# Patient Record
Sex: MALE | Race: WHITE | NOT HISPANIC OR LATINO | URBAN - METROPOLITAN AREA
[De-identification: names, ages, dates, MRNs, and addresses within clinical notes are randomized per-mention and may not be internally consistent; named-entity substitution may affect disease eponyms.]

---

## 2021-03-11 ENCOUNTER — IMMUNIZATION (OUTPATIENT)
Dept: VACCINE CLINIC | Facility: HOSPITAL | Age: 61
End: 2021-03-11

## 2021-03-11 PROCEDURE — 0001A: CPT | Performed by: INTERNAL MEDICINE

## 2021-03-11 PROCEDURE — 91300 HC SARSCOV02 VAC 30MCG/0.3ML IM: CPT | Performed by: INTERNAL MEDICINE

## 2021-04-01 ENCOUNTER — IMMUNIZATION (OUTPATIENT)
Dept: VACCINE CLINIC | Facility: HOSPITAL | Age: 61
End: 2021-04-01

## 2021-04-01 PROCEDURE — 0002A: CPT | Performed by: INTERNAL MEDICINE

## 2021-04-01 PROCEDURE — 91300 HC SARSCOV02 VAC 30MCG/0.3ML IM: CPT | Performed by: INTERNAL MEDICINE

## 2023-05-28 ENCOUNTER — APPOINTMENT (OUTPATIENT)
Dept: CT IMAGING | Facility: HOSPITAL | Age: 63
End: 2023-05-28
Payer: COMMERCIAL

## 2023-05-28 ENCOUNTER — HOSPITAL ENCOUNTER (INPATIENT)
Facility: HOSPITAL | Age: 63
LOS: 7 days | Discharge: HOME | End: 2023-06-04
Attending: EMERGENCY MEDICINE | Admitting: INTERNAL MEDICINE
Payer: COMMERCIAL

## 2023-05-28 ENCOUNTER — APPOINTMENT (OUTPATIENT)
Dept: GENERAL RADIOLOGY | Facility: HOSPITAL | Age: 63
End: 2023-05-28
Payer: COMMERCIAL

## 2023-05-28 DIAGNOSIS — I26.99 ACUTE PULMONARY EMBOLISM, UNSPECIFIED PULMONARY EMBOLISM TYPE, UNSPECIFIED WHETHER ACUTE COR PULMONALE PRESENT: Primary | ICD-10-CM

## 2023-05-28 DIAGNOSIS — R19.04 LEFT LOWER QUADRANT ABDOMINAL MASS: ICD-10-CM

## 2023-05-28 DIAGNOSIS — R93.5 ABNORMAL CT OF THE ABDOMEN: ICD-10-CM

## 2023-05-28 DIAGNOSIS — C43.9 METASTATIC MELANOMA: ICD-10-CM

## 2023-05-28 LAB
ALBUMIN SERPL-MCNC: 3.9 G/DL (ref 3.5–5.2)
ALBUMIN/GLOB SERPL: 0.9 G/DL
ALP SERPL-CCNC: 86 U/L (ref 39–117)
ALT SERPL W P-5'-P-CCNC: 19 U/L (ref 1–41)
ANION GAP SERPL CALCULATED.3IONS-SCNC: 12.9 MMOL/L (ref 5–15)
APTT PPP: 28.3 SECONDS (ref 22.7–35.4)
AST SERPL-CCNC: 11 U/L (ref 1–40)
BASOPHILS # BLD AUTO: 0.03 10*3/MM3 (ref 0–0.2)
BASOPHILS NFR BLD AUTO: 0.3 % (ref 0–1.5)
BILIRUB SERPL-MCNC: 0.8 MG/DL (ref 0–1.2)
BUN SERPL-MCNC: 14 MG/DL (ref 8–23)
BUN/CREAT SERPL: 9.9 (ref 7–25)
CALCIUM SPEC-SCNC: 9.4 MG/DL (ref 8.6–10.5)
CHLORIDE SERPL-SCNC: 99 MMOL/L (ref 98–107)
CO2 SERPL-SCNC: 23.1 MMOL/L (ref 22–29)
CREAT SERPL-MCNC: 1.41 MG/DL (ref 0.76–1.27)
D DIMER PPP FEU-MCNC: >20 MCGFEU/ML (ref 0–0.6)
DEPRECATED RDW RBC AUTO: 41 FL (ref 37–54)
EGFRCR SERPLBLD CKD-EPI 2021: 57.1 ML/MIN/1.73
EOSINOPHIL # BLD AUTO: 0.49 10*3/MM3 (ref 0–0.4)
EOSINOPHIL NFR BLD AUTO: 4.1 % (ref 0.3–6.2)
ERYTHROCYTE [DISTWIDTH] IN BLOOD BY AUTOMATED COUNT: 13.4 % (ref 12.3–15.4)
GLOBULIN UR ELPH-MCNC: 4.5 GM/DL
GLUCOSE SERPL-MCNC: 116 MG/DL (ref 65–99)
HCT VFR BLD AUTO: 39.4 % (ref 37.5–51)
HGB BLD-MCNC: 13.3 G/DL (ref 13–17.7)
HOLD SPECIMEN: NORMAL
HOLD SPECIMEN: NORMAL
IMM GRANULOCYTES # BLD AUTO: 0.04 10*3/MM3 (ref 0–0.05)
IMM GRANULOCYTES NFR BLD AUTO: 0.3 % (ref 0–0.5)
INR PPP: 1.21 (ref 0.9–1.1)
LIPASE SERPL-CCNC: 17 U/L (ref 13–60)
LYMPHOCYTES # BLD AUTO: 1.12 10*3/MM3 (ref 0.7–3.1)
LYMPHOCYTES NFR BLD AUTO: 9.3 % (ref 19.6–45.3)
MAGNESIUM SERPL-MCNC: 2.1 MG/DL (ref 1.6–2.4)
MCH RBC QN AUTO: 28.1 PG (ref 26.6–33)
MCHC RBC AUTO-ENTMCNC: 33.8 G/DL (ref 31.5–35.7)
MCV RBC AUTO: 83.3 FL (ref 79–97)
MONOCYTES # BLD AUTO: 0.93 10*3/MM3 (ref 0.1–0.9)
MONOCYTES NFR BLD AUTO: 7.8 % (ref 5–12)
NEUTROPHILS NFR BLD AUTO: 78.2 % (ref 42.7–76)
NEUTROPHILS NFR BLD AUTO: 9.39 10*3/MM3 (ref 1.7–7)
NRBC BLD AUTO-RTO: 0 /100 WBC (ref 0–0.2)
NT-PROBNP SERPL-MCNC: 460 PG/ML (ref 0–900)
PLATELET # BLD AUTO: 230 10*3/MM3 (ref 140–450)
PMV BLD AUTO: 8.9 FL (ref 6–12)
POTASSIUM SERPL-SCNC: 4.6 MMOL/L (ref 3.5–5.2)
PROT SERPL-MCNC: 8.4 G/DL (ref 6–8.5)
PROTHROMBIN TIME: 15.5 SECONDS (ref 11.7–14.2)
PSA SERPL-MCNC: 1.49 NG/ML (ref 0–4)
RBC # BLD AUTO: 4.73 10*6/MM3 (ref 4.14–5.8)
SODIUM SERPL-SCNC: 135 MMOL/L (ref 136–145)
TROPONIN T SERPL HS-MCNC: 26 NG/L
WBC NRBC COR # BLD: 12 10*3/MM3 (ref 3.4–10.8)
WHOLE BLOOD HOLD COAG: NORMAL
WHOLE BLOOD HOLD SPECIMEN: NORMAL

## 2023-05-28 PROCEDURE — 84484 ASSAY OF TROPONIN QUANT: CPT

## 2023-05-28 PROCEDURE — 93005 ELECTROCARDIOGRAM TRACING: CPT

## 2023-05-28 PROCEDURE — 83735 ASSAY OF MAGNESIUM: CPT

## 2023-05-28 PROCEDURE — 71275 CT ANGIOGRAPHY CHEST: CPT

## 2023-05-28 PROCEDURE — 25010000002 HEPARIN (PORCINE) 25000-0.45 UT/250ML-% SOLUTION: Performed by: EMERGENCY MEDICINE

## 2023-05-28 PROCEDURE — 99284 EMERGENCY DEPT VISIT MOD MDM: CPT

## 2023-05-28 PROCEDURE — 85610 PROTHROMBIN TIME: CPT | Performed by: EMERGENCY MEDICINE

## 2023-05-28 PROCEDURE — 25510000001 IOPAMIDOL PER 1 ML: Performed by: EMERGENCY MEDICINE

## 2023-05-28 PROCEDURE — 71045 X-RAY EXAM CHEST 1 VIEW: CPT

## 2023-05-28 PROCEDURE — 84153 ASSAY OF PSA TOTAL: CPT | Performed by: INTERNAL MEDICINE

## 2023-05-28 PROCEDURE — 93010 ELECTROCARDIOGRAM REPORT: CPT | Performed by: STUDENT IN AN ORGANIZED HEALTH CARE EDUCATION/TRAINING PROGRAM

## 2023-05-28 PROCEDURE — 85025 COMPLETE CBC W/AUTO DIFF WBC: CPT

## 2023-05-28 PROCEDURE — 83880 ASSAY OF NATRIURETIC PEPTIDE: CPT

## 2023-05-28 PROCEDURE — 83690 ASSAY OF LIPASE: CPT | Performed by: EMERGENCY MEDICINE

## 2023-05-28 PROCEDURE — 25010000002 HEPARIN (PORCINE) PER 1000 UNITS: Performed by: EMERGENCY MEDICINE

## 2023-05-28 PROCEDURE — 85730 THROMBOPLASTIN TIME PARTIAL: CPT | Performed by: EMERGENCY MEDICINE

## 2023-05-28 PROCEDURE — 85379 FIBRIN DEGRADATION QUANT: CPT | Performed by: EMERGENCY MEDICINE

## 2023-05-28 PROCEDURE — 80053 COMPREHEN METABOLIC PANEL: CPT

## 2023-05-28 PROCEDURE — 74177 CT ABD & PELVIS W/CONTRAST: CPT

## 2023-05-28 RX ORDER — BISACODYL 10 MG
10 SUPPOSITORY, RECTAL RECTAL DAILY PRN
Status: DISCONTINUED | OUTPATIENT
Start: 2023-05-28 | End: 2023-06-04 | Stop reason: HOSPADM

## 2023-05-28 RX ORDER — ACETAMINOPHEN 325 MG/1
650 TABLET ORAL EVERY 4 HOURS PRN
Status: DISCONTINUED | OUTPATIENT
Start: 2023-05-28 | End: 2023-06-04 | Stop reason: HOSPADM

## 2023-05-28 RX ORDER — HEPARIN SODIUM 10000 [USP'U]/100ML
18 INJECTION, SOLUTION INTRAVENOUS
Status: DISCONTINUED | OUTPATIENT
Start: 2023-05-28 | End: 2023-06-03

## 2023-05-28 RX ORDER — SODIUM CHLORIDE 0.9 % (FLUSH) 0.9 %
10 SYRINGE (ML) INJECTION AS NEEDED
Status: DISCONTINUED | OUTPATIENT
Start: 2023-05-28 | End: 2023-06-04 | Stop reason: HOSPADM

## 2023-05-28 RX ORDER — AMOXICILLIN 250 MG
2 CAPSULE ORAL 2 TIMES DAILY
Status: DISCONTINUED | OUTPATIENT
Start: 2023-05-28 | End: 2023-06-04 | Stop reason: HOSPADM

## 2023-05-28 RX ORDER — UREA 10 %
3 LOTION (ML) TOPICAL NIGHTLY PRN
Status: DISCONTINUED | OUTPATIENT
Start: 2023-05-28 | End: 2023-06-04 | Stop reason: HOSPADM

## 2023-05-28 RX ORDER — ONDANSETRON 2 MG/ML
4 INJECTION INTRAMUSCULAR; INTRAVENOUS EVERY 6 HOURS PRN
Status: DISCONTINUED | OUTPATIENT
Start: 2023-05-28 | End: 2023-06-04 | Stop reason: HOSPADM

## 2023-05-28 RX ORDER — ONDANSETRON 4 MG/1
4 TABLET, FILM COATED ORAL EVERY 6 HOURS PRN
Status: DISCONTINUED | OUTPATIENT
Start: 2023-05-28 | End: 2023-06-04 | Stop reason: HOSPADM

## 2023-05-28 RX ORDER — SODIUM CHLORIDE 9 MG/ML
75 INJECTION, SOLUTION INTRAVENOUS CONTINUOUS
Status: DISCONTINUED | OUTPATIENT
Start: 2023-05-28 | End: 2023-05-31

## 2023-05-28 RX ORDER — HEPARIN SODIUM 5000 [USP'U]/ML
40-80 INJECTION, SOLUTION INTRAVENOUS; SUBCUTANEOUS EVERY 6 HOURS PRN
Status: DISCONTINUED | OUTPATIENT
Start: 2023-05-28 | End: 2023-06-03

## 2023-05-28 RX ORDER — POLYETHYLENE GLYCOL 3350 17 G/17G
17 POWDER, FOR SOLUTION ORAL DAILY PRN
Status: DISCONTINUED | OUTPATIENT
Start: 2023-05-28 | End: 2023-06-04 | Stop reason: HOSPADM

## 2023-05-28 RX ORDER — HEPARIN SODIUM 5000 [USP'U]/ML
80 INJECTION, SOLUTION INTRAVENOUS; SUBCUTANEOUS ONCE
Status: COMPLETED | OUTPATIENT
Start: 2023-05-28 | End: 2023-05-28

## 2023-05-28 RX ORDER — BISACODYL 5 MG/1
5 TABLET, DELAYED RELEASE ORAL DAILY PRN
Status: DISCONTINUED | OUTPATIENT
Start: 2023-05-28 | End: 2023-06-04 | Stop reason: HOSPADM

## 2023-05-28 RX ADMIN — HEPARIN SODIUM 8300 UNITS: 5000 INJECTION INTRAVENOUS; SUBCUTANEOUS at 19:03

## 2023-05-28 RX ADMIN — SODIUM CHLORIDE 75 ML/HR: 9 INJECTION, SOLUTION INTRAVENOUS at 21:42

## 2023-05-28 RX ADMIN — IOPAMIDOL 85 ML: 755 INJECTION, SOLUTION INTRAVENOUS at 18:12

## 2023-05-28 RX ADMIN — HEPARIN SODIUM 18 UNITS/KG/HR: 10000 INJECTION, SOLUTION INTRAVENOUS at 19:05

## 2023-05-28 NOTE — ED PROVIDER NOTES
EMERGENCY DEPARTMENT ENCOUNTER    Room Number:  24/24  Date seen:  5/28/2023  PCP: Mohini Cortes MD  Historian: Patient and friends of the patient  Relevant information provided by sources other than the patient, review of external records, and social determinants of health may be included in the HPI section.      HPI:  Chief Complaint: Multiple issues of concern  Additional historical features obtained directly from: Dr. Cynthia Bond who is a personal friend of the patient is at the bedside to give additional information, and said that they were unaware that he had been feeling poorly but the other day after the patient had a near syncopal episode they went to check on him and she noted that he had a very swollen left leg and he showed her a mass in his left inguinal region that raise concerns and encouraged him to come to the hospital to be seen.  Context: King George is a 62 y.o. male who presents to the ED c/o multiple complaints over the last several weeks.  The patient has been otherwise healthy and is the  of the women's softball team about high school for years.  He admittedly has neglected preventative care and has never had a PCP that he sees often and does not take any medications.  Sounds like over the course the last several months has had some rectal bleeding off and on which he was not seen for but it would just stop.  He for started noticing this growth in his left inguinal region a few weeks ago and it is continually gotten larger and is nontender.  He then started noticing some swelling in his left leg that was worsening, and it would initially get better with elevation but now is not.  Finally, when he was at practice the other day he had a near syncopal episode where he felt very short of breath and had to go sit down.        Initial impression including social determinants which will impact the assessment social determinants limiting care in this case is that the patient has not had any  preventative care or checkups at all recently.  We have no old medical records to go by, and he has no past medical history to go from.  However, he is an extremely nice gentleman who is also good historian and is admittedly upset that he did not seek care sooner.          PAST MEDICAL HISTORY  Active Ambulatory Problems     Diagnosis Date Noted   • No Active Ambulatory Problems     Resolved Ambulatory Problems     Diagnosis Date Noted   • No Resolved Ambulatory Problems     No Additional Past Medical History         PAST SURGICAL HISTORY  History reviewed. No pertinent surgical history.      FAMILY HISTORY  History reviewed. No pertinent family history.      SOCIAL HISTORY  Social History     Socioeconomic History   • Marital status: Unknown   Tobacco Use   • Smoking status: Never   Substance and Sexual Activity   • Alcohol use: Never   • Drug use: Never         ALLERGIES  Patient has no known allergies.          PHYSICAL EXAM  ED Triage Vitals   Temp Heart Rate Resp BP SpO2   05/28/23 1646 05/28/23 1646 05/28/23 1646 05/28/23 1655 05/28/23 1646   99.4 °F (37.4 °C) 114 16 130/77 99 %      Temp src Heart Rate Source Patient Position BP Location FiO2 (%)   05/28/23 1646 05/28/23 1646 -- -- --   Tympanic Monitor            Physical Exam      GENERAL: Awake and alert, not acutely toxic  HENT: nares patent  EYES: no scleral icterus, PERRL, EOMI  CV: Sinus tachycardia, distal pulses symmetric and palpable  RESPIRATORY: normal effort, no respiratory distress and breath sounds are clear  ABDOMEN: soft, nondistended, nontender except for a large firm and nonmobile mass in the left inguinal region consistent with lymphadenopathy.  MUSCULOSKELETAL: no deformity, he has asymmetric swelling of his left lower extremity all the way up to the hip.  There is 1+ pitting edema, and the DP and PT pulses are palpable but somewhat diminished in strength compared to the opposite side.  NEURO: alert, moves all extremities, follows  commands  PSYCH:  calm, cooperative  SKIN: warm, dry with no rash        LAB RESULTS  Recent Results (from the past 24 hour(s))   Comprehensive Metabolic Panel    Collection Time: 05/28/23  5:10 PM    Specimen: Blood   Result Value Ref Range    Glucose 116 (H) 65 - 99 mg/dL    BUN 14 8 - 23 mg/dL    Creatinine 1.41 (H) 0.76 - 1.27 mg/dL    Sodium 135 (L) 136 - 145 mmol/L    Potassium 4.6 3.5 - 5.2 mmol/L    Chloride 99 98 - 107 mmol/L    CO2 23.1 22.0 - 29.0 mmol/L    Calcium 9.4 8.6 - 10.5 mg/dL    Total Protein 8.4 6.0 - 8.5 g/dL    Albumin 3.9 3.5 - 5.2 g/dL    ALT (SGPT) 19 1 - 41 U/L    AST (SGOT) 11 1 - 40 U/L    Alkaline Phosphatase 86 39 - 117 U/L    Total Bilirubin 0.8 0.0 - 1.2 mg/dL    Globulin 4.5 gm/dL    A/G Ratio 0.9 g/dL    BUN/Creatinine Ratio 9.9 7.0 - 25.0    Anion Gap 12.9 5.0 - 15.0 mmol/L    eGFR 57.1 (L) >60.0 mL/min/1.73   Magnesium    Collection Time: 05/28/23  5:10 PM    Specimen: Blood   Result Value Ref Range    Magnesium 2.1 1.6 - 2.4 mg/dL   Single High Sensitivity Troponin T    Collection Time: 05/28/23  5:10 PM    Specimen: Blood   Result Value Ref Range    HS Troponin T 26 (H) <15 ng/L   BNP    Collection Time: 05/28/23  5:10 PM    Specimen: Blood   Result Value Ref Range    proBNP 460.0 0.0 - 900.0 pg/mL   Green Top (Gel)    Collection Time: 05/28/23  5:10 PM   Result Value Ref Range    Extra Tube Hold for add-ons.    Lavender Top    Collection Time: 05/28/23  5:10 PM   Result Value Ref Range    Extra Tube hold for add-on    Gold Top - SST    Collection Time: 05/28/23  5:10 PM   Result Value Ref Range    Extra Tube Hold for add-ons.    Light Blue Top    Collection Time: 05/28/23  5:10 PM   Result Value Ref Range    Extra Tube Hold for add-ons.    CBC Auto Differential    Collection Time: 05/28/23  5:10 PM    Specimen: Blood   Result Value Ref Range    WBC 12.00 (H) 3.40 - 10.80 10*3/mm3    RBC 4.73 4.14 - 5.80 10*6/mm3    Hemoglobin 13.3 13.0 - 17.7 g/dL    Hematocrit 39.4 37.5 -  "51.0 %    MCV 83.3 79.0 - 97.0 fL    MCH 28.1 26.6 - 33.0 pg    MCHC 33.8 31.5 - 35.7 g/dL    RDW 13.4 12.3 - 15.4 %    RDW-SD 41.0 37.0 - 54.0 fl    MPV 8.9 6.0 - 12.0 fL    Platelets 230 140 - 450 10*3/mm3    Neutrophil % 78.2 (H) 42.7 - 76.0 %    Lymphocyte % 9.3 (L) 19.6 - 45.3 %    Monocyte % 7.8 5.0 - 12.0 %    Eosinophil % 4.1 0.3 - 6.2 %    Basophil % 0.3 0.0 - 1.5 %    Immature Grans % 0.3 0.0 - 0.5 %    Neutrophils, Absolute 9.39 (H) 1.70 - 7.00 10*3/mm3    Lymphocytes, Absolute 1.12 0.70 - 3.10 10*3/mm3    Monocytes, Absolute 0.93 (H) 0.10 - 0.90 10*3/mm3    Eosinophils, Absolute 0.49 (H) 0.00 - 0.40 10*3/mm3    Basophils, Absolute 0.03 0.00 - 0.20 10*3/mm3    Immature Grans, Absolute 0.04 0.00 - 0.05 10*3/mm3    nRBC 0.0 0.0 - 0.2 /100 WBC   D-dimer, Quantitative    Collection Time: 05/28/23  5:10 PM    Specimen: Blood   Result Value Ref Range    D-Dimer, Quantitative >20.00 (H) 0.00 - 0.60 MCGFEU/mL   Protime-INR    Collection Time: 05/28/23  5:10 PM    Specimen: Blood   Result Value Ref Range    Protime 15.5 (H) 11.7 - 14.2 Seconds    INR 1.21 (H) 0.90 - 1.10   aPTT    Collection Time: 05/28/23  5:10 PM    Specimen: Blood   Result Value Ref Range    PTT 28.3 22.7 - 35.4 seconds   Lipase    Collection Time: 05/28/23  5:10 PM    Specimen: Blood   Result Value Ref Range    Lipase 17 13 - 60 U/L   ECG 12 Lead ED Triage Standing Order; Syncope    Collection Time: 05/28/23  5:15 PM   Result Value Ref Range    QT Interval 361 ms       Ordered the above labs and my independent interpretation of these results are discussed in the section labeled \"ED course\" below        RADIOLOGY  XR Chest 1 View    Result Date: 5/28/2023  XR CHEST 1 VW-  HISTORY: 62-year-old male with shortness of breath.  FINDINGS: There is no evidence for pneumonia, effusions, or CHF.      CT Angiogram Chest Pulmonary Embolism, CT Abdomen Pelvis With Contrast    Result Date: 5/28/2023   CT ANGIOGRAM OF THE CHEST. MULTIPLE CORONAL, SAGITTAL, " AND 3-D RECONSTRUCTIONS. CT ABDOMEN AND PELVIS WITH IV CONTRAST  HISTORY: 62-year-old male with palpable abnormality at the left abdomen. Inguinal mass. Shortness of breath.  TECHNIQUE: Radiation dose reduction techniques were utilized, including automated exposure control and exposure modulation based on body size. CT angiogram of the chest was performed. Multiple coronal, sagittal, and 3-D reconstruction images were obtained. Subsequently, 3 mm images were obtained through the abdomen and pelvis after the administration of IV contrast. No priors for comparison.  FINDINGS: CHEST CTA: 1. There are multiple bilateral pulmonary thromboemboli. There are no pulmonary thromboemboli within the main pulmonary arteries, but there is nonocclusive thrombus within the proximal right interlobar pulmonary artery extending into segmental and subsegmental pulmonary arteries and there are pulmonary thromboemboli within the right upper and right middle lobe pulmonary arteries. There is a smaller volume of pulmonary thromboemboli within segmental and subsegmental pulmonary arteries on the left. The RV to LV ratio is 1.1. 2. There are several pulmonary sub-6 mm pulmonary nodules in both lungs as seen at the left lower lobe series 2 image 108. The nodules are suspicious for metastases. There are no pleural or pericardial effusions. There is no lymphadenopathy within the chest.  ABDOMEN/PELVIS: 1. The urinary bladder is markedly thickened and irregular and the abnormal soft tissue is confluent with the prostate. There is bulky lymphadenopathy within the pelvis and left groin. One of the left pelvic nodes measures 8.6 x 7.4 cm and a slightly inferior [place] mass measures 10.7 x 6.7 cm. One of the large left groin nodes measures 6.5 x 4.8 cm. There are multiple similar size and smaller size nodes adjacently. There is thrombus within the left common femoral vein. The left external iliac vein is compressed by the bulky intrapelvic lesions.  "There is lymphadenopathy throughout the pelvis and perirectal regions. There is a soft tissue mass within the posterior aspect of the distal rectum which appears similar to the lymphadenopathy but may possibly represent stool. There is significantly less prominent right groin lymphadenopathy than on the left. In addition, there is a 3.0 x 2.4 cm lateral hepatic segment metastasis. Primary malignancy may be the urinary bladder, colon and rectum, lymphoma, sarcoma. Tissue diagnosis is recommended. Left groin lymphadenopathy is easily accessible for CT-guided biopsy. The lateral hepatic segment metastasis is accessible as well. 2. There are lytic bone lesions as seen at the right iliac crest and left posterior elements of L3. Staging with PET/CT is recommended. 3. The spleen measures 14 cm. There is no lymphadenopathy within the abdomen. The gallbladder, pancreas, adrenals, and kidneys appear unremarkable. There is no bowel ileus or obstruction. Appendix appears within normal limits. There is a moderate size right inguinal hernia containing a segment of small bowel without evidence for incarceration.        Ordered the above noted radiological studies.  Independently interpreted by me in PACS.  My independent interpretation of these results are discussed in the section below labeled \"ED course\"        PROCEDURES  Critical Care  Performed by: Venkat Garcia MD  Authorized by: Venkat Garcia MD     Critical care provider statement:     Critical care time (minutes):  35    Critical care time was exclusive of:  Separately billable procedures and treating other patients    Critical care was necessary to treat or prevent imminent or life-threatening deterioration of the following conditions: PE with cor pulmonale.    Critical care was time spent personally by me on the following activities:  Discussions with consultants, discussions with primary provider, evaluation of patient's response to treatment, examination of " "patient, ordering and performing treatments and interventions, ordering and review of laboratory studies, ordering and review of radiographic studies, re-evaluation of patient's condition and review of old charts    I assumed direction of critical care for this patient from another provider in my specialty: no      Care discussed with: admitting provider                MEDICATIONS GIVEN IN ER  Medications   sodium chloride 0.9 % flush 10 mL (has no administration in time range)   heparin 33271 units/250 mL (100 units/mL) in 0.45 % NaCl infusion (has no administration in time range)   heparin (porcine) 5000 UNIT/ML injection 4,200-8,300 Units (has no administration in time range)   iopamidol (ISOVUE-370) 76 % injection 100 mL (85 mL Intravenous Given by Other 5/28/23 1812)   heparin (porcine) 5000 UNIT/ML injection 8,300 Units (8,300 Units Intravenous Given 5/28/23 1903)                   MEDICAL DECISION MAKING and INDEPENDENT INTERPRETATIONS      Everything documented below this statement is the \"ED course\" section which I have referred to above.  Everything discussed in the following section constitutes MY INDEPENDENT INTERPRETATIONS of the lab work, EKGs, and radiology studies, and all of my personal conversations with other providers, and all of my independent decision making regarding this patient are discussed below.      ED Course as of 05/28/23 1907   Sun May 28, 2023   1903 Patient has a mild leukocytosis and normal hemoglobin [DP]   1903 Chemistry shows slightly elevated creatinine with no previous baseline and electrolytes are normal [DP]   1903 High-sensitivity troponin is 26 and D-dimer is greater than 20 [DP]   1903 proBNP is negative [DP]   1904 EKG at 1715  Sinus rhythm at 90  Normal HI, QRS and QT  No acute ST segment changes are seen to suggest ischemia and there is no previous available for comparison at this time. [DP]   1904 Chest x-ray shows nothing acute [DP]   1904 CT angiogram shows multiple " "subsegmental PEs, there may be slight enlargement of the right ventricle. [DP]   1904 CT the abdomen pelvis shows a large heterogeneous mass in the left lower quadrant with adjacent lymphadenopathy in the inguinal region and probably clot in the left iliac region [DP]   1905 Went back in the room to have a long talk with the patient along with Dr. Bond and her  at the bedside.  I have advised him that he needs to be on heparin for the DVT, general surgery and oncology will need to be consulted.    He is hemodynamically stable at this time and not in any respiratory distress so I do not think any surgical intervention or thrombolysis is necessary at this time, but cardiology probably would be consulted [DP]   1905 Patient is aware that this is likely a metastatic colon cancer although we will need to get specialty involvement to confirm [DP]   1905 There is no ultrasound tech here at this time to do an ultrasound of the left lower extremity, but almost certain that there is significant amount of clot burden in that leg [DP]   1906 The distal pulses are slightly diminished when compared to the right, but there is brisk cap refill in the toes and no significant discoloration of the leg to merit emergent thrombectomy. [DP]   1906 I spoke with Dr. Weinstein who agrees to admit the patient to a telemetry bed for further. [DP]      ED Course User Index  [DP] Venkat Garcia MD         Everything documented above with this statement, in the ED course section constitutes my independent interpretation of lab work EKG and radiology studies along with my personal conversations with other providers and my independent medical decision making.  This statement will not be repeated before each data point, but they are all my independent interpretation needs contained within the \"ED course\" section.             All appropriate hygiene and PPE requirements were satisfied with this patient encounter      FINAL DIAGNOSES  Final " diagnoses:   Acute pulmonary embolism, unspecified pulmonary embolism type, unspecified whether acute cor pulmonale present   Left lower quadrant abdominal mass           DISPOSITION  Admit to telemetry          Latest Documented Vital Signs:  As of 19:07 EDT  BP- 130/77 HR- 114 Temp- 99.4 °F (37.4 °C) (Tympanic) O2 sat- 99%        --    Please note that portions of this were completed with a voice recognition program.       Note Disclaimer: At [facility], we believe that sharing information builds trust and better relationships. You are receiving this note because you are receiving care at [facility] or recently visited. It is possible you will see health information before a provider has talked with you about it. This kind of information can be easy to misunderstand. To help you fully understand what it      Venkat Garcia MD  05/28/23 5687

## 2023-05-28 NOTE — ED TRIAGE NOTES
Pt is soa.  He has an abd mass that is palpable.  He had a near syncopal episode today.  He has left lowe calf pain

## 2023-05-28 NOTE — ED NOTES
"Nursing report ED to floor  [place] George  62 y.o.  male    HPI :   Chief Complaint   Patient presents with    Shortness of Breath    Syncope    Leg Pain    abdominal mass       Admitting doctor:   Maria Guadalupe Kam MD    Admitting diagnosis:   The primary encounter diagnosis was Acute pulmonary embolism, unspecified pulmonary embolism type, unspecified whether acute cor pulmonale present. A diagnosis of Left lower quadrant abdominal mass was also pertinent to this visit.    Code status:   Current Code Status       Date Active Code Status Order ID Comments User Context       Not on file            Allergies:   Patient has no known allergies.    Isolation:   No active isolations    Intake and Output  No intake or output data in the 24 hours ending 05/28/23 1908    Weight:       05/28/23 1655   Weight: 104 kg (230 lb)       Most recent vitals:   Vitals:    05/28/23 1646 05/28/23 1655   BP:  130/77   Pulse: 114    Resp: 16    Temp: 99.4 °F (37.4 °C)    TempSrc: Tympanic    SpO2: 99%    Weight:  104 kg (230 lb)   Height:  182.9 cm (72\")       Active LDAs/IV Access:   Lines, Drains & Airways       Active LDAs       Name Placement date Placement time Site Days    Peripheral IV 05/28/23 1708 Anterior;Right Forearm 05/28/23 1708  Forearm  less than 1    Peripheral IV 05/28/23 1902 Anterior;Distal;Right;Upper Arm 05/28/23 1902  Arm  less than 1                    Labs (abnormal labs have a star):   Labs Reviewed   COMPREHENSIVE METABOLIC PANEL - Abnormal; Notable for the following components:       Result Value    Glucose 116 (*)     Creatinine 1.41 (*)     Sodium 135 (*)     eGFR 57.1 (*)     All other components within normal limits    Narrative:     GFR Normal >60  Chronic Kidney Disease <60  Kidney Failure <15     SINGLE HSTROPONIN T - Abnormal; Notable for the following components:    HS Troponin T 26 (*)     All other components within normal limits    Narrative:     High Sensitive Troponin T Reference " "Range:  <10.0 ng/L- Negative Female for AMI  <15.0 ng/L- Negative Male for AMI  >=10 - Abnormal Female indicating possible myocardial injury.  >=15 - Abnormal Male indicating possible myocardial injury.   Clinicians would have to utilize clinical acumen, EKG, Troponin, and serial changes to determine if it is an Acute Myocardial Infarction or myocardial injury due to an underlying chronic condition.        CBC WITH AUTO DIFFERENTIAL - Abnormal; Notable for the following components:    WBC 12.00 (*)     Neutrophil % 78.2 (*)     Lymphocyte % 9.3 (*)     Neutrophils, Absolute 9.39 (*)     Monocytes, Absolute 0.93 (*)     Eosinophils, Absolute 0.49 (*)     All other components within normal limits   D-DIMER, QUANTITATIVE - Abnormal; Notable for the following components:    D-Dimer, Quantitative >20.00 (*)     All other components within normal limits    Narrative:     According to the assay 's published package insert, a normal (<0.50 MCGFEU/mL) D-dimer result in conjunction with a non-high clinical probability assessment, excludes deep vein thrombosis (DVT) and pulmonary embolism (PE) with high sensitivity.    D-dimer values increase with age and this can make VTE exclusion of an older population difficult. To address this, the American College [employer], based on best available evidence and recent guidelines, recommends that clinicians use age-adjusted D-dimer thresholds in patients greater than 50 years of age with: a) a low probability of PE who do not meet all Pulmonary Embolism Rule Out Criteria, or b) in those with intermediate probability of PE.   The formula for an age-adjusted D-dimer cut-off is \"age/100\".  For example, a 60 year old patient would have an age-adjusted cut-off of 0.60 MCGFEU/mL and an 80 year old 0.80 MCGFEU/mL.   PROTIME-INR - Abnormal; Notable for the following components:    Protime 15.5 (*)     INR 1.21 (*)     All other components within normal limits   MAGNESIUM - Normal "   BNP (IN-HOUSE) - Normal    Narrative:     Among patients with dyspnea, NT-proBNP is highly sensitive for the detection of acute congestive heart failure. In addition NT-proBNP of <300 pg/ml effectively rules out acute congestive heart failure with 99% negative predictive value.    Results may be falsely decreased if patient taking Biotin.     APTT - Normal   LIPASE - Normal   RAINBOW DRAW    Narrative:     The following orders were created for panel order [place] Draw.  Procedure                               Abnormality         Status                     ---------                               -----------         ------                     Green Top (Gel)[[id number]]                                  Final result               Lavender Top[[id number]]                                     Final result               Gold Top - SST[[id number]]                                   Final result               Light Blue Top[[id number]]                                   Final result                 Please view results for these tests on the individual orders.   CBC AND DIFFERENTIAL    Narrative:     The following orders were created for panel order CBC & Differential.  Procedure                               Abnormality         Status                     ---------                               -----------         ------                     CBC Auto Differential[[id number]]        Abnormal            Final result                 Please view results for these tests on the individual orders.   GREEN TOP   LAVENDER TOP   GOLD TOP - SST   LIGHT BLUE TOP       EKG:   ECG 12 Lead ED Triage Standing Order; Syncope   Preliminary Result   HEART RATE= 90  bpm   RR Interval= 667  ms   VA Interval= 163  ms   P Horizontal Axis= 19  deg   P Front Axis= 49  deg   QRSD Interval= 77  ms   QT Interval= 361  ms   QRS Axis= 16  deg   T Wave Axis= 15  deg   - NORMAL ECG -   Sinus rhythm   Baseline wander in lead(s) V2   Electronically Signed By:     Date and Time of Study: 2023-05-28 17:15:59          Meds given in ED:   Medications   sodium chloride 0.9 % flush 10 mL (has no administration in time range)   heparin 31861 units/250 mL (100 units/mL) in 0.45 % NaCl infusion (18 Units/kg/hr × 104 kg Intravenous New Bag 5/28/23 1905)   heparin (porcine) 5000 UNIT/ML injection 4,200-8,300 Units (has no administration in time range)   iopamidol (ISOVUE-370) 76 % injection 100 mL (85 mL Intravenous Given by Other 5/28/23 1812)   heparin (porcine) 5000 UNIT/ML injection 8,300 Units (8,300 Units Intravenous Given 5/28/23 1903)       Imaging results:  No radiology results for the last day    Ambulatory status:   - up ad antonio    Social issues:   Social History     Socioeconomic History    Marital status: Unknown   Tobacco Use    Smoking status: Never   Substance and Sexual Activity    Alcohol use: Never    Drug use: Never       NIH Stroke Scale:         Carla Solano RN  05/28/23 19:08 EDT

## 2023-05-29 PROBLEM — R59.0 ABDOMINAL LYMPHADENOPATHY: Status: ACTIVE | Noted: 2023-05-29

## 2023-05-29 PROBLEM — D64.9 ANEMIA: Status: ACTIVE | Noted: 2023-05-29

## 2023-05-29 PROBLEM — R19.09 LEFT GROIN MASS: Status: ACTIVE | Noted: 2023-05-29

## 2023-05-29 PROBLEM — K76.9 HEPATIC LESION: Status: ACTIVE | Noted: 2023-05-29

## 2023-05-29 LAB
ANION GAP SERPL CALCULATED.3IONS-SCNC: 14.5 MMOL/L (ref 5–15)
APTT PPP: 51.8 SECONDS (ref 22.7–35.4)
APTT PPP: 52.9 SECONDS (ref 22.7–35.4)
APTT PPP: 90.8 SECONDS (ref 22.7–35.4)
BASOPHILS # BLD AUTO: 0.04 10*3/MM3 (ref 0–0.2)
BASOPHILS NFR BLD AUTO: 0.4 % (ref 0–1.5)
BUN SERPL-MCNC: 16 MG/DL (ref 8–23)
BUN/CREAT SERPL: 13.6 (ref 7–25)
CALCIUM SPEC-SCNC: 8.9 MG/DL (ref 8.6–10.5)
CEA SERPL-MCNC: 0.88 NG/ML
CHLORIDE SERPL-SCNC: 100 MMOL/L (ref 98–107)
CO2 SERPL-SCNC: 20.5 MMOL/L (ref 22–29)
CREAT SERPL-MCNC: 1.18 MG/DL (ref 0.76–1.27)
DEPRECATED RDW RBC AUTO: 42.7 FL (ref 37–54)
EGFRCR SERPLBLD CKD-EPI 2021: 70.6 ML/MIN/1.73
EOSINOPHIL # BLD AUTO: 0.46 10*3/MM3 (ref 0–0.4)
EOSINOPHIL NFR BLD AUTO: 4.1 % (ref 0.3–6.2)
ERYTHROCYTE [DISTWIDTH] IN BLOOD BY AUTOMATED COUNT: 13.9 % (ref 12.3–15.4)
FERRITIN SERPL-MCNC: 378 NG/ML (ref 30–400)
GLUCOSE SERPL-MCNC: 96 MG/DL (ref 65–99)
HBA1C MFR BLD: 5.7 % (ref 4.8–5.6)
HCT VFR BLD AUTO: 34.4 % (ref 37.5–51)
HGB BLD-MCNC: 11.4 G/DL (ref 13–17.7)
IMM GRANULOCYTES # BLD AUTO: 0.04 10*3/MM3 (ref 0–0.05)
IMM GRANULOCYTES NFR BLD AUTO: 0.4 % (ref 0–0.5)
LDH SERPL-CCNC: 542 U/L (ref 135–225)
LYMPHOCYTES # BLD AUTO: 1.35 10*3/MM3 (ref 0.7–3.1)
LYMPHOCYTES NFR BLD AUTO: 12 % (ref 19.6–45.3)
MCH RBC QN AUTO: 27.8 PG (ref 26.6–33)
MCHC RBC AUTO-ENTMCNC: 33.1 G/DL (ref 31.5–35.7)
MCV RBC AUTO: 83.9 FL (ref 79–97)
MONOCYTES # BLD AUTO: 0.98 10*3/MM3 (ref 0.1–0.9)
MONOCYTES NFR BLD AUTO: 8.7 % (ref 5–12)
NEUTROPHILS NFR BLD AUTO: 74.4 % (ref 42.7–76)
NEUTROPHILS NFR BLD AUTO: 8.41 10*3/MM3 (ref 1.7–7)
NRBC BLD AUTO-RTO: 0 /100 WBC (ref 0–0.2)
PLATELET # BLD AUTO: 206 10*3/MM3 (ref 140–450)
PMV BLD AUTO: 9.2 FL (ref 6–12)
POTASSIUM SERPL-SCNC: 4.1 MMOL/L (ref 3.5–5.2)
QT INTERVAL: 361 MS
RBC # BLD AUTO: 4.1 10*6/MM3 (ref 4.14–5.8)
SODIUM SERPL-SCNC: 135 MMOL/L (ref 136–145)
WBC NRBC COR # BLD: 11.28 10*3/MM3 (ref 3.4–10.8)

## 2023-05-29 PROCEDURE — 85730 THROMBOPLASTIN TIME PARTIAL: CPT | Performed by: HOSPITALIST

## 2023-05-29 PROCEDURE — 85025 COMPLETE CBC W/AUTO DIFF WBC: CPT | Performed by: EMERGENCY MEDICINE

## 2023-05-29 PROCEDURE — 25010000002 HEPARIN (PORCINE) 25000-0.45 UT/250ML-% SOLUTION: Performed by: EMERGENCY MEDICINE

## 2023-05-29 PROCEDURE — 36415 COLL VENOUS BLD VENIPUNCTURE: CPT | Performed by: INTERNAL MEDICINE

## 2023-05-29 PROCEDURE — 85730 THROMBOPLASTIN TIME PARTIAL: CPT | Performed by: NURSE PRACTITIONER

## 2023-05-29 PROCEDURE — 83036 HEMOGLOBIN GLYCOSYLATED A1C: CPT | Performed by: INTERNAL MEDICINE

## 2023-05-29 PROCEDURE — 99223 1ST HOSP IP/OBS HIGH 75: CPT | Performed by: PHYSICIAN ASSISTANT

## 2023-05-29 PROCEDURE — 85730 THROMBOPLASTIN TIME PARTIAL: CPT | Performed by: INTERNAL MEDICINE

## 2023-05-29 PROCEDURE — 0TJB8ZZ INSPECTION OF BLADDER, VIA NATURAL OR ARTIFICIAL OPENING ENDOSCOPIC: ICD-10-PCS | Performed by: SURGERY

## 2023-05-29 PROCEDURE — 25010000002 HEPARIN (PORCINE) PER 1000 UNITS: Performed by: EMERGENCY MEDICINE

## 2023-05-29 PROCEDURE — 80048 BASIC METABOLIC PNL TOTAL CA: CPT | Performed by: INTERNAL MEDICINE

## 2023-05-29 PROCEDURE — 83615 LACTATE (LD) (LDH) ENZYME: CPT | Performed by: INTERNAL MEDICINE

## 2023-05-29 PROCEDURE — 82728 ASSAY OF FERRITIN: CPT | Performed by: INTERNAL MEDICINE

## 2023-05-29 PROCEDURE — 82378 CARCINOEMBRYONIC ANTIGEN: CPT | Performed by: INTERNAL MEDICINE

## 2023-05-29 PROCEDURE — 07DJ3ZX EXTRACTION OF LEFT INGUINAL LYMPHATIC, PERCUTANEOUS APPROACH, DIAGNOSTIC: ICD-10-PCS | Performed by: RADIOLOGY

## 2023-05-29 RX ADMIN — HEPARIN SODIUM 20.99 UNITS/KG/HR: 10000 INJECTION, SOLUTION INTRAVENOUS at 07:21

## 2023-05-29 RX ADMIN — HEPARIN SODIUM 8100 UNITS: 5000 INJECTION INTRAVENOUS; SUBCUTANEOUS at 14:31

## 2023-05-29 RX ADMIN — DOCUSATE SODIUM 50MG AND SENNOSIDES 8.6MG 2 TABLET: 8.6; 5 TABLET, FILM COATED ORAL at 09:13

## 2023-05-29 RX ADMIN — HEPARIN SODIUM 25.83 UNITS/KG/HR: 10000 INJECTION, SOLUTION INTRAVENOUS at 19:17

## 2023-05-29 RX ADMIN — SODIUM CHLORIDE 75 ML/HR: 9 INJECTION, SOLUTION INTRAVENOUS at 22:51

## 2023-05-29 RX ADMIN — HEPARIN SODIUM 25.83 UNITS/KG/HR: 10000 INJECTION, SOLUTION INTRAVENOUS at 14:31

## 2023-05-29 RX ADMIN — DOCUSATE SODIUM 50MG AND SENNOSIDES 8.6MG 2 TABLET: 8.6; 5 TABLET, FILM COATED ORAL at 21:55

## 2023-05-29 NOTE — CONSULTS
FIRST UROLOGY CONSULT      Patient Identification:  NAME:  [place]  Age:  62 y.o.   Sex:  male   :  1960   MRN:  [id number]       Chief complaint: Syncope, mass inguinal region    History of present illness:  Pt presented to the ER on 23 with recent history of near syncopal episode, left inguinal mass.  Pt reports he has not been feeling well for about a month. 20# wt loss due to lack of appetite and ''gagging'' in the morning when he tries to eat.  He reports he noticed swelling in left groin area and extending toward his leg.  He reports it is firm to the touch.  He voids without straining or pushing.  He does report increased urgency to void.  He denies blood in urine or burning with urination.  He does not have any past medical history as he has not seen a medical provider for many years.      CT scan indicates the bladder is thickened and irregular.  Abnormal soft tissue confluent with the prostate.  Bulky lymphadenopathy within the pelvis and left groin.  Enlarged pelvic lymph nodes.  Willian mass.  Thrombus in L common femoral vein and left external iliac vein compression with bulky intrapelvic lesions.  Lymphadenopathy throughout pelvis and cody-rectal area.  Soft issue mass in distal rectum.      Pt's PSA is 1.49.  Pt will have a bedside cystoscopy today to evaluate bladder.  Discussed the procedure with the pt today.        Past medical history:  History reviewed. No pertinent past medical history.    Past surgical history:  History reviewed. No pertinent surgical history.    Allergies:  Patient has no known allergies.    Home medications:  No medications prior to admission.        Hospital medications:  senna-docusate sodium, 2 tablet, Oral, BID      heparin, 18 Units/kg/hr, Last Rate: 20.99 Units/kg/hr (23 0721)  sodium chloride, 75 mL/hr, Last Rate: 75 mL/hr (23 0678)      •  acetaminophen  •  senna-docusate sodium **AND** polyethylene glycol **AND** bisacodyl  **AND** bisacodyl  •  heparin (porcine)  •  melatonin  •  ondansetron **OR** ondansetron  •  sodium chloride    Family history:  History reviewed. No pertinent family history.    Social history:  Social History     Tobacco Use   • Smoking status: Never   Substance Use Topics   • Alcohol use: Never   • Drug use: Never       Review of systems:      Positive for:  Left groin/left leg palpable mass  Negative for:  Denies HA.  Denies CP or SOA.  Denies abd pain    Objective:  TMax 24 hours:   Temp (24hrs), Av.7 °F (37.1 °C), Min:98.2 °F (36.8 °C), Max:99.4 °F (37.4 °C)      Vitals Ranges:   Temp:  [98.2 °F (36.8 °C)-99.4 °F (37.4 °C)] 98.2 °F (36.8 °C)  Heart Rate:  [] 72  Resp:  [16] 16  BP: (122-139)/(74-81) 139/74    Intake/Output Last 3 shifts:  I/O last 3 completed shifts:  In: 600 [P.O.:600]  Out: 200 [Urine:200]     Physical Exam:    General Appearance:    Alert, cooperative, NAD, sitting up in bed    HEENT:    No trauma, pupils reactive, hearing intact   Back:     No CVA tenderness   Lungs:     Respirations unlabored, no wheezing    Heart:    RRR, intact peripheral pulses   Abdomen:     Soft, NDNT, no masses, no guarding.  Large firm and non mobile mass in left inguinal region   :    Penis normal   Extremities:   1+ pitting edema, no deformity   Lymphatic:   No neck or groin LAD   Skin:   No bleeding, bruising or rashes   Neuro/Psych:   Orientation intact, mood/affect pleasant, no focal findings       Results review:   I reviewed the patient's new clinical results.    Data review:  Lab Results (last 24 hours)     Procedure Component Value Units Date/Time    aPTT [[id number]]  (Abnormal) Collected: 23    Specimen: Blood Updated: 23     PTT 52.9 seconds     Basic Metabolic Panel [[id number]]  (Abnormal) Collected: 23    Specimen: Blood Updated: 23     Glucose 96 mg/dL      BUN 16 mg/dL      Creatinine 1.18 mg/dL      Sodium 135 mmol/L      Potassium 4.1 mmol/L       Chloride 100 mmol/L      CO2 20.5 mmol/L      Calcium 8.9 mg/dL      BUN/Creatinine Ratio 13.6     Anion Gap 14.5 mmol/L      eGFR 70.6 mL/min/1.73     Narrative:      GFR Normal >60  Chronic Kidney Disease <60  Kidney Failure <15      Hemoglobin A1c [[id number]]  (Abnormal) Collected: 05/29/23 0326    Specimen: Blood Updated: 05/29/23 0426     Hemoglobin A1C 5.70 %     Narrative:      Hemoglobin A1C Ranges:    Increased Risk for Diabetes  5.7% to 6.4%  Diabetes                     >= 6.5%  Diabetic Goal                < 7.0%    CBC & Differential [[id number]]  (Abnormal) Collected: 05/29/23 0326    Specimen: Blood Updated: 05/29/23 0415    Narrative:      The following orders were created for panel order CBC & Differential.  Procedure                               Abnormality         Status                     ---------                               -----------         ------                     CBC Auto Differential[[id number]]        Abnormal            Final result                 Please view results for these tests on the individual orders.    CBC Auto Differential [[id number]]  (Abnormal) Collected: 05/29/23 0326    Specimen: Blood Updated: 05/29/23 0415     WBC 11.28 10*3/mm3      RBC 4.10 10*6/mm3      Hemoglobin 11.4 g/dL      Hematocrit 34.4 %      MCV 83.9 fL      MCH 27.8 pg      MCHC 33.1 g/dL      RDW 13.9 %      RDW-SD 42.7 fl      MPV 9.2 fL      Platelets 206 10*3/mm3      Neutrophil % 74.4 %      Lymphocyte % 12.0 %      Monocyte % 8.7 %      Eosinophil % 4.1 %      Basophil % 0.4 %      Immature Grans % 0.4 %      Neutrophils, Absolute 8.41 10*3/mm3      Lymphocytes, Absolute 1.35 10*3/mm3      Monocytes, Absolute 0.98 10*3/mm3      Eosinophils, Absolute 0.46 10*3/mm3      Basophils, Absolute 0.04 10*3/mm3      Immature Grans, Absolute 0.04 10*3/mm3      nRBC 0.0 /100 WBC     PSA DIAGNOSTIC [[id number]]  (Normal) Collected: 05/28/23 2236    Specimen: Blood Updated: 05/28/23 2315     PSA 1.490 ng/mL  "    Narrative:      Results may be falsely decreased if patient taking Biotin.    Testing Method: [employer] Electrochemiluminescence Immunoassay(ECLIA)  Values obtained with different assay methods or kits cannot be used interchangeably.    D-dimer, Quantitative [[id number]]  (Abnormal) Collected: 05/28/23 1710    Specimen: Blood Updated: 05/28/23 1830     D-Dimer, Quantitative >20.00 MCGFEU/mL     Narrative:      According to the assay 's published package insert, a normal (<0.50 MCGFEU/mL) D-dimer result in conjunction with a non-high clinical probability assessment, excludes deep vein thrombosis (DVT) and pulmonary embolism (PE) with high sensitivity.    D-dimer values increase with age and this can make VTE exclusion of an older population difficult. To address this, the [employer], based on best available evidence and recent guidelines, recommends that clinicians use age-adjusted D-dimer thresholds in patients greater than 50 years of age with: a) a low probability of PE who do not meet all Pulmonary Embolism Rule Out Criteria, or b) in those with intermediate probability of PE.   The formula for an age-adjusted D-dimer cut-off is \"age/100\".  For example, a 60 year old patient would have an age-adjusted cut-off of 0.60 MCGFEU/mL and an 80 year old 0.80 MCGFEU/mL.    aPTT [[id number]]  (Normal) Collected: 05/28/23 1710    Specimen: Blood Updated: 05/28/23 1825     PTT 28.3 seconds     Protime-INR [[id number]]  (Abnormal) Collected: 05/28/23 1710    Specimen: Blood Updated: 05/28/23 1825     Protime 15.5 Seconds      INR 1.21    [place] Draw [[id number]] Collected: 05/28/23 1710    Specimen: Blood Updated: 05/28/23 1815    Narrative:      The following orders were created for panel order [place] Draw.  Procedure                               Abnormality         Status                     ---------                               -----------         ------                   "   Green Top (Gel)[[id number]]                                  Final result               Lavender Top[[id number]]                                     Final result               Gold Top - SST[[id number]]                                   Final result               Light Blue Top[[id number]]                                   Final result                 Please view results for these tests on the individual orders.    Green Top (Gel) [[id number]] Collected: 05/28/23 1710    Specimen: Blood Updated: 05/28/23 1815     Extra Tube Hold for add-ons.     Comment: Auto resulted.       Lavender Top [[id number]] Collected: 05/28/23 1710    Specimen: Blood Updated: 05/28/23 1815     Extra Tube hold for add-on     Comment: Auto resulted       Gold Top - SST [[id number]] Collected: 05/28/23 1710    Specimen: Blood Updated: 05/28/23 1815     Extra Tube Hold for add-ons.     Comment: Auto resulted.       Light Blue Top [[id number]] Collected: 05/28/23 1710    Specimen: Blood Updated: 05/28/23 1815     Extra Tube Hold for add-ons.     Comment: Auto resulted       Lipase [[id number]]  (Normal) Collected: 05/28/23 1710    Specimen: Blood Updated: 05/28/23 1757     Lipase 17 U/L     Magnesium [[id number]]  (Normal) Collected: 05/28/23 1710    Specimen: Blood Updated: 05/28/23 1743     Magnesium 2.1 mg/dL     Comprehensive Metabolic Panel [[id number]]  (Abnormal) Collected: 05/28/23 1710    Specimen: Blood Updated: 05/28/23 1743     Glucose 116 mg/dL      BUN 14 mg/dL      Creatinine 1.41 mg/dL      Sodium 135 mmol/L      Potassium 4.6 mmol/L      Chloride 99 mmol/L      CO2 23.1 mmol/L      Calcium 9.4 mg/dL      Total Protein 8.4 g/dL      Albumin 3.9 g/dL      ALT (SGPT) 19 U/L      AST (SGOT) 11 U/L      Alkaline Phosphatase 86 U/L      Total Bilirubin 0.8 mg/dL      Globulin 4.5 gm/dL      A/G Ratio 0.9 g/dL      BUN/Creatinine Ratio 9.9     Anion Gap 12.9 mmol/L      eGFR 57.1 mL/min/1.73     Narrative:      GFR Normal >60  Chronic Kidney  Disease <60  Kidney Failure <15      Single High Sensitivity Troponin T [[id number]]  (Abnormal) Collected: 05/28/23 1710    Specimen: Blood Updated: 05/28/23 1743     HS Troponin T 26 ng/L     Narrative:      High Sensitive Troponin T Reference Range:  <10.0 ng/L- Negative Female for AMI  <15.0 ng/L- Negative Male for AMI  >=10 - Abnormal Female indicating possible myocardial injury.  >=15 - Abnormal Male indicating possible myocardial injury.   Clinicians would have to utilize clinical acumen, EKG, Troponin, and serial changes to determine if it is an Acute Myocardial Infarction or myocardial injury due to an underlying chronic condition.         BNP [[id number]]  (Normal) Collected: 05/28/23 1710    Specimen: Blood Updated: 05/28/23 1741     proBNP 460.0 pg/mL     Narrative:      Among patients with dyspnea, NT-proBNP is highly sensitive for the detection of acute congestive heart failure. In addition NT-proBNP of <300 pg/ml effectively rules out acute congestive heart failure with 99% negative predictive value.    Results may be falsely decreased if patient taking Biotin.      CBC & Differential [[id number]]  (Abnormal) Collected: 05/28/23 1710    Specimen: Blood Updated: 05/28/23 1722    Narrative:      The following orders were created for panel order CBC & Differential.  Procedure                               Abnormality         Status                     ---------                               -----------         ------                     CBC Auto Differential[[id number]]        Abnormal            Final result                 Please view results for these tests on the individual orders.    CBC Auto Differential [[id number]]  (Abnormal) Collected: 05/28/23 1710    Specimen: Blood Updated: 05/28/23 1722     WBC 12.00 10*3/mm3      RBC 4.73 10*6/mm3      Hemoglobin 13.3 g/dL      Hematocrit 39.4 %      MCV 83.3 fL      MCH 28.1 pg      MCHC 33.8 g/dL      RDW 13.4 %      RDW-SD 41.0 fl      MPV 8.9 fL       Platelets 230 10*3/mm3      Neutrophil % 78.2 %      Lymphocyte % 9.3 %      Monocyte % 7.8 %      Eosinophil % 4.1 %      Basophil % 0.3 %      Immature Grans % 0.3 %      Neutrophils, Absolute 9.39 10*3/mm3      Lymphocytes, Absolute 1.12 10*3/mm3      Monocytes, Absolute 0.93 10*3/mm3      Eosinophils, Absolute 0.49 10*3/mm3      Basophils, Absolute 0.03 10*3/mm3      Immature Grans, Absolute 0.04 10*3/mm3      nRBC 0.0 /100 WBC            Imaging:  Imaging Results (Last 24 Hours)     Procedure Component Value Units Date/Time    CT Angiogram Chest Pulmonary Embolism [[id number]] Collected: 05/28/23 1906     Updated: 05/28/23 1906    Narrative:         CT ANGIOGRAM OF THE CHEST. MULTIPLE CORONAL, SAGITTAL, AND 3-D  RECONSTRUCTIONS. CT ABDOMEN AND PELVIS WITH IV CONTRAST     HISTORY: 62-year-old male with palpable abnormality at the left abdomen.  Inguinal mass. Shortness of breath.     TECHNIQUE: Radiation dose reduction techniques were utilized, including  automated exposure control and exposure modulation based on body size.  CT angiogram of the chest was performed. Multiple coronal, sagittal, and  3-D reconstruction images were obtained. Subsequently, 3 mm images were  obtained through the abdomen and pelvis after the administration of IV  contrast. No priors for comparison.     FINDINGS:  CHEST CTA:  1. There are multiple bilateral pulmonary thromboemboli. There are no  pulmonary thromboemboli within the main pulmonary arteries, but there is  nonocclusive thrombus within the proximal right interlobar pulmonary  artery extending into segmental and subsegmental pulmonary arteries and  there are pulmonary thromboemboli within the right upper and right  middle lobe pulmonary arteries. There is a smaller volume of pulmonary  thromboemboli within segmental and subsegmental pulmonary arteries on  the left. The RV to LV ratio is 1.1.  2. There are several pulmonary sub-6 mm pulmonary nodules in both lungs  as seen at  the left lower lobe series 2 image 108. The nodules are  suspicious for metastases. There are no pleural or pericardial  effusions. There is no lymphadenopathy within the chest.     ABDOMEN/PELVIS:  1. The urinary bladder is markedly thickened and irregular and the  abnormal soft tissue is confluent with the prostate. There is bulky  lymphadenopathy within the pelvis and left groin. One of the left pelvic  nodes measures 8.6 x 7.4 cm and a slightly inferior [place] mass measures  10.7 x 6.7 cm. One of the large left groin nodes measures 6.5 x 4.8 cm.  There are multiple similar size and smaller size nodes adjacently. There  is thrombus within the left common femoral vein. The left external iliac  vein is compressed by the bulky intrapelvic lesions. There is  lymphadenopathy throughout the pelvis and perirectal regions. There is a  soft tissue mass within the posterior aspect of the distal rectum which  appears similar to the lymphadenopathy but may possibly represent stool.  There is significantly less prominent right groin lymphadenopathy than  on the left. In addition, there is a 3.0 x 2.4 cm lateral hepatic  segment metastasis. Primary malignancy may be the urinary bladder, colon  and rectum, lymphoma, sarcoma. Tissue diagnosis is recommended. Left  groin lymphadenopathy is easily accessible for CT-guided biopsy. The  lateral hepatic segment metastasis is accessible as well.  2. There are lytic bone lesions as seen at the right iliac crest and  left posterior elements of L3. Staging with PET/CT is recommended.  3. The spleen measures 14 cm. There is no lymphadenopathy within the  abdomen. The gallbladder, pancreas, adrenals, and kidneys appear  unremarkable. There is no bowel ileus or obstruction. Appendix appears  within normal limits. There is a moderate size right inguinal hernia  containing a segment of small bowel without evidence for incarceration.       CT Abdomen Pelvis With Contrast [[id number]] Collected:  05/28/23 1906     Updated: 05/28/23 1906    Narrative:         CT ANGIOGRAM OF THE CHEST. MULTIPLE CORONAL, SAGITTAL, AND 3-D  RECONSTRUCTIONS. CT ABDOMEN AND PELVIS WITH IV CONTRAST     HISTORY: 62-year-old male with palpable abnormality at the left abdomen.  Inguinal mass. Shortness of breath.     TECHNIQUE: Radiation dose reduction techniques were utilized, including  automated exposure control and exposure modulation based on body size.  CT angiogram of the chest was performed. Multiple coronal, sagittal, and  3-D reconstruction images were obtained. Subsequently, 3 mm images were  obtained through the abdomen and pelvis after the administration of IV  contrast. No priors for comparison.     FINDINGS:  CHEST CTA:  1. There are multiple bilateral pulmonary thromboemboli. There are no  pulmonary thromboemboli within the main pulmonary arteries, but there is  nonocclusive thrombus within the proximal right interlobar pulmonary  artery extending into segmental and subsegmental pulmonary arteries and  there are pulmonary thromboemboli within the right upper and right  middle lobe pulmonary arteries. There is a smaller volume of pulmonary  thromboemboli within segmental and subsegmental pulmonary arteries on  the left. The RV to LV ratio is 1.1.  2. There are several pulmonary sub-6 mm pulmonary nodules in both lungs  as seen at the left lower lobe series 2 image 108. The nodules are  suspicious for metastases. There are no pleural or pericardial  effusions. There is no lymphadenopathy within the chest.     ABDOMEN/PELVIS:  1. The urinary bladder is markedly thickened and irregular and the  abnormal soft tissue is confluent with the prostate. There is bulky  lymphadenopathy within the pelvis and left groin. One of the left pelvic  nodes measures 8.6 x 7.4 cm and a slightly inferior [place] mass measures  10.7 x 6.7 cm. One of the large left groin nodes measures 6.5 x 4.8 cm.  There are multiple similar size and smaller  size nodes adjacently. There  is thrombus within the left common femoral vein. The left external iliac  vein is compressed by the bulky intrapelvic lesions. There is  lymphadenopathy throughout the pelvis and perirectal regions. There is a  soft tissue mass within the posterior aspect of the distal rectum which  appears similar to the lymphadenopathy but may possibly represent stool.  There is significantly less prominent right groin lymphadenopathy than  on the left. In addition, there is a 3.0 x 2.4 cm lateral hepatic  segment metastasis. Primary malignancy may be the urinary bladder, colon  and rectum, lymphoma, sarcoma. Tissue diagnosis is recommended. Left  groin lymphadenopathy is easily accessible for CT-guided biopsy. The  lateral hepatic segment metastasis is accessible as well.  2. There are lytic bone lesions as seen at the right iliac crest and  left posterior elements of L3. Staging with PET/CT is recommended.  3. The spleen measures 14 cm. There is no lymphadenopathy within the  abdomen. The gallbladder, pancreas, adrenals, and kidneys appear  unremarkable. There is no bowel ileus or obstruction. Appendix appears  within normal limits. There is a moderate size right inguinal hernia  containing a segment of small bowel without evidence for incarceration.       XR Chest 1 View [[id number]] Collected: 05/28/23 1823     Updated: 05/28/23 1823    Narrative:      XR CHEST 1 VW-     HISTORY: 62-year-old male with shortness of breath.     FINDINGS: There is no evidence for pneumonia, effusions, or CHF.                Assessment:       Acute pulmonary embolism, unspecified pulmonary embolism type, unspecified whether acute cor pulmonale present    Weight loss     Plan:   Bladder wall thickening and irregular findings on CT   PSA 1.49   Cr. 1.18  Will plan for bedside cystoscopy   Plan reviewed and discussed with pt today   Spoke with RNKylie, and requested cart for bedside cystoscopy     Shira Angela,  APRN  05/29/23  07:43 EDT

## 2023-05-29 NOTE — CONSULTS
Subjective     REASON FOR CONSULTATION: Widely metastatic malignancy presumably of rectal origin with associated DVT and pulmonary embolism  Provide an opinion on any further workup or treatment                             REQUESTING PHYSICIAN: [facility]    RECORDS OBTAINED:  Records of the patients history including those obtained from the referring provider were reviewed and summarized in detail.    HISTORY OF PRESENT ILLNESS:  The patient is a 62 y.o. year old male who is here for an opinion about the above issue.    History of Present Illness patient is a 62-year-old white male with no chronic medical illnesses has not seen a doctor for many years never had a screening colonoscopy or PSA.  He is had a 20 pound weight loss in the last 2 to 3 months associated with low appetite and rectal bleeding.  Became progressively more weak and short of breath and came to the ER at the insistence of one of his friends who is a physician    He has noted a fullness in the left groin as well as swelling of his entire left leg for the last week    CAT scans in the ER showed pulmonary embolism and CT of the abdomen showed extensive adenopathy in the left side of the abdomen with a possible rectal mass liver lesions bone lesions and pulmonary nodules and a clot in the external iliac vein due to compression by adenopathy    Biopsy of these nodes is planned for tomorrow    He is not a smoker or drinker  Is never had a DVT MI or stroke  He has a family history of prostate cancer in his father and his 60s and mother with breast cancer at age 86        History reviewed. No pertinent past medical history.     History reviewed. No pertinent surgical history.     No current facility-administered medications on file prior to encounter.     No current outpatient medications on file prior to encounter.        ALLERGIES:  No Known Allergies     Social History     Socioeconomic History   • Marital status: Unknown   Tobacco Use   • Smoking status:  Never   Substance and Sexual Activity   • Alcohol use: Never   • Drug use: Never        History reviewed. No pertinent family history.     Review of Systems   Constitutional: Positive for activity change, appetite change, fatigue and unexpected weight change.   Respiratory: Positive for shortness of breath.    Cardiovascular: Positive for leg swelling (Left side).   Gastrointestinal: Positive for anal bleeding.   Hematological: Positive for adenopathy (Left groin).        Objective     Vitals:    05/28/23 2355 05/29/23 0400 05/29/23 0443 05/29/23 0810   BP: 126/77 139/74  137/78   BP Location: Left arm Left arm  Right arm   Patient Position: Lying Lying  Lying   Pulse: 79 72  74   Resp: 16 16  16   Temp: 98.9 °F (37.2 °C) 98.2 °F (36.8 °C)  98.2 °F (36.8 °C)   TempSrc: Oral Oral  Oral   SpO2:    98%   Weight:   102 kg (224 lb 1.6 oz)    Height:              View : No data to display.                Physical Exam   CONSTITUTIONAL:  Vital signs reviewed.  No distress, looks comfortable.  EYES:  Conjunctivae and lids unremarkable.  PERRLA  EARS,NOSE,MOUTH,THROAT:  Ears and nose appear unremarkable.  Lips, teeth, gums appear unremarkable.  RESPIRATORY:  Normal respiratory effort.  Lungs clear to auscultation bilaterally.  CARDIOVASCULAR:  Normal S1, S2.  No murmurs rubs or gallops.  3+ edema in his entire left leg to the thigh GASTROINTESTINAL: Abdomen appears unremarkable.  Nontender.  No hepatomegaly.  No splenomegaly.  LYMPHATIC:  No cervical, supraclavicular, axillary lymphadenopathy.  SKIN:  Warm.  No rashes.  PSYCHIATRIC:  Normal judgment and insight.  Normal mood and affect.      RECENT LABS:  Hematology WBC   Date Value Ref Range Status   05/29/2023 11.28 (H) 3.40 - 10.80 10*3/mm3 Final     RBC   Date Value Ref Range Status   05/29/2023 4.10 (L) 4.14 - 5.80 10*6/mm3 Final     Hemoglobin   Date Value Ref Range Status   05/29/2023 11.4 (L) 13.0 - 17.7 g/dL Final     Hematocrit   Date Value Ref Range Status    05/29/2023 34.4 (L) 37.5 - 51.0 % Final     Platelets   Date Value Ref Range Status   05/29/2023 206 140 - 450 10*3/mm3 Final        FINDINGS:  CHEST CTA:  1. There are multiple bilateral pulmonary thromboemboli. There are no  pulmonary thromboemboli within the main pulmonary arteries, but there is  nonocclusive thrombus within the proximal right interlobar pulmonary  artery extending into segmental and subsegmental pulmonary arteries and  there are pulmonary thromboemboli within the right upper and right  middle lobe pulmonary arteries. There is a smaller volume of pulmonary  thromboemboli within segmental and subsegmental pulmonary arteries on  the left. The RV to LV ratio is 1.1.  2. There are several pulmonary sub-6 mm pulmonary nodules in both lungs  as seen at the left lower lobe series 2 image 108. The nodules are  suspicious for metastases. There are no pleural or pericardial  effusions. There is no lymphadenopathy within the chest.     ABDOMEN/PELVIS:  1. The urinary bladder is markedly thickened and irregular and the  abnormal soft tissue is confluent with the prostate. There is bulky  lymphadenopathy within the pelvis and left groin. One of the left pelvic  nodes measures 8.6 x 7.4 cm and a slightly inferior [place] mass measures  10.7 x 6.7 cm. One of the large left groin nodes measures 6.5 x 4.8 cm.  There are multiple similar size and smaller size nodes adjacently. There  is thrombus within the left common femoral vein. The left external iliac  vein is compressed by the bulky intrapelvic lesions. There is  lymphadenopathy throughout the pelvis and perirectal regions. There is a  soft tissue mass within the posterior aspect of the distal rectum which  appears similar to the lymphadenopathy but may possibly represent stool.  There is significantly less prominent right groin lymphadenopathy than  on the left. In addition, there is a 3.0 x 2.4 cm lateral hepatic  segment metastasis. Primary malignancy may  be the urinary bladder, colon  and rectum, lymphoma, sarcoma. Tissue diagnosis is recommended. Left  groin lymphadenopathy is easily accessible for CT-guided biopsy. The  lateral hepatic segment metastasis is accessible as well.  2. There are lytic bone lesions as seen at the right iliac crest and  left posterior elements of L3. Staging with PET/CT is recommended.  3. The spleen measures 14 cm. There is no lymphadenopathy within the  abdomen. The gallbladder, pancreas, adrenals, and kidneys appear  unremarkable. There is no bowel ileus or obstruction. Appendix appears  within normal limits. There is a moderate size right inguinal hernia  containing a segment of small bowel without evidence for incarceration.      This result has not been signed. Information might be incomplete.       Assessment & Plan   1.  Generalized abdominal lymphadenopathy with a rectal mass liver mets and pulmonary nodules and bone mets suspicious for rectal carcinoma with metastasis-cannot exclude bladder cancer or lymphoma although lymphoma less likely to have lytic bone lesions and liver involvement    2.  Incidental bilateral PEs and thrombus in the left external iliac due to compression by bulky nodes on IV heparin    3.  Mild anemia suspect iron deficiency    4.  Family history of breast cancer in his mother at age 86 and prostate cancer his father in his 60s    Plan    1.Check tumor markers such as CEA/CA 19-9 and coag work-up although the clotting is very obviously due to the underlying malignancy and some of it due to compressive obstruction of the veins in his left leg    2.  Agree with anticoagulation with heparin until biopsy is over    3.  We will need to watch for rectal bleeding with anticoagulation and see how we will do before discharge    4.  We will need an [id number] once the path report is back for treatment and he realizes the options for treatment may be limited depending on the diagnosis in terms of  curability.    Thank you for this consult we will follow with you

## 2023-05-29 NOTE — PROGRESS NOTES
Discharge Planning Assessment  [facility]     Patient Name: King George  MRN: [id number]  Today's Date: 5/29/2023    Admit Date: 5/28/2023    Plan: Home with no needs   Discharge Needs Assessment     Row Name 05/29/23 1115       Living Environment    People in Home alone    Current Living Arrangements home    Potentially Unsafe Housing Conditions none    Primary Care Provided by self    Provides Primary Care For no one    Family Caregiver if Needed friend(s)    Quality of Family Relationships supportive    Able to Return to Prior Arrangements yes       Resource/Environmental Concerns    Resource/Environmental Concerns none    Transportation Concerns none       Food Insecurity    Within the past 12 months, you worried that your food would run out before you got the money to buy more. Never true    Within the past 12 months, the food you bought just didn't last and you didn't have money to get more. Never true       Transition Planning    Patient/Family Anticipates Transition to home    Patient/Family Anticipated Services at Transition none    Transportation Anticipated family or friend will provide       Discharge Needs Assessment    Equipment Currently Used at Home none    Concerns to be Addressed denies needs/concerns at this time;discharge planning    Anticipated Changes Related to Illness none               Discharge Plan     Row Name 05/29/23 1117       Plan    Plan Home with no needs    Plan Comments CCP spoke to patient at bedside to discuss discharge plans.  CCP role explained  Face sheet verified.  Pt lives in a one story house alone.  He does not use any assistive devices.  He is independent with ADL's. He has no history of HH  He has been to rehab many years ago.  Plan is home with no needs  CCP following              Continued Care and Services - Admitted Since 5/28/2023    Coordination has not been started for this encounter.          Demographic Summary    No documentation.                 Functional Status    No documentation.                Psychosocial    No documentation.                Abuse/Neglect    No documentation.                Legal    No documentation.                Substance Abuse    No documentation.                Patient Forms    No documentation.                   Lucy Sue RN

## 2023-05-29 NOTE — CONSULTS
Nutrition Services    Patient Name:  King George  YOB: 1960  MRN: [id number]  Admit Date:  5/28/2023      Assessment Date:  05/29/23    Comment: Nutrition consult per nurse admission screen    61 y/o male admitted after near syncopal episode and L groin mass with LLE swelling x 1 month. CTA performed in the ED revealed multiple bilateral PEs and several <6 mm pulmonary nodules. CT of the abdomen/pelvis with noted thickening of the urinary bladder, pelvic lymphadenopathy, questionable soft tissue mass near the distal rectum, lytic bone lesions, and a 3 x 2.4 cm hepatic lesion concerning for metastasis.     Pt reports decreased appetite and PO intake for the past month. States he was also experiencing gagging with PO intake, which he reports is resolving, but still experienced this morning. Pt also endorses 20# weight loss in the past month. He reports his PO intake was low but was able to tolerate fruit and protein shakes. He was able to tolerate eggs and oatmeal this morning. Agreed to RD adding Boost Plus with breakfast. Encouraged PO intake as tolerated. RD will continue to follow.     Patient meets criteria for : Moderate (non-severe) Malnutrition (Pt meets ASPEN/AND criteria for moderate malnutrition of acute illness based on decreased PO intake and unintentional weight loss.)    Recommendations:  1. Add Boost Plus TID  2. Encourage PO intake as tolerated      CLINICAL NUTRITION ASSESSMENT      Reason for Assessment Nurse Admission Screen   Diagnosis/Problem Acute pulmonary embolism, abdominal mass   Medical & Surgical Hx History reviewed. No pertinent past medical history.    History reviewed. No pertinent surgical history.     Current Problems      Encounter Information        Nutrition History    Food Preferences    Supplements    Factors Affecting Intake decreased appetite, nausea, Other: gagging w/ PO intake   Tests/Procedures CT scan     Anthropometrics        Current Height   Current  "Weight  BMI kg/m2 Height: 182.9 cm (72\")  Weight: 102 kg (224 lb 1.6 oz) (05/29/23 0443)  Body mass index is 30.39 kg/m².     Adj BMI (if applicable)    BMI Category Obese, Class I (30 - 34.9)       Admission Weight 224#   Ideal Body Weight (IBW) 178#     Adj IBW (if applicable)    Usual Body Weight (UBW)    Weight Change/Trend Loss, Amount/Timeframe:  pt reports 20# wt loss x 1 month        Weight history: Wt Readings from Last 30 Encounters:   05/29/23 0443 102 kg (224 lb 1.6 oz)   05/28/23 2014 101 kg (222 lb 14.4 oz)   05/28/23 1655 104 kg (230 lb)        Estimated/Assessed Needs        Energy Requirements    Weight for Calculation 101.6kg   Method for Estimation  20-22 kcal/kg   EST Needs (kcal/day) 5753-4327       Protein Requirements    Weight for Calculation 80.9kg   EST Protein Needs (g/kg) 1.5 - 2.0 gm/kg   EST Daily Needs (g/day) 122-162       Fluid Requirements     Method for Estimation 1 mL/kcal    Estimated Needs (mL/day)        Fluid Deficit    Current Na Level (mEq/L)    Desired Na Level (mEq/L)    Estimated Fluid Deficit (L)       Labs        Pertinent Labs    Results from last 7 days   Lab Units 05/29/23  0326 05/28/23  1710   SODIUM mmol/L 135* 135*   POTASSIUM mmol/L 4.1 4.6   CHLORIDE mmol/L 100 99   CO2 mmol/L 20.5* 23.1   BUN mg/dL 16 14   CREATININE mg/dL 1.18 1.41*   CALCIUM mg/dL 8.9 9.4   BILIRUBIN mg/dL  --  0.8   ALK PHOS U/L  --  86   ALT (SGPT) U/L  --  19   AST (SGOT) U/L  --  11   GLUCOSE mg/dL 96 116*     Results from last 7 days   Lab Units 05/29/23  0326 05/28/23  1710   MAGNESIUM mg/dL  --  2.1   HEMOGLOBIN g/dL 11.4* 13.3   HEMATOCRIT % 34.4* 39.4   WBC 10*3/mm3 11.28* 12.00*   ALBUMIN g/dL  --  3.9     Results from last 7 days   Lab Units 05/29/23  0326 05/28/23  1710   INR   --  1.21*   APTT seconds 52.9* 28.3   PLATELETS 10*3/mm3 206 230     No results found for: COVID19  Lab Results   Component Value Date    HGBA1C 5.70 (H) 05/29/2023          Medications            " Scheduled Medications senna-docusate sodium, 2 tablet, Oral, BID        Infusions heparin, 18 Units/kg/hr, Last Rate: 20.99 Units/kg/hr (05/29/23 0719)  sodium chloride, 75 mL/hr, Last Rate: 75 mL/hr (05/28/23 2142)        PRN Medications •  acetaminophen  •  senna-docusate sodium **AND** polyethylene glycol **AND** bisacodyl **AND** bisacodyl  •  heparin (porcine)  •  melatonin  •  ondansetron **OR** ondansetron  •  sodium chloride     Physical Findings          Physical Appearance alert, oriented, room air   Oral/Mouth Cavity WNL   Edema  3+ (moderate)   Gastrointestinal last bowel movement:5/27   Skin  skin intact   Tubes/Drains/Lines none   NFPE See Malnutrition Severity Assessment     Malnutrition Severity Assessment      Patient meets criteria for : Moderate (non-severe) Malnutrition (Pt meets ASPEN/AND criteria for moderate malnutrition of acute illness based on decreased PO intake and unintentional weight loss.)  Malnutrition Type (last 8 hours)     Malnutrition Severity Assessment     Row Name 05/29/23 1020       Malnutrition Severity Assessment    Malnutrition Type Acute Disease or Injury - Related Malnutrition    Row Name 05/29/23 1020       Insufficient Energy Intake     Insufficient Energy Intake Findings Moderate    Insufficient Energy Intake  <75% of est. energy requirement for > or equal to 1 month    Row Name 05/29/23 1020       Unintentional Weight Loss     Unintentional Weight Loss Findings Severe    Unintentional Weight Loss  Weight loss greater than 5% in one month  20# (8.1%) x 1 month    Row Name 05/29/23 1020       Muscle Loss    Loss of Muscle Mass Findings Mild    Row Name 05/29/23 1020       Fat Loss    Subcutaneous Fat Loss Findings Mild    Row Name 05/29/23 1020       Criteria Met (Must meet criteria for severity in at least 2 of these categories: M Wasting, Fat Loss, Fluid, Secondary Signs, Wt. Status, Intake)    Patient meets criteria for  Moderate (non-severe) Malnutrition  Pt meets  ASPEN/AND criteria for moderate malnutrition of acute illness based on decreased PO intake and unintentional weight loss.                   Current Nutrition Orders & Evaluation of Intake       Oral Nutrition     Food Allergies NKFA   Current PO Diet Diet: Cardiac Diets; Healthy Heart (2-3 Na+); Texture: Regular Texture (IDDSI 7); Fluid Consistency: Thin (IDDSI 0)   Supplement    PO Evaluation     Trending % PO Intake 50% breakfast     Nutrition Diagnosis        Nutrition Dx Problem 1 Problem: Malnutrition  Etiology: Factors Affecting Nutrition  Signs/Symptoms: Report of Minimal PO Intake and Unintended Weight Change    Comment:      INTERVENTION / PLAN OF CARE  Intervention Goal        Intervention Goal(s) Reduce/improve symptoms, Increase intake and No significant weight loss     Nutrition Intervention        RD Action Supplement provided, Encourage intake and Follow Tx Progress     Prescription         Diet     Supplement/Snack Boost Plus with breakfast   EN/PN     Prescription Ordered Yes     Monitor/Evaluation        Monitor Per protocol   Discharge Plan Pending clinical course   Education Will instruct as appropriate     RD to follow per protocol.       Electronically signed by:  Brigette Marina RD  05/29/23 10:14 EDT

## 2023-05-29 NOTE — NURSING NOTE
Confirmed with Madeline Schmidt Heparin gtt @ 18u/kg/hr which Dr Brooks ordered is to be continued.    GEORGES Brown

## 2023-05-29 NOTE — PLAN OF CARE
Problem: Adult Inpatient Plan of Care  Goal: Plan of Care Review  Outcome: Ongoing, Progressing     Problem: Adult Inpatient Plan of Care  Goal: Absence of Hospital-Acquired Illness or Injury  Intervention: Prevent and Manage VTE (Venous Thromboembolism) Risk  Recent Flowsheet Documentation  Taken 5/29/2023 0931 by Kylie Bustos, RN  Range of Motion: active ROM (range of motion) encouraged     Problem: Adult Inpatient Plan of Care  Goal: Absence of Hospital-Acquired Illness or Injury  Intervention: Identify and Manage Fall Risk  Recent Flowsheet Documentation  Taken 5/29/2023 0931 by Kylie Bustos RN  Safety Promotion/Fall Prevention:   activity supervised   clutter free environment maintained   assistive device/personal items within reach   nonskid shoes/slippers when out of bed   room organization consistent   safety round/check completed     Problem: Adult Inpatient Plan of Care  Goal: Optimal Comfort and Wellbeing  Outcome: Ongoing, Progressing  Intervention: Provide Person-Centered Care  Recent Flowsheet Documentation  Taken 5/29/2023 0931 by Kylie Bustos RN  Trust Relationship/Rapport:   care explained   questions answered     Problem: Adult Inpatient Plan of Care  Goal: Readiness for Transition of Care  Outcome: Ongoing, Progressing   Goal Outcome Evaluation:

## 2023-05-29 NOTE — H&P
HISTORY AND PHYSICAL   [facility]        Date of Admission: 2023  Patient Identification:  Name: King George  Age: 62 y.o.  Sex: male  :  1960  MRN: [id number]                     Primary Care Physician: Mohini Cortes MD    Chief Complaint:  62 year old gentleman who presented to the emergency room with near syncope, weakness and malaise; his friend who is an md checked on him and noted that he appeared weak and in poor condition so she urged him to come to the ed; he has noted black and bloody stool for some time but did not seek medical attention; he has had weight loss; he has not seen any medical providers for many years    History of Present Illness:   As above    Past Medical History:  History reviewed. No pertinent past medical history.  Past Surgical History:  History reviewed. No pertinent surgical history.   Home Meds:  No medications prior to admission.       Allergies:  No Known Allergies  Immunizations:  Immunization History   Administered Date(s) Administered   • COVID-19 (PFIZER) Purple Cap Monovalent 2021, 2021     Social History:   Social History     Social History Narrative   • Not on file     Social History     Socioeconomic History   • Marital status: Unknown   Tobacco Use   • Smoking status: Never   Substance and Sexual Activity   • Alcohol use: Never   • Drug use: Never       Family History:  History reviewed. No pertinent family history.     Review of Systems  See history of present illness and past medical history.  Patient denies headache, dizziness, syncope, falls, trauma, change in vision, change in hearing, change in taste, changes in weight, changes in appetite, focal weakness, numbness, or paresthesia.  Patient denies chest pain, palpitations, dyspnea, orthopnea, PND, cough, sinus pressure, rhinorrhea, epistaxis, hemoptysis, nausea, vomiting,hematemesis, diarrhea, constipation or hematochezia.  Denies cold or heat intolerance, polydipsia,  "polyuria, polyphagia. Denies hematuria, pyuria, dysuria, hesitancy, frequency or urgency. Denies consumption of raw and under cooked meats foods or change in water source.  Denies fever, chills, sweats, night sweats.  Denies missing any routine medications. Remainder of ROS is negative.    Objective:  T Max 24 hrs: Temp (24hrs), Av.4 °F (37.4 °C), Min:99.4 °F (37.4 °C), Max:99.4 °F (37.4 °C)    Vitals Ranges:   Temp:  [99.4 °F (37.4 °C)] 99.4 °F (37.4 °C)  Heart Rate:  [] 90  Resp:  [16] 16  BP: (122-130)/(77-81) 122/81      Exam:  /81   Pulse 90   Temp 99.4 °F (37.4 °C) (Tympanic)   Resp 16   Ht 182.9 cm (72\")   Wt 101 kg (222 lb 14.4 oz)   SpO2 100%   BMI 30.23 kg/m²     General Appearance:    Alert, cooperative, no distress, appears stated age   Head:    Normocephalic, without obvious abnormality, atraumatic   Eyes:    PERRL, conjunctivae/corneas clear, EOM's intact, both eyes   Ears:    Normal external ear canals, both ears   Nose:   Nares normal, septum midline, mucosa normal, no drainage    or sinus tenderness   Throat:   Lips, mucosa, and tongue normal   Neck:   Supple, symmetrical, trachea midline, no adenopathy;     thyroid:  no enlargement/tenderness/nodules; no carotid    bruit or JVD   Back:     Symmetric, no curvature, ROM normal, no CVA tenderness   Lungs:     Decreased breath sounds bilaterally, respirations unlabored   Chest Wall:    No tenderness or deformity    Heart:    Regular rate and rhythm, S1 and S2 normal, no murmur, rub   or gallop   Abdomen:     Soft, nontender, bowel sounds active all four quadrants,     no masses, no hepatomegaly, no splenomegaly   Extremities:   Extremities normal, atraumatic, no cyanosis or edema   Pulses:   2+ and symmetric all extremities   Skin:   Skin color, texture, turgor normal, no rashes or lesions               .    Data Review:  Labs in chart were reviewed.  WBC   Date Value Ref Range Status   2023 12.00 (H) 3.40 - 10.80 10*3/mm3 " Final     Hemoglobin   Date Value Ref Range Status   05/28/2023 13.3 13.0 - 17.7 g/dL Final     Hematocrit   Date Value Ref Range Status   05/28/2023 39.4 37.5 - 51.0 % Final     Platelets   Date Value Ref Range Status   05/28/2023 230 140 - 450 10*3/mm3 Final     Sodium   Date Value Ref Range Status   05/28/2023 135 (L) 136 - 145 mmol/L Final     Potassium   Date Value Ref Range Status   05/28/2023 4.6 3.5 - 5.2 mmol/L Final     Chloride   Date Value Ref Range Status   05/28/2023 99 98 - 107 mmol/L Final     CO2   Date Value Ref Range Status   05/28/2023 23.1 22.0 - 29.0 mmol/L Final     BUN   Date Value Ref Range Status   05/28/2023 14 8 - 23 mg/dL Final     Creatinine   Date Value Ref Range Status   05/28/2023 1.41 (H) 0.76 - 1.27 mg/dL Final     Glucose   Date Value Ref Range Status   05/28/2023 116 (H) 65 - 99 mg/dL Final     Calcium   Date Value Ref Range Status   05/28/2023 9.4 8.6 - 10.5 mg/dL Final     Magnesium   Date Value Ref Range Status   05/28/2023 2.1 1.6 - 2.4 mg/dL Final                Imaging Results (All)     Procedure Component Value Units Date/Time    CT Angiogram Chest Pulmonary Embolism [[id number]] Collected: 05/28/23 1906     Updated: 05/28/23 1906    Narrative:         CT ANGIOGRAM OF THE CHEST. MULTIPLE CORONAL, SAGITTAL, AND 3-D  RECONSTRUCTIONS. CT ABDOMEN AND PELVIS WITH IV CONTRAST     HISTORY: 62-year-old male with palpable abnormality at the left abdomen.  Inguinal mass. Shortness of breath.     TECHNIQUE: Radiation dose reduction techniques were utilized, including  automated exposure control and exposure modulation based on body size.  CT angiogram of the chest was performed. Multiple coronal, sagittal, and  3-D reconstruction images were obtained. Subsequently, 3 mm images were  obtained through the abdomen and pelvis after the administration of IV  contrast. No priors for comparison.     FINDINGS:  CHEST CTA:  1. There are multiple bilateral pulmonary thromboemboli. There are  no  pulmonary thromboemboli within the main pulmonary arteries, but there is  nonocclusive thrombus within the proximal right interlobar pulmonary  artery extending into segmental and subsegmental pulmonary arteries and  there are pulmonary thromboemboli within the right upper and right  middle lobe pulmonary arteries. There is a smaller volume of pulmonary  thromboemboli within segmental and subsegmental pulmonary arteries on  the left. The RV to LV ratio is 1.1.  2. There are several pulmonary sub-6 mm pulmonary nodules in both lungs  as seen at the left lower lobe series 2 image 108. The nodules are  suspicious for metastases. There are no pleural or pericardial  effusions. There is no lymphadenopathy within the chest.     ABDOMEN/PELVIS:  1. The urinary bladder is markedly thickened and irregular and the  abnormal soft tissue is confluent with the prostate. There is bulky  lymphadenopathy within the pelvis and left groin. One of the left pelvic  nodes measures 8.6 x 7.4 cm and a slightly inferior [place] mass measures  10.7 x 6.7 cm. One of the large left groin nodes measures 6.5 x 4.8 cm.  There are multiple similar size and smaller size nodes adjacently. There  is thrombus within the left common femoral vein. The left external iliac  vein is compressed by the bulky intrapelvic lesions. There is  lymphadenopathy throughout the pelvis and perirectal regions. There is a  soft tissue mass within the posterior aspect of the distal rectum which  appears similar to the lymphadenopathy but may possibly represent stool.  There is significantly less prominent right groin lymphadenopathy than  on the left. In addition, there is a 3.0 x 2.4 cm lateral hepatic  segment metastasis. Primary malignancy may be the urinary bladder, colon  and rectum, lymphoma, sarcoma. Tissue diagnosis is recommended. Left  groin lymphadenopathy is easily accessible for CT-guided biopsy. The  lateral hepatic segment metastasis is accessible as  well.  2. There are lytic bone lesions as seen at the right iliac crest and  left posterior elements of L3. Staging with PET/CT is recommended.  3. The spleen measures 14 cm. There is no lymphadenopathy within the  abdomen. The gallbladder, pancreas, adrenals, and kidneys appear  unremarkable. There is no bowel ileus or obstruction. Appendix appears  within normal limits. There is a moderate size right inguinal hernia  containing a segment of small bowel without evidence for incarceration.       CT Abdomen Pelvis With Contrast [[id number]] Collected: 05/28/23 1906     Updated: 05/28/23 1906    Narrative:         CT ANGIOGRAM OF THE CHEST. MULTIPLE CORONAL, SAGITTAL, AND 3-D  RECONSTRUCTIONS. CT ABDOMEN AND PELVIS WITH IV CONTRAST     HISTORY: 62-year-old male with palpable abnormality at the left abdomen.  Inguinal mass. Shortness of breath.     TECHNIQUE: Radiation dose reduction techniques were utilized, including  automated exposure control and exposure modulation based on body size.  CT angiogram of the chest was performed. Multiple coronal, sagittal, and  3-D reconstruction images were obtained. Subsequently, 3 mm images were  obtained through the abdomen and pelvis after the administration of IV  contrast. No priors for comparison.     FINDINGS:  CHEST CTA:  1. There are multiple bilateral pulmonary thromboemboli. There are no  pulmonary thromboemboli within the main pulmonary arteries, but there is  nonocclusive thrombus within the proximal right interlobar pulmonary  artery extending into segmental and subsegmental pulmonary arteries and  there are pulmonary thromboemboli within the right upper and right  middle lobe pulmonary arteries. There is a smaller volume of pulmonary  thromboemboli within segmental and subsegmental pulmonary arteries on  the left. The RV to LV ratio is 1.1.  2. There are several pulmonary sub-6 mm pulmonary nodules in both lungs  as seen at the left lower lobe series 2 image 108. The  nodules are  suspicious for metastases. There are no pleural or pericardial  effusions. There is no lymphadenopathy within the chest.     ABDOMEN/PELVIS:  1. The urinary bladder is markedly thickened and irregular and the  abnormal soft tissue is confluent with the prostate. There is bulky  lymphadenopathy within the pelvis and left groin. One of the left pelvic  nodes measures 8.6 x 7.4 cm and a slightly inferior [place] mass measures  10.7 x 6.7 cm. One of the large left groin nodes measures 6.5 x 4.8 cm.  There are multiple similar size and smaller size nodes adjacently. There  is thrombus within the left common femoral vein. The left external iliac  vein is compressed by the bulky intrapelvic lesions. There is  lymphadenopathy throughout the pelvis and perirectal regions. There is a  soft tissue mass within the posterior aspect of the distal rectum which  appears similar to the lymphadenopathy but may possibly represent stool.  There is significantly less prominent right groin lymphadenopathy than  on the left. In addition, there is a 3.0 x 2.4 cm lateral hepatic  segment metastasis. Primary malignancy may be the urinary bladder, colon  and rectum, lymphoma, sarcoma. Tissue diagnosis is recommended. Left  groin lymphadenopathy is easily accessible for CT-guided biopsy. The  lateral hepatic segment metastasis is accessible as well.  2. There are lytic bone lesions as seen at the right iliac crest and  left posterior elements of L3. Staging with PET/CT is recommended.  3. The spleen measures 14 cm. There is no lymphadenopathy within the  abdomen. The gallbladder, pancreas, adrenals, and kidneys appear  unremarkable. There is no bowel ileus or obstruction. Appendix appears  within normal limits. There is a moderate size right inguinal hernia  containing a segment of small bowel without evidence for incarceration.       XR Chest 1 View [[id number]] Collected: 05/28/23 1823     Updated: 05/28/23 1823    Narrative:       XR CHEST 1 VW-     HISTORY: 62-year-old male with shortness of breath.     FINDINGS: There is no evidence for pneumonia, effusions, or CHF.               Assessment:  Active Hospital Problems    Diagnosis  POA   • **Acute pulmonary embolism, unspecified pulmonary embolism type, unspecified whether acute cor pulmonale present [I26.99]  Yes      Resolved Hospital Problems   No resolved problems to display.   weight loss  ckd3  Hyperglycemia      Plan:  Will ask urology and colorectal surgery to see  Fluids  Monitor blood sugar  Trend creatinine  Heparin for pe since he will need procedures/biopsy  Check psa  D.w patient and friends as well as ed provider    Maria Guadalupe Kam MD  5/28/2023  20:40 EDT

## 2023-05-29 NOTE — PROGRESS NOTES
Name: King George ADMIT: 2023   : 1960  PCP: Mohini Cortes MD    MRN: [id number] LOS: 1 days   AGE/SEX: 62 y.o. male  ROOM: [facility]     Subjective   Subjective   Patient evaluated at 1350--appears comfortable and in no apparent distress.  Denies chest pain or shortness of breath at this time.  Also denies pain.  Reports left lower extremity swelling and left groin 'mass' present for quite some time with associated intermittent rectal bleeding without prior work-up or history of colonoscopy.       Objective   Objective   Vital Signs  Temp:  [98.2 °F (36.8 °C)-99.4 °F (37.4 °C)] 98.6 °F (37 °C)  Heart Rate:  [] 85  Resp:  [16] 16  BP: (122-139)/(74-81) 129/78  SpO2:  [97 %-100 %] 97 %  on   ;   Device (Oxygen Therapy): room air  Body mass index is 30.39 kg/m².     Physical Exam  Constitutional:       General: He is not in acute distress.     Appearance: He is obese. He is not toxic-appearing.   Cardiovascular:      Rate and Rhythm: Normal rate.      Heart sounds: Normal heart sounds.   Pulmonary:      Effort: Pulmonary effort is normal.      Breath sounds: Normal breath sounds.   Abdominal:      General: Bowel sounds are normal.   Musculoskeletal:      Left lower leg: Edema present.   Skin:     General: Skin is warm and dry.   Neurological:      Mental Status: He is alert and oriented to person, place, and time.      Cranial Nerves: No cranial nerve deficit.       Results Review     I reviewed the patient's new clinical results.  Results from last 7 days   Lab Units 23  0326 23  1710   WBC 10*3/mm3 11.28* 12.00*   HEMOGLOBIN g/dL 11.4* 13.3   PLATELETS 10*3/mm3 206 230     Results from last 7 days   Lab Units 23  0326 23  1710   SODIUM mmol/L 135* 135*   POTASSIUM mmol/L 4.1 4.6   CHLORIDE mmol/L 100 99   CO2 mmol/L 20.5* 23.1   BUN mg/dL 16 14   CREATININE mg/dL 1.18 1.41*   GLUCOSE mg/dL 96 116*   EGFR mL/min/1.73 70.6 57.1*     Results from last 7 days   Lab Units  05/28/23  1710   ALBUMIN g/dL 3.9   BILIRUBIN mg/dL 0.8   ALK PHOS U/L 86   AST (SGOT) U/L 11   ALT (SGPT) U/L 19     Results from last 7 days   Lab Units 05/29/23  0326 05/28/23  1710   CALCIUM mg/dL 8.9 9.4   ALBUMIN g/dL  --  3.9   MAGNESIUM mg/dL  --  2.1       Hemoglobin A1C   Date/Time Value Ref Range Status   05/29/2023 0326 5.70 (H) 4.80 - 5.60 % Final       No radiology results for the last day  I have personally reviewed all medications:  Scheduled Medications  senna-docusate sodium, 2 tablet, Oral, BID    Infusions  heparin, 18 Units/kg/hr, Last Rate: 25.83 Units/kg/hr (05/29/23 1431)  sodium chloride, 75 mL/hr, Last Rate: 75 mL/hr (05/28/23 2142)    Diet  Diet: Cardiac Diets; Healthy Heart (2-3 Na+); Texture: Regular Texture (IDDSI 7); Fluid Consistency: Thin (IDDSI 0)    I have personally reviewed:  [x]  Laboratory   []  Microbiology   [x]  Radiology   [x]  EKG/Telemetry  []  Cardiology/Vascular    []  Pathology    []  Records       Assessment/Plan     Active Hospital Problems    Diagnosis  POA   • **Acute pulmonary embolism, unspecified pulmonary embolism type, unspecified whether acute cor pulmonale present [I26.99]  Yes   • Anemia [D64.9]  Yes   • Left groin mass [R19.09]  Yes   • Hepatic lesion [K76.9]  Yes   • Abdominal lymphadenopathy [R59.0]  Yes      Resolved Hospital Problems   No resolved problems to display.       62 y.o. male admitted with Acute pulmonary embolism, unspecified pulmonary embolism type, unspecified whether acute cor pulmonale present.      Acute pulmonary embolism, unspecified pulmonary embolism type, unspecified whether acute cor pulmonale present: Confirmed on CTA chest.      Abdominal lymphadenopathy: Associated rectal mass, liver mets, pulmonary nodule, and bone mets suspicious for rectal carcinoma with metastasis on CT.  Primary malignancy may be the urinary bladder, colon, rectum, lymphoma, sarcoma.  Oncology following.  [id number] once path report available.  Tumor  markers pending results.  Colorectal surgery following ultrasound-guided biopsy (noted heparin gtt does not need to be held for biopsy).  No plans for colonoscopy.      Anemia: Slight downward trend serum hemoglobin.  Suspect part hemodilution; however, heparin drip infusing as well.  No overt signs of active bleeding.  Transfuse for serum hemoglobin less than 7.  Repeat labs in AM.      Hepatic lesion: See plan above.      Bladder wall thickening and irregularity findings on CT: Urology consulted plan for bedside cystoscopy.     · heparin gtt adequate for DVT prophylaxis.  · Full code.  · Discussed with patient & RN.  · Anticipate discharge pending clinical course.      TANYA Bray  [facility]  05/29/23  14:54 EDT

## 2023-05-29 NOTE — CONSULTS
King George is a 62 y.o. male who is seen as a consult at the request of Maria Guadalupe Kam MD for abdominal mass.      HPI:  Pt presented to the [facility] ED yesterday after a near syncopal episode. 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 He contributed his symptoms to exertion so he drank a Gatorade and sat down inside. He then experienced tunnel vision and felt like he was going to pass out. Pt later went home to rest. States his best friend's wife is a retired ER physician. She came over after hearing that he was not feeling well. At that time, the Pt told her about a mass in the left groin that he noticed a couple months prior and LLE swelling x1 month. After evaluating him and taking his vitals, she ultimately convinced him to present to the ER for further evaluation.     CTA performed in the ED revealed multiple bilateral PEs and several <6 mm pulmonary nodules. CT of the abdomen/pelvis with noted thickening of the urinary bladder, pelvic lymphadenopathy, questionable soft tissue mass near the distal rectum, lytic bone lesions, and a 3 x 2.4 cm hepatic lesion concerning for metastasis. Primary malignancy favored to be urinary bladder, colon, lymphoma, or sarcoma. WBC 12. Hg 13.3. Pt was admitted for further management.     Pt reports experiencing intermittent BRBPR x1 year that has been progressively worsening. He reports a distrust in the healthcare system as his father was a  of a hospital and ultimately passed away due to prostate cancer. His mother also had a Hx of breast cancer to which he reports disagreeing with her treatment recommendations. Due to this, he has not been seen by a physician, take any medications, or undergone any preventative measures, like a colonoscopy. Pt states that he has 1-2 BM daily, with bleeding becoming gradually more frequent. He also notes a 20 lb weight loss within the past month, which he contributed to decreased appetite  "and increased physical activity. Even though his PO intake decreased, he ate fruits/vegatibles and foods high in protein. Therefore, he states he was not concerned about his weight loss. He denies N/V prior to being dmitted, but notes a \"gagging\" sensation now when he tries to eat. Denies any abdominal pain or melena. No known FHx of colon polyps or colon cancer.     He first noticed the mass in the left groin approximately 2 months ago. Had what he believed to be a right inguinal hernia, but notes the mass on the left felt different as it was more firm and less mobile. It was non-tender, but interfered with his mobility and ability to bend down to put his socks on. Tried treating it with warm compresses. He also notes an overlying rash which he tried creams on in the past, without improvement.       History reviewed. No pertinent past medical history.    History reviewed. No pertinent surgical history.    Social History:   reports that he has never smoked. He does not have any smokeless tobacco history on file. He reports that he does not drink alcohol and does not use drugs.      Marriage status: Unknown    History reviewed. No pertinent family history.      Current Facility-Administered Medications:   •  acetaminophen (TYLENOL) tablet 650 mg, 650 mg, Oral, Q4H PRN, Maria Guadalupe Kam MD  •  sennosides-docusate (PERICOLACE) 8.6-50 MG per tablet 2 tablet, 2 tablet, Oral, BID, 2 tablet at 05/29/23 0913 **AND** polyethylene glycol (MIRALAX) packet 17 g, 17 g, Oral, Daily PRN **AND** bisacodyl (DULCOLAX) EC tablet 5 mg, 5 mg, Oral, Daily PRN **AND** bisacodyl (DULCOLAX) suppository 10 mg, 10 mg, Rectal, Daily PRN, Maria Guadalupe Kam MD  •  heparin (porcine) 5000 UNIT/ML injection 4,200-8,300 Units, 40-80 Units/kg, Intravenous, Q6H PRN, Venkat Garcia MD  •  heparin 38790 units/250 mL (100 units/mL) in 0.45 % NaCl infusion, 18 Units/kg/hr, Intravenous, Titrated, Venkat Garcia MD, Last Rate: 21.83 mL/hr at " 05/29/23 0721, 20.99 Units/kg/hr at 05/29/23 0721  •  melatonin tablet 3 mg, 3 mg, Oral, Nightly PRN, Maria Guadalupe Kam MD  •  ondansetron (ZOFRAN) tablet 4 mg, 4 mg, Oral, Q6H PRN **OR** ondansetron (ZOFRAN) injection 4 mg, 4 mg, Intravenous, Q6H PRN, Maria Guadalupe Kam MD  •  sodium chloride 0.9 % flush 10 mL, 10 mL, Intravenous, PRN, Emergency, Triage Protocol, MD  •  sodium chloride 0.9 % infusion, 75 mL/hr, Intravenous, Continuous, Maria Guadalupe Kam MD, Last Rate: 75 mL/hr at 05/28/23 2142, 75 mL/hr at 05/28/23 2142    Allergy  Patient has no known allergies.    Review of Systems   Constitutional: Positive for decreased appetite and weight loss. Negative for weight gain.   HENT: Negative for congestion, hearing loss and hoarse voice.    Eyes: Positive for visual disturbance. Negative for discharge.   Cardiovascular: Positive for leg swelling and near-syncope. Negative for chest pain and cyanosis.   Respiratory: Positive for shortness of breath. Negative for cough, sleep disturbances due to breathing and snoring.    Endocrine: Negative for cold intolerance and heat intolerance.   Hematologic/Lymphatic: Does not bruise/bleed easily.   Skin: Negative for itching, poor wound healing and skin cancer.   Musculoskeletal: Negative for arthritis, back pain, joint pain and joint swelling.   Gastrointestinal: Positive for hematochezia. Negative for abdominal pain, change in bowel habit, bowel incontinence, constipation, diarrhea, hematemesis, melena, nausea and vomiting.   Genitourinary: Negative for bladder incontinence, dysuria and hematuria.   Neurological: Negative for brief paralysis, excessive daytime sleepiness, dizziness, focal weakness, headaches, light-headedness and weakness.   Psychiatric/Behavioral: Negative for altered mental status and hallucinations. The patient does not have insomnia.    Allergic/Immunologic: Negative for HIV exposure and persistent infections.       Vitals:    05/29/23 0810    BP: 137/78   Pulse: 74   Resp: 16   Temp: 98.2 °F (36.8 °C)   SpO2: 98%     Body mass index is 30.39 kg/m².    Physical Exam  Exam conducted with a chaperone present.   Constitutional:       General: He is not in acute distress.     Appearance: He is well-developed.      Comments: LLE swelling   HENT:      Head: Normocephalic and atraumatic.      Nose: Nose normal.   Eyes:      Conjunctiva/sclera: Conjunctivae normal.      Pupils: Pupils are equal, round, and reactive to light.   Neck:      Trachea: No tracheal deviation.   Pulmonary:      Effort: Pulmonary effort is normal. No respiratory distress.      Breath sounds: Normal breath sounds.   Abdominal:      General: Bowel sounds are normal. There is no distension.      Palpations: Abdomen is soft.      Comments: Abdomen soft, non-distended. No tenderness to palpation. Large, firm, and non-mobile mass noted to left groin.    Musculoskeletal:         General: No deformity. Normal range of motion.      Cervical back: Normal range of motion.   Skin:     General: Skin is warm and dry.   Neurological:      Mental Status: He is alert and oriented to person, place, and time.      Cranial Nerves: No cranial nerve deficit.      Coordination: Coordination normal.      Gait: Gait normal.   Psychiatric:         Behavior: Behavior normal.         Judgment: Judgment normal.         Review of Medical Record:  I reviewed medical records as detailed in HPI.     CT Abdomen/Pelvis W/ Contrast 05/28/2023:  - Urinary bladder with irregular thickening   - Bulky lymphadenopathy within the pelvis and left groin. One of the left pelvic nodes measures 8.6 x 7.4 cm and a slightly inferior [place] mass measures 10.7 x 6.7 cm. One of the large left groin nodes measures 6.5 x 4.8 cm.  - Thrombus present within the left common femoral vein.   - Soft tissue mass within the posterior aspect of the distal rectum   - Tthere is a 3.0 x 2.4 cm lateral hepatic segment metastasis.   - Lytic bone lesions  as seen at the right iliac crest and left posterior elements of L3.     CTA 05/28/2023:  - Multiple bilateral PEs  - Several <6 mm pulmonary nodules bilateral concerning for metastases     CBC 05/28/2023:  - WBC 12  - Hg 13.3      Assessment:  - LLQ mass with unknown etiology     Plan:  - Pt evaluated by urology today with plans to undergo cystoscopy   - Will ask oncology to evaluate Pt today and will ask IR to perform biopsy of the lymph nodes within the left groin.     I have reviewed the history and plan as obtained by More Bailey PA-C:. I have independently examined the patient and I personally performed <50% of the management in this split shared patient. My exam confirms her physical findings and I agree with the plan as listed, with the addition of the following      Physical Exam  Constitutional:       General: Patient is not in acute distress.     Appearance: Normal appearance. Not toxic-appearing.   HENT:      Head: Normocephalic and atraumatic.   Cardiovascular:      Rate and Rhythm: Normal rate and regular rhythm.   Pulmonary:      Effort: Pulmonary effort is normal. No respiratory distress.      Breath sounds: Normal breath sounds. No wheezing or rhonchi.   Abdominal:      General: Bowel sounds are normal. There is no distension.      Palpations: Abdomen is soft.      Tenderness: There is no abdominal tenderness. There is no guarding or rebound.      Left groin 8 x 6 cm firm mass  Musculoskeletal:        Right lower leg: No edema.      Left lower leg: + edema.   Skin:     General: Skin is warm and dry.   Neurological:      General: No focal deficit present.      Mental Status: Alert and oriented to person, place, and time.   Psychiatric:         Mood and Affect: Mood normal.         Behavior: Behavior normal.       I have personally reviewed:  [x]  Laboratory   []  Microbiology   [x]  Radiology I reviewed CT images and report  []  EKG/Telemetry   []  Cardiology/Vascular   []  Pathology   []  Some  old records       Assessment: Metastatic cancer of unknown origin.  It would be unusual for rectal cancer to have such large groin nodes.      Plan I discussed with patient that he has tumor in his liver and bones.  He was unaware of this.    • I have written for interventional radiology to do a ultrasound-guided biopsy of the left groin.  I discussed this with Dr. George.  He states the heparin does not need to be held.  • Urology to do bedside cystoscope  • At this point I do not think that there is high yield to do a colonoscopy and we will wait for pathology from groin biopsy.  • I have written for oncology to be consulted  • Discussed with patient and consulting provider and More Bailey PA-C:

## 2023-05-30 ENCOUNTER — APPOINTMENT (OUTPATIENT)
Dept: ULTRASOUND IMAGING | Facility: HOSPITAL | Age: 63
End: 2023-05-30
Payer: COMMERCIAL

## 2023-05-30 PROBLEM — E44.0 MODERATE MALNUTRITION: Status: ACTIVE | Noted: 2023-05-30

## 2023-05-30 LAB
ANION GAP SERPL CALCULATED.3IONS-SCNC: 9 MMOL/L (ref 5–15)
APTT PPP: 88.4 SECONDS (ref 22.7–35.4)
BASOPHILS # BLD AUTO: 0.04 10*3/MM3 (ref 0–0.2)
BASOPHILS NFR BLD AUTO: 0.5 % (ref 0–1.5)
BUN SERPL-MCNC: 13 MG/DL (ref 8–23)
BUN/CREAT SERPL: 14.9 (ref 7–25)
CALCIUM SPEC-SCNC: 8.1 MG/DL (ref 8.6–10.5)
CHLORIDE SERPL-SCNC: 104 MMOL/L (ref 98–107)
CO2 SERPL-SCNC: 21 MMOL/L (ref 22–29)
CREAT SERPL-MCNC: 0.87 MG/DL (ref 0.76–1.27)
DEPRECATED RDW RBC AUTO: 41.8 FL (ref 37–54)
EGFRCR SERPLBLD CKD-EPI 2021: 98.8 ML/MIN/1.73
EOSINOPHIL # BLD AUTO: 0.48 10*3/MM3 (ref 0–0.4)
EOSINOPHIL NFR BLD AUTO: 5.6 % (ref 0.3–6.2)
ERYTHROCYTE [DISTWIDTH] IN BLOOD BY AUTOMATED COUNT: 13.7 % (ref 12.3–15.4)
GLUCOSE SERPL-MCNC: 92 MG/DL (ref 65–99)
HCT VFR BLD AUTO: 34.7 % (ref 37.5–51)
HGB BLD-MCNC: 11.4 G/DL (ref 13–17.7)
IMM GRANULOCYTES # BLD AUTO: 0.03 10*3/MM3 (ref 0–0.05)
IMM GRANULOCYTES NFR BLD AUTO: 0.4 % (ref 0–0.5)
LYMPHOCYTES # BLD AUTO: 0.94 10*3/MM3 (ref 0.7–3.1)
LYMPHOCYTES NFR BLD AUTO: 11 % (ref 19.6–45.3)
MCH RBC QN AUTO: 27.7 PG (ref 26.6–33)
MCHC RBC AUTO-ENTMCNC: 32.9 G/DL (ref 31.5–35.7)
MCV RBC AUTO: 84.4 FL (ref 79–97)
MONOCYTES # BLD AUTO: 0.83 10*3/MM3 (ref 0.1–0.9)
MONOCYTES NFR BLD AUTO: 9.7 % (ref 5–12)
NEUTROPHILS NFR BLD AUTO: 6.23 10*3/MM3 (ref 1.7–7)
NEUTROPHILS NFR BLD AUTO: 72.8 % (ref 42.7–76)
NRBC BLD AUTO-RTO: 0 /100 WBC (ref 0–0.2)
PLATELET # BLD AUTO: 238 10*3/MM3 (ref 140–450)
PMV BLD AUTO: 9.4 FL (ref 6–12)
POTASSIUM SERPL-SCNC: 4.2 MMOL/L (ref 3.5–5.2)
RBC # BLD AUTO: 4.11 10*6/MM3 (ref 4.14–5.8)
SODIUM SERPL-SCNC: 134 MMOL/L (ref 136–145)
WBC NRBC COR # BLD: 8.55 10*3/MM3 (ref 3.4–10.8)

## 2023-05-30 PROCEDURE — 88341 IMHCHEM/IMCYTCHM EA ADD ANTB: CPT | Performed by: COLON & RECTAL SURGERY

## 2023-05-30 PROCEDURE — 36415 COLL VENOUS BLD VENIPUNCTURE: CPT | Performed by: EMERGENCY MEDICINE

## 2023-05-30 PROCEDURE — 0 LIDOCAINE 1 % SOLUTION: Performed by: RADIOLOGY

## 2023-05-30 PROCEDURE — 97162 PT EVAL MOD COMPLEX 30 MIN: CPT

## 2023-05-30 PROCEDURE — 88342 IMHCHEM/IMCYTCHM 1ST ANTB: CPT | Performed by: COLON & RECTAL SURGERY

## 2023-05-30 PROCEDURE — 85025 COMPLETE CBC W/AUTO DIFF WBC: CPT | Performed by: EMERGENCY MEDICINE

## 2023-05-30 PROCEDURE — 80048 BASIC METABOLIC PNL TOTAL CA: CPT | Performed by: NURSE PRACTITIONER

## 2023-05-30 PROCEDURE — 99232 SBSQ HOSP IP/OBS MODERATE 35: CPT | Performed by: PHYSICIAN ASSISTANT

## 2023-05-30 PROCEDURE — 25010000002 HEPARIN (PORCINE) 25000-0.45 UT/250ML-% SOLUTION: Performed by: EMERGENCY MEDICINE

## 2023-05-30 PROCEDURE — 85730 THROMBOPLASTIN TIME PARTIAL: CPT | Performed by: EMERGENCY MEDICINE

## 2023-05-30 PROCEDURE — 88305 TISSUE EXAM BY PATHOLOGIST: CPT | Performed by: COLON & RECTAL SURGERY

## 2023-05-30 PROCEDURE — 97530 THERAPEUTIC ACTIVITIES: CPT

## 2023-05-30 PROCEDURE — 76942 ECHO GUIDE FOR BIOPSY: CPT

## 2023-05-30 RX ORDER — LIDOCAINE HYDROCHLORIDE 10 MG/ML
10 INJECTION, SOLUTION INFILTRATION; PERINEURAL ONCE
Status: COMPLETED | OUTPATIENT
Start: 2023-05-30 | End: 2023-05-30

## 2023-05-30 RX ADMIN — SODIUM CHLORIDE 75 ML/HR: 9 INJECTION, SOLUTION INTRAVENOUS at 12:10

## 2023-05-30 RX ADMIN — LIDOCAINE HYDROCHLORIDE 2 ML: 10 INJECTION, SOLUTION INFILTRATION; PERINEURAL at 09:48

## 2023-05-30 RX ADMIN — SODIUM CHLORIDE 75 ML/HR: 9 INJECTION, SOLUTION INTRAVENOUS at 21:24

## 2023-05-30 RX ADMIN — DOCUSATE SODIUM 50MG AND SENNOSIDES 8.6MG 2 TABLET: 8.6; 5 TABLET, FILM COATED ORAL at 10:41

## 2023-05-30 RX ADMIN — HEPARIN SODIUM 25.8 UNITS/KG/HR: 10000 INJECTION, SOLUTION INTRAVENOUS at 15:01

## 2023-05-30 RX ADMIN — HEPARIN SODIUM 25.83 UNITS/KG/HR: 10000 INJECTION, SOLUTION INTRAVENOUS at 05:41

## 2023-05-30 NOTE — THERAPY EVALUATION
Patient Name: King George  : 1960    MRN: [id number]                              Today's Date: 2023       Admit Date: 2023    Visit Dx:     ICD-10-CM ICD-9-CM   1. Acute pulmonary embolism, unspecified pulmonary embolism type, unspecified whether acute cor pulmonale present  I26.99 415.19   2. Left lower quadrant abdominal mass  R19.04 789.34     Patient Active Problem List   Diagnosis   • Acute pulmonary embolism, unspecified pulmonary embolism type, unspecified whether acute cor pulmonale present   • Anemia   • Left groin mass   • Hepatic lesion   • Abdominal lymphadenopathy   • Moderate Malnutrition (HCC)     History reviewed. No pertinent past medical history.  Past Surgical History:   Procedure Laterality Date   • US GUIDED LYMPH NODE BIOPSY  2023      General Information     Row Name 23 1054          Physical Therapy Time and Intention    Document Type evaluation  -DB     Mode of Treatment individual therapy;physical therapy  -DB     Row Name 23 1054          General Information    Patient Profile Reviewed yes  -DB     Prior Level of Function independent:  -DB     Existing Precautions/Restrictions no known precautions/restrictions  -DB     Barriers to Rehab none identified  -DB     Row Name 23 1054          Living Environment    People in Home alone  -DB     Row Name 23 1054          Home Main Entrance    Number of Stairs, Main Entrance none  -DB     Row Name 23 1054          Stairs Within Home, Primary    Number of Stairs, Within Home, Primary none  -DB     Row Name 23 1054          Cognition    Orientation Status (Cognition) oriented x 4  -DB     Row Name 23 1054          Safety Issues, Functional Mobility    Impairments Affecting Function (Mobility) pain;endurance/activity tolerance  -DB           User Key  (r) = Recorded By, (t) = Taken By, (c) = Cosigned By    Initials Name Provider Type    DB Le Myers, PT Physical Therapist                Mobility     Row Name 05/30/23 1055          Bed Mobility    Bed Mobility sit-supine  -DB     Sit-Supine Larue (Bed Mobility) supervision  -DB     Assistive Device (Bed Mobility) head of bed elevated  -DB     Row Name 05/30/23 1055          Sit-Stand Transfer    Sit-Stand Larue (Transfers) standby assist  -DB     Row Name 05/30/23 1055          Gait/Stairs (Locomotion)    Larue Level (Gait) contact guard  -DB     Assistive Device (Gait) other (see comments)  pushing IV pole  -DB     Distance in Feet (Gait) 100'  -DB     Deviations/Abnormal Patterns (Gait) ijan decreased;gait speed decreased  -DB           User Key  (r) = Recorded By, (t) = Taken By, (c) = Cosigned By    Initials Name Provider Type    Le Noriega PT Physical Therapist               Obj/Interventions     Row Name 05/30/23 1056          Range of Motion Comprehensive    General Range of Motion no range of motion deficits identified  -DB     Row Name 05/30/23 1056          Strength Comprehensive (MMT)    General Manual Muscle Testing (MMT) Assessment no strength deficits identified  -DB     Row Name 05/30/23 1056          Balance    Balance Assessment sitting static balance;sitting dynamic balance;standing static balance;standing dynamic balance  -DB     Static Sitting Balance modified independence  -DB     Dynamic Sitting Balance modified independence  -DB     Position, Sitting Balance unsupported;sitting edge of bed  -DB     Static Standing Balance standby assist  -DB     Dynamic Standing Balance contact guard;standby assist  -DB     Position/Device Used, Standing Balance unsupported  -DB     Balance Interventions sitting;standing;sit to stand  -DB           User Key  (r) = Recorded By, (t) = Taken By, (c) = Cosigned By    Initials Name Provider Type    Le Noriega PT Physical Therapist               Goals/Plan    No documentation.                Clinical Impression     Row Name 05/30/23 1056           Pain    Pain Intervention(s) Ambulation/increased activity;Repositioned  -DB     Row Name 05/30/23 1056          Plan of Care Review    Plan of Care Reviewed With patient  -DB     Outcome Evaluation Patient is a 62 y.o. male admitted to [facility] for SOA and near syncopal episode on 5/28/2023. Work up reveals acute PE. Patient is (I) at baseline and lives alone in  home with 0 MONTSE. Today, patient performed bed mobility with SV, required SBA for transfers, and ambulated 100' with CGA and pushing IV pole. Pt denies any SOA during ambulation and states he feels like he is at his baseline for strength and balance. No acute PT needs at this time. Anticipate home upon DC.  -DB     Row Name 05/30/23 1056          Therapy Assessment/Plan (PT)    Criteria for Skilled Interventions Met (PT) does not meet criteria for skilled intervention  -DB     Therapy Frequency (PT) evaluation only  -DB     Row Name 05/30/23 1056          Vital Signs    O2 Delivery Pre Treatment room air  -DB     O2 Delivery Intra Treatment room air  -DB     O2 Delivery Post Treatment room air  -DB     Pre Patient Position Supine  -DB     Intra Patient Position Standing  -DB     Post Patient Position Supine  -DB     Row Name 05/30/23 1056          Positioning and Restraints    Pre-Treatment Position other (comment)  on stretcher  -DB     Post Treatment Position bed  -DB     In Bed supine;call light within reach;encouraged to call for assist  -DB           User Key  (r) = Recorded By, (t) = Taken By, (c) = Cosigned By    Initials Name Provider Type    DB Le Myers, PT Physical Therapist               Outcome Measures     Row Name 05/30/23 1100          How much help from another person do you currently need...    Turning from your back to your side while in flat bed without using bedrails? 4  -DB     Moving from lying on back to sitting on the side of a flat bed without bedrails? 4  -DB     Moving to and from a bed to a chair (including a wheelchair)?  4  -DB     Standing up from a chair using your arms (e.g., wheelchair, bedside chair)? 4  -DB     Climbing 3-5 steps with a railing? 3  -DB     To walk in hospital room? 3  -DB     AM-PAC 6 Clicks Score (PT) 22  -DB     Highest level of mobility 7 --> Walked 25 feet or more  -DB     Row Name 05/30/23 1100          Functional Assessment    Outcome Measure Options AM-PAC 6 Clicks Basic Mobility (PT)  -DB           User Key  (r) = Recorded By, (t) = Taken By, (c) = Cosigned By    Initials Name Provider Type    DB Le Myers, MARTÍNEZ Physical Therapist                             Physical Therapy Education     Title: PT OT SLP Therapies (Done)     Topic: Physical Therapy (Done)     Point: Mobility training (Done)     Learning Progress Summary           Patient Acceptance, E, VU by DB at 5/30/2023 1101                   Point: Home exercise program (Done)     Learning Progress Summary           Patient Acceptance, E, VU by DB at 5/30/2023 1101                   Point: Body mechanics (Done)     Learning Progress Summary           Patient Acceptance, E, VU by DB at 5/30/2023 1101                   Point: Precautions (Done)     Learning Progress Summary           Patient Acceptance, E, VU by DB at 5/30/2023 1101                               User Key     Initials Effective Dates Name Provider Type Discipline    DB 06/16/21 -  Le Myers PT Physical Therapist PT              PT Recommendation and Plan     Plan of Care Reviewed With: patient  Outcome Evaluation: Patient is a 62 y.o. male admitted to [facility] for SOA and near syncopal episode on 5/28/2023. Work up reveals acute PE. Patient is (I) at baseline and lives alone in  home with 0 MONTSE. Today, patient performed bed mobility with SV, required SBA for transfers, and ambulated 100' with CGA and pushing IV pole. Pt denies any SOA during ambulation and states he feels like he is at his baseline for strength and balance. No acute PT needs at this time. Anticipate home  upon DC.     Time Calculation:    PT Charges     Row Name 05/30/23 1101             Time Calculation    Start Time 1015  -DB      Stop Time 1027  -DB      Time Calculation (min) 12 min  -DB      PT Received On 05/30/23  -DB         Time Calculation- PT    Total Timed Code Minutes- PT 8 minute(s)  -DB            User Key  (r) = Recorded By, (t) = Taken By, (c) = Cosigned By    Initials Name Provider Type    DB Le Myers, PT Physical Therapist              Therapy Charges for Today     Code Description Service Date Service Provider Modifiers Qty    [id number]  PT EVAL MOD COMPLEXITY 2 5/30/2023 eL Myers, PT GP 1    [id number]  PT THERAPEUTIC ACT EA 15 MIN 5/30/2023 Le Myers, PT GP 1          PT G-Codes  Outcome Measure Options: AM-PAC 6 Clicks Basic Mobility (PT)  AM-PAC 6 Clicks Score (PT): 22  PT Discharge Summary  Anticipated Discharge Disposition (PT): home, home with assist    Le Myers PT  5/30/2023

## 2023-05-30 NOTE — PAYOR COMM NOTE
"Marilee George (62 y.o. Male)       INPATIENT REQUEST FOR [id number]    CONTACT STEPHANI PB  P# [phone]  F# [phone]          Date of Birth   1960    Social Security Number       Address   [address] [place] [address]    Home Phone   [phone]    MRN   [id number]       Latter day   Unknown    Marital Status   Unknown                            Admission Date   23    Admission Type   Emergency    Admitting Provider   Maria Guadalupe Kam MD    Attending Provider   Price Sotelo MD    Department, Room/Bed   [address], E468/1       Discharge Date       Discharge Disposition       Discharge Destination                               Attending Provider: Price Sotelo MD    Allergies: No Known Allergies    Isolation: None   Infection: None   Code Status: CPR    Ht: 182.9 cm (72\")   Wt: 103 kg (228 lb)    Admission Cmt: None   Principal Problem: Acute pulmonary embolism, unspecified pulmonary embolism type, unspecified whether acute cor pulmonale present [I26.99]                 Active Insurance as of 2023     Primary Coverage     Payor Plan Insurance Group Employer/Plan Group    [employer] [facility] [id number]     Payor Plan Address Payor Plan Phone Number Payor Plan Fax Number Effective Dates    PO    3/1/2021 - None Entered    [facility] [id number]       Subscriber Name Subscriber Birth Date Member ID       MARILEE GEORGE 1960 [id number]                 Emergency Contacts      (Rel.) Home Phone Work Phone Mobile Phone    daquan de leon (Friend) -- -- [phone]               History & Physical      Maria Guadalupe Kam MD at 23          HISTORY AND PHYSICAL   [facility]        Date of Admission: 2023  Patient Identification:  Name: Marilee George  Age: 62 y.o.  Sex: male  :  1960  MRN: [id number]                     Primary Care Physician: Mohini Cortes, " MD    Chief Complaint:  62 year old gentleman who presented to the emergency room with near syncope, weakness and malaise; his friend who is an md checked on him and noted that he appeared weak and in poor condition so she urged him to come to the ed; he has noted black and bloody stool for some time but did not seek medical attention; he has had weight loss; he has not seen any medical providers for many years    History of Present Illness:   As above    Past Medical History:  History reviewed. No pertinent past medical history.  Past Surgical History:  History reviewed. No pertinent surgical history.   Home Meds:  No medications prior to admission.       Allergies:  No Known Allergies  Immunizations:  Immunization History   Administered Date(s) Administered   • COVID-19 (PFIZER) Purple Cap Monovalent 03/11/2021, 04/01/2021     Social History:   Social History     Social History Narrative   • Not on file     Social History     Socioeconomic History   • Marital status: Unknown   Tobacco Use   • Smoking status: Never   Substance and Sexual Activity   • Alcohol use: Never   • Drug use: Never       Family History:  History reviewed. No pertinent family history.     Review of Systems  See history of present illness and past medical history.  Patient denies headache, dizziness, syncope, falls, trauma, change in vision, change in hearing, change in taste, changes in weight, changes in appetite, focal weakness, numbness, or paresthesia.  Patient denies chest pain, palpitations, dyspnea, orthopnea, PND, cough, sinus pressure, rhinorrhea, epistaxis, hemoptysis, nausea, vomiting,hematemesis, diarrhea, constipation or hematochezia.  Denies cold or heat intolerance, polydipsia, polyuria, polyphagia. Denies hematuria, pyuria, dysuria, hesitancy, frequency or urgency. Denies consumption of raw and under cooked meats foods or change in water source.  Denies fever, chills, sweats, night sweats.  Denies missing any routine  "medications. Remainder of ROS is negative.    Objective:  T Max 24 hrs: Temp (24hrs), Av.4 °F (37.4 °C), Min:99.4 °F (37.4 °C), Max:99.4 °F (37.4 °C)    Vitals Ranges:   Temp:  [99.4 °F (37.4 °C)] 99.4 °F (37.4 °C)  Heart Rate:  [] 90  Resp:  [16] 16  BP: (122-130)/(77-81) 122/81      Exam:  /81   Pulse 90   Temp 99.4 °F (37.4 °C) (Tympanic)   Resp 16   Ht 182.9 cm (72\")   Wt 101 kg (222 lb 14.4 oz)   SpO2 100%   BMI 30.23 kg/m²     General Appearance:    Alert, cooperative, no distress, appears stated age   Head:    Normocephalic, without obvious abnormality, atraumatic   Eyes:    PERRL, conjunctivae/corneas clear, EOM's intact, both eyes   Ears:    Normal external ear canals, both ears   Nose:   Nares normal, septum midline, mucosa normal, no drainage    or sinus tenderness   Throat:   Lips, mucosa, and tongue normal   Neck:   Supple, symmetrical, trachea midline, no adenopathy;     thyroid:  no enlargement/tenderness/nodules; no carotid    bruit or JVD   Back:     Symmetric, no curvature, ROM normal, no CVA tenderness   Lungs:     Decreased breath sounds bilaterally, respirations unlabored   Chest Wall:    No tenderness or deformity    Heart:    Regular rate and rhythm, S1 and S2 normal, no murmur, rub   or gallop   Abdomen:     Soft, nontender, bowel sounds active all four quadrants,     no masses, no hepatomegaly, no splenomegaly   Extremities:   Extremities normal, atraumatic, no cyanosis or edema   Pulses:   2+ and symmetric all extremities   Skin:   Skin color, texture, turgor normal, no rashes or lesions               .    Data Review:  Labs in chart were reviewed.  WBC   Date Value Ref Range Status   2023 12.00 (H) 3.40 - 10.80 10*3/mm3 Final     Hemoglobin   Date Value Ref Range Status   2023 13.3 13.0 - 17.7 g/dL Final     Hematocrit   Date Value Ref Range Status   2023 39.4 37.5 - 51.0 % Final     Platelets   Date Value Ref Range Status   2023 230 140 - 450 " 10*3/mm3 Final     Sodium   Date Value Ref Range Status   05/28/2023 135 (L) 136 - 145 mmol/L Final     Potassium   Date Value Ref Range Status   05/28/2023 4.6 3.5 - 5.2 mmol/L Final     Chloride   Date Value Ref Range Status   05/28/2023 99 98 - 107 mmol/L Final     CO2   Date Value Ref Range Status   05/28/2023 23.1 22.0 - 29.0 mmol/L Final     BUN   Date Value Ref Range Status   05/28/2023 14 8 - 23 mg/dL Final     Creatinine   Date Value Ref Range Status   05/28/2023 1.41 (H) 0.76 - 1.27 mg/dL Final     Glucose   Date Value Ref Range Status   05/28/2023 116 (H) 65 - 99 mg/dL Final     Calcium   Date Value Ref Range Status   05/28/2023 9.4 8.6 - 10.5 mg/dL Final     Magnesium   Date Value Ref Range Status   05/28/2023 2.1 1.6 - 2.4 mg/dL Final                Imaging Results (All)     Procedure Component Value Units Date/Time    CT Angiogram Chest Pulmonary Embolism [[id number]] Collected: 05/28/23 1906     Updated: 05/28/23 1906    Narrative:         CT ANGIOGRAM OF THE CHEST. MULTIPLE CORONAL, SAGITTAL, AND 3-D  RECONSTRUCTIONS. CT ABDOMEN AND PELVIS WITH IV CONTRAST     HISTORY: 62-year-old male with palpable abnormality at the left abdomen.  Inguinal mass. Shortness of breath.     TECHNIQUE: Radiation dose reduction techniques were utilized, including  automated exposure control and exposure modulation based on body size.  CT angiogram of the chest was performed. Multiple coronal, sagittal, and  3-D reconstruction images were obtained. Subsequently, 3 mm images were  obtained through the abdomen and pelvis after the administration of IV  contrast. No priors for comparison.     FINDINGS:  CHEST CTA:  1. There are multiple bilateral pulmonary thromboemboli. There are no  pulmonary thromboemboli within the main pulmonary arteries, but there is  nonocclusive thrombus within the proximal right interlobar pulmonary  artery extending into segmental and subsegmental pulmonary arteries and  there are pulmonary  thromboemboli within the right upper and right  middle lobe pulmonary arteries. There is a smaller volume of pulmonary  thromboemboli within segmental and subsegmental pulmonary arteries on  the left. The RV to LV ratio is 1.1.  2. There are several pulmonary sub-6 mm pulmonary nodules in both lungs  as seen at the left lower lobe series 2 image 108. The nodules are  suspicious for metastases. There are no pleural or pericardial  effusions. There is no lymphadenopathy within the chest.     ABDOMEN/PELVIS:  1. The urinary bladder is markedly thickened and irregular and the  abnormal soft tissue is confluent with the prostate. There is bulky  lymphadenopathy within the pelvis and left groin. One of the left pelvic  nodes measures 8.6 x 7.4 cm and a slightly inferior [place] mass measures  10.7 x 6.7 cm. One of the large left groin nodes measures 6.5 x 4.8 cm.  There are multiple similar size and smaller size nodes adjacently. There  is thrombus within the left common femoral vein. The left external iliac  vein is compressed by the bulky intrapelvic lesions. There is  lymphadenopathy throughout the pelvis and perirectal regions. There is a  soft tissue mass within the posterior aspect of the distal rectum which  appears similar to the lymphadenopathy but may possibly represent stool.  There is significantly less prominent right groin lymphadenopathy than  on the left. In addition, there is a 3.0 x 2.4 cm lateral hepatic  segment metastasis. Primary malignancy may be the urinary bladder, colon  and rectum, lymphoma, sarcoma. Tissue diagnosis is recommended. Left  groin lymphadenopathy is easily accessible for CT-guided biopsy. The  lateral hepatic segment metastasis is accessible as well.  2. There are lytic bone lesions as seen at the right iliac crest and  left posterior elements of L3. Staging with PET/CT is recommended.  3. The spleen measures 14 cm. There is no lymphadenopathy within the  abdomen. The gallbladder,  pancreas, adrenals, and kidneys appear  unremarkable. There is no bowel ileus or obstruction. Appendix appears  within normal limits. There is a moderate size right inguinal hernia  containing a segment of small bowel without evidence for incarceration.       CT Abdomen Pelvis With Contrast [[id number]] Collected: 05/28/23 1906     Updated: 05/28/23 1906    Narrative:         CT ANGIOGRAM OF THE CHEST. MULTIPLE CORONAL, SAGITTAL, AND 3-D  RECONSTRUCTIONS. CT ABDOMEN AND PELVIS WITH IV CONTRAST     HISTORY: 62-year-old male with palpable abnormality at the left abdomen.  Inguinal mass. Shortness of breath.     TECHNIQUE: Radiation dose reduction techniques were utilized, including  automated exposure control and exposure modulation based on body size.  CT angiogram of the chest was performed. Multiple coronal, sagittal, and  3-D reconstruction images were obtained. Subsequently, 3 mm images were  obtained through the abdomen and pelvis after the administration of IV  contrast. No priors for comparison.     FINDINGS:  CHEST CTA:  1. There are multiple bilateral pulmonary thromboemboli. There are no  pulmonary thromboemboli within the main pulmonary arteries, but there is  nonocclusive thrombus within the proximal right interlobar pulmonary  artery extending into segmental and subsegmental pulmonary arteries and  there are pulmonary thromboemboli within the right upper and right  middle lobe pulmonary arteries. There is a smaller volume of pulmonary  thromboemboli within segmental and subsegmental pulmonary arteries on  the left. The RV to LV ratio is 1.1.  2. There are several pulmonary sub-6 mm pulmonary nodules in both lungs  as seen at the left lower lobe series 2 image 108. The nodules are  suspicious for metastases. There are no pleural or pericardial  effusions. There is no lymphadenopathy within the chest.     ABDOMEN/PELVIS:  1. The urinary bladder is markedly thickened and irregular and the  abnormal soft  tissue is confluent with the prostate. There is bulky  lymphadenopathy within the pelvis and left groin. One of the left pelvic  nodes measures 8.6 x 7.4 cm and a slightly inferior [place] mass measures  10.7 x 6.7 cm. One of the large left groin nodes measures 6.5 x 4.8 cm.  There are multiple similar size and smaller size nodes adjacently. There  is thrombus within the left common femoral vein. The left external iliac  vein is compressed by the bulky intrapelvic lesions. There is  lymphadenopathy throughout the pelvis and perirectal regions. There is a  soft tissue mass within the posterior aspect of the distal rectum which  appears similar to the lymphadenopathy but may possibly represent stool.  There is significantly less prominent right groin lymphadenopathy than  on the left. In addition, there is a 3.0 x 2.4 cm lateral hepatic  segment metastasis. Primary malignancy may be the urinary bladder, colon  and rectum, lymphoma, sarcoma. Tissue diagnosis is recommended. Left  groin lymphadenopathy is easily accessible for CT-guided biopsy. The  lateral hepatic segment metastasis is accessible as well.  2. There are lytic bone lesions as seen at the right iliac crest and  left posterior elements of L3. Staging with PET/CT is recommended.  3. The spleen measures 14 cm. There is no lymphadenopathy within the  abdomen. The gallbladder, pancreas, adrenals, and kidneys appear  unremarkable. There is no bowel ileus or obstruction. Appendix appears  within normal limits. There is a moderate size right inguinal hernia  containing a segment of small bowel without evidence for incarceration.       XR Chest 1 View [[id number]] Collected: 05/28/23 1823     Updated: 05/28/23 1823    Narrative:      XR CHEST 1 VW-     HISTORY: 62-year-old male with shortness of breath.     FINDINGS: There is no evidence for pneumonia, effusions, or CHF.               Assessment:  Active Hospital Problems    Diagnosis  POA   • **Acute pulmonary  embolism, unspecified pulmonary embolism type, unspecified whether acute cor pulmonale present [I26.99]  Yes      Resolved Hospital Problems   No resolved problems to display.   weight loss  ckd3  Hyperglycemia      Plan:  Will ask urology and colorectal surgery to see  Fluids  Monitor blood sugar  Trend creatinine  Heparin for pe since he will need procedures/biopsy  Check psa  D.w patient and friends as well as ed provider    Maria Guadalupe Kam MD  2023  20:40 EDT      Electronically signed by Maria Guadalupe Kam MD at 23       Operative/Procedure Notes (last 72 hours)  Notes from 23 1446 through 23 1446   No notes of this type exist for this encounter.            Physician Progress Notes (last 72 hours)      Chico Lima APRN at 23 1230              Name: King George ADMIT: 2023   : 1960  PCP: Mohini Cortes MD    MRN: [id number] LOS: 2 days   AGE/SEX: 62 y.o. male  ROOM: [facility] [place]     Subjective   Subjective   Patient appears comfortable and in no apparent distress.  Continues to deny chest pain or shortness of breath or pain.  Reports left lower extremity swelling and left groin 'mass' present for quite some time with associated intermittent rectal bleeding without prior work-up or history of colonoscopy.    Objective   Objective   Vital Signs  Temp:  [98 °F (36.7 °C)-98.9 °F (37.2 °C)] 98 °F (36.7 °C)  Heart Rate:  [69-82] 72  Resp:  [16] 16  BP: (107-139)/(72-84) 124/72  SpO2:  [96 %-99 %] 98 %  on   ;   Device (Oxygen Therapy): room air  Body mass index is 30.92 kg/m².     Physical Exam  Constitutional:       General: He is not in acute distress.     Appearance: He is obese. He is not toxic-appearing.   Cardiovascular:      Rate and Rhythm: Normal rate.      Heart sounds: Normal heart sounds.   Pulmonary:      Effort: Pulmonary effort is normal.      Breath sounds: Normal breath sounds.   Abdominal:      General: Bowel sounds are normal.    Musculoskeletal:      Right lower leg: No edema.      Left lower leg: Edema present.   Skin:     General: Skin is warm and dry.   Neurological:      Mental Status: He is alert and oriented to person, place, and time.      Cranial Nerves: No cranial nerve deficit.     Physical exam performed on 5/30/2023 as per above    Results Review     I reviewed the patient's new clinical results.  Results from last 7 days   Lab Units 05/30/23  0254 05/29/23  0326 05/28/23  1710   WBC 10*3/mm3 8.55 11.28* 12.00*   HEMOGLOBIN g/dL 11.4* 11.4* 13.3   PLATELETS 10*3/mm3 238 206 230     Results from last 7 days   Lab Units 05/30/23  0254 05/29/23  0326 05/28/23  1710   SODIUM mmol/L 134* 135* 135*   POTASSIUM mmol/L 4.2 4.1 4.6   CHLORIDE mmol/L 104 100 99   CO2 mmol/L 21.0* 20.5* 23.1   BUN mg/dL 13 16 14   CREATININE mg/dL 0.87 1.18 1.41*   GLUCOSE mg/dL 92 96 116*   EGFR mL/min/1.73 98.8 70.6 57.1*     Results from last 7 days   Lab Units 05/28/23  1710   ALBUMIN g/dL 3.9   BILIRUBIN mg/dL 0.8   ALK PHOS U/L 86   AST (SGOT) U/L 11   ALT (SGPT) U/L 19     Results from last 7 days   Lab Units 05/30/23  0254 05/29/23  0326 05/28/23  1710   CALCIUM mg/dL 8.1* 8.9 9.4   ALBUMIN g/dL  --   --  3.9   MAGNESIUM mg/dL  --   --  2.1       Hemoglobin A1C   Date/Time Value Ref Range Status   05/29/2023 0326 5.70 (H) 4.80 - 5.60 % Final       US Guided Lymph Node Biopsy    Result Date: 5/30/2023  Technically successful ultrasound-guided left inguinal lymph node biopsy  This report was finalized on 5/30/2023 10:20 AM by Dr. James George M.D.      I have personally reviewed all medications:  Scheduled Medications  senna-docusate sodium, 2 tablet, Oral, BID    Infusions  heparin, 18 Units/kg/hr, Last Rate: 25.8 Units/kg/hr (05/30/23 1127)  sodium chloride, 75 mL/hr, Last Rate: 75 mL/hr (05/30/23 1210)    Diet  Diet: Cardiac Diets; Healthy Heart (2-3 Na+); Texture: Regular Texture (IDDSI 7); Fluid Consistency: Thin (IDDSI 0)    I have  personally reviewed:  [x]  Laboratory   []  Microbiology   []  Radiology   [x]  EKG/Telemetry  []  Cardiology/Vascular    []  Pathology    []  Records      Assessment/Plan     Active Hospital Problems    Diagnosis  POA   • **Acute pulmonary embolism, unspecified pulmonary embolism type, unspecified whether acute cor pulmonale present [I26.99]  Yes   • Moderate Malnutrition (HCC) [E44.0]  Yes   • Anemia [D64.9]  Yes   • Left groin mass [R19.09]  Yes   • Hepatic lesion [K76.9]  Yes   • Abdominal lymphadenopathy [R59.0]  Yes      Resolved Hospital Problems   No resolved problems to display.       62 y.o. male admitted with Acute pulmonary embolism, unspecified pulmonary embolism type, unspecified whether acute cor pulmonale present.      Acute pulmonary embolism, unspecified pulmonary embolism type, unspecified whether acute cor pulmonale present: Confirmed on CTA chest.  Heparin gtt infusing.       Abdominal lymphadenopathy: Associated rectal mass, liver mets, pulmonary nodule, and bone mets suspicious for rectal carcinoma with metastasis on CT.  Primary malignancy may be the urinary bladder, colon, rectum, lymphoma, sarcoma.  Oncology following.  Anticipate [id number] once path report available.  Unremarkable CEA 0.88 & PSA 1.49.  Colorectal surgery following ultrasound-guided biopsy completed on 5/30/2023 (noted heparin gtt does not need to be held for biopsy).  No plans for colonoscopy.      Anemia: Slight downward trend serum hemoglobin--stable today.  Suspect part hemodilution given recent IVF; however, heparin drip infusing as well.  No overt signs of active bleeding.  Transfuse for serum hemoglobin less than 7.  Repeat labs in AM.      Hepatic lesion: See plan above.      Bladder wall thickening and irregularity findings on CT: Urology following & patient underwent pancystoscopy on 5/29/2023 findings normal prostate, urethra, mass effect on left lateral wall of bladder,, small erythematous area posterior  bladder wall ~1 cm with concern for external invasion.    *Reviewed telemetry with no evidence for ectopy, remains SR, and stable for transfer to [facility] oncology unit--discontinue telemetry.    · heparin gtt adequate for DVT prophylaxis.  · Full code.  · Discussed with patient & RN.  · Anticipate discharge pending clinical course.      TANYA Bray  [facility]  05/30/23  12:37 EDT        Electronically signed by Chico Lima APRN at 05/30/23 3666     Brodie Rocha MD at 05/30/23 0867            Subjective   REASON FOR CONSULTATION: Widely metastatic malignancy presumably of rectal origin with associated DVT and pulmonary embolism      History of Present Illness  patient is a 62-year-old white male with no chronic medical illnesses has not seen a doctor for many years never had a screening colonoscopy or PSA.  He is had a 20 pound weight loss in the last 2 to 3 months associated with low appetite and rectal bleeding.  Became progressively more weak and short of breath and came to the ER at the insistence of one of his friends who is a physician     He has noted a fullness in the left groin as well as swelling of his entire left leg for the last week     CAT scans in the ER showed pulmonary embolism and CT of the abdomen showed extensive adenopathy in the left side of the abdomen with a possible rectal mass liver lesions bone lesions and pulmonary nodules and a clot in the external iliac vein due to compression by adenopathy     Biopsy of these nodes is planned for tomorrow     He is not a smoker or drinker  Is never had a DVT MI or stroke  He has a family history of prostate cancer in his father and his 60s and mother with breast cancer at age 86    Interval history  5/30  Shortness of breath much improved on no oxygen  left inguinal node biopsy done today  Continues on heparin  No significant rectal bleeding hemoglobin stable 11.4  CEA and PSA normal surprisingly      Past  Medical History, Past Surgical History, Social History, Family History have been reviewed and are without significant changes except as mentioned.    Review of Systems   A comprehensive 14 point review of systems was performed and was negative except as mentioned.    Medications:  The current medication list was reviewed in the EMR    ALLERGIES:  No Known Allergies    Objective      Vitals:    05/29/23 1942 05/30/23 0034 05/30/23 0404 05/30/23 0636   BP: 130/76 138/81 139/73    BP Location: Right arm Right arm Right arm    Patient Position: Lying Lying Lying    Pulse: 71 70 82    Resp: 16 16 16    Temp: 98.6 °F (37 °C) 98.9 °F (37.2 °C) 98.4 °F (36.9 °C)    TempSrc: Oral Oral Oral    SpO2: 97% 97% 97%    Weight:    103 kg (228 lb)   Height:              View : No data to display.                Physical Exam    CONSTITUTIONAL:  Vital signs reviewed.  No distress, looks comfortable.  EYES:  Conjunctivae and lids unremarkable.  PERRLA  EARS,NOSE,MOUTH,THROAT:  Ears and nose appear unremarkable.  Lips, teeth, gums appear unremarkable.  RESPIRATORY:  Normal respiratory effort.  Lungs clear to auscultation bilaterally.  CARDIOVASCULAR:  Normal S1, S2.  No murmurs rubs or gallops.  3+ edema in his entire left leg to the thigh   GASTROINTESTINAL: Abdomen appears unremarkable.  Nontender.  No hepatomegaly.  No splenomegaly.BULKY left inguinal adenopathy  LYMPHATIC:  No cervical, supraclavicular, axillary lymphadenopathy.  SKIN:  Warm.  No rashes.  PSYCHIATRIC:  Normal judgment and insight.  Normal mood and affect.  I have reexamined the patient and the results are consistent with the previously documented exam. Brodie Rocha MD       RECENT LABS:  Hematology WBC   Date Value Ref Range Status   05/30/2023 8.55 3.40 - 10.80 10*3/mm3 Final     RBC   Date Value Ref Range Status   05/30/2023 4.11 (L) 4.14 - 5.80 10*6/mm3 Final     Hemoglobin   Date Value Ref Range Status   05/30/2023 11.4 (L) 13.0 - 17.7 g/dL Final      Hematocrit   Date Value Ref Range Status   05/30/2023 34.7 (L) 37.5 - 51.0 % Final     Platelets   Date Value Ref Range Status   05/30/2023 238 140 - 450 10*3/mm3 Final              Assessment & Plan     1.  Generalized abdominal lymphadenopathy with a rectal mass liver mets and pulmonary nodules and bone mets suspicious for rectal carcinoma with metastasis-cannot exclude bladder cancer or lymphoma although lymphoma less likely to have lytic bone lesions and liver involvement  · CEA/PSA  normal-     2.  Incidental bilateral PEs and thrombus in the left external iliac due to compression by bulky nodes on IV heparin     3.  Mild anemia - normal ferritin     4.  Family history of breast cancer in his mother at age 86 and prostate cancer his father in his 60s     Plan     1.Check biopsy results    2.  Agree with anticoagulation with heparin for now     3.  We will need to watch for rectal bleeding with anticoagulation and see how we will do before discharge     4.  We will need an [id number]-[id number] once the path report is back for treatment and he realizes the options for treatment may be limited depending on the diagnosis in terms of curability.    [place] [facility] [place] can be discontinued             5/30/2023      CC:            Electronically signed by Brodie Rocha MD at 05/30/23 1130     More Bailey PA-C at 05/30/23 0807     Attestation signed by Bhanu Evans MD at 05/30/23 0970    I have reviewed this documentation and agree.                  Progress Note      S: Pt tolerating PO intake and had a BM this morning with some RB. States the RB is similar to what he experienced in the past.    O:  Temp:  [98.2 °F (36.8 °C)-98.9 °F (37.2 °C)] 98.4 °F (36.9 °C)  Heart Rate:  [69-85] 82  Resp:  [16] 16  BP: (129-139)/(73-82) 139/73      Intake/Output Summary (Last 24 hours) at 5/30/2023 0807  Last data filed at 5/30/2023 0656  Gross per 24 hour   Intake 840 ml   Output --   Net 840 ml        Abd:   soft, non-distended      Results from last 7 days   Lab Units 23  0254   WBC 10*3/mm3 8.55   HEMOGLOBIN g/dL 11.4*   HEMATOCRIT % 34.7*   PLATELETS 10*3/mm3 238     Results from last 7 days   Lab Units 23  0254   SODIUM mmol/L 134*   POTASSIUM mmol/L 4.2   CHLORIDE mmol/L 104   CO2 mmol/L 21.0*   BUN mg/dL 13   CREATININE mg/dL 0.87   EGFR mL/min/1.73 98.8   GLUCOSE mg/dL 92   CALCIUM mg/dL 8.1*       A/P: 62 y.o. male with metastatic cancer of unknown origin.     - Bedside cystoscopy performed by Urology yesterday with mass effect involving the left lateral wall of the bladder and 1 cm erythematous area along the posterior wall concerning for external invasion.   - Slight decrease in Hg to 11.4 in setting of Heparin gtt and RB. Will continue to monitor.   - IR to perform ultrasound guided lymph node biopsy. Oncology following with plans for port placement once pathology is back.     Electronically signed by Bhanu Evans MD at 23 0927     Chico Lima APRN at 23 1442              Name: King George ADMIT: 2023   : 1960  PCP: Mohini Cortes MD    MRN: [id number] LOS: 1 days   AGE/SEX: 62 y.o. male  ROOM: [facility]     Subjective   Subjective   Patient evaluated at 1350--appears comfortable and in no apparent distress.  Denies chest pain or shortness of breath at this time.  Also denies pain.  Reports left lower extremity swelling and left groin 'mass' present for quite some time with associated intermittent rectal bleeding without prior work-up or history of colonoscopy.      Objective   Objective   Vital Signs  Temp:  [98.2 °F (36.8 °C)-99.4 °F (37.4 °C)] 98.6 °F (37 °C)  Heart Rate:  [] 85  Resp:  [16] 16  BP: (122-139)/(74-81) 129/78  SpO2:  [97 %-100 %] 97 %  on   ;   Device (Oxygen Therapy): room air  Body mass index is 30.39 kg/m².     Physical Exam  Constitutional:       General: He is not in acute distress.     Appearance: He is obese. He is not  toxic-appearing.   Cardiovascular:      Rate and Rhythm: Normal rate.      Heart sounds: Normal heart sounds.   Pulmonary:      Effort: Pulmonary effort is normal.      Breath sounds: Normal breath sounds.   Abdominal:      General: Bowel sounds are normal.   Musculoskeletal:      Left lower leg: Edema present.   Skin:     General: Skin is warm and dry.   Neurological:      Mental Status: He is alert and oriented to person, place, and time.      Cranial Nerves: No cranial nerve deficit.       Results Review     I reviewed the patient's new clinical results.  Results from last 7 days   Lab Units 05/29/23  0326 05/28/23  1710   WBC 10*3/mm3 11.28* 12.00*   HEMOGLOBIN g/dL 11.4* 13.3   PLATELETS 10*3/mm3 206 230     Results from last 7 days   Lab Units 05/29/23  0326 05/28/23  1710   SODIUM mmol/L 135* 135*   POTASSIUM mmol/L 4.1 4.6   CHLORIDE mmol/L 100 99   CO2 mmol/L 20.5* 23.1   BUN mg/dL 16 14   CREATININE mg/dL 1.18 1.41*   GLUCOSE mg/dL 96 116*   EGFR mL/min/1.73 70.6 57.1*     Results from last 7 days   Lab Units 05/28/23  1710   ALBUMIN g/dL 3.9   BILIRUBIN mg/dL 0.8   ALK PHOS U/L 86   AST (SGOT) U/L 11   ALT (SGPT) U/L 19     Results from last 7 days   Lab Units 05/29/23  0326 05/28/23  1710   CALCIUM mg/dL 8.9 9.4   ALBUMIN g/dL  --  3.9   MAGNESIUM mg/dL  --  2.1       Hemoglobin A1C   Date/Time Value Ref Range Status   05/29/2023 0326 5.70 (H) 4.80 - 5.60 % Final       No radiology results for the last day  I have personally reviewed all medications:  Scheduled Medications  senna-docusate sodium, 2 tablet, Oral, BID    Infusions  heparin, 18 Units/kg/hr, Last Rate: 25.83 Units/kg/hr (05/29/23 1431)  sodium chloride, 75 mL/hr, Last Rate: 75 mL/hr (05/28/23 2142)    Diet  Diet: Cardiac Diets; Healthy Heart (2-3 Na+); Texture: Regular Texture (IDDSI 7); Fluid Consistency: Thin (IDDSI 0)    I have personally reviewed:  [x]  Laboratory   []  Microbiology   [x]  Radiology   [x]  EKG/Telemetry  []   Cardiology/Vascular    []  Pathology    []  Records      Assessment/Plan     Active Hospital Problems    Diagnosis  POA   • **Acute pulmonary embolism, unspecified pulmonary embolism type, unspecified whether acute cor pulmonale present [I26.99]  Yes   • Anemia [D64.9]  Yes   • Left groin mass [R19.09]  Yes   • Hepatic lesion [K76.9]  Yes   • Abdominal lymphadenopathy [R59.0]  Yes      Resolved Hospital Problems   No resolved problems to display.       62 y.o. male admitted with Acute pulmonary embolism, unspecified pulmonary embolism type, unspecified whether acute cor pulmonale present.      Acute pulmonary embolism, unspecified pulmonary embolism type, unspecified whether acute cor pulmonale present: Confirmed on CTA chest.      Abdominal lymphadenopathy: Associated rectal mass, liver mets, pulmonary nodule, and bone mets suspicious for rectal carcinoma with metastasis on CT.  Primary malignancy may be the urinary bladder, colon, rectum, lymphoma, sarcoma.  Oncology following.  [id number] once path report available.  Tumor markers pending results.  Colorectal surgery following ultrasound-guided biopsy (noted heparin gtt does not need to be held for biopsy).  No plans for colonoscopy.      Anemia: Slight downward trend serum hemoglobin.  Suspect part hemodilution; however, heparin drip infusing as well.  No overt signs of active bleeding.  Transfuse for serum hemoglobin less than 7.  Repeat labs in AM.      Hepatic lesion: See plan above.      Bladder wall thickening and irregularity findings on CT: Urology consulted plan for bedside cystoscopy.     · heparin gtt adequate for DVT prophylaxis.  · Full code.  · Discussed with patient & RN.  · Anticipate discharge pending clinical course.      TANYA Bray  [facility]  05/29/23  14:54 EDT        Electronically signed by Chico Lima APRN at 05/29/23 5724          Consult Notes (last 72 hours)      Brodie Rocha MD at  05/29/23 1057            Subjective     REASON FOR CONSULTATION: Widely metastatic malignancy presumably of rectal origin with associated DVT and pulmonary embolism  Provide an opinion on any further workup or treatment                             REQUESTING PHYSICIAN: [facility]    RECORDS OBTAINED:  Records of the patients history including those obtained from the referring provider were reviewed and summarized in detail.    HISTORY OF PRESENT ILLNESS:  The patient is a 62 y.o. year old male who is here for an opinion about the above issue.    History of Present Illness patient is a 62-year-old white male with no chronic medical illnesses has not seen a doctor for many years never had a screening colonoscopy or PSA.  He is had a 20 pound weight loss in the last 2 to 3 months associated with low appetite and rectal bleeding.  Became progressively more weak and short of breath and came to the ER at the insistence of one of his friends who is a physician    He has noted a fullness in the left groin as well as swelling of his entire left leg for the last week    CAT scans in the ER showed pulmonary embolism and CT of the abdomen showed extensive adenopathy in the left side of the abdomen with a possible rectal mass liver lesions bone lesions and pulmonary nodules and a clot in the external iliac vein due to compression by adenopathy    Biopsy of these nodes is planned for tomorrow    He is not a smoker or drinker  Is never had a DVT MI or stroke  He has a family history of prostate cancer in his father and his 60s and mother with breast cancer at age 86        History reviewed. No pertinent past medical history.     History reviewed. No pertinent surgical history.     No current facility-administered medications on file prior to encounter.     No current outpatient medications on file prior to encounter.        ALLERGIES:  No Known Allergies     Social History     Socioeconomic History   • Marital status: Unknown   Tobacco  Use   • Smoking status: Never   Substance and Sexual Activity   • Alcohol use: Never   • Drug use: Never        History reviewed. No pertinent family history.     Review of Systems   Constitutional: Positive for activity change, appetite change, fatigue and unexpected weight change.   Respiratory: Positive for shortness of breath.    Cardiovascular: Positive for leg swelling (Left side).   Gastrointestinal: Positive for anal bleeding.   Hematological: Positive for adenopathy (Left groin).        Objective     Vitals:    05/28/23 2355 05/29/23 0400 05/29/23 0443 05/29/23 0810   BP: 126/77 139/74  137/78   BP Location: Left arm Left arm  Right arm   Patient Position: Lying Lying  Lying   Pulse: 79 72  74   Resp: 16 16  16   Temp: 98.9 °F (37.2 °C) 98.2 °F (36.8 °C)  98.2 °F (36.8 °C)   TempSrc: Oral Oral  Oral   SpO2:    98%   Weight:   102 kg (224 lb 1.6 oz)    Height:              View : No data to display.                Physical Exam   CONSTITUTIONAL:  Vital signs reviewed.  No distress, looks comfortable.  EYES:  Conjunctivae and lids unremarkable.  PERRLA  EARS,NOSE,MOUTH,THROAT:  Ears and nose appear unremarkable.  Lips, teeth, gums appear unremarkable.  RESPIRATORY:  Normal respiratory effort.  Lungs clear to auscultation bilaterally.  CARDIOVASCULAR:  Normal S1, S2.  No murmurs rubs or gallops.  3+ edema in his entire left leg to the thigh GASTROINTESTINAL: Abdomen appears unremarkable.  Nontender.  No hepatomegaly.  No splenomegaly.  LYMPHATIC:  No cervical, supraclavicular, axillary lymphadenopathy.  SKIN:  Warm.  No rashes.  PSYCHIATRIC:  Normal judgment and insight.  Normal mood and affect.      RECENT LABS:  Hematology WBC   Date Value Ref Range Status   05/29/2023 11.28 (H) 3.40 - 10.80 10*3/mm3 Final     RBC   Date Value Ref Range Status   05/29/2023 4.10 (L) 4.14 - 5.80 10*6/mm3 Final     Hemoglobin   Date Value Ref Range Status   05/29/2023 11.4 (L) 13.0 - 17.7 g/dL Final     Hematocrit   Date Value  Ref Range Status   05/29/2023 34.4 (L) 37.5 - 51.0 % Final     Platelets   Date Value Ref Range Status   05/29/2023 206 140 - 450 10*3/mm3 Final        FINDINGS:  CHEST CTA:  1. There are multiple bilateral pulmonary thromboemboli. There are no  pulmonary thromboemboli within the main pulmonary arteries, but there is  nonocclusive thrombus within the proximal right interlobar pulmonary  artery extending into segmental and subsegmental pulmonary arteries and  there are pulmonary thromboemboli within the right upper and right  middle lobe pulmonary arteries. There is a smaller volume of pulmonary  thromboemboli within segmental and subsegmental pulmonary arteries on  the left. The RV to LV ratio is 1.1.  2. There are several pulmonary sub-6 mm pulmonary nodules in both lungs  as seen at the left lower lobe series 2 image 108. The nodules are  suspicious for metastases. There are no pleural or pericardial  effusions. There is no lymphadenopathy within the chest.     ABDOMEN/PELVIS:  1. The urinary bladder is markedly thickened and irregular and the  abnormal soft tissue is confluent with the prostate. There is bulky  lymphadenopathy within the pelvis and left groin. One of the left pelvic  nodes measures 8.6 x 7.4 cm and a slightly inferior [place] mass measures  10.7 x 6.7 cm. One of the large left groin nodes measures 6.5 x 4.8 cm.  There are multiple similar size and smaller size nodes adjacently. There  is thrombus within the left common femoral vein. The left external iliac  vein is compressed by the bulky intrapelvic lesions. There is  lymphadenopathy throughout the pelvis and perirectal regions. There is a  soft tissue mass within the posterior aspect of the distal rectum which  appears similar to the lymphadenopathy but may possibly represent stool.  There is significantly less prominent right groin lymphadenopathy than  on the left. In addition, there is a 3.0 x 2.4 cm lateral hepatic  segment metastasis.  Primary malignancy may be the urinary bladder, colon  and rectum, lymphoma, sarcoma. Tissue diagnosis is recommended. Left  groin lymphadenopathy is easily accessible for CT-guided biopsy. The  lateral hepatic segment metastasis is accessible as well.  2. There are lytic bone lesions as seen at the right iliac crest and  left posterior elements of L3. Staging with PET/CT is recommended.  3. The spleen measures 14 cm. There is no lymphadenopathy within the  abdomen. The gallbladder, pancreas, adrenals, and kidneys appear  unremarkable. There is no bowel ileus or obstruction. Appendix appears  within normal limits. There is a moderate size right inguinal hernia  containing a segment of small bowel without evidence for incarceration.      This result has not been signed. Information might be incomplete.       Assessment & Plan   1.  Generalized abdominal lymphadenopathy with a rectal mass liver mets and pulmonary nodules and bone mets suspicious for rectal carcinoma with metastasis-cannot exclude bladder cancer or lymphoma although lymphoma less likely to have lytic bone lesions and liver involvement    2.  Incidental bilateral PEs and thrombus in the left external iliac due to compression by bulky nodes on IV heparin    3.  Mild anemia suspect iron deficiency    4.  Family history of breast cancer in his mother at age 86 and prostate cancer his father in his 60s    Plan    1.Check tumor markers such as CEA/CA 19-9 and coag work-up although the clotting is very obviously due to the underlying malignancy and some of it due to compressive obstruction of the veins in his left leg    2.  Agree with anticoagulation with heparin until biopsy is over    3.  We will need to watch for rectal bleeding with anticoagulation and see how we will do before discharge    4.  We will need an [id number] once the path report is back for treatment and he realizes the options for treatment may be limited depending on the diagnosis in  terms of curability.    Thank you for this consult we will follow with you        Electronically signed by Brodie Rocha MD at 05/29/23 4401     Bhanu Evans MD at 05/29/23 0910      Consult Orders    1. Inpatient Colorectal Surgery Consult [[id number]] ordered by Maria Guadalupe Kam MD at 05/28/23 2046               King George is a 62 y.o. male who is seen as a consult at the request of Maria Guadalupe Kam MD for abdominal mass.      HPI:  Pt presented to the [facility] ED yesterday after a near syncopal episode. 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 He contributed his symptoms to exertion so he drank a Gatorade and sat down inside. He then experienced tunnel vision and felt like he was going to pass out. Pt later went home to rest. States his best friend's wife is a retired ER physician. She came over after hearing that he was not feeling well. At that time, the Pt told her about a mass in the left groin that he noticed a couple months prior and LLE swelling x1 month. After evaluating him and taking his vitals, she ultimately convinced him to present to the ER for further evaluation.     CTA performed in the ED revealed multiple bilateral PEs and several <6 mm pulmonary nodules. CT of the abdomen/pelvis with noted thickening of the urinary bladder, pelvic lymphadenopathy, questionable soft tissue mass near the distal rectum, lytic bone lesions, and a 3 x 2.4 cm hepatic lesion concerning for metastasis. Primary malignancy favored to be urinary bladder, colon, lymphoma, or sarcoma. WBC 12. Hg 13.3. Pt was admitted for further management.     Pt reports experiencing intermittent BRBPR x1 year that has been progressively worsening. He reports a distrust in the healthcare system as his father was a  of a hospital and ultimately passed away due to prostate cancer. His mother also had a Hx of breast cancer to which he reports disagreeing with her treatment  "recommendations. Due to this, he has not been seen by a physician, take any medications, or undergone any preventative measures, like a colonoscopy. Pt states that he has 1-2 BM daily, with bleeding becoming gradually more frequent. He also notes a 20 lb weight loss within the past month, which he contributed to decreased appetite and increased physical activity. Even though his PO intake decreased, he ate fruits/vegatibles and foods high in protein. Therefore, he states he was not concerned about his weight loss. He denies N/V prior to being dmitted, but notes a \"gagging\" sensation now when he tries to eat. Denies any abdominal pain or melena. No known FHx of colon polyps or colon cancer.     He first noticed the mass in the left groin approximately 2 months ago. Had what he believed to be a right inguinal hernia, but notes the mass on the left felt different as it was more firm and less mobile. It was non-tender, but interfered with his mobility and ability to bend down to put his socks on. Tried treating it with warm compresses. He also notes an overlying rash which he tried creams on in the past, without improvement.       History reviewed. No pertinent past medical history.    History reviewed. No pertinent surgical history.    Social History:   reports that he has never smoked. He does not have any smokeless tobacco history on file. He reports that he does not drink alcohol and does not use drugs.      Marriage status: Unknown    History reviewed. No pertinent family history.      Current Facility-Administered Medications:   •  acetaminophen (TYLENOL) tablet 650 mg, 650 mg, Oral, Q4H PRN, Maria Guadalupe Kam MD  •  sennosides-docusate (PERICOLACE) 8.6-50 MG per tablet 2 tablet, 2 tablet, Oral, BID, 2 tablet at 05/29/23 0913 **AND** polyethylene glycol (MIRALAX) packet 17 g, 17 g, Oral, Daily PRN **AND** bisacodyl (DULCOLAX) EC tablet 5 mg, 5 mg, Oral, Daily PRN **AND** bisacodyl (DULCOLAX) suppository 10 " mg, 10 mg, Rectal, Daily PRN, Maria Guadalupe Kam MD  •  heparin (porcine) 5000 UNIT/ML injection 4,200-8,300 Units, 40-80 Units/kg, Intravenous, Q6H PRN, Venkat Garcia MD  •  heparin 44056 units/250 mL (100 units/mL) in 0.45 % NaCl infusion, 18 Units/kg/hr, Intravenous, Titrated, Venkat Garcia MD, Last Rate: 21.83 mL/hr at 05/29/23 0721, 20.99 Units/kg/hr at 05/29/23 0721  •  melatonin tablet 3 mg, 3 mg, Oral, Nightly PRN, Maria Guadalupe Kam MD  •  ondansetron (ZOFRAN) tablet 4 mg, 4 mg, Oral, Q6H PRN **OR** ondansetron (ZOFRAN) injection 4 mg, 4 mg, Intravenous, Q6H PRN, Maria Guadalupe Kam MD  •  sodium chloride 0.9 % flush 10 mL, 10 mL, Intravenous, PRN, Emergency, Triage Protocol, MD  •  sodium chloride 0.9 % infusion, 75 mL/hr, Intravenous, Continuous, Maria Guadalupe Kam MD, Last Rate: 75 mL/hr at 05/28/23 2142, 75 mL/hr at 05/28/23 2142    Allergy  Patient has no known allergies.    Review of Systems   Constitutional: Positive for decreased appetite and weight loss. Negative for weight gain.   HENT: Negative for congestion, hearing loss and hoarse voice.    Eyes: Positive for visual disturbance. Negative for discharge.   Cardiovascular: Positive for leg swelling and near-syncope. Negative for chest pain and cyanosis.   Respiratory: Positive for shortness of breath. Negative for cough, sleep disturbances due to breathing and snoring.    Endocrine: Negative for cold intolerance and heat intolerance.   Hematologic/Lymphatic: Does not bruise/bleed easily.   Skin: Negative for itching, poor wound healing and skin cancer.   Musculoskeletal: Negative for arthritis, back pain, joint pain and joint swelling.   Gastrointestinal: Positive for hematochezia. Negative for abdominal pain, change in bowel habit, bowel incontinence, constipation, diarrhea, hematemesis, melena, nausea and vomiting.   Genitourinary: Negative for bladder incontinence, dysuria and hematuria.   Neurological: Negative for brief  paralysis, excessive daytime sleepiness, dizziness, focal weakness, headaches, light-headedness and weakness.   Psychiatric/Behavioral: Negative for altered mental status and hallucinations. The patient does not have insomnia.    Allergic/Immunologic: Negative for HIV exposure and persistent infections.       Vitals:    05/29/23 0810   BP: 137/78   Pulse: 74   Resp: 16   Temp: 98.2 °F (36.8 °C)   SpO2: 98%     Body mass index is 30.39 kg/m².    Physical Exam  Exam conducted with a chaperone present.   Constitutional:       General: He is not in acute distress.     Appearance: He is well-developed.      Comments: LLE swelling   HENT:      Head: Normocephalic and atraumatic.      Nose: Nose normal.   Eyes:      Conjunctiva/sclera: Conjunctivae normal.      Pupils: Pupils are equal, round, and reactive to light.   Neck:      Trachea: No tracheal deviation.   Pulmonary:      Effort: Pulmonary effort is normal. No respiratory distress.      Breath sounds: Normal breath sounds.   Abdominal:      General: Bowel sounds are normal. There is no distension.      Palpations: Abdomen is soft.      Comments: Abdomen soft, non-distended. No tenderness to palpation. Large, firm, and non-mobile mass noted to left groin.    Musculoskeletal:         General: No deformity. Normal range of motion.      Cervical back: Normal range of motion.   Skin:     General: Skin is warm and dry.   Neurological:      Mental Status: He is alert and oriented to person, place, and time.      Cranial Nerves: No cranial nerve deficit.      Coordination: Coordination normal.      Gait: Gait normal.   Psychiatric:         Behavior: Behavior normal.         Judgment: Judgment normal.         Review of Medical Record:  I reviewed medical records as detailed in HPI.     CT Abdomen/Pelvis W/ Contrast 05/28/2023:  - Urinary bladder with irregular thickening   - Bulky lymphadenopathy within the pelvis and left groin. One of the left pelvic nodes measures 8.6 x  7.4 cm and a slightly inferior [place] mass measures 10.7 x 6.7 cm. One of the large left groin nodes measures 6.5 x 4.8 cm.  - Thrombus present within the left common femoral vein.   - Soft tissue mass within the posterior aspect of the distal rectum   - Tthere is a 3.0 x 2.4 cm lateral hepatic segment metastasis.   - Lytic bone lesions as seen at the right iliac crest and left posterior elements of L3.     CTA 05/28/2023:  - Multiple bilateral PEs  - Several <6 mm pulmonary nodules bilateral concerning for metastases     CBC 05/28/2023:  - WBC 12  - Hg 13.3      Assessment:  - LLQ mass with unknown etiology     Plan:  - Pt evaluated by urology today with plans to undergo cystoscopy   - Will ask oncology to evaluate Pt today and will ask IR to perform biopsy of the lymph nodes within the left groin.     I have reviewed the history and plan as obtained by More Bailey PA-C:. I have independently examined the patient and I personally performed <50% of the management in this split shared patient. My exam confirms her physical findings and I agree with the plan as listed, with the addition of the following      Physical Exam  Constitutional:       General: Patient is not in acute distress.     Appearance: Normal appearance. Not toxic-appearing.   HENT:      Head: Normocephalic and atraumatic.   Cardiovascular:      Rate and Rhythm: Normal rate and regular rhythm.   Pulmonary:      Effort: Pulmonary effort is normal. No respiratory distress.      Breath sounds: Normal breath sounds. No wheezing or rhonchi.   Abdominal:      General: Bowel sounds are normal. There is no distension.      Palpations: Abdomen is soft.      Tenderness: There is no abdominal tenderness. There is no guarding or rebound.      Left groin 8 x 6 cm firm mass  Musculoskeletal:        Right lower leg: No edema.      Left lower leg: + edema.   Skin:     General: Skin is warm and dry.   Neurological:      General: No focal deficit present.       Mental Status: Alert and oriented to person, place, and time.   Psychiatric:         Mood and Affect: Mood normal.         Behavior: Behavior normal.       I have personally reviewed:  [x]  Laboratory   []  Microbiology   [x]  Radiology I reviewed CT images and report  []  EKG/Telemetry   []  Cardiology/Vascular   []  Pathology   []  Some old records       Assessment: Metastatic cancer of unknown origin.  It would be unusual for rectal cancer to have such large groin nodes.      Plan I discussed with patient that he has tumor in his liver and bones.  He was unaware of this.    • I have written for interventional radiology to do a ultrasound-guided biopsy of the left groin.  I discussed this with Dr. George.  He states the heparin does not need to be held.  • Urology to do bedside cystoscope  • At this point I do not think that there is high yield to do a colonoscopy and we will wait for pathology from groin biopsy.  • I have written for oncology to be consulted  • Discussed with patient and consulting provider and More Bailey PA-C:      Electronically signed by Bhanu Evans MD at 05/29/23 1031     Shira Angela APRN at 05/29/23 0743      Consult Orders    1. Inpatient Urology Consult [[id number]] ordered by Maria Guadalupe Kam MD at 05/28/23 2046          Attestation signed by Eduardo Mueller MD at 05/29/23 2004    I have reviewed this documentation and agree.    Bedside cystoscopy procedure report    Preop diagnosis: Bladder wall thickening, lymphadenopathy  Post op diagnosis: Same as above    Pt was prepped in standard fashion. Pancystoscopy was preformed with the findings as below:    1. Normal prostate and urethra  2. Mass effect on left lateral wall of bladder  3. Small erythematous area on posterior bladder wall ~1 cm with concern for external invasion    Pt tolerated the procedure well.    Will f/u path from lymph node biopsy                       FIRST UROLOGY CONSULT      Patient  Identification:  NAME:  King George  Age:  62 y.o.   Sex:  male   :  1960   MRN:  [id number]       Chief complaint: Syncope, mass inguinal region    History of present illness:  Pt presented to the ER on 23 with recent history of near syncopal episode, left inguinal mass.  Pt reports he has not been feeling well for about a month. 20# wt loss due to lack of appetite and ''gagging'' in the morning when he tries to eat.  He reports he noticed swelling in left groin area and extending toward his leg.  He reports it is firm to the touch.  He voids without straining or pushing.  He does report increased urgency to void.  He denies blood in urine or burning with urination.  He does not have any past medical history as he has not seen a medical provider for many years.      CT scan indicates the bladder is thickened and irregular.  Abnormal soft tissue confluent with the prostate.  Bulky lymphadenopathy within the pelvis and left groin.  Enlarged pelvic lymph nodes.  Willian mass.  Thrombus in L common femoral vein and left external iliac vein compression with bulky intrapelvic lesions.  Lymphadenopathy throughout pelvis and cody-rectal area.  Soft issue mass in distal rectum.      Pt's PSA is 1.49.  Pt will have a bedside cystoscopy today to evaluate bladder.  Discussed the procedure with the pt today.        Past medical history:  History reviewed. No pertinent past medical history.    Past surgical history:  History reviewed. No pertinent surgical history.    Allergies:  Patient has no known allergies.    Home medications:  No medications prior to admission.        Hospital medications:  senna-docusate sodium, 2 tablet, Oral, BID      heparin, 18 Units/kg/hr, Last Rate: 20.99 Units/kg/hr (23 7421)  sodium chloride, 75 mL/hr, Last Rate: 75 mL/hr (23 0198)      •  acetaminophen  •  senna-docusate sodium **AND** polyethylene glycol **AND** bisacodyl **AND** bisacodyl  •  heparin (porcine)  •   melatonin  •  ondansetron **OR** ondansetron  •  sodium chloride    Family history:  History reviewed. No pertinent family history.    Social history:  Social History     Tobacco Use   • Smoking status: Never   Substance Use Topics   • Alcohol use: Never   • Drug use: Never       Review of systems:      Positive for:  Left groin/left leg palpable mass  Negative for:  Denies HA.  Denies CP or SOA.  Denies abd pain    Objective:  TMax 24 hours:   Temp (24hrs), Av.7 °F (37.1 °C), Min:98.2 °F (36.8 °C), Max:99.4 °F (37.4 °C)      Vitals Ranges:   Temp:  [98.2 °F (36.8 °C)-99.4 °F (37.4 °C)] 98.2 °F (36.8 °C)  Heart Rate:  [] 72  Resp:  [16] 16  BP: (122-139)/(74-81) 139/74    Intake/Output Last 3 shifts:  I/O last 3 completed shifts:  In: 600 [P.O.:600]  Out: 200 [Urine:200]     Physical Exam:    General Appearance:    Alert, cooperative, NAD, sitting up in bed    HEENT:    No trauma, pupils reactive, hearing intact   Back:     No CVA tenderness   Lungs:     Respirations unlabored, no wheezing    Heart:    RRR, intact peripheral pulses   Abdomen:     Soft, NDNT, no masses, no guarding.  Large firm and non mobile mass in left inguinal region   :    Penis normal   Extremities:   1+ pitting edema, no deformity   Lymphatic:   No neck or groin LAD   Skin:   No bleeding, bruising or rashes   Neuro/Psych:   Orientation intact, mood/affect pleasant, no focal findings       Results review:   I reviewed the patient's new clinical results.    Data review:  Lab Results (last 24 hours)     Procedure Component Value Units Date/Time    aPTT [[id number]]  (Abnormal) Collected: 23    Specimen: Blood Updated: 23     PTT 52.9 seconds     Basic Metabolic Panel [[id number]]  (Abnormal) Collected: 23    Specimen: Blood Updated: 23     Glucose 96 mg/dL      BUN 16 mg/dL      Creatinine 1.18 mg/dL      Sodium 135 mmol/L      Potassium 4.1 mmol/L      Chloride 100 mmol/L      CO2 20.5 mmol/L       Calcium 8.9 mg/dL      BUN/Creatinine Ratio 13.6     Anion Gap 14.5 mmol/L      eGFR 70.6 mL/min/1.73     Narrative:      GFR Normal >60  Chronic Kidney Disease <60  Kidney Failure <15      Hemoglobin A1c [[id number]]  (Abnormal) Collected: 05/29/23 0326    Specimen: Blood Updated: 05/29/23 0426     Hemoglobin A1C 5.70 %     Narrative:      Hemoglobin A1C Ranges:    Increased Risk for Diabetes  5.7% to 6.4%  Diabetes                     >= 6.5%  Diabetic Goal                < 7.0%    CBC & Differential [[id number]]  (Abnormal) Collected: 05/29/23 0326    Specimen: Blood Updated: 05/29/23 0415    Narrative:      The following orders were created for panel order CBC & Differential.  Procedure                               Abnormality         Status                     ---------                               -----------         ------                     CBC Auto Differential[[id number]]        Abnormal            Final result                 Please view results for these tests on the individual orders.    CBC Auto Differential [[id number]]  (Abnormal) Collected: 05/29/23 0326    Specimen: Blood Updated: 05/29/23 0415     WBC 11.28 10*3/mm3      RBC 4.10 10*6/mm3      Hemoglobin 11.4 g/dL      Hematocrit 34.4 %      MCV 83.9 fL      MCH 27.8 pg      MCHC 33.1 g/dL      RDW 13.9 %      RDW-SD 42.7 fl      MPV 9.2 fL      Platelets 206 10*3/mm3      Neutrophil % 74.4 %      Lymphocyte % 12.0 %      Monocyte % 8.7 %      Eosinophil % 4.1 %      Basophil % 0.4 %      Immature Grans % 0.4 %      Neutrophils, Absolute 8.41 10*3/mm3      Lymphocytes, Absolute 1.35 10*3/mm3      Monocytes, Absolute 0.98 10*3/mm3      Eosinophils, Absolute 0.46 10*3/mm3      Basophils, Absolute 0.04 10*3/mm3      Immature Grans, Absolute 0.04 10*3/mm3      nRBC 0.0 /100 WBC     PSA DIAGNOSTIC [[id number]]  (Normal) Collected: 05/28/23 2236    Specimen: Blood Updated: 05/28/23 2315     PSA 1.490 ng/mL     Narrative:      Results may be falsely  "decreased if patient taking Biotin.    Testing Method: [employer] Electrochemiluminescence Immunoassay(ECLIA)  Values obtained with different assay methods or kits cannot be used interchangeably.    D-dimer, Quantitative [[id number]]  (Abnormal) Collected: 05/28/23 1710    Specimen: Blood Updated: 05/28/23 1830     D-Dimer, Quantitative >20.00 MCGFEU/mL     Narrative:      According to the assay 's published package insert, a normal (<0.50 MCGFEU/mL) D-dimer result in conjunction with a non-high clinical probability assessment, excludes deep vein thrombosis (DVT) and pulmonary embolism (PE) with high sensitivity.    D-dimer values increase with age and this can make VTE exclusion of an older population difficult. To address this, the [employer], based on best available evidence and recent guidelines, recommends that clinicians use age-adjusted D-dimer thresholds in patients greater than 50 years of age with: a) a low probability of PE who do not meet all Pulmonary Embolism Rule Out Criteria, or b) in those with intermediate probability of PE.   The formula for an age-adjusted D-dimer cut-off is \"age/100\".  For example, a 60 year old patient would have an age-adjusted cut-off of 0.60 MCGFEU/mL and an 80 year old 0.80 MCGFEU/mL.    aPTT [[id number]]  (Normal) Collected: 05/28/23 1710    Specimen: Blood Updated: 05/28/23 1825     PTT 28.3 seconds     Protime-INR [[id number]]  (Abnormal) Collected: 05/28/23 1710    Specimen: Blood Updated: 05/28/23 1825     Protime 15.5 Seconds      INR 1.21    [place] Draw [[id number]] Collected: 05/28/23 1710    Specimen: Blood Updated: 05/28/23 1815    Narrative:      The following orders were created for panel order [place] Draw.  Procedure                               Abnormality         Status                     ---------                               -----------         ------                     Green Top (Gel)[[id number]]                      "             Final result               Lavender Top[[id number]]                                     Final result               Gold Top - SST[[id number]]                                   Final result               Light Blue Top[[id number]]                                   Final result                 Please view results for these tests on the individual orders.    Green Top (Gel) [[id number]] Collected: 05/28/23 1710    Specimen: Blood Updated: 05/28/23 1815     Extra Tube Hold for add-ons.     Comment: Auto resulted.       Lavender Top [[id number]] Collected: 05/28/23 1710    Specimen: Blood Updated: 05/28/23 1815     Extra Tube hold for add-on     Comment: Auto resulted       Gold Top - SST [[id number]] Collected: 05/28/23 1710    Specimen: Blood Updated: 05/28/23 1815     Extra Tube Hold for add-ons.     Comment: Auto resulted.       Light Blue Top [[id number]] Collected: 05/28/23 1710    Specimen: Blood Updated: 05/28/23 1815     Extra Tube Hold for add-ons.     Comment: Auto resulted       Lipase [[id number]]  (Normal) Collected: 05/28/23 1710    Specimen: Blood Updated: 05/28/23 1757     Lipase 17 U/L     Magnesium [[id number]]  (Normal) Collected: 05/28/23 1710    Specimen: Blood Updated: 05/28/23 1743     Magnesium 2.1 mg/dL     Comprehensive Metabolic Panel [[id number]]  (Abnormal) Collected: 05/28/23 1710    Specimen: Blood Updated: 05/28/23 1743     Glucose 116 mg/dL      BUN 14 mg/dL      Creatinine 1.41 mg/dL      Sodium 135 mmol/L      Potassium 4.6 mmol/L      Chloride 99 mmol/L      CO2 23.1 mmol/L      Calcium 9.4 mg/dL      Total Protein 8.4 g/dL      Albumin 3.9 g/dL      ALT (SGPT) 19 U/L      AST (SGOT) 11 U/L      Alkaline Phosphatase 86 U/L      Total Bilirubin 0.8 mg/dL      Globulin 4.5 gm/dL      A/G Ratio 0.9 g/dL      BUN/Creatinine Ratio 9.9     Anion Gap 12.9 mmol/L      eGFR 57.1 mL/min/1.73     Narrative:      GFR Normal >60  Chronic Kidney Disease <60  Kidney Failure <15      Single High  Sensitivity Troponin T [[id number]]  (Abnormal) Collected: 05/28/23 1710    Specimen: Blood Updated: 05/28/23 1743     HS Troponin T 26 ng/L     Narrative:      High Sensitive Troponin T Reference Range:  <10.0 ng/L- Negative Female for AMI  <15.0 ng/L- Negative Male for AMI  >=10 - Abnormal Female indicating possible myocardial injury.  >=15 - Abnormal Male indicating possible myocardial injury.   Clinicians would have to utilize clinical acumen, EKG, Troponin, and serial changes to determine if it is an Acute Myocardial Infarction or myocardial injury due to an underlying chronic condition.         BNP [[id number]]  (Normal) Collected: 05/28/23 1710    Specimen: Blood Updated: 05/28/23 1741     proBNP 460.0 pg/mL     Narrative:      Among patients with dyspnea, NT-proBNP is highly sensitive for the detection of acute congestive heart failure. In addition NT-proBNP of <300 pg/ml effectively rules out acute congestive heart failure with 99% negative predictive value.    Results may be falsely decreased if patient taking Biotin.      CBC & Differential [[id number]]  (Abnormal) Collected: 05/28/23 1710    Specimen: Blood Updated: 05/28/23 1722    Narrative:      The following orders were created for panel order CBC & Differential.  Procedure                               Abnormality         Status                     ---------                               -----------         ------                     CBC Auto Differential[[id number]]        Abnormal            Final result                 Please view results for these tests on the individual orders.    CBC Auto Differential [[id number]]  (Abnormal) Collected: 05/28/23 1710    Specimen: Blood Updated: 05/28/23 1722     WBC 12.00 10*3/mm3      RBC 4.73 10*6/mm3      Hemoglobin 13.3 g/dL      Hematocrit 39.4 %      MCV 83.3 fL      MCH 28.1 pg      MCHC 33.8 g/dL      RDW 13.4 %      RDW-SD 41.0 fl      MPV 8.9 fL      Platelets 230 10*3/mm3      Neutrophil % 78.2 %       Lymphocyte % 9.3 %      Monocyte % 7.8 %      Eosinophil % 4.1 %      Basophil % 0.3 %      Immature Grans % 0.3 %      Neutrophils, Absolute 9.39 10*3/mm3      Lymphocytes, Absolute 1.12 10*3/mm3      Monocytes, Absolute 0.93 10*3/mm3      Eosinophils, Absolute 0.49 10*3/mm3      Basophils, Absolute 0.03 10*3/mm3      Immature Grans, Absolute 0.04 10*3/mm3      nRBC 0.0 /100 WBC            Imaging:  Imaging Results (Last 24 Hours)     Procedure Component Value Units Date/Time    CT Angiogram Chest Pulmonary Embolism [[id number]] Collected: 05/28/23 1906     Updated: 05/28/23 1906    Narrative:         CT ANGIOGRAM OF THE CHEST. MULTIPLE CORONAL, SAGITTAL, AND 3-D  RECONSTRUCTIONS. CT ABDOMEN AND PELVIS WITH IV CONTRAST     HISTORY: 62-year-old male with palpable abnormality at the left abdomen.  Inguinal mass. Shortness of breath.     TECHNIQUE: Radiation dose reduction techniques were utilized, including  automated exposure control and exposure modulation based on body size.  CT angiogram of the chest was performed. Multiple coronal, sagittal, and  3-D reconstruction images were obtained. Subsequently, 3 mm images were  obtained through the abdomen and pelvis after the administration of IV  contrast. No priors for comparison.     FINDINGS:  CHEST CTA:  1. There are multiple bilateral pulmonary thromboemboli. There are no  pulmonary thromboemboli within the main pulmonary arteries, but there is  nonocclusive thrombus within the proximal right interlobar pulmonary  artery extending into segmental and subsegmental pulmonary arteries and  there are pulmonary thromboemboli within the right upper and right  middle lobe pulmonary arteries. There is a smaller volume of pulmonary  thromboemboli within segmental and subsegmental pulmonary arteries on  the left. The RV to LV ratio is 1.1.  2. There are several pulmonary sub-6 mm pulmonary nodules in both lungs  as seen at the left lower lobe series 2 image 108. The nodules  are  suspicious for metastases. There are no pleural or pericardial  effusions. There is no lymphadenopathy within the chest.     ABDOMEN/PELVIS:  1. The urinary bladder is markedly thickened and irregular and the  abnormal soft tissue is confluent with the prostate. There is bulky  lymphadenopathy within the pelvis and left groin. One of the left pelvic  nodes measures 8.6 x 7.4 cm and a slightly inferior [place] mass measures  10.7 x 6.7 cm. One of the large left groin nodes measures 6.5 x 4.8 cm.  There are multiple similar size and smaller size nodes adjacently. There  is thrombus within the left common femoral vein. The left external iliac  vein is compressed by the bulky intrapelvic lesions. There is  lymphadenopathy throughout the pelvis and perirectal regions. There is a  soft tissue mass within the posterior aspect of the distal rectum which  appears similar to the lymphadenopathy but may possibly represent stool.  There is significantly less prominent right groin lymphadenopathy than  on the left. In addition, there is a 3.0 x 2.4 cm lateral hepatic  segment metastasis. Primary malignancy may be the urinary bladder, colon  and rectum, lymphoma, sarcoma. Tissue diagnosis is recommended. Left  groin lymphadenopathy is easily accessible for CT-guided biopsy. The  lateral hepatic segment metastasis is accessible as well.  2. There are lytic bone lesions as seen at the right iliac crest and  left posterior elements of L3. Staging with PET/CT is recommended.  3. The spleen measures 14 cm. There is no lymphadenopathy within the  abdomen. The gallbladder, pancreas, adrenals, and kidneys appear  unremarkable. There is no bowel ileus or obstruction. Appendix appears  within normal limits. There is a moderate size right inguinal hernia  containing a segment of small bowel without evidence for incarceration.       CT Abdomen Pelvis With Contrast [[id number]] Collected: 05/28/23 1906     Updated: 05/28/23 1906     Narrative:         CT ANGIOGRAM OF THE CHEST. MULTIPLE CORONAL, SAGITTAL, AND 3-D  RECONSTRUCTIONS. CT ABDOMEN AND PELVIS WITH IV CONTRAST     HISTORY: 62-year-old male with palpable abnormality at the left abdomen.  Inguinal mass. Shortness of breath.     TECHNIQUE: Radiation dose reduction techniques were utilized, including  automated exposure control and exposure modulation based on body size.  CT angiogram of the chest was performed. Multiple coronal, sagittal, and  3-D reconstruction images were obtained. Subsequently, 3 mm images were  obtained through the abdomen and pelvis after the administration of IV  contrast. No priors for comparison.     FINDINGS:  CHEST CTA:  1. There are multiple bilateral pulmonary thromboemboli. There are no  pulmonary thromboemboli within the main pulmonary arteries, but there is  nonocclusive thrombus within the proximal right interlobar pulmonary  artery extending into segmental and subsegmental pulmonary arteries and  there are pulmonary thromboemboli within the right upper and right  middle lobe pulmonary arteries. There is a smaller volume of pulmonary  thromboemboli within segmental and subsegmental pulmonary arteries on  the left. The RV to LV ratio is 1.1.  2. There are several pulmonary sub-6 mm pulmonary nodules in both lungs  as seen at the left lower lobe series 2 image 108. The nodules are  suspicious for metastases. There are no pleural or pericardial  effusions. There is no lymphadenopathy within the chest.     ABDOMEN/PELVIS:  1. The urinary bladder is markedly thickened and irregular and the  abnormal soft tissue is confluent with the prostate. There is bulky  lymphadenopathy within the pelvis and left groin. One of the left pelvic  nodes measures 8.6 x 7.4 cm and a slightly inferior [place] mass measures  10.7 x 6.7 cm. One of the large left groin nodes measures 6.5 x 4.8 cm.  There are multiple similar size and smaller size nodes adjacently. There  is thrombus  within the left common femoral vein. The left external iliac  vein is compressed by the bulky intrapelvic lesions. There is  lymphadenopathy throughout the pelvis and perirectal regions. There is a  soft tissue mass within the posterior aspect of the distal rectum which  appears similar to the lymphadenopathy but may possibly represent stool.  There is significantly less prominent right groin lymphadenopathy than  on the left. In addition, there is a 3.0 x 2.4 cm lateral hepatic  segment metastasis. Primary malignancy may be the urinary bladder, colon  and rectum, lymphoma, sarcoma. Tissue diagnosis is recommended. Left  groin lymphadenopathy is easily accessible for CT-guided biopsy. The  lateral hepatic segment metastasis is accessible as well.  2. There are lytic bone lesions as seen at the right iliac crest and  left posterior elements of L3. Staging with PET/CT is recommended.  3. The spleen measures 14 cm. There is no lymphadenopathy within the  abdomen. The gallbladder, pancreas, adrenals, and kidneys appear  unremarkable. There is no bowel ileus or obstruction. Appendix appears  within normal limits. There is a moderate size right inguinal hernia  containing a segment of small bowel without evidence for incarceration.       XR Chest 1 View [[id number]] Collected: 05/28/23 1823     Updated: 05/28/23 1823    Narrative:      XR CHEST 1 VW-     HISTORY: 62-year-old male with shortness of breath.     FINDINGS: There is no evidence for pneumonia, effusions, or CHF.                Assessment:       Acute pulmonary embolism, unspecified pulmonary embolism type, unspecified whether acute cor pulmonale present    Weight loss     Plan:   Bladder wall thickening and irregular findings on CT   PSA 1.49   Cr. 1.18  Will plan for bedside cystoscopy   Plan reviewed and discussed with pt today   Spoke with RNKylie, and requested cart for bedside cystoscopy     TANYA Kaye  05/29/23  07:43  EDT              Electronically signed by Eduardo Mueller MD at 05/29/23 2004

## 2023-05-30 NOTE — PROGRESS NOTES
Name: King George ADMIT: 2023   : 1960  PCP: Mohini Cortes MD    MRN: [id number] LOS: 2 days   AGE/SEX: 62 y.o. male  ROOM: [facility]     Subjective   Subjective   Patient appears comfortable and in no apparent distress.  Continues to deny chest pain or shortness of breath or pain.  Reports left lower extremity swelling and left groin 'mass' present for quite some time with associated intermittent rectal bleeding without prior work-up or history of colonoscopy.     Objective   Objective   Vital Signs  Temp:  [98 °F (36.7 °C)-98.9 °F (37.2 °C)] 98 °F (36.7 °C)  Heart Rate:  [69-82] 72  Resp:  [16] 16  BP: (107-139)/(72-84) 124/72  SpO2:  [96 %-99 %] 98 %  on   ;   Device (Oxygen Therapy): room air  Body mass index is 30.92 kg/m².     Physical Exam  Constitutional:       General: He is not in acute distress.     Appearance: He is obese. He is not toxic-appearing.   Cardiovascular:      Rate and Rhythm: Normal rate.      Heart sounds: Normal heart sounds.   Pulmonary:      Effort: Pulmonary effort is normal.      Breath sounds: Normal breath sounds.   Abdominal:      General: Bowel sounds are normal.   Musculoskeletal:      Right lower leg: No edema.      Left lower leg: Edema present.   Skin:     General: Skin is warm and dry.   Neurological:      Mental Status: He is alert and oriented to person, place, and time.      Cranial Nerves: No cranial nerve deficit.     Physical exam performed on 2023 as per above    Results Review     I reviewed the patient's new clinical results.  Results from last 7 days   Lab Units 23  0254 23  0326 23  1710   WBC 10*3/mm3 8.55 11.28* 12.00*   HEMOGLOBIN g/dL 11.4* 11.4* 13.3   PLATELETS 10*3/mm3 238 206 230     Results from last 7 days   Lab Units 23  0254 23  0326 23  1710   SODIUM mmol/L 134* 135* 135*   POTASSIUM mmol/L 4.2 4.1 4.6   CHLORIDE mmol/L 104 100 99   CO2 mmol/L 21.0* 20.5* 23.1   BUN mg/dL 13 16 14   CREATININE  mg/dL 0.87 1.18 1.41*   GLUCOSE mg/dL 92 96 116*   EGFR mL/min/1.73 98.8 70.6 57.1*     Results from last 7 days   Lab Units 05/28/23  1710   ALBUMIN g/dL 3.9   BILIRUBIN mg/dL 0.8   ALK PHOS U/L 86   AST (SGOT) U/L 11   ALT (SGPT) U/L 19     Results from last 7 days   Lab Units 05/30/23  0254 05/29/23  0326 05/28/23  1710   CALCIUM mg/dL 8.1* 8.9 9.4   ALBUMIN g/dL  --   --  3.9   MAGNESIUM mg/dL  --   --  2.1       Hemoglobin A1C   Date/Time Value Ref Range Status   05/29/2023 0326 5.70 (H) 4.80 - 5.60 % Final       US Guided Lymph Node Biopsy    Result Date: 5/30/2023  Technically successful ultrasound-guided left inguinal lymph node biopsy  This report was finalized on 5/30/2023 10:20 AM by Dr. James George M.D.      I have personally reviewed all medications:  Scheduled Medications  senna-docusate sodium, 2 tablet, Oral, BID    Infusions  heparin, 18 Units/kg/hr, Last Rate: 25.8 Units/kg/hr (05/30/23 1127)  sodium chloride, 75 mL/hr, Last Rate: 75 mL/hr (05/30/23 1210)    Diet  Diet: Cardiac Diets; Healthy Heart (2-3 Na+); Texture: Regular Texture (IDDSI 7); Fluid Consistency: Thin (IDDSI 0)    I have personally reviewed:  [x]  Laboratory   []  Microbiology   []  Radiology   [x]  EKG/Telemetry  []  Cardiology/Vascular    []  Pathology    []  Records       Assessment/Plan     Active Hospital Problems    Diagnosis  POA   • **Acute pulmonary embolism, unspecified pulmonary embolism type, unspecified whether acute cor pulmonale present [I26.99]  Yes   • Moderate Malnutrition (HCC) [E44.0]  Yes   • Anemia [D64.9]  Yes   • Left groin mass [R19.09]  Yes   • Hepatic lesion [K76.9]  Yes   • Abdominal lymphadenopathy [R59.0]  Yes      Resolved Hospital Problems   No resolved problems to display.       62 y.o. male admitted with Acute pulmonary embolism, unspecified pulmonary embolism type, unspecified whether acute cor pulmonale present.      Acute pulmonary embolism, unspecified pulmonary embolism type, unspecified  whether acute cor pulmonale present: Confirmed on CTA chest.  Heparin gtt infusing.       Abdominal lymphadenopathy: Associated rectal mass, liver mets, pulmonary nodule, and bone mets suspicious for rectal carcinoma with metastasis on CT.  Primary malignancy may be the urinary bladder, colon, rectum, lymphoma, sarcoma.  Oncology following.  Anticipate [id number] once path report available.  Unremarkable CEA 0.88 & PSA 1.49.  Colorectal surgery following ultrasound-guided biopsy completed on 5/30/2023 (noted heparin gtt does not need to be held for biopsy).  No plans for colonoscopy.      Anemia: Slight downward trend serum hemoglobin--stable today.  Suspect part hemodilution given recent IVF; however, heparin drip infusing as well.  No overt signs of active bleeding.  Transfuse for serum hemoglobin less than 7.  Repeat labs in AM.      Hepatic lesion: See plan above.      Bladder wall thickening and irregularity findings on CT: Urology following & patient underwent pancystoscopy on 5/29/2023 findings normal prostate, urethra, mass effect on left lateral wall of bladder,, small erythematous area posterior bladder wall ~1 cm with concern for external invasion.    *Reviewed telemetry with no evidence for ectopy, remains SR, and stable for transfer to [facility] oncology unit--discontinue telemetry.    · heparin gtt adequate for DVT prophylaxis.  · Full code.  · Discussed with patient & RN.  · Anticipate discharge pending clinical course.      TANYA Bray  [facility]  05/30/23  12:37 EDT

## 2023-05-30 NOTE — PROGRESS NOTES
Subjective   REASON FOR CONSULTATION: Widely metastatic malignancy presumably of rectal origin with associated DVT and pulmonary embolism      History of Present Illness  patient is a 62-year-old white male with no chronic medical illnesses has not seen a doctor for many years never had a screening colonoscopy or PSA.  He is had a 20 pound weight loss in the last 2 to 3 months associated with low appetite and rectal bleeding.  Became progressively more weak and short of breath and came to the ER at the insistence of one of his friends who is a physician     He has noted a fullness in the left groin as well as swelling of his entire left leg for the last week     CAT scans in the ER showed pulmonary embolism and CT of the abdomen showed extensive adenopathy in the left side of the abdomen with a possible rectal mass liver lesions bone lesions and pulmonary nodules and a clot in the external iliac vein due to compression by adenopathy     Biopsy of these nodes is planned for tomorrow     He is not a smoker or drinker  Is never had a DVT MI or stroke  He has a family history of prostate cancer in his father and his 60s and mother with breast cancer at age 86    Interval history  5/30  Shortness of breath much improved on no oxygen  left inguinal node biopsy done today  Continues on heparin  No significant rectal bleeding hemoglobin stable 11.4  CEA and PSA normal surprisingly      Past Medical History, Past Surgical History, Social History, Family History have been reviewed and are without significant changes except as mentioned.    Review of Systems   A comprehensive 14 point review of systems was performed and was negative except as mentioned.    Medications:  The current medication list was reviewed in the EMR    ALLERGIES:  No Known Allergies    Objective      Vitals:    05/29/23 1942 05/30/23 0034 05/30/23 0404 05/30/23 0636   BP: 130/76 138/81 139/73    BP Location: Right arm Right arm Right arm    Patient  Position: Lying Lying Lying    Pulse: 71 70 82    Resp: 16 16 16    Temp: 98.6 °F (37 °C) 98.9 °F (37.2 °C) 98.4 °F (36.9 °C)    TempSrc: Oral Oral Oral    SpO2: 97% 97% 97%    Weight:    103 kg (228 lb)   Height:              View : No data to display.                Physical Exam    CONSTITUTIONAL:  Vital signs reviewed.  No distress, looks comfortable.  EYES:  Conjunctivae and lids unremarkable.  PERRLA  EARS,NOSE,MOUTH,THROAT:  Ears and nose appear unremarkable.  Lips, teeth, gums appear unremarkable.  RESPIRATORY:  Normal respiratory effort.  Lungs clear to auscultation bilaterally.  CARDIOVASCULAR:  Normal S1, S2.  No murmurs rubs or gallops.  3+ edema in his entire left leg to the thigh   GASTROINTESTINAL: Abdomen appears unremarkable.  Nontender.  No hepatomegaly.  No splenomegaly.BULKY left inguinal adenopathy  LYMPHATIC:  No cervical, supraclavicular, axillary lymphadenopathy.  SKIN:  Warm.  No rashes.  PSYCHIATRIC:  Normal judgment and insight.  Normal mood and affect.  I have reexamined the patient and the results are consistent with the previously documented exam. Brodie Rocha MD       RECENT LABS:  Hematology WBC   Date Value Ref Range Status   05/30/2023 8.55 3.40 - 10.80 10*3/mm3 Final     RBC   Date Value Ref Range Status   05/30/2023 4.11 (L) 4.14 - 5.80 10*6/mm3 Final     Hemoglobin   Date Value Ref Range Status   05/30/2023 11.4 (L) 13.0 - 17.7 g/dL Final     Hematocrit   Date Value Ref Range Status   05/30/2023 34.7 (L) 37.5 - 51.0 % Final     Platelets   Date Value Ref Range Status   05/30/2023 238 140 - 450 10*3/mm3 Final              Assessment & Plan     1.  Generalized abdominal lymphadenopathy with a rectal mass liver mets and pulmonary nodules and bone mets suspicious for rectal carcinoma with metastasis-cannot exclude bladder cancer or lymphoma although lymphoma less likely to have lytic bone lesions and liver involvement  · CEA/PSA  normal-     2.  Incidental bilateral PEs and  thrombus in the left external iliac due to compression by bulky nodes on IV heparin     3.  Mild anemia - normal ferritin     4.  Family history of breast cancer in his mother at age 86 and prostate cancer his father in his 60s     Plan     1.Check biopsy results    2.  Agree with anticoagulation with heparin for now     3.  We will need to watch for rectal bleeding with anticoagulation and see how we will do before discharge     4.  We will need an [id number] once the path report is back for treatment and he realizes the options for treatment may be limited depending on the diagnosis in terms of curability.    Can transfer to [facility] if telemetry can be discontinued              5/30/2023      CC:

## 2023-05-30 NOTE — PROGRESS NOTES
Progress Note      S: Pt tolerating PO intake and had a BM this morning with some RB. States the RB is similar to what he experienced in the past.    O:  Temp:  [98.2 °F (36.8 °C)-98.9 °F (37.2 °C)] 98.4 °F (36.9 °C)  Heart Rate:  [69-85] 82  Resp:  [16] 16  BP: (129-139)/(73-82) 139/73      Intake/Output Summary (Last 24 hours) at 5/30/2023 0807  Last data filed at 5/30/2023 0656  Gross per 24 hour   Intake 840 ml   Output --   Net 840 ml       Abd:   soft, non-distended      Results from last 7 days   Lab Units 05/30/23  0254   WBC 10*3/mm3 8.55   HEMOGLOBIN g/dL 11.4*   HEMATOCRIT % 34.7*   PLATELETS 10*3/mm3 238     Results from last 7 days   Lab Units 05/30/23  0254   SODIUM mmol/L 134*   POTASSIUM mmol/L 4.2   CHLORIDE mmol/L 104   CO2 mmol/L 21.0*   BUN mg/dL 13   CREATININE mg/dL 0.87   EGFR mL/min/1.73 98.8   GLUCOSE mg/dL 92   CALCIUM mg/dL 8.1*       A/P: 62 y.o. male with metastatic cancer of unknown origin.     - Bedside cystoscopy performed by Urology yesterday with mass effect involving the left lateral wall of the bladder and 1 cm erythematous area along the posterior wall concerning for external invasion.   - Slight decrease in Hg to 11.4 in setting of Heparin gtt and RB. Will continue to monitor.   - IR to perform ultrasound guided lymph node biopsy. Oncology following with plans for port placement once pathology is back.

## 2023-05-30 NOTE — PLAN OF CARE
Problem: Adult Inpatient Plan of Care  Goal: Plan of Care Review  Outcome: Ongoing, Progressing  Flowsheets (Taken 5/30/2023 0655)  Progress: improving  Plan of Care Reviewed With: patient  Outcome Evaluation:   VSS   A&Ox4. Pt remains on fluids and heparin gtt per order. Pt w/o complaints of pain during shift. Pt remains SR and RA on the monitor. Pt safety maintained   WCTM per POC and update as needed.  Goal: Patient-Specific Goal (Individualized)  Outcome: Ongoing, Progressing  Goal: Absence of Hospital-Acquired Illness or Injury  Outcome: Ongoing, Progressing  Intervention: Identify and Manage Fall Risk  Description: Perform standard risk assessment on admission using a validated tool or comprehensive approach appropriate to the patient; reassess fall risk frequently, with change in status or transfer to another level of care.  Communicate fall injury risk to interprofessional healthcare team.  Determine need for increased observation, equipment and environmental modification, such as low bed, signage and supportive, nonskid footwear.  Adjust safety measures to individual developmental age, stage and identified risk factors.  Reinforce the importance of safety and physical activity with patient and family.  Perform regular intentional rounding to assess need for position change, pain assessment and personal needs, including assistance with toileting.  Recent Flowsheet Documentation  Taken 5/30/2023 0600 by Barbara Holland, RN  Safety Promotion/Fall Prevention:   safety round/check completed   room organization consistent   nonskid shoes/slippers when out of bed   clutter free environment maintained   activity supervised  Taken 5/30/2023 0400 by Barbara Holland, RN  Safety Promotion/Fall Prevention:   safety round/check completed   room organization consistent   nonskid shoes/slippers when out of bed   clutter free environment maintained  Taken 5/30/2023 0200 by Barbara Holland, RN  Safety Promotion/Fall  Prevention:   safety round/check completed   room organization consistent   nonskid shoes/slippers when out of bed   clutter free environment maintained  Taken 5/30/2023 0000 by Barbara Holland, RN  Safety Promotion/Fall Prevention:   safety round/check completed   room organization consistent   nonskid shoes/slippers when out of bed   clutter free environment maintained  Taken 5/29/2023 2200 by Barbara Holland, RN  Safety Promotion/Fall Prevention:   safety round/check completed   room organization consistent   nonskid shoes/slippers when out of bed   clutter free environment maintained  Taken 5/29/2023 2000 by Barbara Holland, RN  Safety Promotion/Fall Prevention:   safety round/check completed   room organization consistent   nonskid shoes/slippers when out of bed   clutter free environment maintained   activity supervised  Intervention: Prevent and Manage VTE (Venous Thromboembolism) Risk  Description: Assess for VTE (venous thromboembolism) risk.  Encourage and assist with early ambulation.  Initiate and maintain compression or other therapy, as indicated, based on identified risk in accordance with organizational protocol and provider order.  Encourage both active and passive leg exercises while in bed, if unable to ambulate.  Recent Flowsheet Documentation  Taken 5/30/2023 0600 by Barbara Holland, RN  Activity Management: activity encouraged  Taken 5/30/2023 0400 by Barbara Holland, RN  Activity Management: activity encouraged  Taken 5/30/2023 0200 by Barbara Holland, RN  Activity Management: activity encouraged  Taken 5/30/2023 0000 by Barbara Holland, RN  Activity Management: activity encouraged  Taken 5/29/2023 2200 by Barbara Holland, RN  Activity Management: activity encouraged  Taken 5/29/2023 2000 by Barbara Holland, RN  Activity Management: activity encouraged  Range of Motion: active ROM (range of motion) encouraged  Intervention: Prevent Infection  Description: Maintain skin and mucous  membrane integrity; promote hand, oral and pulmonary hygiene.  Optimize fluid balance, nutrition, sleep and glycemic control to maximize infection resistance.  Identify potential sources of infection early to prevent or mitigate progression of infection (e.g., wound, lines, devices).  Evaluate ongoing need for invasive devices; remove promptly when no longer indicated.  Recent Flowsheet Documentation  Taken 5/30/2023 0600 by Barbara Holland RN  Infection Prevention:   single patient room provided   rest/sleep promoted   hand hygiene promoted   environmental surveillance performed  Taken 5/30/2023 0400 by Barbara Holland RN  Infection Prevention:   single patient room provided   rest/sleep promoted   hand hygiene promoted   environmental surveillance performed  Taken 5/30/2023 0200 by Barbara Holland RN  Infection Prevention:   rest/sleep promoted   single patient room provided   hand hygiene promoted   environmental surveillance performed  Taken 5/30/2023 0000 by Barbara Holland RN  Infection Prevention:   single patient room provided   rest/sleep promoted   hand hygiene promoted   environmental surveillance performed  Taken 5/29/2023 2200 by Barbara Holland RN  Infection Prevention:   single patient room provided   rest/sleep promoted   hand hygiene promoted   environmental surveillance performed  Taken 5/29/2023 2000 by Barbara Holland RN  Infection Prevention:   single patient room provided   rest/sleep promoted   hand hygiene promoted   environmental surveillance performed  Goal: Optimal Comfort and Wellbeing  Outcome: Ongoing, Progressing  Intervention: Provide Person-Centered Care  Description: Use a family-focused approach to care.  Develop trust and rapport by proactively providing information, encouraging questions, addressing concerns and offering reassurance.  Acknowledge emotional response to hospitalization.  Recognize and utilize personal coping strategies.  Honor spiritual and cultural  preferences.  Recent Flowsheet Documentation  Taken 5/30/2023 0400 by Barbara Holland RN  Trust Relationship/Rapport:   care explained   choices provided  Taken 5/30/2023 0000 by Barbara Holland RN  Trust Relationship/Rapport:   care explained   choices provided  Taken 5/29/2023 2000 by Barbara Holland RN  Trust Relationship/Rapport:   care explained   choices provided  Goal: Readiness for Transition of Care  Outcome: Ongoing, Progressing   Goal Outcome Evaluation:  Plan of Care Reviewed With: patient        Progress: improving  Outcome Evaluation: VSS; A&Ox4. Pt remains on fluids and heparin gtt per order. Pt w/o complaints of pain during shift. Pt remains SR and RA on the monitor. Pt safety maintained; WCTM per POC and update as needed.

## 2023-05-30 NOTE — PLAN OF CARE
Goal Outcome Evaluation:  Plan of Care Reviewed With: patient            Patient is a 62 y.o. male admitted to [facility] for SOA and near syncopal episode on 5/28/2023. Work up reveals acute PE. Patient is (I) at baseline and lives alone in  home with 0 MONTSE. Today, patient performed bed mobility with SV, required SBA for transfers, and ambulated 100' with CGA and pushing IV pole. Pt denies any SOA during ambulation and states he feels like he is at his baseline for strength and balance. No acute PT needs at this time. Anticipate home upon DC.

## 2023-05-31 PROBLEM — C43.9 METASTATIC MELANOMA: Status: ACTIVE | Noted: 2023-05-31

## 2023-05-31 PROBLEM — R19.04 LEFT LOWER QUADRANT ABDOMINAL MASS: Status: ACTIVE | Noted: 2023-05-28

## 2023-05-31 LAB
ANION GAP SERPL CALCULATED.3IONS-SCNC: 10.5 MMOL/L (ref 5–15)
APTT PPP: 77.8 SECONDS (ref 22.7–35.4)
BASOPHILS # BLD AUTO: 0.03 10*3/MM3 (ref 0–0.2)
BASOPHILS NFR BLD AUTO: 0.4 % (ref 0–1.5)
BUN SERPL-MCNC: 9 MG/DL (ref 8–23)
BUN/CREAT SERPL: 9.3 (ref 7–25)
CALCIUM SPEC-SCNC: 8.9 MG/DL (ref 8.6–10.5)
CHLORIDE SERPL-SCNC: 103 MMOL/L (ref 98–107)
CO2 SERPL-SCNC: 22.5 MMOL/L (ref 22–29)
CREAT SERPL-MCNC: 0.97 MG/DL (ref 0.76–1.27)
DEPRECATED RDW RBC AUTO: 40.5 FL (ref 37–54)
EGFRCR SERPLBLD CKD-EPI 2021: 89.4 ML/MIN/1.73
EOSINOPHIL # BLD AUTO: 0.44 10*3/MM3 (ref 0–0.4)
EOSINOPHIL NFR BLD AUTO: 5.3 % (ref 0.3–6.2)
ERYTHROCYTE [DISTWIDTH] IN BLOOD BY AUTOMATED COUNT: 13.3 % (ref 12.3–15.4)
GLUCOSE SERPL-MCNC: 102 MG/DL (ref 65–99)
HCT VFR BLD AUTO: 35.2 % (ref 37.5–51)
HGB BLD-MCNC: 11.7 G/DL (ref 13–17.7)
IMM GRANULOCYTES # BLD AUTO: 0.04 10*3/MM3 (ref 0–0.05)
IMM GRANULOCYTES NFR BLD AUTO: 0.5 % (ref 0–0.5)
LYMPHOCYTES # BLD AUTO: 1.21 10*3/MM3 (ref 0.7–3.1)
LYMPHOCYTES NFR BLD AUTO: 14.6 % (ref 19.6–45.3)
MCH RBC QN AUTO: 27.7 PG (ref 26.6–33)
MCHC RBC AUTO-ENTMCNC: 33.2 G/DL (ref 31.5–35.7)
MCV RBC AUTO: 83.2 FL (ref 79–97)
MONOCYTES # BLD AUTO: 0.74 10*3/MM3 (ref 0.1–0.9)
MONOCYTES NFR BLD AUTO: 8.9 % (ref 5–12)
NEUTROPHILS NFR BLD AUTO: 5.82 10*3/MM3 (ref 1.7–7)
NEUTROPHILS NFR BLD AUTO: 70.3 % (ref 42.7–76)
NRBC BLD AUTO-RTO: 0.1 /100 WBC (ref 0–0.2)
PLATELET # BLD AUTO: 257 10*3/MM3 (ref 140–450)
PMV BLD AUTO: 9 FL (ref 6–12)
POTASSIUM SERPL-SCNC: 3.9 MMOL/L (ref 3.5–5.2)
RBC # BLD AUTO: 4.23 10*6/MM3 (ref 4.14–5.8)
SODIUM SERPL-SCNC: 136 MMOL/L (ref 136–145)
WBC NRBC COR # BLD: 8.28 10*3/MM3 (ref 3.4–10.8)

## 2023-05-31 PROCEDURE — 25010000002 HEPARIN (PORCINE) 25000-0.45 UT/250ML-% SOLUTION: Performed by: EMERGENCY MEDICINE

## 2023-05-31 PROCEDURE — 99232 SBSQ HOSP IP/OBS MODERATE 35: CPT | Performed by: PHYSICIAN ASSISTANT

## 2023-05-31 PROCEDURE — 36415 COLL VENOUS BLD VENIPUNCTURE: CPT | Performed by: EMERGENCY MEDICINE

## 2023-05-31 PROCEDURE — 80048 BASIC METABOLIC PNL TOTAL CA: CPT | Performed by: INTERNAL MEDICINE

## 2023-05-31 PROCEDURE — 85730 THROMBOPLASTIN TIME PARTIAL: CPT | Performed by: EMERGENCY MEDICINE

## 2023-05-31 PROCEDURE — 25010000002 HEPARIN (PORCINE) 25000-0.45 UT/250ML-% SOLUTION: Performed by: NURSE PRACTITIONER

## 2023-05-31 PROCEDURE — 99233 SBSQ HOSP IP/OBS HIGH 50: CPT | Performed by: INTERNAL MEDICINE

## 2023-05-31 PROCEDURE — 85025 COMPLETE CBC W/AUTO DIFF WBC: CPT | Performed by: EMERGENCY MEDICINE

## 2023-05-31 RX ORDER — POLYETHYLENE GLYCOL 3350 17 G/17G
17 POWDER, FOR SOLUTION ORAL TAKE AS DIRECTED
Status: COMPLETED | OUTPATIENT
Start: 2023-05-31 | End: 2023-05-31

## 2023-05-31 RX ORDER — SODIUM CHLORIDE, SODIUM LACTATE, POTASSIUM CHLORIDE, CALCIUM CHLORIDE 600; 310; 30; 20 MG/100ML; MG/100ML; MG/100ML; MG/100ML
30 INJECTION, SOLUTION INTRAVENOUS CONTINUOUS
Status: CANCELLED | OUTPATIENT
Start: 2023-05-31

## 2023-05-31 RX ORDER — SODIUM CHLORIDE 9 MG/ML
75 INJECTION, SOLUTION INTRAVENOUS CONTINUOUS
Status: DISCONTINUED | OUTPATIENT
Start: 2023-06-01 | End: 2023-06-02

## 2023-05-31 RX ADMIN — POLYETHYLENE GLYCOL 3350 17 G: 17 POWDER, FOR SOLUTION ORAL at 21:40

## 2023-05-31 RX ADMIN — DOCUSATE SODIUM 50MG AND SENNOSIDES 8.6MG 2 TABLET: 8.6; 5 TABLET, FILM COATED ORAL at 20:32

## 2023-05-31 RX ADMIN — HEPARIN SODIUM 25.77 UNITS/KG/HR: 10000 INJECTION, SOLUTION INTRAVENOUS at 22:23

## 2023-05-31 RX ADMIN — POLYETHYLENE GLYCOL 3350 17 G: 17 POWDER, FOR SOLUTION ORAL at 22:23

## 2023-05-31 RX ADMIN — POLYETHYLENE GLYCOL 3350 17 G: 17 POWDER, FOR SOLUTION ORAL at 18:28

## 2023-05-31 RX ADMIN — HEPARIN SODIUM 25.77 UNITS/KG/HR: 10000 INJECTION, SOLUTION INTRAVENOUS at 01:33

## 2023-05-31 RX ADMIN — POLYETHYLENE GLYCOL 3350 17 G: 17 POWDER, FOR SOLUTION ORAL at 20:32

## 2023-05-31 RX ADMIN — POLYETHYLENE GLYCOL 3350 17 G: 17 POWDER, FOR SOLUTION ORAL at 19:15

## 2023-05-31 RX ADMIN — POLYETHYLENE GLYCOL 3350 17 G: 17 POWDER, FOR SOLUTION ORAL at 17:54

## 2023-05-31 RX ADMIN — HEPARIN SODIUM 25.77 UNITS/KG/HR: 10000 INJECTION, SOLUTION INTRAVENOUS at 11:54

## 2023-05-31 NOTE — PROGRESS NOTES
Progress Note    S: Pt doing well today and in good spirits. Family, including his granddaughter, are planning on visiting him today. Had multiple BMs yesterday and notes few drops of blood in the commode afterwards. He denies any abdominal pain, nausea, or vomiting.     O:  Temp:  [97 °F (36.1 °C)-98.4 °F (36.9 °C)] 97.9 °F (36.6 °C)  Heart Rate:  [66-73] 68  Resp:  [16-18] 16  BP: (107-140)/(66-84) 121/66      Intake/Output Summary (Last 24 hours) at 5/31/2023 0802  Last data filed at 5/30/2023 1840  Gross per 24 hour   Intake 600 ml   Output --   Net 600 ml       Abd:   soft, non-distended      Results from last 7 days   Lab Units 05/31/23  0716   WBC 10*3/mm3 8.28   HEMOGLOBIN g/dL 11.7*   HEMATOCRIT % 35.2*   PLATELETS 10*3/mm3 257     Results from last 7 days   Lab Units 05/30/23  0254   SODIUM mmol/L 134*   POTASSIUM mmol/L 4.2   CHLORIDE mmol/L 104   CO2 mmol/L 21.0*   BUN mg/dL 13   CREATININE mg/dL 0.87   EGFR mL/min/1.73 98.8   GLUCOSE mg/dL 92   CALCIUM mg/dL 8.1*       Results from last 7 days   Lab Units 05/28/23  1710   MAGNESIUM mg/dL 2.1         A/P: 62 y.o. male with metastatic cancer of unknown origin.      - Bedside cystoscopy performed by Urology 05/29/2023 with mass effect involving the left lateral wall of the bladder and 1 cm erythematous area along the posterior wall concerning for external invasion.   - Pt S/P left inguinal lymph node biopsy yesterday. Pathology pending. Oncology following with plans for port placement once pathology report is available.   - Pt continues to experience RB. Hg remaining stable.   - Dr. Evans to perform colonoscopy tomorrow. Bowel prep today. Start clear liquid diet.

## 2023-05-31 NOTE — PROGRESS NOTES
Subjective   REASON FOR CONSULTATION: Widely metastatic malignancy presumably of rectal origin with associated DVT and pulmonary embolism      History of Present Illness  patient is a 62-year-old white male with no chronic medical illnesses has not seen a doctor for many years never had a screening colonoscopy or PSA.  He is had a 20 pound weight loss in the last 2 to 3 months associated with low appetite and rectal bleeding.  Became progressively more weak and short of breath and came to the ER at the insistence of one of his friends who is a physician     He has noted a fullness in the left groin as well as swelling of his entire left leg for the last week     CAT scans in the ER showed pulmonary embolism and CT of the abdomen showed extensive adenopathy in the left side of the abdomen with a possible rectal mass liver lesions bone lesions and pulmonary nodules and a clot in the external iliac vein due to compression by adenopathy     Biopsy of these nodes is planned for tomorrow     He is not a smoker or drinker  Is never had a DVT MI or stroke  He has a family history of prostate cancer in his father and his 60s and mother with breast cancer at age 86    Interval history  5/30  Shortness of breath much improved on no oxygen  left inguinal node biopsy done today  Continues on heparin  No significant rectal bleeding hemoglobin stable 11.4  CEA and PSA normal surprisingly    5/31/2023  Patient had multiple bowel movements overnight and he reports a good bowel movement earlier in the day.  He has been evaluated by colorectal surgery and plans noted for colonoscopy tomorrow.  He does notice some blood on the stool.  Denies any other complaints at this time.  Left lower extremity edema had worsened yesterday and IV fluids were discontinued and now somewhat better.      Past Medical History, Past Surgical History, Social History, Family History have been reviewed and are without significant changes except as  mentioned.    Review of Systems   Respiratory: Positive for shortness of breath.    Cardiovascular: Positive for syncope.      A comprehensive 14 point review of systems was performed and was negative except as mentioned.    Medications:  The current medication list was reviewed in the EMR    ALLERGIES:  No Known Allergies    Objective      Vitals:    05/30/23 1658 05/30/23 2038 05/31/23 0451 05/31/23 0736   BP: 127/73 122/72 124/67 121/66   BP Location: Right arm Left arm Left arm Left arm   Patient Position: Lying Lying Lying Lying   Pulse: 70 72 66 68   Resp: 18 18 16 16   Temp: 97 °F (36.1 °C) 98.1 °F (36.7 °C) 97.1 °F (36.2 °C) 97.9 °F (36.6 °C)   TempSrc: Oral Oral Oral Oral   SpO2:  99% 96% 97%   Weight:   107 kg (235 lb 0.2 oz)    Height:              View : No data to display.                Physical Exam    CONSTITUTIONAL:  Vital signs reviewed.  No distress, looks comfortable.  EYES:  Conjunctivae and lids unremarkable.  PERRLA  EARS,NOSE,MOUTH,THROAT:  Ears and nose appear unremarkable.  Lips, teeth, gums appear unremarkable.  RESPIRATORY:  Normal respiratory effort.  Lungs clear to auscultation bilaterally.  CARDIOVASCULAR:  Normal S1, S2.  No murmurs rubs or gallops.  3+ edema in his entire left leg to the thigh   GASTROINTESTINAL: Abdomen appears unremarkable.  Nontender.  No hepatomegaly.  No splenomegaly.BULKY left inguinal adenopathy  LYMPHATIC:  No cervical, supraclavicular, axillary lymphadenopathy.  SKIN:  Warm.  No rashes.  PSYCHIATRIC:  Normal judgment and insight.  Normal mood and affect.    I have reexamined the patient and the results are consistent with the previously documented exam. Karen Conti MD       RECENT LABS:  Hematology WBC   Date Value Ref Range Status   05/31/2023 8.28 3.40 - 10.80 10*3/mm3 Final     RBC   Date Value Ref Range Status   05/31/2023 4.23 4.14 - 5.80 10*6/mm3 Final     Hemoglobin   Date Value Ref Range Status   05/31/2023 11.7 (L) 13.0 - 17.7 g/dL Final      Hematocrit   Date Value Ref Range Status   05/31/2023 35.2 (L) 37.5 - 51.0 % Final     Platelets   Date Value Ref Range Status   05/31/2023 257 140 - 450 10*3/mm3 Final              Assessment & Plan     1.  Generalized abdominal lymphadenopathy with a rectal mass liver mets and pulmonary nodules and bone mets suspicious for rectal carcinoma with metastasis-cannot exclude bladder cancer or lymphoma although lymphoma less likely to have lytic bone lesions and liver involvement  · CEA/PSA  normal-  · Cystoscopy performed 5/29/2023 with mass effect involving the left lateral wall of the bladder and 1 cm erythematous area along the posterior wall concerning for external invasion.     2.  Incidental bilateral PEs and thrombus in the left external iliac due to compression by bulky nodes on IV heparin  Continues on heparin drip.     3.  Mild anemia - normal ferritin  Hemoglobin stable today     4.  Family history of breast cancer in his mother at age 86 and prostate cancer his father in his 60s     Plan     1.pathology still pending    2.  Agree with anticoagulation with heparin for now     3.  We will need to watch for rectal bleeding with anticoagulation and see how we will do before discharge     4.  We will need an [id number] once the path report is back for treatment and he realizes the options for treatment may be limited depending on the diagnosis in terms of curability.    5.colonoscopy planned for tomorrow    Patient is new to me and extensive review of the medical records including the CT chest abdomen and pelvis images was performed today.  All issues new to me today.              5/31/2023      CC:

## 2023-05-31 NOTE — PROGRESS NOTES
"Nutrition Services    Patient Name:  King George  YOB: 1960  MRN: [id number]  Admit Date:  5/28/2023   Assessment Date:  05/31/23    FOLLOW UP - CLINICAL NUTRITION    Encounter Information         Reason for Encounter RD f/u.       Current Issues Pt on CLD today. Plan for NPO at [facility] for colonoscopy tomorrow. S/p L inguinal node biopsy yesterday with pathology pending. PO intake % x 6 meals past 3 days per I/Os. +BM on 5/30 per I/Os. Pt is on scheduled bowel regimen.      Current Nutrition Orders & Evaluation of Intake       Oral Nutrition     Current PO Diet NPO Diet NPO Type: Strict NPO  Diet: Liquid Diets; Clear Liquid; Texture: Regular Texture (IDDSI 7); Fluid Consistency: Thin (IDDSI 0)   Supplement n/a   PO Evaluation     % PO Intake % x 6 meals    # of Days Evaluated 3    Factors Affecting Intake  decreased appetite, nausea   --  Anthropometrics          Height    Weight Height: 182.9 cm (72\")  Weight: 107 kg (235 lb 0.2 oz) (05/31/23 0451)    BMI kg/m2 Body mass index is 31.87 kg/m².  Obese, Class I (30 - 34.9)    Weight trend Stable     Labs        Pertinent Labs Reviewed, listed below     Results from last 7 days   Lab Units 05/31/23 0716 05/30/23 0254 05/29/23 0326 05/28/23  1710   SODIUM mmol/L 136 134* 135* 135*   POTASSIUM mmol/L 3.9 4.2 4.1 4.6   CHLORIDE mmol/L 103 104 100 99   CO2 mmol/L 22.5 21.0* 20.5* 23.1   BUN mg/dL 9 13 16 14   CREATININE mg/dL 0.97 0.87 1.18 1.41*   CALCIUM mg/dL 8.9 8.1* 8.9 9.4   BILIRUBIN mg/dL  --   --   --  0.8   ALK PHOS U/L  --   --   --  86   ALT (SGPT) U/L  --   --   --  19   AST (SGOT) U/L  --   --   --  11   GLUCOSE mg/dL 102* 92 96 116*     Results from last 7 days   Lab Units 05/31/23  0716 05/29/23 0326 05/28/23  1710   MAGNESIUM mg/dL  --   --  2.1   HEMOGLOBIN g/dL 11.7*   < > 13.3   HEMATOCRIT % 35.2*   < > 39.4   WBC 10*3/mm3 8.28   < > 12.00*   ALBUMIN g/dL  --   --  3.9    < > = values in this interval not displayed. "     Results from last 7 days   Lab Units 05/31/23  0716 05/30/23  0254 05/29/23  2154 05/29/23  1333 05/29/23  0326 05/28/23  1710   INR   --   --   --   --   --  1.21*   APTT seconds 77.8* 88.4* 90.8* 51.8* 52.9* 28.3   PLATELETS 10*3/mm3 257 238  --   --  206 230     No results found for: COVID19  Lab Results   Component Value Date    HGBA1C 5.70 (H) 05/29/2023          Medications            Scheduled Medications polyethylene glycol, 17 g, Oral, Take As Directed  senna-docusate sodium, 2 tablet, Oral, BID        Infusions heparin, 18 Units/kg/hr, Last Rate: 25.77 Units/kg/hr (05/31/23 1154)  [START ON 6/1/2023] sodium chloride, 75 mL/hr        PRN Medications •  acetaminophen  •  senna-docusate sodium **AND** polyethylene glycol **AND** bisacodyl **AND** bisacodyl  •  heparin (porcine)  •  melatonin  •  ondansetron **OR** ondansetron  •  sodium chloride     Physical Findings          Physical Appearance alert, oriented, room air   Oral/Mouth Cavity WNL   Edema  lower extremity , 3+ (moderate)   Gastrointestinal non-distended , normoactive, last bowel movement:5/30   Skin  skin intact   Tubes/Drains/Lines none   NFPE Date Completed: 5/29   --  NUTRITION INTERVENTION / PLAN OF CARE  Intervention Goal         Intervention Goal(s) Maintain nutrition status, Nutrition support treatment, Improved nutrition related labs, Meet estimated needs, Disease management/therapy, Tolerate PO , Maintain intake, Maintain weight and No significant weight loss     Nutrition Intervention         RD Action Encourage intake, Follow Tx Progress, Care plan reviewed and Recommend/ordered:      Nutrition Prescription         Diet Prescription     Supplement Prescription Boost Plus, Daily   EN/PN Prescription    New Prescription Ordered? No, recommended - will order once PO diet re-initiated    --  Monitor/Evaluation        Monitor Per protocol, I&O, PO intake, Pertinent labs, Weight, Skin status, GI status, Symptoms   Discharge Needs  Pending clinical course   Education Will instruct as appropriate   --    RD to follow up per protocol.    Electronically signed by:  Madeleine Watkins RD  05/31/23 14:14 EDT

## 2023-05-31 NOTE — PLAN OF CARE
Goal Outcome Evaluation:  Plan of Care Reviewed With: patient        Progress: improving     Pt AxOx4, calm and cooperative, VSS. Takes meds whole with water, see MAR. IV fluids d.c this AM. Pt on a clear liquid diet. NPO after Midnight for a Colonoscopy tomorrow. Miralax Bowel Prep started this afternoon. Heparin drip infusing, PTT therapeutic this AM. Pt ambulates in room independently. Per order, heparin will need to held for 6 hrs prior to the Colonoscopy. Pt's family visiting throughout the day, attentive to pt. RN will continue to monitor.

## 2023-05-31 NOTE — PROGRESS NOTES
Name: King George ADMIT: 2023   : 1960  PCP: Mohini Cortes MD    MRN: [id number] LOS: 3 days   AGE/SEX: 62 y.o. male  ROOM: [facility] [place]     Subjective   Subjective   Pleasant patient appears comfortable and in no apparent distress.  Continues to deny chest pain or shortness of breath or pain.  Reports left lower extremity swelling and left groin 'mass' present for quite some time with associated intermittent rectal bleeding without prior work-up or history of colonoscopy.  Mother at bedside.     Objective   Objective   Vital Signs  Temp:  [97 °F (36.1 °C)-98.4 °F (36.9 °C)] 97.3 °F (36.3 °C)  Heart Rate:  [64-73] 64  Resp:  [16-18] 16  BP: (121-140)/(66-84) 130/77  SpO2:  [96 %-99 %] 98 %  on   ;   Device (Oxygen Therapy): room air  Body mass index is 31.87 kg/m².     Physical Exam  Constitutional:       General: He is not in acute distress.     Appearance: He is obese. He is not toxic-appearing.   Cardiovascular:      Rate and Rhythm: Normal rate.      Heart sounds: Normal heart sounds.   Pulmonary:      Effort: Pulmonary effort is normal.      Breath sounds: Normal breath sounds.   Abdominal:      General: Bowel sounds are normal.   Musculoskeletal:      Right lower leg: No edema.      Left lower leg: Edema present.   Skin:     General: Skin is warm and dry.   Neurological:      Mental Status: He is alert and oriented to person, place, and time.      Cranial Nerves: No cranial nerve deficit.     Physical exam performed on 2023 as per above    Results Review     I reviewed the patient's new clinical results.  Results from last 7 days   Lab Units 23  0716 23  0254 23  0326 23  1710   WBC 10*3/mm3 8.28 8.55 11.28* 12.00*   HEMOGLOBIN g/dL 11.7* 11.4* 11.4* 13.3   PLATELETS 10*3/mm3 257 238 206 230     Results from last 7 days   Lab Units 23  0716 23  0254 23  0326 23  1710   SODIUM mmol/L 136 134* 135* 135*   POTASSIUM mmol/L 3.9 4.2 4.1 4.6    CHLORIDE mmol/L 103 104 100 99   CO2 mmol/L 22.5 21.0* 20.5* 23.1   BUN mg/dL 9 13 16 14   CREATININE mg/dL 0.97 0.87 1.18 1.41*   GLUCOSE mg/dL 102* 92 96 116*   EGFR mL/min/1.73 89.4 98.8 70.6 57.1*     Results from last 7 days   Lab Units 05/28/23  1710   ALBUMIN g/dL 3.9   BILIRUBIN mg/dL 0.8   ALK PHOS U/L 86   AST (SGOT) U/L 11   ALT (SGPT) U/L 19     Results from last 7 days   Lab Units 05/31/23  0716 05/30/23  0254 05/29/23  0326 05/28/23  1710   CALCIUM mg/dL 8.9 8.1* 8.9 9.4   ALBUMIN g/dL  --   --   --  3.9   MAGNESIUM mg/dL  --   --   --  2.1       Hemoglobin A1C   Date/Time Value Ref Range Status   05/29/2023 0326 5.70 (H) 4.80 - 5.60 % Final       US Guided Lymph Node Biopsy    Result Date: 5/30/2023  Technically successful ultrasound-guided left inguinal lymph node biopsy  This report was finalized on 5/30/2023 10:20 AM by Dr. James George M.D.      I have personally reviewed all medications:  Scheduled Medications  polyethylene glycol, 17 g, Oral, Take As Directed  senna-docusate sodium, 2 tablet, Oral, BID    Infusions  heparin, 18 Units/kg/hr, Last Rate: 25.769 Units/kg/hr (05/31/23 0133)  [START ON 6/1/2023] sodium chloride, 75 mL/hr    Diet  NPO Diet NPO Type: Strict NPO  Diet: Liquid Diets; Clear Liquid; Texture: Regular Texture (IDDSI 7); Fluid Consistency: Thin (IDDSI 0)    I have personally reviewed:  [x]  Laboratory   []  Microbiology   []  Radiology   []  EKG/Telemetry  []  Cardiology/Vascular    []  Pathology    []  Records       Assessment/Plan     Active Hospital Problems    Diagnosis  POA   • **Acute pulmonary embolism, unspecified pulmonary embolism type, unspecified whether acute cor pulmonale present [I26.99]  Yes   • Moderate Malnutrition (HCC) [E44.0]  Yes   • Anemia [D64.9]  Yes   • Left groin mass [R19.09]  Yes   • Hepatic lesion [K76.9]  Yes   • Abdominal lymphadenopathy [R59.0]  Yes   • Left lower quadrant abdominal mass [R19.04]  Unknown      Resolved Hospital Problems    No resolved problems to display.       62 y.o. male admitted with Acute pulmonary embolism, unspecified pulmonary embolism type, unspecified whether acute cor pulmonale present.      Acute pulmonary embolism, unspecified pulmonary embolism type, unspecified whether acute cor pulmonale present: Confirmed on CTA chest.  Heparin gtt infusing.       Abdominal lymphadenopathy: Associated rectal mass, liver mets, pulmonary nodule, and bone mets suspicious for rectal carcinoma with metastasis on CT.  Primary malignancy may be the urinary bladder, colon, rectum, lymphoma, sarcoma.  Oncology following.  Anticipate [id number] once path report available.  Unremarkable CEA 0.88 & PSA 1.49.  Colorectal surgery following ultrasound-guided biopsy (results pending) completed on 5/30/2023 (noted heparin gtt did not need to be held for biopsy).  Plan colonoscopy on 6/1/2023.      Anemia: Stable hemoglobin trend.  Initial decline hemodilution suspect due to recent IVF vs heparin drip infusing vs ? Malignancy vs all.  No overt signs of active bleeding.  Transfuse for serum hemoglobin less than 7.  Repeat labs in AM.      Hepatic lesion: See plan above.      Bladder wall thickening and irregularity findings on CT: Urology following--patient underwent pancystoscopy on 5/29/2023 findings normal prostate, urethra, mass effect on left lateral wall of bladder, small erythematous area posterior bladder wall ~1 cm with concern for external invasion.    *Transferred to [facility] oncology unit on 5/30/2023.    · heparin gtt adequate for DVT prophylaxis.  · Full code.  · Discussed with patient & RN.  · Anticipate discharge pending clinical course.      TANYA Bray  [facility]  05/31/23  11:46 EDT

## 2023-06-01 ENCOUNTER — APPOINTMENT (OUTPATIENT)
Dept: MRI IMAGING | Facility: HOSPITAL | Age: 63
End: 2023-06-01
Payer: COMMERCIAL

## 2023-06-01 ENCOUNTER — ANESTHESIA (OUTPATIENT)
Dept: GASTROENTEROLOGY | Facility: HOSPITAL | Age: 63
End: 2023-06-01
Payer: COMMERCIAL

## 2023-06-01 ENCOUNTER — ANESTHESIA EVENT (OUTPATIENT)
Dept: GASTROENTEROLOGY | Facility: HOSPITAL | Age: 63
End: 2023-06-01
Payer: COMMERCIAL

## 2023-06-01 LAB
ANION GAP SERPL CALCULATED.3IONS-SCNC: 9 MMOL/L (ref 5–15)
APTT PPP: 87 SECONDS (ref 22.7–35.4)
BASOPHILS # BLD AUTO: 0.03 10*3/MM3 (ref 0–0.2)
BASOPHILS NFR BLD AUTO: 0.4 % (ref 0–1.5)
BUN SERPL-MCNC: 6 MG/DL (ref 8–23)
BUN/CREAT SERPL: 6.3 (ref 7–25)
CALCIUM SPEC-SCNC: 8.9 MG/DL (ref 8.6–10.5)
CHLORIDE SERPL-SCNC: 104 MMOL/L (ref 98–107)
CO2 SERPL-SCNC: 24 MMOL/L (ref 22–29)
CREAT SERPL-MCNC: 0.96 MG/DL (ref 0.76–1.27)
DEPRECATED RDW RBC AUTO: 41.3 FL (ref 37–54)
EGFRCR SERPLBLD CKD-EPI 2021: 90.5 ML/MIN/1.73
EOSINOPHIL # BLD AUTO: 0.43 10*3/MM3 (ref 0–0.4)
EOSINOPHIL NFR BLD AUTO: 5.5 % (ref 0.3–6.2)
ERYTHROCYTE [DISTWIDTH] IN BLOOD BY AUTOMATED COUNT: 13.5 % (ref 12.3–15.4)
GLUCOSE SERPL-MCNC: 95 MG/DL (ref 65–99)
HCT VFR BLD AUTO: 33 % (ref 37.5–51)
HGB BLD-MCNC: 11 G/DL (ref 13–17.7)
IMM GRANULOCYTES # BLD AUTO: 0.04 10*3/MM3 (ref 0–0.05)
IMM GRANULOCYTES NFR BLD AUTO: 0.5 % (ref 0–0.5)
LYMPHOCYTES # BLD AUTO: 0.9 10*3/MM3 (ref 0.7–3.1)
LYMPHOCYTES NFR BLD AUTO: 11.4 % (ref 19.6–45.3)
MCH RBC QN AUTO: 28 PG (ref 26.6–33)
MCHC RBC AUTO-ENTMCNC: 33.3 G/DL (ref 31.5–35.7)
MCV RBC AUTO: 84 FL (ref 79–97)
MONOCYTES # BLD AUTO: 0.74 10*3/MM3 (ref 0.1–0.9)
MONOCYTES NFR BLD AUTO: 9.4 % (ref 5–12)
NEUTROPHILS NFR BLD AUTO: 5.73 10*3/MM3 (ref 1.7–7)
NEUTROPHILS NFR BLD AUTO: 72.8 % (ref 42.7–76)
NRBC BLD AUTO-RTO: 0 /100 WBC (ref 0–0.2)
PLATELET # BLD AUTO: 265 10*3/MM3 (ref 140–450)
PMV BLD AUTO: 9 FL (ref 6–12)
POTASSIUM SERPL-SCNC: 4.2 MMOL/L (ref 3.5–5.2)
RBC # BLD AUTO: 3.93 10*6/MM3 (ref 4.14–5.8)
SODIUM SERPL-SCNC: 137 MMOL/L (ref 136–145)
WBC NRBC COR # BLD: 7.87 10*3/MM3 (ref 3.4–10.8)

## 2023-06-01 PROCEDURE — 0 GADOBENATE DIMEGLUMINE 529 MG/ML SOLUTION: Performed by: INTERNAL MEDICINE

## 2023-06-01 PROCEDURE — 85730 THROMBOPLASTIN TIME PARTIAL: CPT | Performed by: NURSE PRACTITIONER

## 2023-06-01 PROCEDURE — 25010000002 HEPARIN (PORCINE) 25000-0.45 UT/250ML-% SOLUTION: Performed by: NURSE PRACTITIONER

## 2023-06-01 PROCEDURE — 70553 MRI BRAIN STEM W/O & W/DYE: CPT

## 2023-06-01 PROCEDURE — 85025 COMPLETE CBC W/AUTO DIFF WBC: CPT | Performed by: NURSE PRACTITIONER

## 2023-06-01 PROCEDURE — 99252 IP/OBS CONSLTJ NEW/EST SF 35: CPT | Performed by: SURGERY

## 2023-06-01 PROCEDURE — 99233 SBSQ HOSP IP/OBS HIGH 50: CPT | Performed by: INTERNAL MEDICINE

## 2023-06-01 PROCEDURE — A9577 INJ MULTIHANCE: HCPCS | Performed by: INTERNAL MEDICINE

## 2023-06-01 PROCEDURE — 80048 BASIC METABOLIC PNL TOTAL CA: CPT | Performed by: INTERNAL MEDICINE

## 2023-06-01 PROCEDURE — 99232 SBSQ HOSP IP/OBS MODERATE 35: CPT | Performed by: PHYSICIAN ASSISTANT

## 2023-06-01 RX ADMIN — GADOBENATE DIMEGLUMINE 20 ML: 529 INJECTION, SOLUTION INTRAVENOUS at 11:25

## 2023-06-01 RX ADMIN — DOCUSATE SODIUM 50MG AND SENNOSIDES 8.6MG 2 TABLET: 8.6; 5 TABLET, FILM COATED ORAL at 20:25

## 2023-06-01 RX ADMIN — HEPARIN SODIUM 25.77 UNITS/KG/HR: 10000 INJECTION, SOLUTION INTRAVENOUS at 09:37

## 2023-06-01 RX ADMIN — POLYETHYLENE GLYCOL 3350 17 G: 17 POWDER, FOR SOLUTION ORAL at 00:06

## 2023-06-01 RX ADMIN — POLYETHYLENE GLYCOL 3350 17 G: 17 POWDER, FOR SOLUTION ORAL at 01:01

## 2023-06-01 RX ADMIN — SODIUM CHLORIDE 75 ML/HR: 9 INJECTION, SOLUTION INTRAVENOUS at 14:54

## 2023-06-01 RX ADMIN — SODIUM CHLORIDE 75 ML/HR: 9 INJECTION, SOLUTION INTRAVENOUS at 00:06

## 2023-06-01 RX ADMIN — HEPARIN SODIUM 25.77 UNITS/KG/HR: 10000 INJECTION, SOLUTION INTRAVENOUS at 20:31

## 2023-06-01 NOTE — PROGRESS NOTES
Progress Note      S: No acute events overnight. Pt tolerated bowel prep yesterday.     O:  Temp:  [97.1 °F (36.2 °C)-97.6 °F (36.4 °C)] 97.1 °F (36.2 °C)  Heart Rate:  [64-74] 71  Resp:  [16-20] 16  BP: (119-141)/(68-78) 123/70      Intake/Output Summary (Last 24 hours) at 6/1/2023 0849  Last data filed at 6/1/2023 0600  Gross per 24 hour   Intake 1120 ml   Output --   Net 1120 ml       Abd:   soft, non-distended      Results from last 7 days   Lab Units 06/01/23  0643   WBC 10*3/mm3 7.87   HEMOGLOBIN g/dL 11.0*   HEMATOCRIT % 33.0*   PLATELETS 10*3/mm3 265     Results from last 7 days   Lab Units 06/01/23  0643   SODIUM mmol/L 137   POTASSIUM mmol/L 4.2   CHLORIDE mmol/L 104   CO2 mmol/L 24.0   BUN mg/dL 6*   CREATININE mg/dL 0.96   EGFR mL/min/1.73 90.5   GLUCOSE mg/dL 95   CALCIUM mg/dL 8.9       Results from last 7 days   Lab Units 05/28/23  1710   MAGNESIUM mg/dL 2.1         A/P: 62 y.o. male with metastatic melanoma.      - Bedside cystoscopy performed by Urology 05/29/2023 with mass effect involving the left lateral wall of the bladder and 1 cm erythematous area along the posterior wall concerning for external invasion.   - Pt S/P left inguinal lymph node biopsy yesterday. Pathology consistent with metastatic melanoma. Oncology following to discuss therapy plan.   - Hg remaining stable  - Will proceed with colonoscopy this afternoon for further evaluation of RB

## 2023-06-01 NOTE — PROGRESS NOTES
[facility] Medicine Services  PROGRESS NOTE    Patient Name: King George  : 1960  MRN: [id number]    Date of Admission: 2023  Primary Care Physician: Mohini Cortes MD    Subjective   Subjective     CC:  Follow-up multiple issues PE    Subjective:  Patient states his rectal bleeding has decreased.  He reports he had a good bowel prep.  He is awaiting endoscopy today.  No new complaints.    Review of Systems  No current fevers or chills  No current shortness of breath or cough  No current chest pain or palpitations        Objective   Objective     Vital Signs:   Temp:  [97.1 °F (36.2 °C)-97.6 °F (36.4 °C)] 97.1 °F (36.2 °C)  Heart Rate:  [64-74] 71  Resp:  [16-20] 16  BP: (119-141)/(68-78) 123/70        Physical Exam:  Constitutional:Awake, alert  HENT: NCAT, mucous membranes moist, neck supple  Respiratory: No cough or wheezing, nonlabored breathing, good inspiration  Cardiovascular: Pulse rate is normal, palpable radial pulse bilaterally  Gastrointestinal: Positive bowel sounds, soft, nontender, nondistended  Musculoskeletal: BMI is 31, mildly obese, otherwise normal musculature for age, pitting lower extremity edema left greater than right  Psychiatric: Appropriate affect, cooperative, conversational  Neurologic: No slurred speech or facial droop, follows commands  Skin: No rashes or jaundice, warm      Results Reviewed:  Results from last 7 days   Lab Units 23  0643 23  0716 23  0254 23  1710   WBC 10*3/mm3 7.87 8.28 8.55   < > 12.00*   HEMOGLOBIN g/dL 11.0* 11.7* 11.4*   < > 13.3   HEMATOCRIT % 33.0* 35.2* 34.7*   < > 39.4   PLATELETS 10*3/mm3 265 257 238   < > 230   INR   --   --   --   --  1.21*    < > = values in this interval not displayed.     Results from last 7 days   Lab Units 23  0643 23  0716 23  0254 23  0326 23  1710   SODIUM mmol/L 137 136 134*   < > 135*   POTASSIUM mmol/L 4.2 3.9 4.2   < > 4.6   CHLORIDE  mmol/L 104 103 104   < > 99   CO2 mmol/L 24.0 22.5 21.0*   < > 23.1   BUN mg/dL 6* 9 13   < > 14   CREATININE mg/dL 0.96 0.97 0.87   < > 1.41*   GLUCOSE mg/dL 95 102* 92   < > 116*   CALCIUM mg/dL 8.9 8.9 8.1*   < > 9.4   ALT (SGPT) U/L  --   --   --   --  19   AST (SGOT) U/L  --   --   --   --  11   HSTROP T ng/L  --   --   --   --  26*   PROBNP pg/mL  --   --   --   --  460.0    < > = values in this interval not displayed.     Estimated Creatinine Clearance: 102.1 mL/min (by C-G formula based on SCr of 0.96 mg/dL).    Microbiology Results Abnormal     None          Imaging Results (Last 24 Hours)     ** No results found for the last 24 hours. **              I have reviewed the medications:  Scheduled Meds:gadobenate dimeglumine, 20 mL, Intravenous, Once in imaging  senna-docusate sodium, 2 tablet, Oral, BID      Continuous Infusions:heparin, 18 Units/kg/hr, Last Rate: 18 Units/kg/hr (06/01/23 1050)  sodium chloride, 75 mL/hr, Last Rate: 75 mL/hr (06/01/23 0006)      PRN Meds:.•  acetaminophen  •  senna-docusate sodium **AND** polyethylene glycol **AND** bisacodyl **AND** bisacodyl  •  heparin (porcine)  •  melatonin  •  ondansetron **OR** ondansetron  •  sodium chloride    Assessment & Plan   Assessment & Plan     Active Hospital Problems    Diagnosis  POA   • **Acute pulmonary embolism, unspecified pulmonary embolism type, unspecified whether acute cor pulmonale present [I26.99]  Yes   • Metastatic melanoma [C43.9]  Yes   • Moderate Malnutrition (HCC) [E44.0]  Yes   • Anemia [D64.9]  Yes   • Left groin mass [R19.09]  Yes   • Hepatic lesion [K76.9]  Yes   • Abdominal lymphadenopathy [R59.0]  Yes   • Left lower quadrant abdominal mass [R19.04]  Unknown      Resolved Hospital Problems   No resolved problems to display.        Brief Hospital Course to date:  King George is a 62 y.o. male presents to the hospital with weakness, near syncope, and bloody stools and was found to have metastatic melanoma with rectal  bleeding and pulmonary embolism.    Discussion/plan for today:  Plan to follow-up on colonoscopy today.  Trend blood counts.  Bleeding seems to be improving.  Stop IV fluid today following colonoscopy.  Case discussed with pathologist, colorectal surgery.  Electrolytes and renal function stable.    Metastatic melanoma:  Case discussed with pathologist and colorectal surgery and oncologist.  Plan to follow-up further on oncology recommendations and long-term treatment plan.    Pulmonary embolism:  Anticoagulation.  Initially treated with heparin drip.    Rectal bleeding:  Related to cancer/rectal mass.  Colonoscopy 6/1.    Blood loss anemia:  Monitor blood counts and transfuse as needed.    Malnutrition:  Maximize nutritional status is much as possible.  Encourage oral intake.  Registered dietitian has been consulted to assist with management.    Peripheral edema:  Likely related to lymphadenopathy of the abdomen.  Seems to be more significant on the left leg.    DVT Prophylaxis: Anticoagulation      Disposition: Likely home when improved.    CODE STATUS:   Code Status and Medical Interventions:   Ordered at: 05/28/23 1915     Code Status (Patient has no pulse and is not breathing):    CPR (Attempt to Resuscitate)     Medical Interventions (Patient has pulse or is breathing):    Full       Price Sotelo MD  06/01/23

## 2023-06-01 NOTE — CONSULTS
General Surgery Consultation    Consulting Physician: Vicki Layne MD    Referring Physician: Dr. Rocha    Reason for consultation: [id number]-[id number] placement    CC: Metastatic melanoma    HPI:   The patient is a very pleasant 62 y.o. male who has been diagnosed with metastatic melanoma and requires qzkzkx-u-qbmr for immunotherapy.  He was found to have a DVT and PE on admission and was started on anticoagulation.  He has never had a central line or PICC line before.  He is otherwise in good health.    Past Medical History:  No significant past medical history  Newly diagnosed metastatic melanoma    Past Surgical History:  Denies past surgical history  Underwent ultrasound-guided lymph node biopsy this admission    Medications:  No medications prior to admission.       Allergies: No Known Allergies    Social History:   Denies tobacco use  Denies alcohol use  Denies drug use  He is the "[employer]" school       Family History:   Father had prostate cancer  Mother with history of breast cancer      Review of Systems:  Constitutional: Reports weight loss of about 20 pounds, reports episode of fatigue prior to presentation  Eyes: denies blurred/double vision or scleral icterus  Cardiovascular: denies chest pain, palpitations, or edemas  Respiratory: denies cough, sputum, or dyspnea  Gastrointestinal: Denies nausea, vomiting or abdominal pain, reports blood per rectum  Genitourinary: denies dysuria or hematuria  Endocrine: denies cold intolerance, lethargy, or flushing  Hematologic: denies excessive bruising or bleeding  Musculoskeletal: denies weakness, joint swelling, joint pain, or stiffness  Neurologic: denies seizures, CVA, paresthesia, or peripheral neuropathy  Skin: denies change in nevi, rashes, masses, or jaundice     All other systems reviewed and were negative.    Physical Exam:   Vitals:    06/01/23 1922   BP: 138/75   Pulse: 73   Resp: 18   Temp: 97.7 °F (36.5 °C)   SpO2: 97%     Height: 72  inches  Weight: 104 kg  BMI: 31  GENERAL: awake and alert, no acute distress, oriented to person, place, and time  HEENT: normocephalic, atraumatic, no scleral icterus, moist mucous membranes  NECK: Supple, there is no thyromegaly or lymphadenopathy  RESPIRATORY: clear to auscultation, no wheezes, rales or rhonchi, symmetric air entry  CARDIOVASCULAR: regular rate and rhythm    GASTROINTESTINAL: Soft, nondistended, nontender  MUSCULOSKELETAL: no cyanosis, clubbing, left lower extremity edema  NEUROLOGIC: alert and oriented, normal speech, cranial nerves 2-12 grossly intact, no focal deficits   SKIN: Moist, warm, no rashes, no jaundice      Diagnostic work-up:     Pertinent labs:   Results from last 7 days   Lab Units 06/01/23  0643 05/31/23  0716 05/30/23  0254 05/29/23  0326 05/28/23  1710   WBC 10*3/mm3 7.87 8.28 8.55 11.28* 12.00*   HEMOGLOBIN g/dL 11.0* 11.7* 11.4* 11.4* 13.3   HEMATOCRIT % 33.0* 35.2* 34.7* 34.4* 39.4   PLATELETS 10*3/mm3 265 257 238 206 230     Results from last 7 days   Lab Units 06/01/23  0643 05/31/23  0716 05/30/23  0254 05/29/23  0326 05/28/23  1710   SODIUM mmol/L 137 136 134*   < > 135*   POTASSIUM mmol/L 4.2 3.9 4.2   < > 4.6   CHLORIDE mmol/L 104 103 104   < > 99   CO2 mmol/L 24.0 22.5 21.0*   < > 23.1   BUN mg/dL 6* 9 13   < > 14   CREATININE mg/dL 0.96 0.97 0.87   < > 1.41*   CALCIUM mg/dL 8.9 8.9 8.1*   < > 9.4   BILIRUBIN mg/dL  --   --   --   --  0.8   ALK PHOS U/L  --   --   --   --  86   ALT (SGPT) U/L  --   --   --   --  19   AST (SGOT) U/L  --   --   --   --  11   GLUCOSE mg/dL 95 102* 92   < > 116*    < > = values in this interval not displayed.       Imaging:  I reviewed the CTA of his chest for patency of internal jugular veins.  Both appear patent.    Assessment and plan:   The patient is a 62 y.o. male with metastatic melanoma who needs an Oqiabi-u-Qegx placed for initiation of immunotherapy.    Ideally, I would like to perform this tomorrow while his heparin is already  being held for his colonoscopy.  I discussed the procedure with him in detail.  I explained all the risks (bleeding, infection, damage to surrounding structures, pneumothorax), benefits and alternatives.  All of his questions were answered and he elected to proceed.    Keep him n.p.o. after colonoscopy and continue holding heparin drip.  If we cannot get him on the schedule soon after the colonoscopy, I will let his nurse know when to restart heparin drip.    Vicki Layne MD  General, Robotic, and Endoscopic Surgery  [facility]     [address] [place] [address], [address]  [place], [place], [address]  P: [phone]  F: [phone]

## 2023-06-01 NOTE — PROGRESS NOTES
CRS attending    Patient was off the floor when called to have colonoscopy    Heparin is not stopped  On clear liquids  Tolerated bowel prep    Metastatic melanoma has had PTE.  Current DVT  Needs long-term anticoagulation  Having rectal bleeding    N.p.o. after midnight  Heparin to stop 6 hours before procedure i.e. 1 AM

## 2023-06-01 NOTE — PLAN OF CARE
Problem: Adult Inpatient Plan of Care  Goal: Absence of Hospital-Acquired Illness or Injury  Outcome: Ongoing, Progressing   Goal Outcome Evaluation:  Plan of Care Reviewed With: patient        Progress: improving

## 2023-06-01 NOTE — ANESTHESIA PREPROCEDURE EVALUATION
Anesthesia Evaluation     Patient summary reviewed and Nursing notes reviewed                Airway   Mallampati: II  TM distance: >3 FB  Neck ROM: full  Dental    (+) upper dentures, lower dentures, partials and poor dentition    Pulmonary    (+) pulmonary embolism,   Cardiovascular - negative cardio ROS    ECG reviewed  Rhythm: regular  Rate: normal        Neuro/Psych- negative ROS  GI/Hepatic/Renal/Endo    (+) obesity,  GI bleeding , liver disease,     Musculoskeletal (-) negative ROS    Abdominal    Substance History - negative use     OB/GYN negative ob/gyn ROS         Other      history of cancer active                    Anesthesia Plan    ASA 4     MAC     (Probable metastatic cancer  GI bleed  Pulmonary thrombo-emboli without cor pulmonale    Full upper and partial lower dentures have been removed pre-procedure    I have reviewed the patient's history with the patient and the chart, including all pertinent laboratory results and imaging. I have explained the risks of anesthesia including but not limited to dental damage, corneal abrasion, nerve injury, MI, stroke, and death. Questions asked and answered. Anesthetic plan discussed with patient and team as indicated. Patient expressed understanding of the above.  )  intravenous induction     Anesthetic plan, risks, benefits, and alternatives have been provided, discussed and informed consent has been obtained with: patient.    Plan discussed with CRNA.        CODE STATUS:    Code Status (Patient has no pulse and is not breathing): CPR (Attempt to Resuscitate)  Medical Interventions (Patient has pulse or is breathing): Full

## 2023-06-01 NOTE — PROGRESS NOTES
Subjective   REASON FOR CONSULTATION: Widely metastatic malignancy presumably of rectal origin with associated DVT and pulmonary embolism      Shortness of Breath  Associated symptoms include leg pain and syncope.   Syncope    Leg Pain       patient is a 62-year-old white male with no chronic medical illnesses has not seen a doctor for many years never had a screening colonoscopy or PSA.  He is had a 20 pound weight loss in the last 2 to 3 months associated with low appetite and rectal bleeding.  Became progressively more weak and short of breath and came to the ER at the insistence of one of his friends who is a physician     He has noted a fullness in the left groin as well as swelling of his entire left leg for the last week     CAT scans in the ER showed pulmonary embolism and CT of the abdomen showed extensive adenopathy in the left side of the abdomen with a possible rectal mass liver lesions bone lesions and pulmonary nodules and a clot in the external iliac vein due to compression by adenopathy     Biopsy of these nodes is planned for tomorrow     He is not a smoker or drinker  Is never had a DVT MI or stroke  He has a family history of prostate cancer in his father and his 60s and mother with breast cancer at age 86    Interval history  5/30  Shortness of breath much improved on no oxygen  left inguinal node biopsy done today  Continues on heparin  No significant rectal bleeding hemoglobin stable 11.4  CEA and PSA normal surprisingly    5/31/2023  Patient had multiple bowel movements overnight and he reports a good bowel movement earlier in the day.  He has been evaluated by colorectal surgery and plans noted for colonoscopy tomorrow.  He does notice some blood on the stool.  Denies any other complaints at this time.  Left lower extremity edema had worsened yesterday and IV fluids were discontinued and now somewhat better.    6/1  Colonoscopy canceled because his heparin was not held  Path consistent with  melanoma   staging with brain MRI reveals 2 small nodules suspicious for brain metastasis  He is otherwise clinically comfortable and much less short of breath  CBC stable      Past Medical History, Past Surgical History, Social History, Family History have been reviewed and are without significant changes except as mentioned.    Review of Systems   Respiratory: Positive for shortness of breath.    Cardiovascular: Positive for syncope.      A comprehensive 14 point review of systems was performed and was negative except as mentioned.    Medications:  The current medication list was reviewed in the EMR    ALLERGIES:  No Known Allergies    Objective      Vitals:    05/31/23 1107 05/31/23 1545 05/31/23 2034 06/01/23 0426   BP: 130/77 141/78 123/70 119/68   BP Location: Left arm Left arm Left arm Left arm   Patient Position: Lying Lying Lying Lying   Pulse: 64 68 67 74   Resp: 16 16 20 16   Temp: 97.3 °F (36.3 °C) 97.6 °F (36.4 °C) 97.1 °F (36.2 °C) 97.1 °F (36.2 °C)   TempSrc: Oral Oral Oral Oral   SpO2: 98% 99% 98% 99%   Weight:    104 kg (228 lb 13.4 oz)   Height:              View : No data to display.                Physical Exam    CONSTITUTIONAL:  Vital signs reviewed.  No distress, looks comfortable.  EYES:  Conjunctivae and lids unremarkable.  PERRLA  EARS,NOSE,MOUTH,THROAT:  Ears and nose appear unremarkable.  Lips, teeth, gums appear unremarkable.  RESPIRATORY:  Normal respiratory effort.  Lungs clear to auscultation bilaterally.  CARDIOVASCULAR:  Normal S1, S2.  No murmurs rubs or gallops.  3+ edema in his entire left leg to the thigh   GASTROINTESTINAL: Abdomen appears unremarkable.  Nontender.  No hepatomegaly.  No splenomegaly.BULKY left inguinal adenopathy  LYMPHATIC:  No cervical, supraclavicular, axillary lymphadenopathy.  SKIN:  Warm.  No rashes.  PSYCHIATRIC:  Normal judgment and insight.  Normal mood and affect.    I have reexamined the patient and the results are consistent with the previously  documented exam. Brodie Rocha MD       RECENT LABS:  Hematology WBC   Date Value Ref Range Status   05/31/2023 8.28 3.40 - 10.80 10*3/mm3 Final     RBC   Date Value Ref Range Status   05/31/2023 4.23 4.14 - 5.80 10*6/mm3 Final     Hemoglobin   Date Value Ref Range Status   05/31/2023 11.7 (L) 13.0 - 17.7 g/dL Final     Hematocrit   Date Value Ref Range Status   05/31/2023 35.2 (L) 37.5 - 51.0 % Final     Platelets   Date Value Ref Range Status   05/31/2023 257 140 - 450 10*3/mm3 Final          Final Diagnosis  1. Lymph Node, Left Groin, U/S-Guided Core Needle Biopsy:  A. METASTATIC MELANOMA (SEE COMMENT).  jab/pkm    Assessment & Plan     1.  Generalized abdominal lymphadenopathy with a rectal mass liver mets and pulmonary nodules and bone mets suspicious for rectal carcinoma with metastasis-cannot exclude bladder cancer or lymphoma although lymphoma less likely to have lytic bone lesions and liver involvement  · CEA/PSA  normal-  · Cystoscopy performed 5/29/2023 with mass effect involving the left lateral wall of the bladder and 1 cm erythematous area along the posterior wall concerning for external invasion.  · Path shows melanoma- BRAF testing+ mri brain shows 2 small brain metastasis  · We will need IPI Nivo as an outpatient which should help the brain metastasis     2.  Incidental bilateral PEs and thrombus in the left external iliac due to compression by bulky nodes on IV heparin  Continues on heparin drip.     3.  Mild anemia - normal ferritin  Hemoglobin stable today     4.  Family history of breast cancer in his mother at age 86 and prostate cancer his father in his 60s     Plan     1.port placement    2.  Agree with anticoagulation with heparin for now     3.  We will need to watch for rectal bleeding with anticoagulation and see how we will do before discharge     4. .colonoscopy planned      Discussed with the patient and his family.  No obvious malignant moles.  Mucosal melanomas are reported  and with the mass in his rectum which he states is protruding I think evaluation is indicated.    We would like to get him discharged soon as possible on Xarelto or Eliquis after port placement and initiate immunotherapy with Opdivo and ipilimumab which I think will take care of his brain metastasis but if there is a delay we could initiate focused radiation to the 2 small brain metastasis and initiate immunotherapy thereafter    He is aware that this is not a curable situation typically but that he may not therapy can be very effective in this situation in a subset of patients        6/1/2023      CC:

## 2023-06-02 ENCOUNTER — APPOINTMENT (OUTPATIENT)
Dept: GENERAL RADIOLOGY | Facility: HOSPITAL | Age: 63
End: 2023-06-02
Payer: COMMERCIAL

## 2023-06-02 ENCOUNTER — ANESTHESIA EVENT (OUTPATIENT)
Dept: PERIOP | Facility: HOSPITAL | Age: 63
End: 2023-06-02
Payer: COMMERCIAL

## 2023-06-02 ENCOUNTER — ANESTHESIA (OUTPATIENT)
Dept: GASTROENTEROLOGY | Facility: HOSPITAL | Age: 63
End: 2023-06-02
Payer: COMMERCIAL

## 2023-06-02 ENCOUNTER — ANESTHESIA (OUTPATIENT)
Dept: PERIOP | Facility: HOSPITAL | Age: 63
End: 2023-06-02
Payer: COMMERCIAL

## 2023-06-02 ENCOUNTER — ANESTHESIA EVENT (OUTPATIENT)
Dept: GASTROENTEROLOGY | Facility: HOSPITAL | Age: 63
End: 2023-06-02
Payer: COMMERCIAL

## 2023-06-02 DIAGNOSIS — R93.5 ABNORMAL CT OF THE ABDOMEN: ICD-10-CM

## 2023-06-02 DIAGNOSIS — R19.04 LEFT LOWER QUADRANT ABDOMINAL MASS: Primary | ICD-10-CM

## 2023-06-02 DIAGNOSIS — C43.9 METASTATIC MELANOMA: ICD-10-CM

## 2023-06-02 PROBLEM — K62.1 RECTAL POLYP: Status: ACTIVE | Noted: 2023-06-02

## 2023-06-02 PROBLEM — C79.31 METASTASIS TO BRAIN: Status: ACTIVE | Noted: 2023-06-02

## 2023-06-02 LAB
ANION GAP SERPL CALCULATED.3IONS-SCNC: 10 MMOL/L (ref 5–15)
APTT PPP: 28.8 SECONDS (ref 22.7–35.4)
APTT PPP: 46.9 SECONDS (ref 22.7–35.4)
BUN SERPL-MCNC: 5 MG/DL (ref 8–23)
BUN/CREAT SERPL: 5.4 (ref 7–25)
CALCIUM SPEC-SCNC: 8 MG/DL (ref 8.6–10.5)
CHLORIDE SERPL-SCNC: 104 MMOL/L (ref 98–107)
CO2 SERPL-SCNC: 22 MMOL/L (ref 22–29)
CREAT SERPL-MCNC: 0.92 MG/DL (ref 0.76–1.27)
EGFRCR SERPLBLD CKD-EPI 2021: 95.2 ML/MIN/1.73
GLUCOSE SERPL-MCNC: 85 MG/DL (ref 65–99)
POTASSIUM SERPL-SCNC: 4 MMOL/L (ref 3.5–5.2)
SODIUM SERPL-SCNC: 136 MMOL/L (ref 136–145)

## 2023-06-02 PROCEDURE — 85730 THROMBOPLASTIN TIME PARTIAL: CPT | Performed by: NURSE PRACTITIONER

## 2023-06-02 PROCEDURE — 85730 THROMBOPLASTIN TIME PARTIAL: CPT | Performed by: INTERNAL MEDICINE

## 2023-06-02 PROCEDURE — C1788 PORT, INDWELLING, IMP: HCPCS | Performed by: SURGERY

## 2023-06-02 PROCEDURE — 25010000002 HEPARIN (PORCINE) PER 1000 UNITS: Performed by: SURGERY

## 2023-06-02 PROCEDURE — 25010000002 HEPARIN (PORCINE) 25000-0.45 UT/250ML-% SOLUTION: Performed by: COLON & RECTAL SURGERY

## 2023-06-02 PROCEDURE — 76937 US GUIDE VASCULAR ACCESS: CPT | Performed by: SURGERY

## 2023-06-02 PROCEDURE — 05HM33Z INSERTION OF INFUSION DEVICE INTO RIGHT INTERNAL JUGULAR VEIN, PERCUTANEOUS APPROACH: ICD-10-PCS | Performed by: SURGERY

## 2023-06-02 PROCEDURE — 80048 BASIC METABOLIC PNL TOTAL CA: CPT | Performed by: INTERNAL MEDICINE

## 2023-06-02 PROCEDURE — 76000 FLUOROSCOPY <1 HR PHYS/QHP: CPT

## 2023-06-02 PROCEDURE — 36415 COLL VENOUS BLD VENIPUNCTURE: CPT | Performed by: NURSE PRACTITIONER

## 2023-06-02 PROCEDURE — 25010000002 PROPOFOL 10 MG/ML EMULSION

## 2023-06-02 PROCEDURE — 71045 X-RAY EXAM CHEST 1 VIEW: CPT

## 2023-06-02 PROCEDURE — 99233 SBSQ HOSP IP/OBS HIGH 50: CPT | Performed by: INTERNAL MEDICINE

## 2023-06-02 PROCEDURE — 77001 FLUOROGUIDE FOR VEIN DEVICE: CPT | Performed by: SURGERY

## 2023-06-02 PROCEDURE — 0JH63XZ INSERTION OF TUNNELED VASCULAR ACCESS DEVICE INTO CHEST SUBCUTANEOUS TISSUE AND FASCIA, PERCUTANEOUS APPROACH: ICD-10-PCS | Performed by: SURGERY

## 2023-06-02 PROCEDURE — 88305 TISSUE EXAM BY PATHOLOGIST: CPT | Performed by: COLON & RECTAL SURGERY

## 2023-06-02 PROCEDURE — 36561 INSERT TUNNELED CV CATH: CPT | Performed by: SURGERY

## 2023-06-02 PROCEDURE — 25010000002 CEFAZOLIN PER 500 MG

## 2023-06-02 PROCEDURE — B543ZZA ULTRASONOGRAPHY OF RIGHT JUGULAR VEINS, GUIDANCE: ICD-10-PCS | Performed by: SURGERY

## 2023-06-02 PROCEDURE — 0DBP8ZZ EXCISION OF RECTUM, VIA NATURAL OR ARTIFICIAL OPENING ENDOSCOPIC: ICD-10-PCS | Performed by: COLON & RECTAL SURGERY

## 2023-06-02 DEVICE — PRT INTRO VASC/INTERV VORTEX FILL/HL DETACH/POLYURET/CATH 8F: Type: IMPLANTABLE DEVICE | Site: INTERNAL JUGULAR | Status: FUNCTIONAL

## 2023-06-02 RX ORDER — SODIUM CHLORIDE 9 MG/ML
30 INJECTION, SOLUTION INTRAVENOUS CONTINUOUS PRN
Status: DISCONTINUED | OUTPATIENT
Start: 2023-06-02 | End: 2023-06-04 | Stop reason: HOSPADM

## 2023-06-02 RX ORDER — PROMETHAZINE HYDROCHLORIDE 25 MG/1
25 TABLET ORAL ONCE AS NEEDED
Status: DISCONTINUED | OUTPATIENT
Start: 2023-06-02 | End: 2023-06-02 | Stop reason: HOSPADM

## 2023-06-02 RX ORDER — NALOXONE HCL 0.4 MG/ML
0.2 VIAL (ML) INJECTION AS NEEDED
Status: DISCONTINUED | OUTPATIENT
Start: 2023-06-02 | End: 2023-06-02 | Stop reason: HOSPADM

## 2023-06-02 RX ORDER — PROPOFOL 10 MG/ML
VIAL (ML) INTRAVENOUS CONTINUOUS PRN
Status: DISCONTINUED | OUTPATIENT
Start: 2023-06-02 | End: 2023-06-02 | Stop reason: SURG

## 2023-06-02 RX ORDER — FAMOTIDINE 10 MG/ML
20 INJECTION, SOLUTION INTRAVENOUS ONCE
Status: CANCELLED | OUTPATIENT
Start: 2023-06-02 | End: 2023-06-02

## 2023-06-02 RX ORDER — FENTANYL CITRATE 50 UG/ML
50 INJECTION, SOLUTION INTRAMUSCULAR; INTRAVENOUS
Status: CANCELLED | OUTPATIENT
Start: 2023-06-02

## 2023-06-02 RX ORDER — PROPOFOL 10 MG/ML
VIAL (ML) INTRAVENOUS AS NEEDED
Status: DISCONTINUED | OUTPATIENT
Start: 2023-06-02 | End: 2023-06-02 | Stop reason: SURG

## 2023-06-02 RX ORDER — OXYCODONE AND ACETAMINOPHEN 7.5; 325 MG/1; MG/1
1 TABLET ORAL EVERY 4 HOURS PRN
Status: DISCONTINUED | OUTPATIENT
Start: 2023-06-02 | End: 2023-06-02 | Stop reason: HOSPADM

## 2023-06-02 RX ORDER — SODIUM CHLORIDE, SODIUM LACTATE, POTASSIUM CHLORIDE, CALCIUM CHLORIDE 600; 310; 30; 20 MG/100ML; MG/100ML; MG/100ML; MG/100ML
9 INJECTION, SOLUTION INTRAVENOUS CONTINUOUS
Status: CANCELLED | OUTPATIENT
Start: 2023-06-02

## 2023-06-02 RX ORDER — HYDROMORPHONE HYDROCHLORIDE 1 MG/ML
0.5 INJECTION, SOLUTION INTRAMUSCULAR; INTRAVENOUS; SUBCUTANEOUS
Status: DISCONTINUED | OUTPATIENT
Start: 2023-06-02 | End: 2023-06-02 | Stop reason: HOSPADM

## 2023-06-02 RX ORDER — SODIUM CHLORIDE 0.9 % (FLUSH) 0.9 %
3 SYRINGE (ML) INJECTION EVERY 12 HOURS SCHEDULED
Status: CANCELLED | OUTPATIENT
Start: 2023-06-02

## 2023-06-02 RX ORDER — HEPARIN SODIUM 1000 [USP'U]/ML
INJECTION, SOLUTION INTRAVENOUS; SUBCUTANEOUS AS NEEDED
Status: DISCONTINUED | OUTPATIENT
Start: 2023-06-02 | End: 2023-06-02 | Stop reason: HOSPADM

## 2023-06-02 RX ORDER — SODIUM CHLORIDE 0.9 % (FLUSH) 0.9 %
3-10 SYRINGE (ML) INJECTION AS NEEDED
Status: CANCELLED | OUTPATIENT
Start: 2023-06-02

## 2023-06-02 RX ORDER — FLUMAZENIL 0.1 MG/ML
0.2 INJECTION INTRAVENOUS AS NEEDED
Status: DISCONTINUED | OUTPATIENT
Start: 2023-06-02 | End: 2023-06-02 | Stop reason: HOSPADM

## 2023-06-02 RX ORDER — SODIUM CHLORIDE 9 MG/ML
INJECTION, SOLUTION INTRAVENOUS CONTINUOUS PRN
Status: DISCONTINUED | OUTPATIENT
Start: 2023-06-02 | End: 2023-06-02 | Stop reason: SURG

## 2023-06-02 RX ORDER — BUPIVACAINE HYDROCHLORIDE AND EPINEPHRINE 2.5; 5 MG/ML; UG/ML
INJECTION, SOLUTION INFILTRATION; PERINEURAL AS NEEDED
Status: DISCONTINUED | OUTPATIENT
Start: 2023-06-02 | End: 2023-06-02 | Stop reason: HOSPADM

## 2023-06-02 RX ORDER — MAGNESIUM HYDROXIDE 1200 MG/15ML
LIQUID ORAL AS NEEDED
Status: DISCONTINUED | OUTPATIENT
Start: 2023-06-02 | End: 2023-06-02 | Stop reason: HOSPADM

## 2023-06-02 RX ORDER — IPRATROPIUM BROMIDE AND ALBUTEROL SULFATE 2.5; .5 MG/3ML; MG/3ML
3 SOLUTION RESPIRATORY (INHALATION) ONCE AS NEEDED
Status: DISCONTINUED | OUTPATIENT
Start: 2023-06-02 | End: 2023-06-02 | Stop reason: HOSPADM

## 2023-06-02 RX ORDER — HYDROCODONE BITARTRATE AND ACETAMINOPHEN 7.5; 325 MG/1; MG/1
1 TABLET ORAL ONCE AS NEEDED
Status: DISCONTINUED | OUTPATIENT
Start: 2023-06-02 | End: 2023-06-02 | Stop reason: HOSPADM

## 2023-06-02 RX ORDER — LIDOCAINE HYDROCHLORIDE 20 MG/ML
INJECTION, SOLUTION INFILTRATION; PERINEURAL AS NEEDED
Status: DISCONTINUED | OUTPATIENT
Start: 2023-06-02 | End: 2023-06-02 | Stop reason: SURG

## 2023-06-02 RX ORDER — PROMETHAZINE HYDROCHLORIDE 25 MG/1
25 SUPPOSITORY RECTAL ONCE AS NEEDED
Status: DISCONTINUED | OUTPATIENT
Start: 2023-06-02 | End: 2023-06-02 | Stop reason: HOSPADM

## 2023-06-02 RX ORDER — EPHEDRINE SULFATE 50 MG/ML
5 INJECTION, SOLUTION INTRAVENOUS ONCE AS NEEDED
Status: DISCONTINUED | OUTPATIENT
Start: 2023-06-02 | End: 2023-06-02 | Stop reason: HOSPADM

## 2023-06-02 RX ORDER — CEFAZOLIN SODIUM 2 G/100ML
2 INJECTION, SOLUTION INTRAVENOUS ONCE
Status: CANCELLED | OUTPATIENT
Start: 2023-06-02

## 2023-06-02 RX ORDER — BUPIVACAINE HYDROCHLORIDE AND EPINEPHRINE 5; 5 MG/ML; UG/ML
INJECTION, SOLUTION EPIDURAL; INTRACAUDAL; PERINEURAL AS NEEDED
Status: DISCONTINUED | OUTPATIENT
Start: 2023-06-02 | End: 2023-06-02 | Stop reason: HOSPADM

## 2023-06-02 RX ORDER — ONDANSETRON 2 MG/ML
4 INJECTION INTRAMUSCULAR; INTRAVENOUS ONCE AS NEEDED
Status: DISCONTINUED | OUTPATIENT
Start: 2023-06-02 | End: 2023-06-02 | Stop reason: HOSPADM

## 2023-06-02 RX ORDER — SODIUM CHLORIDE 0.9 % (FLUSH) 0.9 %
20 SYRINGE (ML) INJECTION AS NEEDED
Status: DISCONTINUED | OUTPATIENT
Start: 2023-06-02 | End: 2023-06-04 | Stop reason: HOSPADM

## 2023-06-02 RX ORDER — GLYCOPYRROLATE 0.2 MG/ML
INJECTION INTRAMUSCULAR; INTRAVENOUS AS NEEDED
Status: DISCONTINUED | OUTPATIENT
Start: 2023-06-02 | End: 2023-06-02 | Stop reason: SURG

## 2023-06-02 RX ORDER — MIDAZOLAM HYDROCHLORIDE 1 MG/ML
1 INJECTION INTRAMUSCULAR; INTRAVENOUS
Status: CANCELLED | OUTPATIENT
Start: 2023-06-02

## 2023-06-02 RX ORDER — LABETALOL HYDROCHLORIDE 5 MG/ML
5 INJECTION, SOLUTION INTRAVENOUS
Status: DISCONTINUED | OUTPATIENT
Start: 2023-06-02 | End: 2023-06-02 | Stop reason: HOSPADM

## 2023-06-02 RX ORDER — HEPARIN SODIUM (PORCINE) LOCK FLUSH IV SOLN 100 UNIT/ML 100 UNIT/ML
5 SOLUTION INTRAVENOUS AS NEEDED
Status: DISCONTINUED | OUTPATIENT
Start: 2023-06-02 | End: 2023-06-04 | Stop reason: HOSPADM

## 2023-06-02 RX ORDER — CEFAZOLIN SODIUM 500 MG/2.2ML
INJECTION, POWDER, FOR SOLUTION INTRAMUSCULAR; INTRAVENOUS AS NEEDED
Status: DISCONTINUED | OUTPATIENT
Start: 2023-06-02 | End: 2023-06-02 | Stop reason: SURG

## 2023-06-02 RX ORDER — FENTANYL CITRATE 50 UG/ML
50 INJECTION, SOLUTION INTRAMUSCULAR; INTRAVENOUS
Status: DISCONTINUED | OUTPATIENT
Start: 2023-06-02 | End: 2023-06-02 | Stop reason: HOSPADM

## 2023-06-02 RX ORDER — SODIUM CHLORIDE 0.9 % (FLUSH) 0.9 %
10 SYRINGE (ML) INJECTION AS NEEDED
Status: DISCONTINUED | OUTPATIENT
Start: 2023-06-02 | End: 2023-06-04 | Stop reason: HOSPADM

## 2023-06-02 RX ORDER — DIPHENHYDRAMINE HYDROCHLORIDE 50 MG/ML
12.5 INJECTION INTRAMUSCULAR; INTRAVENOUS
Status: DISCONTINUED | OUTPATIENT
Start: 2023-06-02 | End: 2023-06-02 | Stop reason: HOSPADM

## 2023-06-02 RX ORDER — LIDOCAINE HYDROCHLORIDE 10 MG/ML
0.5 INJECTION, SOLUTION EPIDURAL; INFILTRATION; INTRACAUDAL; PERINEURAL ONCE AS NEEDED
Status: CANCELLED | OUTPATIENT
Start: 2023-06-02

## 2023-06-02 RX ORDER — DROPERIDOL 2.5 MG/ML
0.62 INJECTION, SOLUTION INTRAMUSCULAR; INTRAVENOUS
Status: DISCONTINUED | OUTPATIENT
Start: 2023-06-02 | End: 2023-06-02 | Stop reason: HOSPADM

## 2023-06-02 RX ORDER — HYDRALAZINE HYDROCHLORIDE 20 MG/ML
5 INJECTION INTRAMUSCULAR; INTRAVENOUS
Status: DISCONTINUED | OUTPATIENT
Start: 2023-06-02 | End: 2023-06-02 | Stop reason: HOSPADM

## 2023-06-02 RX ORDER — HEPARIN SODIUM 5000 [USP'U]/ML
INJECTION, SOLUTION INTRAVENOUS; SUBCUTANEOUS AS NEEDED
Status: DISCONTINUED | OUTPATIENT
Start: 2023-06-02 | End: 2023-06-02 | Stop reason: HOSPADM

## 2023-06-02 RX ORDER — SODIUM CHLORIDE 0.9 % (FLUSH) 0.9 %
10 SYRINGE (ML) INJECTION EVERY 12 HOURS SCHEDULED
Status: DISCONTINUED | OUTPATIENT
Start: 2023-06-02 | End: 2023-06-04 | Stop reason: HOSPADM

## 2023-06-02 RX ADMIN — LIDOCAINE HYDROCHLORIDE 60 MG: 20 INJECTION, SOLUTION INFILTRATION; PERINEURAL at 07:06

## 2023-06-02 RX ADMIN — PROPOFOL 100 MG: 10 INJECTION, EMULSION INTRAVENOUS at 07:06

## 2023-06-02 RX ADMIN — Medication 50 MG: at 11:04

## 2023-06-02 RX ADMIN — GLYCOPYRROLATE 0.1 MG: 1 INJECTION INTRAMUSCULAR; INTRAVENOUS at 11:04

## 2023-06-02 RX ADMIN — LIDOCAINE HYDROCHLORIDE 60 MG: 20 INJECTION, SOLUTION INFILTRATION; PERINEURAL at 11:04

## 2023-06-02 RX ADMIN — GLYCOPYRROLATE 0.1 MG: 0.2 INJECTION INTRAMUSCULAR; INTRAVENOUS at 07:10

## 2023-06-02 RX ADMIN — HEPARIN SODIUM 25.77 UNITS/KG/HR: 10000 INJECTION, SOLUTION INTRAVENOUS at 14:35

## 2023-06-02 RX ADMIN — CEFAZOLIN 2 G: 225 INJECTION, POWDER, FOR SOLUTION INTRAMUSCULAR; INTRAVENOUS at 11:20

## 2023-06-02 RX ADMIN — PROPOFOL 160 MCG/KG/MIN: 10 INJECTION, EMULSION INTRAVENOUS at 11:04

## 2023-06-02 RX ADMIN — SODIUM CHLORIDE 30 ML/HR: 9 INJECTION, SOLUTION INTRAVENOUS at 06:48

## 2023-06-02 RX ADMIN — Medication 200 MCG/KG/MIN: at 07:08

## 2023-06-02 RX ADMIN — SODIUM CHLORIDE: 9 INJECTION, SOLUTION INTRAVENOUS at 10:58

## 2023-06-02 NOTE — ANESTHESIA POSTPROCEDURE EVALUATION
Patient: [place]    Procedure Summary     Date: 06/02/23 Room / Location:  [facility]    Anesthesia Start: 0700 Anesthesia Stop: 0756    Procedure: COLONOSCOPY TO CECUM WITH HOT SNARE POLYPECTOMY WITH MARCAINE INJECTION Diagnosis:     Surgeons: Bhanu Evans MD Provider: Toño Fitzgerald MD    Anesthesia Type: MAC ASA Status: 4          Anesthesia Type: MAC    Vitals  Vitals Value Taken Time   /79 06/02/23 0802   Temp 36.6 °C (97.9 °F) 06/02/23 0802   Pulse 71 06/02/23 0802   Resp 16 06/02/23 0802   SpO2 99 % 06/02/23 0802           Post Anesthesia Care and Evaluation    Patient location during evaluation: PHASE II  Patient participation: complete - patient participated  Level of consciousness: awake and alert  Pain management: adequate    Airway patency: patent  Anesthetic complications: No anesthetic complications  PONV Status: none  Cardiovascular status: acceptable and hemodynamically stable  Respiratory status: acceptable, nonlabored ventilation and spontaneous ventilation  Hydration status: acceptable

## 2023-06-02 NOTE — PLAN OF CARE
Goal Outcome Evaluation:   Patient due to have colonoscopy and port placement today.

## 2023-06-02 NOTE — ANESTHESIA PREPROCEDURE EVALUATION
Anesthesia Evaluation     no history of anesthetic complications:  NPO Solid Status: > 8 hours  NPO Liquid Status: > 2 hours           Airway   Mallampati: II  Neck ROM: full  no difficulty expected  Dental - normal exam     Pulmonary - normal exam   (+) pulmonary embolism (active),   (-) COPD, asthma, sleep apnea, not a smoker    PE comment: nonlabored  Cardiovascular - negative cardio ROS and normal exam  Exercise tolerance: good (4-7 METS)    Rhythm: regular  Rate: normal    (-) hypertension, valvular problems/murmurs, past MI, CAD, dysrhythmias, angina      Neuro/Psych- negative ROS  (-) seizures, TIA, CVA    ROS Comment: Brain metastasis  GI/Hepatic/Renal/Endo    (+) obesity,   liver disease (hepatic lesion),   (-) GERD, no renal disease, diabetes, no thyroid disorder    ROS Comment: LLQ abdominal mass    Musculoskeletal (-) negative ROS    Abdominal    Substance History      OB/GYN          Other   blood dyscrasia anemia,   history of cancer (metastatic melanoma)    ROS/Med Hx Other: Had colonoscopy earlier today                Anesthesia Plan    ASA 4     MAC       Anesthetic plan, risks, benefits, and alternatives have been provided, discussed and informed consent has been obtained with: patient.        CODE STATUS:    Code Status (Patient has no pulse and is not breathing): CPR (Attempt to Resuscitate)  Medical Interventions (Patient has pulse or is breathing): Full

## 2023-06-02 NOTE — PROGRESS NOTES
Subjective   REASON FOR CONSULTATION: Widely metastatic malignancy presumably of rectal origin with associated DVT and pulmonary embolism      Shortness of Breath  Associated symptoms include leg pain and syncope.   Syncope    Leg Pain     patient is a 62-year-old white male with no chronic medical illnesses has not seen a doctor for many years never had a screening colonoscopy or PSA.  He is had a 20 pound weight loss in the last 2 to 3 months associated with low appetite and rectal bleeding.  Became progressively more weak and short of breath and came to the ER at the insistence of one of his friends who is a physician     He has noted a fullness in the left groin as well as swelling of his entire left leg for the last week     CAT scans in the ER showed pulmonary embolism and CT of the abdomen showed extensive adenopathy in the left side of the abdomen with a possible rectal mass liver lesions bone lesions and pulmonary nodules and a clot in the external iliac vein due to compression by adenopathy     Biopsy of these nodes is planned for tomorrow     He is not a smoker or drinker  Is never had a DVT MI or stroke  He has a family history of prostate cancer in his father and his 60s and mother with breast cancer at age 86    Interval history  5/30  Shortness of breath much improved on no oxygen  left inguinal node biopsy done today  Continues on heparin  No significant rectal bleeding hemoglobin stable 11.4  CEA and PSA normal surprisingly    5/31/2023  Patient had multiple bowel movements overnight and he reports a good bowel movement earlier in the day.  He has been evaluated by colorectal surgery and plans noted for colonoscopy tomorrow.  He does notice some blood on the stool.  Denies any other complaints at this time.  Left lower extremity edema had worsened yesterday and IV fluids were discontinued and now somewhat better.    6/1  Colonoscopy canceled because his heparin was not held  Path consistent with  melanoma   staging with brain MRI reveals 2 small nodules suspicious for brain metastasis  He is otherwise clinically comfortable and much less short of breath  CBC stable    6/2  Colonoscopy showed just a polyp and no significant abnormalities  Port placement today  Can switch to Eliquis or Xarelto and discharged with plans for outpatient follow-up to initiate immunotherapy    Past Medical History, Past Surgical History, Social History, Family History have been reviewed and are without significant changes except as mentioned.    Review of Systems   Respiratory: Positive for shortness of breath.    Cardiovascular: Positive for syncope.      A comprehensive 14 point review of systems was performed and was negative except as mentioned.    Medications:  The current medication list was reviewed in the EMR    ALLERGIES:  No Known Allergies    Objective      Vitals:    06/02/23 0752 06/02/23 0802 06/02/23 0812 06/02/23 0839   BP: 116/79 125/79 126/72 141/79   BP Location: Left arm Left arm Left arm Left arm   Patient Position: Lying Lying Lying Lying   Pulse: 81 71 67 69   Resp: 16 16 16 16   Temp:  97.9 °F (36.6 °C)  97.2 °F (36.2 °C)   TempSrc:  Oral  Oral   SpO2: 100% 99% 100% 100%   Weight:       Height:              View : No data to display.                Physical Exam    CONSTITUTIONAL:  Vital signs reviewed.  No distress, looks comfortable.  EYES:  Conjunctivae and lids unremarkable.  PERRLA  EARS,NOSE,MOUTH,THROAT:  Ears and nose appear unremarkable.  Lips, teeth, gums appear unremarkable.  RESPIRATORY:  Normal respiratory effort.  Lungs clear to auscultation bilaterally.  CARDIOVASCULAR:  Normal S1, S2.  No murmurs rubs or gallops.  3+ edema in his entire left leg to the thigh   GASTROINTESTINAL: Abdomen appears unremarkable.  Nontender.  No hepatomegaly.  No splenomegaly.BULKY left inguinal adenopathy  LYMPHATIC:  No cervical, supraclavicular, axillary lymphadenopathy.  SKIN:  Warm.  No rashes.  Nodular  pigmented in-transit cutaneous lesions seen in the left groin consistent with melanoma  PSYCHIATRIC:  Normal judgment and insight.  Normal mood and affect.    I have reexamined the patient and the results are consistent with the previously documented exam. Brodie Rocha MD       RECENT LABS:  Hematology WBC   Date Value Ref Range Status   06/01/2023 7.87 3.40 - 10.80 10*3/mm3 Final     RBC   Date Value Ref Range Status   06/01/2023 3.93 (L) 4.14 - 5.80 10*6/mm3 Final     Hemoglobin   Date Value Ref Range Status   06/01/2023 11.0 (L) 13.0 - 17.7 g/dL Final     Hematocrit   Date Value Ref Range Status   06/01/2023 33.0 (L) 37.5 - 51.0 % Final     Platelets   Date Value Ref Range Status   06/01/2023 265 140 - 450 10*3/mm3 Final          Final Diagnosis  1. Lymph Node, Left Groin, U/S-Guided Core Needle Biopsy:  A. METASTATIC MELANOMA (SEE COMMENT).  jab/pkm    Assessment & Plan     1.  Generalized abdominal lymphadenopathy with a rectal mass liver mets and pulmonary nodules and bone mets suspicious for rectal carcinoma with metastasis-cannot exclude bladder cancer or lymphoma although lymphoma less likely to have lytic bone lesions and liver involvement  CEA/PSA  normal-  Cystoscopy performed 5/29/2023 with mass effect involving the left lateral wall of the bladder and 1 cm erythematous area along the posterior wall concerning for external invasion.  Path shows melanoma- BRAF testing+ mri brain shows 2 small brain metastasis  We will need IPI Nivo as an outpatient which should help the brain metastasis     2.  Incidental bilateral PEs and thrombus in the left external iliac due to compression by bulky nodes on IV heparin  Continues on heparin drip.     3.  Mild anemia - normal ferritin  Hemoglobin stable today     4.  Family history of breast cancer in his mother at age 86 and prostate cancer his father in his 60s     Plan     1.can switch to oral anticoagulants if okay with surgery    2.    Discharged with  plans for radiation appointment and oncology appointment in 1 week and  2 weeks         Discussed with the patient and his family.  No obvious malignant moles.  Mucosal melanomas are reported .    We would like to get him discharged soon as possible on Xarelto or Eliquis after port placement and initiate immunotherapy with Opdivo and ipilimumab which I think will take care of his brain metastasis but if there is a delay we could initiate focused radiation to the 2 small brain metastasis and initiate immunotherapy thereafter    He is aware that this is not a curable situation typically but that he may not therapy can be very effective in this situation in a subset of patients  We will set up for him to see radiation therapy next week as well as a PET as an outpatient and see us the week after      6/2/2023      CC:

## 2023-06-02 NOTE — PROGRESS NOTES
[facility] Medicine Services  PROGRESS NOTE    Patient Name: King George  : 1960  MRN: [id number]    Date of Admission: 2023  Primary Care Physician: Mohini Cortes MD    Subjective   Subjective     CC:  Follow-up multiple issues PE and melanoma    Subjective:  Colonoscopy today.    Review of Systems  No current fevers or chills  No current shortness of breath or cough  No current chest pain or palpitations        Objective   Objective     Vital Signs:   Temp:  [97 °F (36.1 °C)-98 °F (36.7 °C)] 97.2 °F (36.2 °C)  Heart Rate:  [67-81] 69  Resp:  [16-18] 16  BP: (116-141)/(66-79) 141/79        Physical Exam:  Constitutional:Awake, alert  HENT: NCAT, mucous membranes moist, neck supple  Respiratory: No cough or wheezing, nonlabored breathing, good inspiration  Cardiovascular: Pulse rate is normal, palpable radial pulse bilaterally  Gastrointestinal:  soft, nontender, nondistended  Musculoskeletal: BMI is 31, mildly obese, otherwise normal musculature for age, pitting lower extremity edema left greater than right  Psychiatric: Appropriate affect, cooperative, conversational  Neurologic: No slurred speech or facial droop, follows commands  Skin: No rashes or jaundice, warm  Examination stable    Results Reviewed:  Results from last 7 days   Lab Units 23  0643 23  0716 23  0254 236 23  1710   WBC 10*3/mm3 7.87 8.28 8.55   < > 12.00*   HEMOGLOBIN g/dL 11.0* 11.7* 11.4*   < > 13.3   HEMATOCRIT % 33.0* 35.2* 34.7*   < > 39.4   PLATELETS 10*3/mm3 265 257 238   < > 230   INR   --   --   --   --  1.21*    < > = values in this interval not displayed.     Results from last 7 days   Lab Units 23  0917 23  0643 23  0716 23  0326 23  1710   SODIUM mmol/L 136 137 136   < > 135*   POTASSIUM mmol/L 4.0 4.2 3.9   < > 4.6   CHLORIDE mmol/L 104 104 103   < > 99   CO2 mmol/L 22.0 24.0 22.5   < > 23.1   BUN mg/dL 5* 6* 9   < > 14   CREATININE mg/dL 0.92  0.96 0.97   < > 1.41*   GLUCOSE mg/dL 85 95 102*   < > 116*   CALCIUM mg/dL 8.0* 8.9 8.9   < > 9.4   ALT (SGPT) U/L  --   --   --   --  19   AST (SGOT) U/L  --   --   --   --  11   HSTROP T ng/L  --   --   --   --  26*   PROBNP pg/mL  --   --   --   --  460.0    < > = values in this interval not displayed.     Estimated Creatinine Clearance: 106 mL/min (by C-G formula based on SCr of 0.92 mg/dL).    Microbiology Results Abnormal     None          Imaging Results (Last 24 Hours)     Procedure Component Value Units Date/Time    MRI Brain With & Without Contrast [[id number]] Collected: 06/01/23 1302     Updated: 06/01/23 1327    Narrative:      MRI OF THE BRAIN WITH AND WITHOUT CONTRAST     CLINICAL HISTORY: Melanoma. Weakness and weight loss. Evaluate for  metastatic disease.     TECHNIQUE: MRI of the brain was obtained with sagittal pre and  postgadolinium T1, axial pre and postgadolinium T1, axial postgadolinium  3-D SPGR, coronal postgadolinium T1, axial FLAIR, axial T2, and axial  diffusion-weighted images.     FINDINGS:     There is a small contrast enhancing lesion within the anterior aspect of  the left middle frontal gyrus which measures up to approximately 3 mm in  diameter and is consistent with a small metastatic focus. Additionally,  within the lateral aspect of the right occipital lobe, there is an  additional contrast enhancing lesion which measures up to approximately  4 mm in diameter and is most consistent with an additional small  metastatic lesion.     The ventricles, sulci, and cisterns are age-appropriate. The major  intracranial flow related signal voids are within normal limits. Minor  changes of chronic small vessel ischemic phenomena are noted. There are  no abnormal foci of restricted diffusion.       Impression:         There is a 3 mm contrast-enhancing lesion seen within the anterior  aspect left middle frontal gyrus and a 4 mm contrast enhancing lesion is  noted within the lateral aspect  of the right occipital lobe. These  lesions are most compatible with small metastatic foci.     This report was finalized on 6/1/2023 1:24 PM by Dr. Judson Ariza M.D.                 I have reviewed the medications:  Scheduled Meds:senna-docusate sodium, 2 tablet, Oral, BID      Continuous Infusions:heparin, 18 Units/kg/hr, Last Rate: Stopped (06/02/23 0040)  sodium chloride, 30 mL/hr, Last Rate: 30 mL/hr (06/02/23 0700)      PRN Meds:.•  acetaminophen  •  senna-docusate sodium **AND** polyethylene glycol **AND** bisacodyl **AND** bisacodyl  •  heparin (porcine)  •  melatonin  •  ondansetron **OR** ondansetron  •  sodium chloride  •  sodium chloride    Assessment & Plan   Assessment & Plan     Active Hospital Problems    Diagnosis  POA   • **Acute pulmonary embolism, unspecified pulmonary embolism type, unspecified whether acute cor pulmonale present [I26.99]  Yes   • Rectal polyp [K62.1]  Yes   • Metastatic melanoma [C43.9]  Yes   • Moderate Malnutrition (HCC) [E44.0]  Yes   • Anemia [D64.9]  Yes   • Left groin mass [R19.09]  Yes   • Hepatic lesion [K76.9]  Yes   • Abdominal lymphadenopathy [R59.0]  Yes   • Left lower quadrant abdominal mass [R19.04]  Unknown      Resolved Hospital Problems   No resolved problems to display.        Brief Hospital Course to date:  King George is a 62 y.o. male presents to the hospital with weakness, near syncope, and bloody stools and was found to have metastatic melanoma with rectal bleeding and pulmonary embolism.    Discussion/plan for today:  Restart hep gtt after surgery.  Discussed with Onc today.  MRI brain reviewed.  Findings consistent with metastatic brain metastasis from melanoma.  Colonoscopy report images reviewed today.  Rectal polyp removed and injected.  Continue to monitor for any further signs of rectal bleeding.  Discontinue IV fluid today.  Plan to discuss case further with oncology today.  Electrolytes and renal function stable.  Restart heparin drip  following colonoscopy and monitor PTT.    Metastatic melanoma:  MRI brain shows metastasis.  Oncology following and patient will need to continue to follow closely with their clinic.  Continue treatment plan per their management.    Pulmonary embolism:  Anticoagulation.  Initially treated with heparin drip.  Likely discharge on NOAC    Rectal bleeding:  Resolved.  Colonoscopy 6/2.  With rectal polyp which was removed.    Blood loss anemia:  Monitor blood counts and transfuse as needed.    Malnutrition:  Maximize nutritional status is much as possible.  Encourage oral intake.  Registered dietitian has been consulted to assist with management.    Peripheral edema:  Likely related to lymphadenopathy of the abdomen.  Seems to be more significant on the left leg.    DVT Prophylaxis: Anticoagulation      Disposition: Likely home when improved in 1-2 days.    CODE STATUS:   Code Status and Medical Interventions:   Ordered at: 05/28/23 1915     Code Status (Patient has no pulse and is not breathing):    CPR (Attempt to Resuscitate)     Medical Interventions (Patient has pulse or is breathing):    Full       Price Sotelo MD  06/02/23

## 2023-06-02 NOTE — PLAN OF CARE
Goal Outcome Evaluation:  Plan of Care Reviewed With: patient      Pt s/p coloscopy and port placement today, minimal c/o pain, heparin continued, ambulating up ad antonio, voiding per brp, plans to d/c home when medically appropriate.    Progress: improving

## 2023-06-02 NOTE — ANESTHESIA PREPROCEDURE EVALUATION
Anesthesia Evaluation     Patient summary reviewed and Nursing notes reviewed   NPO Solid Status: > 8 hours  NPO Liquid Status: > 4 hours           Airway   Mallampati: II  TM distance: >3 FB  Neck ROM: full  No difficulty expected  Dental    (+) lower dentures, upper dentures, partials and poor dentition    Pulmonary - normal exam   (+) pulmonary embolism,   Cardiovascular - normal exam    ECG reviewed      ROS comment: Recent pulmonary embolism 5 days ago with cor pulmonale    Neuro/Psych  GI/Hepatic/Renal/Endo    (+) obesity,  GI bleeding , liver disease,     Musculoskeletal     Abdominal   (+) obese,    Substance History      OB/GYN          Other      history of cancer active      Other Comment: Metastatic melanoma                Anesthesia Plan    ASA 4     MAC     (Recent pulmonary embolism with cor pulmonale)  intravenous induction     Anesthetic plan, risks, benefits, and alternatives have been provided, discussed and informed consent has been obtained with: patient.    Plan discussed with CRNA.        CODE STATUS:    Code Status (Patient has no pulse and is not breathing): CPR (Attempt to Resuscitate)  Medical Interventions (Patient has pulse or is breathing): Full

## 2023-06-02 NOTE — ANESTHESIA POSTPROCEDURE EVALUATION
"Patient: King George    Procedure Summary     Date: 06/02/23 Room / Location: [facility] OR 09 / [facility] MAIN OR    Anesthesia Start: 1057 Anesthesia Stop: 1206    Procedure: INFUSAPORT PLACEMENT Diagnosis:       Metastatic melanoma      (Metastatic melanoma [C43.9])    Surgeons: Vicki Layne MD Provider: James Hernandez MD    Anesthesia Type: MAC ASA Status: 4          Anesthesia Type: MAC    Vitals  Vitals Value Taken Time   /78 06/02/23 1300   Temp 36.6 °C (97.8 °F) 06/02/23 1308   Pulse 70 06/02/23 1309   Resp 22 06/02/23 1245   SpO2 99 % 06/02/23 1309   Vitals shown include unvalidated device data.        Post Anesthesia Care and Evaluation    Anesthetic complications: No anesthetic complications    Comments: /83 (BP Location: Right arm, Patient Position: Lying)   Pulse 71   Temp 36.8 °C (98.2 °F) (Oral)   Resp 16   Ht 182.9 cm (72\")   Wt 103 kg (226 lb 13.7 oz)   SpO2 97%   BMI 30.77 kg/m²     No anesthesia complications reported to me.      "

## 2023-06-02 NOTE — PROGRESS NOTES
First Urology Update  6/2/2023  06:41 EDT      Follow-up with Dr. Eduardo Mueller ([facility]) 1-2 weeks after Discharge - Schedulers messaged.      Zach Pratt MD  [facility]  General & Reconstructive Urology  Office: [phone]  Fax: [phone]

## 2023-06-02 NOTE — OP NOTE
PREOPERATIVE DIAGNOSIS:  Metastatic melanoma requiring immunotherapy    POSTOPERATIVE DIAGNOSIS AND FINDINGS:  same    PROCEDURE:  1.  Ultrasound-guided access of right internal jugular vein  2.  Live interpretation of intraoperative fluoroscopy  3.  Placement of tunneled [id number]    DATE OF PROCEDURE:   6/2/2023    SURGEON:  Janelle Pruett MD    ASSISTANT:  None    ANESTHESIA:  MAC    EBL:  Minimal    SPECIMEN:  none    DESCRIPTION:  All risks, benefits, and alternatives were explained and informed consent was obtained. The patient was taken to the operating room, transferred onto the operating room table in the supine position, underwent monitored anesthesia, and was prepped and draped in the usual sterile manner.  Half percent marcaine with epinephrine was administered.  The right internal jugular vein was accessed with an 18-gauge needle under ultrasound guidance, and a guidewire was inserted under fluoroscopic guidance into the superior vena cava.  A subcutaneous pocket was then created with electrocautery on the right chest wall. The port was placed in the pocket and secured in two points with 2-0 prolene. The tubing was then tunneled from the subcutaneous pocket to the location of the wire in the neck. The vein dilator and sheath were introduced, and the dilator and guidewire were removed.  The port tubing was inserted to the cavoatrial junction, and position of tip was confirmed with fluoroscopic guidance. The port was connected to the tubing and the cap was secured. Venous blood was easily aspirated from the port, and the port was flushed with heparinized saline. There was good hemostasis noted. The skin was then closed with 3-0 Vicryl deep dermal and 4-0 Monocryl subcuticular sutures. Dermabond was placed over the wound. The patient tolerated the procedure well, and was transferred to the recovery area in stable condition. Recovery room CXR was ordered to confirm placement.    JANELLE PRUETT  MD  General, Robotic, and Endoscopic Surgery  [facility]    [address], [address]  [place], [place], [address]  P: [phone]  F: [phone]

## 2023-06-03 LAB
ANION GAP SERPL CALCULATED.3IONS-SCNC: 8.4 MMOL/L (ref 5–15)
APTT PPP: 136.8 SECONDS (ref 22.7–35.4)
BUN SERPL-MCNC: 5 MG/DL (ref 8–23)
BUN/CREAT SERPL: 5.7 (ref 7–25)
CALCIUM SPEC-SCNC: 8.6 MG/DL (ref 8.6–10.5)
CHLORIDE SERPL-SCNC: 106 MMOL/L (ref 98–107)
CO2 SERPL-SCNC: 23.6 MMOL/L (ref 22–29)
CREAT SERPL-MCNC: 0.87 MG/DL (ref 0.76–1.27)
DEPRECATED RDW RBC AUTO: 40.3 FL (ref 37–54)
EGFRCR SERPLBLD CKD-EPI 2021: 98.8 ML/MIN/1.73
ERYTHROCYTE [DISTWIDTH] IN BLOOD BY AUTOMATED COUNT: 13.5 % (ref 12.3–15.4)
GLUCOSE SERPL-MCNC: 117 MG/DL (ref 65–99)
HCT VFR BLD AUTO: 33.1 % (ref 37.5–51)
HGB BLD-MCNC: 11 G/DL (ref 13–17.7)
MCH RBC QN AUTO: 27.5 PG (ref 26.6–33)
MCHC RBC AUTO-ENTMCNC: 33.2 G/DL (ref 31.5–35.7)
MCV RBC AUTO: 82.8 FL (ref 79–97)
PLATELET # BLD AUTO: 276 10*3/MM3 (ref 140–450)
PMV BLD AUTO: 8.6 FL (ref 6–12)
POTASSIUM SERPL-SCNC: 3.8 MMOL/L (ref 3.5–5.2)
RBC # BLD AUTO: 4 10*6/MM3 (ref 4.14–5.8)
SODIUM SERPL-SCNC: 138 MMOL/L (ref 136–145)
WBC NRBC COR # BLD: 8.81 10*3/MM3 (ref 3.4–10.8)

## 2023-06-03 PROCEDURE — 80048 BASIC METABOLIC PNL TOTAL CA: CPT | Performed by: SURGERY

## 2023-06-03 PROCEDURE — 85027 COMPLETE CBC AUTOMATED: CPT | Performed by: SURGERY

## 2023-06-03 PROCEDURE — 25010000002 HEPARIN (PORCINE) PER 1000 UNITS: Performed by: COLON & RECTAL SURGERY

## 2023-06-03 PROCEDURE — 99024 POSTOP FOLLOW-UP VISIT: CPT | Performed by: SURGERY

## 2023-06-03 PROCEDURE — 99233 SBSQ HOSP IP/OBS HIGH 50: CPT | Performed by: INTERNAL MEDICINE

## 2023-06-03 PROCEDURE — 85730 THROMBOPLASTIN TIME PARTIAL: CPT | Performed by: COLON & RECTAL SURGERY

## 2023-06-03 PROCEDURE — 25010000002 HEPARIN (PORCINE) 25000-0.45 UT/250ML-% SOLUTION: Performed by: COLON & RECTAL SURGERY

## 2023-06-03 RX ADMIN — HEPARIN SODIUM 8000 UNITS: 5000 INJECTION INTRAVENOUS; SUBCUTANEOUS at 00:06

## 2023-06-03 RX ADMIN — Medication 10 ML: at 21:00

## 2023-06-03 RX ADMIN — HEPARIN SODIUM 29.77 UNITS/KG/HR: 10000 INJECTION, SOLUTION INTRAVENOUS at 00:04

## 2023-06-03 RX ADMIN — Medication 10 ML: at 08:12

## 2023-06-03 RX ADMIN — APIXABAN 10 MG: 5 TABLET, FILM COATED ORAL at 09:50

## 2023-06-03 RX ADMIN — APIXABAN 10 MG: 5 TABLET, FILM COATED ORAL at 21:05

## 2023-06-03 NOTE — PROGRESS NOTES
Subjective   REASON FOR CONSULTATION: Widely metastatic malignancy presumably of rectal origin with associated DVT and pulmonary embolism      Shortness of Breath  Associated symptoms include leg pain and syncope.   Syncope    Leg Pain     patient is a 62-year-old white male with no chronic medical illnesses has not seen a doctor for many years never had a screening colonoscopy or PSA.  He is had a 20 pound weight loss in the last 2 to 3 months associated with low appetite and rectal bleeding.  Became progressively more weak and short of breath and came to the ER at the insistence of one of his friends who is a physician     He has noted a fullness in the left groin as well as swelling of his entire left leg for the last week     CAT scans in the ER showed pulmonary embolism and CT of the abdomen showed extensive adenopathy in the left side of the abdomen with a possible rectal mass liver lesions bone lesions and pulmonary nodules and a clot in the external iliac vein due to compression by adenopathy     Biopsy of these nodes were planned.     He is not a smoker or drinker  Is never had a DVT MI or stroke  He has a family history of prostate cancer in his father and his 60s and mother with breast cancer at age 86    Interval history  5/30  Shortness of breath much improved on no oxygen  left inguinal node biopsy done today  Continues on heparin  No significant rectal bleeding hemoglobin stable 11.4  CEA and PSA normal surprisingly    5/31/2023  Patient had multiple bowel movements overnight and he reports a good bowel movement earlier in the day.  He has been evaluated by colorectal surgery and plans noted for colonoscopy tomorrow.  He does notice some blood on the stool.  Denies any other complaints at this time.  Left lower extremity edema had worsened yesterday and IV fluids were discontinued and now somewhat better.    6/1  Colonoscopy canceled because his heparin was not held  Path consistent with melanoma    staging with brain MRI reveals 2 small nodules suspicious for brain metastasis  He is otherwise clinically comfortable and much less short of breath  CBC stable    6/2  Colonoscopy showed just a polyp and no significant abnormalities  Port placement today  Can switch to Eliquis or Xarelto and discharged with plans for outpatient follow-up to initiate immunotherapy    6/3  T97.2, pulse 82, respirations 16, /73    H&H 11.0 and 33.1, white count of 8810, platelet count of 276,000  Patient is status post port placement 6/2/2023, seen by surgery this AM.  Patient now scheduled in office 6/14/2023, Eliquis initiated.      Past Medical History, Past Surgical History, Social History, Family History have been reviewed and are without significant changes except as mentioned.    Review of Systems   Respiratory:  Positive for shortness of breath.    Cardiovascular:  Positive for syncope.    A comprehensive 14 point review of systems was performed and was negative except as mentioned.    Medications:  The current medication list was reviewed in the EMR    ALLERGIES:  No Known Allergies    Objective      Vitals:    06/02/23 1524 06/02/23 1955 06/03/23 0500 06/03/23 0820   BP: 142/81 129/70 137/78 131/73   BP Location: Left arm Left arm Left arm Left arm   Patient Position: Lying Lying Lying Lying   Pulse: 75 81 78 82   Resp: 16 16 16 16   Temp: 97.5 °F (36.4 °C) 96.7 °F (35.9 °C) 97.1 °F (36.2 °C) 97.2 °F (36.2 °C)   TempSrc: Oral Oral Oral Oral   SpO2: 94% 95%  98%   Weight:   105 kg (231 lb 0.7 oz)    Height:              No data to display                  Physical Exam    CONSTITUTIONAL:  Vital signs reviewed.  No distress, looks comfortable.  EYES:  Conjunctivae and lids unremarkable.  PERRLA  EARS,NOSE,MOUTH,THROAT:  Ears and nose appear unremarkable.  Lips, teeth, gums appear unremarkable.  RESPIRATORY:  Normal respiratory effort.  Lungs clear to auscultation bilaterally.  CARDIOVASCULAR:  Normal S1, S2.  No murmurs  rubs or gallops.  3+ edema in his entire left leg to the thigh   GASTROINTESTINAL: Abdomen appears unremarkable.  Nontender.  No hepatomegaly.  No splenomegaly.BULKY left inguinal adenopathy  LYMPHATIC:  No cervical, supraclavicular, axillary lymphadenopathy.  SKIN:  Warm.  No rashes.  PSYCHIATRIC:  Normal judgment and insight.  Normal mood and affect.    I have reexamined the patient and the results are consistent with the previously documented exam. Delroy Loaiza MD       RECENT LABS:  Hematology WBC   Date Value Ref Range Status   06/03/2023 8.81 3.40 - 10.80 10*3/mm3 Final     RBC   Date Value Ref Range Status   06/03/2023 4.00 (L) 4.14 - 5.80 10*6/mm3 Final     Hemoglobin   Date Value Ref Range Status   06/03/2023 11.0 (L) 13.0 - 17.7 g/dL Final     Hematocrit   Date Value Ref Range Status   06/03/2023 33.1 (L) 37.5 - 51.0 % Final     Platelets   Date Value Ref Range Status   06/03/2023 276 140 - 450 10*3/mm3 Final          Final Diagnosis  1. Lymph Node, Left Groin, U/S-Guided Core Needle Biopsy:  A. METASTATIC MELANOMA (SEE COMMENT).  jab/pkm    Assessment & Plan     1.  Generalized abdominal lymphadenopathy with a rectal mass liver mets and pulmonary nodules and bone mets suspicious for rectal carcinoma with metastasis-cannot exclude bladder cancer or lymphoma although lymphoma less likely to have lytic bone lesions and liver involvement  CEA/PSA  normal-  Cystoscopy performed 5/29/2023 with mass effect involving the left lateral wall of the bladder and 1 cm erythematous area along the posterior wall concerning for external invasion.  Path shows melanoma- BRAF testing+ mri brain shows 2 small brain metastasis  We will need IPI Nivo as an outpatient which should help the brain metastasis  Port placement 6/2/2023.     2.  Incidental bilateral PEs and thrombus in the left external iliac due to compression by bulky nodes on IV heparin  Continues on heparin drip.  Patient transitioned to Eliquis-6/3/2023      3.  Mild anemia - normal ferritin  Hemoglobin stable today     4.  Family history of breast cancer in his mother at age 86 and prostate cancer his father in his 60s     Plan     1.    Discharge with plans for radiation appointment and oncology appointment in 1 week and  2 weeks-now scheduled with Dr. Rocha 6/14/2023     Discussed with the patient and his family.  No obvious malignant moles.  Mucosal melanomas are reported .    We would like to get him discharged soon as possible on Xarelto or Eliquis after port placement and initiate immunotherapy with Opdivo and ipilimumab which I think will take care of his brain metastasis but if there is a delay we could initiate focused radiation to the 2 small brain metastasis and initiate immunotherapy thereafter    He is aware that this is not a curable situation typically but that he may not therapy can be very effective in this situation in a subset of patients  We will set up for him to see radiation therapy next week as an outpatient and see us the week after      6/3/2023

## 2023-06-03 NOTE — PROGRESS NOTES
Name: King George ADMIT: 2023   : 1960  PCP: Mohini Cortes MD    MRN: [id number] LOS: 6 days   AGE/SEX: 62 y.o. male  ROOM: [facility]     Subjective   Subjective   Chief Complaint   Patient presents with    Shortness of Breath    Syncope    Leg Pain    abdominal mass     No chest pain palpitations shortness of breath or productive cough reported.  No nausea or vomiting.     Objective   Objective   Vital Signs  Temp:  [96.7 °F (35.9 °C)-97.5 °F (36.4 °C)] 97.2 °F (36.2 °C)  Heart Rate:  [75-82] 79  Resp:  [16] 16  BP: (111-142)/(67-81) 111/67  SpO2:  [94 %-98 %] 97 %  on   ;   Device (Oxygen Therapy): room air  Body mass index is 31.33 kg/m².    Physical Exam  Vitals and nursing note reviewed.   Constitutional:       General: He is not in acute distress.     Appearance: He is not diaphoretic.   HENT:      Head: Atraumatic.   Cardiovascular:      Rate and Rhythm: Normal rate and regular rhythm.   Pulmonary:      Effort: Pulmonary effort is normal.      Breath sounds: No wheezing.   Abdominal:      General: There is no distension.      Palpations: Abdomen is soft.   Musculoskeletal:      Right lower leg: Edema present.      Left lower leg: Edema present.      Comments: L>R   Skin:     General: Skin is warm and dry.   Neurological:      Mental Status: He is alert. Mental status is at baseline.   Psychiatric:         Mood and Affect: Mood normal.         Behavior: Behavior normal.     Results Review  I reviewed the patient's new clinical results.    Results from last 7 days   Lab Units 23  0606 23  0643 23  0716 23  0254   WBC 10*3/mm3 8.81 7.87 8.28 8.55   HEMOGLOBIN g/dL 11.0* 11.0* 11.7* 11.4*   PLATELETS 10*3/mm3 276 265 257 238     Results from last 7 days   Lab Units 23  0606 23  0917 23  0643 23  0716   SODIUM mmol/L 138 136 137 136   POTASSIUM mmol/L 3.8 4.0 4.2 3.9   CHLORIDE mmol/L 106 104 104 103   CO2 mmol/L 23.6 22.0 24.0 22.5   BUN mg/dL 5*  5* 6* 9   CREATININE mg/dL 0.87 0.92 0.96 0.97   GLUCOSE mg/dL 117* 85 95 102*   EGFR mL/min/1.73 98.8 95.2 90.5 89.4     Results from last 7 days   Lab Units 05/28/23  1710   ALBUMIN g/dL 3.9   BILIRUBIN mg/dL 0.8   ALK PHOS U/L 86   AST (SGOT) U/L 11   ALT (SGPT) U/L 19     Results from last 7 days   Lab Units 06/03/23  0606 06/02/23  0917 06/01/23  0643 05/31/23  0716 05/29/23  0326 05/28/23  1710   CALCIUM mg/dL 8.6 8.0* 8.9 8.9   < > 9.4   ALBUMIN g/dL  --   --   --   --   --  3.9   MAGNESIUM mg/dL  --   --   --   --   --  2.1    < > = values in this interval not displayed.         No results found for: HGBA1C, POCGLU    XR Chest 1 View    Result Date: 6/2/2023  Chest port placement. No pneumothorax.  This report was finalized on 6/2/2023 12:36 PM by Dr. Alejandro Yu M.D.       I have personally reviewed all medications:  Scheduled Medications  apixaban, 10 mg, Oral, Q12H   Followed by  [START ON 6/10/2023] apixaban, 5 mg, Oral, Q12H  senna-docusate sodium, 2 tablet, Oral, BID  sodium chloride, 10 mL, Intravenous, Q12H      Infusions  sodium chloride, 30 mL/hr, Last Rate: 30 mL/hr (06/02/23 0700)      Diet  Diet: Regular/House Diet; Texture: Regular Texture (IDDSI 7); Fluid Consistency: Thin (IDDSI 0)    I have personally reviewed:  [x]  Laboratory   []  Microbiology   [x]  Radiology   [x]  EKG/Telemetry  []  Cardiology/Vascular   [x]  Pathology    []  Records       Assessment/Plan     Active Hospital Problems    Diagnosis  POA    **Acute pulmonary embolism, unspecified pulmonary embolism type, unspecified whether acute cor pulmonale present [I26.99]  Yes    Rectal polyp [K62.1]  Yes    Metastasis to brain [C79.31]  Yes    Metastatic melanoma [C43.9]  Yes    Moderate Malnutrition (HCC) [E44.0]  Yes    Anemia [D64.9]  Yes    Left groin mass [R19.09]  Yes    Hepatic lesion [K76.9]  Yes    Abdominal lymphadenopathy [R59.0]  Yes    Left lower quadrant abdominal mass [R19.04]  Unknown      Resolved Hospital  Problems   No resolved problems to display.       62 y.o. male admitted with Acute pulmonary embolism, unspecified pulmonary embolism type, unspecified whether acute cor pulmonale present.    Metastatic melanoma: Rectal mass, liver metastases, pulmonary nodules, bone mets, brain mets.  Lymph node biopsy with metastatic melanoma pathology.  Port placed 6/1.  Radiation and medical oncology clinic follow-ups are planned.  Oncology following.  PE/DVT: Transition to Eliquis.  Rectal bleed: Colonoscopy 6/2 . polyp noted.  It was resected and injected.  Colorectal surgery following.  Bladder wall thickening: Underwent bedside cystoscopy 5/29 which showed mass effect concerning for external invasion.  Urology evaluated.  Plan follow-up 1 to 2 weeks .  Ppx: AC as above  Disposition: Home/potentially tomorrow    Expected Discharge Date: 6/5/2023; Expected Discharge Time:      Bernardo Azar MD  [facility]  06/03/23  14:04 EDT    Dictated portions of note using dragon dictation software.

## 2023-06-03 NOTE — PLAN OF CARE
Goal Outcome Evaluation:  Plan of Care Reviewed With: patient        Progress: improving     Pt AxOx4, calm and cooperative. VSS. Heparin drip d.c this AM. PO eliquis first dose given. Pt takes meds whole with water, see MAR. No complains of pain. Family visiting this afternoon. RN will continue to monitor.

## 2023-06-03 NOTE — DISCHARGE INSTRUCTIONS
Dr. Vicki Layne  [address] [facility]  [place] [address]  [phone]    Discharge Instructions for Port Placement     You may shower tomorrow.  No tub baths until your incisions are completely healed.    If the port is to be used within 10-14 days of placement, no surgical follow-up is needed.  Otherwise, you should call the office at [phone] to schedule a follow-up in about 1 week.      Remember to contact me for any of the following:    Fever> 101 degrees  Severe pain that cannot be controlled by taking your pain pills  Severe nausea or vomiting that cannot be controlled by taking your nausea medicine  Significant bleeding from your incision  Drainage that has a bad smell or is yellow or green in appearance  Any other questions or concerns

## 2023-06-03 NOTE — PLAN OF CARE
Goal Outcome Evaluation: Patient remains on a heparin gtt. Next check is 0600. Patient had large polyp removed 6-2 during colonoscopy, biopsy sent. Patient also has a right upper chest port. Port not accessed at this time. Will be receiving immumotherapy per plan of care.

## 2023-06-03 NOTE — PROGRESS NOTES
POD 1 s/p [id number] placement and colonoscopy by Dr. Evans with removal of large rectal polyp.    No signs of bleeding.  Hemoglobin has remained stable.    Exam:  Well-appearing  Port incision clean, dry and intact with some surrounding ecchymosis  Abdomen soft, nontender    Plan:  Okay for oral anticoagulation and discharge once stable from medical standpoint    Vicki Layne MD  General, Robotic and Endoscopic Surgery  [facility]     [address], [address]  [place], [place], [address]  P: [phone]  F: [phone]

## 2023-06-04 ENCOUNTER — READMISSION MANAGEMENT (OUTPATIENT)
Dept: CALL CENTER | Facility: HOSPITAL | Age: 63
End: 2023-06-04

## 2023-06-04 VITALS
BODY MASS INDEX: 31.14 KG/M2 | SYSTOLIC BLOOD PRESSURE: 127 MMHG | TEMPERATURE: 97.9 F | WEIGHT: 229.94 LBS | DIASTOLIC BLOOD PRESSURE: 76 MMHG | HEART RATE: 75 BPM | OXYGEN SATURATION: 96 % | HEIGHT: 72 IN | RESPIRATION RATE: 16 BRPM

## 2023-06-04 LAB
ANION GAP SERPL CALCULATED.3IONS-SCNC: 9 MMOL/L (ref 5–15)
BUN SERPL-MCNC: 7 MG/DL (ref 8–23)
BUN/CREAT SERPL: 7 (ref 7–25)
CALCIUM SPEC-SCNC: 8.2 MG/DL (ref 8.6–10.5)
CHLORIDE SERPL-SCNC: 102 MMOL/L (ref 98–107)
CO2 SERPL-SCNC: 26 MMOL/L (ref 22–29)
CREAT SERPL-MCNC: 1 MG/DL (ref 0.76–1.27)
DEPRECATED RDW RBC AUTO: 41.8 FL (ref 37–54)
EGFRCR SERPLBLD CKD-EPI 2021: 86.2 ML/MIN/1.73
ERYTHROCYTE [DISTWIDTH] IN BLOOD BY AUTOMATED COUNT: 14 % (ref 12.3–15.4)
GLUCOSE SERPL-MCNC: 111 MG/DL (ref 65–99)
HCT VFR BLD AUTO: 33.5 % (ref 37.5–51)
HGB BLD-MCNC: 10.9 G/DL (ref 13–17.7)
MCH RBC QN AUTO: 27 PG (ref 26.6–33)
MCHC RBC AUTO-ENTMCNC: 32.5 G/DL (ref 31.5–35.7)
MCV RBC AUTO: 83.1 FL (ref 79–97)
PLATELET # BLD AUTO: 288 10*3/MM3 (ref 140–450)
PMV BLD AUTO: 8.6 FL (ref 6–12)
POTASSIUM SERPL-SCNC: 4 MMOL/L (ref 3.5–5.2)
RBC # BLD AUTO: 4.03 10*6/MM3 (ref 4.14–5.8)
SODIUM SERPL-SCNC: 137 MMOL/L (ref 136–145)
WBC NRBC COR # BLD: 8.72 10*3/MM3 (ref 3.4–10.8)

## 2023-06-04 PROCEDURE — 99231 SBSQ HOSP IP/OBS SF/LOW 25: CPT | Performed by: INTERNAL MEDICINE

## 2023-06-04 PROCEDURE — 85027 COMPLETE CBC AUTOMATED: CPT | Performed by: SURGERY

## 2023-06-04 PROCEDURE — 80048 BASIC METABOLIC PNL TOTAL CA: CPT | Performed by: SURGERY

## 2023-06-04 RX ADMIN — Medication 10 ML: at 09:28

## 2023-06-04 RX ADMIN — APIXABAN 10 MG: 5 TABLET, FILM COATED ORAL at 09:29

## 2023-06-04 RX ADMIN — DOCUSATE SODIUM 50MG AND SENNOSIDES 8.6MG 2 TABLET: 8.6; 5 TABLET, FILM COATED ORAL at 09:29

## 2023-06-04 NOTE — PLAN OF CARE
Goal Outcome Evaluation:   Patient is due to discharge this morning, Treatment plan involves patient getting radiation and immunotherapy outpatient.

## 2023-06-04 NOTE — PROGRESS NOTES
Subjective   REASON FOR CONSULTATION: Widely metastatic malignancy presumably of rectal origin with associated DVT and pulmonary embolism      Shortness of Breath  Associated symptoms include leg pain and syncope.   Syncope    Leg Pain     patient is a 62-year-old white male with no chronic medical illnesses has not seen a doctor for many years never had a screening colonoscopy or PSA.  He is had a 20 pound weight loss in the last 2 to 3 months associated with low appetite and rectal bleeding.  Became progressively more weak and short of breath and came to the ER at the insistence of one of his friends who is a physician     He has noted a fullness in the left groin as well as swelling of his entire left leg for the last week     CAT scans in the ER showed pulmonary embolism and CT of the abdomen showed extensive adenopathy in the left side of the abdomen with a possible rectal mass liver lesions bone lesions and pulmonary nodules and a clot in the external iliac vein due to compression by adenopathy     Biopsy of these nodes were planned.     He is not a smoker or drinker  Is never had a DVT MI or stroke  He has a family history of prostate cancer in his father and his 60s and mother with breast cancer at age 86    Interval history  5/30  Shortness of breath much improved on no oxygen  left inguinal node biopsy done today  Continues on heparin  No significant rectal bleeding hemoglobin stable 11.4  CEA and PSA normal surprisingly    5/31/2023  Patient had multiple bowel movements overnight and he reports a good bowel movement earlier in the day.  He has been evaluated by colorectal surgery and plans noted for colonoscopy tomorrow.  He does notice some blood on the stool.  Denies any other complaints at this time.  Left lower extremity edema had worsened yesterday and IV fluids were discontinued and now somewhat better.    6/1  Colonoscopy canceled because his heparin was not held  Path consistent with melanoma    staging with brain MRI reveals 2 small nodules suspicious for brain metastasis  He is otherwise clinically comfortable and much less short of breath  CBC stable    6/2  Colonoscopy showed just a polyp and no significant abnormalities  Port placement today  Can switch to Eliquis or Xarelto and discharged with plans for outpatient follow-up to initiate immunotherapy    6/3  T97.2, pulse 82, respirations 16, /73  Patient more comfortable, discussed overall plans  H&H 11.0 and 33.1, white count of 8810, platelet count of 276,000  Patient is status post port placement 6/2/2023, seen by surgery this AM.  Patient now scheduled in office 6/14/2023, Eliquis initiated.  6/4 6/4  T 97 degrees, pulse 75, respirations 16, /70, SPO2 90%  Patient feeling better this a.m., tolerating anticoagulation.  He will likely be discharged today with follow-up in office as scheduled.  H&H 10.9 and 33.5, white count of 8720, platelet count of 2 8 8000, BUN/creatinine of 7 and 1.0, calcium 8.2.  Patient again transitioned to Eliquis        Past Medical History, Past Surgical History, Social History, Family History have been reviewed and are without significant changes except as mentioned.    Review of Systems   Respiratory:  Positive for shortness of breath.    Cardiovascular:  Positive for syncope.    A comprehensive 14 point review of systems was performed and was negative except as mentioned.    Medications:  The current medication list was reviewed in the EMR    ALLERGIES:  No Known Allergies    Objective      Vitals:    06/03/23 1058 06/03/23 1707 06/03/23 2002 06/04/23 0518   BP: 111/67 130/73 123/68 131/70   BP Location: Left arm Left arm Left arm Left arm   Patient Position: Lying Lying Lying Lying   Pulse: 79 86 108 75   Resp: 16 16 16 16   Temp: 97.2 °F (36.2 °C) 98.3 °F (36.8 °C) 96.7 °F (35.9 °C) 97 °F (36.1 °C)   TempSrc: Oral Oral Oral Oral   SpO2: 97% 98% 95% 98%   Weight:    104 kg (229 lb 15 oz)   Height:               No data to display                  Physical Exam    CONSTITUTIONAL:  Vital signs reviewed.  No distress, looks comfortable.  EYES:  Conjunctivae and lids unremarkable.  PERRLA  EARS,NOSE,MOUTH,THROAT:  Ears and nose appear unremarkable.  Lips, teeth, gums appear unremarkable.  RESPIRATORY:  Normal respiratory effort.  Lungs clear to auscultation bilaterally.  CARDIOVASCULAR:  Normal S1, S2.  No murmurs rubs or gallops.  3+ edema in his entire left leg to the thigh   GASTROINTESTINAL: Abdomen appears unremarkable.  Nontender.  No hepatomegaly.  No splenomegaly.BULKY left inguinal adenopathy  LYMPHATIC:  No cervical, supraclavicular, axillary lymphadenopathy.  SKIN:  Warm.  No rashes.  PSYCHIATRIC:  Normal judgment and insight.  Normal mood and affect.    I have reexamined the patient and the results are consistent with the previously documented exam. Delroy Loaiza MD       RECENT LABS:  Hematology WBC   Date Value Ref Range Status   06/04/2023 8.72 3.40 - 10.80 10*3/mm3 Final     RBC   Date Value Ref Range Status   06/04/2023 4.03 (L) 4.14 - 5.80 10*6/mm3 Final     Hemoglobin   Date Value Ref Range Status   06/04/2023 10.9 (L) 13.0 - 17.7 g/dL Final     Hematocrit   Date Value Ref Range Status   06/04/2023 33.5 (L) 37.5 - 51.0 % Final     Platelets   Date Value Ref Range Status   06/04/2023 288 140 - 450 10*3/mm3 Final          Final Diagnosis  1. Lymph Node, Left Groin, U/S-Guided Core Needle Biopsy:  A. METASTATIC MELANOMA (SEE COMMENT).  jab/pkm    Assessment & Plan     1.  Generalized abdominal lymphadenopathy with a rectal mass liver mets and pulmonary nodules and bone mets suspicious for rectal carcinoma with metastasis-cannot exclude bladder cancer or lymphoma although lymphoma less likely to have lytic bone lesions and liver involvement  CEA/PSA  normal-  Cystoscopy performed 5/29/2023 with mass effect involving the left lateral wall of the bladder and 1 cm erythematous area along the posterior  wall concerning for external invasion.  Path shows melanoma- BRAF testing+ mri brain shows 2 small brain metastasis  We will need IPI Nivo as an outpatient which should help the brain metastasis  Port placement 6/2/2023.     2.  Incidental bilateral PEs and thrombus in the left external iliac due to compression by bulky nodes on IV heparin  Continues on heparin drip.  Patient transitioned to Eliquis-6/3/2023     3.  Mild anemia - normal ferritin  Hemoglobin stable today     4.  Family history of breast cancer in his mother at age 86 and prostate cancer his father in his 60s     Plan     1.    Discharge with plans for radiation appointment and oncology appointment in 1 week and  2 weeks-now scheduled with Dr. Rocha 6/14/2023.  We will move quickly to initiate immunotherapy with opdivo and ipilimumab to address his disease including brain metastasis, radiation therapy consultation can be arranged as outpatient.         6/4/2023

## 2023-06-04 NOTE — CASE MANAGEMENT/SOCIAL WORK
Case Management Discharge Note      Final Note: DC home         Selected Continued Care - Discharged on 6/4/2023 Admission date: 5/28/2023 - Discharge disposition: Home or Self Care      Destination    No services have been selected for the patient.                Durable Medical Equipment    No services have been selected for the patient.                Dialysis/Infusion    No services have been selected for the patient.                Home Medical Care    No services have been selected for the patient.                Therapy    No services have been selected for the patient.                Community Resources    No services have been selected for the patient.                Community & DME    No services have been selected for the patient.                         Final Discharge Disposition Code: 01 - home or self-care

## 2023-06-04 NOTE — DISCHARGE SUMMARY
Date of Admission: 5/28/2023  Date of Discharge:  6/4/2023  Primary Care Physician: Mohini Cortes MD     Discharge Diagnosis:  Active Hospital Problems    Diagnosis  POA    **Acute pulmonary embolism, unspecified pulmonary embolism type, unspecified whether acute cor pulmonale present [I26.99]  Yes    Rectal polyp [K62.1]  Yes    Metastasis to brain [C79.31]  Yes    Metastatic melanoma [C43.9]  Yes    Moderate Malnutrition (HCC) [E44.0]  Yes    Anemia [D64.9]  Yes    Left groin mass [R19.09]  Yes    Hepatic lesion [K76.9]  Yes    Abdominal lymphadenopathy [R59.0]  Yes    Left lower quadrant abdominal mass [R19.04]  Yes      Resolved Hospital Problems   No resolved problems to display.       Presenting Problem/History of Present Illness:  Left lower quadrant abdominal mass [R19.04]  Acute pulmonary embolism, unspecified pulmonary embolism type, unspecified whether acute cor pulmonale present [I26.99]     62 year old gentleman who presented to the emergency room with near syncope, weakness and malaise; his friend who is an md checked on him and noted that he appeared weak and in poor condition so she urged him to come to the ed; he has noted black and bloody stool for some time but did not seek medical attention; he has had weight loss; he has not seen any medical providers for many years     Hospital Course:  The patient is a 62 y.o. male who presented with DVT and PE and was found to have metastatic melanoma.  He has rectal mass liver metastasis pulmonary nodules bone mets and brain mets.  The lymph node biopsy is consistent with metastatic melanoma.  He did undergo colonoscopy on 6/2 for rectal bleeding and a polyp was resected and injected.  Pathology for that is still pending.  He underwent bedside cystoscopy on 5/29 which did show mass concerning for external invasion.  He had port placement on 6/1.  He has been transitioned to [facility] for the clot treatment and oncology plans on close follow-up outpatient  to initiate treatment for his melanoma.  Outpatient radiation oncology consultation is planned.  He needs to follow-up with urology in 1 to 2 weeks.    Exam Today:  Constitutional:       General: He is not in acute distress.     Appearance: He is not diaphoretic.   HENT:      Head: Atraumatic.   Cardiovascular:      Rate and Rhythm: Normal rate and regular rhythm.   Pulmonary:      Effort: Pulmonary effort is normal.      Breath sounds: No wheezing.   Abdominal:      General: There is no distension.      Palpations: Abdomen is soft.   Musculoskeletal:      Right lower leg: Edema present.      Left lower leg: Edema present.      Comments: L>R   Skin:     General: Skin is warm and dry.   Neurological:      Mental Status: He is alert. Mental status is at baseline.   Psychiatric:         Mood and Affect: Mood normal.         Behavior: Behavior normal.     Results:  CHEST CTA:  1. There are multiple bilateral pulmonary thromboemboli. There are no  pulmonary thromboemboli within the main pulmonary arteries, but there is  nonocclusive thrombus within the proximal right interlobar pulmonary  artery extending into segmental and subsegmental pulmonary arteries and  there are pulmonary thromboemboli within the right upper and right  middle lobe pulmonary arteries. There is a smaller volume of pulmonary  thromboemboli within segmental and subsegmental pulmonary arteries on  the left. The RV to LV ratio is 1.1.  2. There are several pulmonary sub-6 mm pulmonary nodules in both lungs  as seen at the left lower lobe series 2 image 108. The nodules are  suspicious for metastases. There are no pleural or pericardial  effusions. There is no lymphadenopathy within the chest.     ABDOMEN/PELVIS:  1. The urinary bladder is markedly thickened and irregular and the  abnormal soft tissue is confluent with the prostate. There is bulky  lymphadenopathy within the pelvis and left groin. One of the left pelvic  nodes measures 8.6 x 7.4 cm and  a slightly inferior [place] mass measures  10.7 x 6.7 cm. One of the large left groin nodes measures 6.5 x 4.8 cm.  There are multiple similar size and smaller size nodes adjacently. There  is thrombus within the left common femoral vein. The left external iliac  vein is compressed by the bulky intrapelvic lesions. There is  lymphadenopathy throughout the pelvis and perirectal regions. There is a  soft tissue mass within the posterior aspect of the distal rectum which  appears similar to the lymphadenopathy but may possibly represent stool.  There is significantly less prominent right groin lymphadenopathy than  on the left. In addition, there is a 3.0 x 2.4 cm lateral hepatic  segment metastasis. Primary malignancy may be the urinary bladder, colon  and rectum, lymphoma, sarcoma. Tissue diagnosis is recommended. Left  groin lymphadenopathy is easily accessible for CT-guided biopsy. The  lateral hepatic segment metastasis is accessible as well.  2. There are lytic bone lesions as seen at the right iliac crest and  left posterior elements of L3. Staging with PET/CT is recommended.  3. The spleen measures 14 cm. There is no lymphadenopathy within the  abdomen. The gallbladder, pancreas, adrenals, and kidneys appear  unremarkable. There is no bowel ileus or obstruction. Appendix appears  within normal limits. There is a moderate size right inguinal hernia  containing a segment of small bowel without evidence for incarceration.    MRI Brain:  There is a 3 mm contrast-enhancing lesion seen within the anterior  aspect left middle frontal gyrus and a 4 mm contrast enhancing lesion is  noted within the lateral aspect of the right occipital lobe. These  lesions are most compatible with small metastatic foci.    Procedures Performed:  Procedure(s):  INFUSAPORT PLACEMENT  06/02 4453 COLONOSCOPY  - One 40 mm polyp in the distal rectum, removed with a hot snare. Resected and retrieved. Injected.  - Repeat colonoscopy in 1 year for  surveillance.    Consults:   Consults       Date and Time Order Name Status Description    6/1/2023  2:34 PM Inpatient General Surgery Consult For Implanted Port Placement Completed     5/29/2023  9:00 AM Hematology & Oncology Inpatient Consult      5/28/2023  8:46 PM Inpatient Colorectal Surgery Consult Completed     5/28/2023  8:46 PM Inpatient Urology Consult Completed              Discharge Disposition:  Home or Self Care    Discharge Medications:     Discharge Medications        New Medications        Instructions Start Date   apixaban 5 MG tablet tablet  Commonly known as: ELIQUIS   10 mg, Oral, Every 12 Hours Scheduled      apixaban 5 MG tablet tablet  Commonly known as: ELIQUIS   5 mg, Oral, Every 12 Hours Scheduled   Start Date: [facility] 10, 2023              Discharge Diet:   Diet Instructions       Diet: Regular/House Diet; Regular Texture (IDDSI 7); Thin (IDDSI 0)      Discharge Diet: Regular/House Diet    Texture: Regular Texture (IDDSI 7)    Fluid Consistency: Thin (IDDSI 0)            Activity at Discharge:   Activity Instructions       Activity as Tolerated              Follow-up Appointments:   Follow-up Information       Mohini Cortes MD .    Specialty: Internal Medicine  Contact information:  [address] [place] [id number] [phone]               Brodie Rocha MD Follow up.    Specialties: Hematology and Oncology, Oncology, Hematology  Contact information:  [address] [facility]  [place] [id number] [phone]               Eduardo Mueller MD Follow up.    Specialty: Urology  Contact information:  [address]  [place] [id number] [phone]                             Test Results Pending at Discharge:  Pending Labs       Order Current Status    Tissue Pathology Exam In process             Bernardo Azar MD  06/04/23  10:12 EDT    Time Spent on Discharge Activities: >30 minutes    Dictated portions using Dragon dictation software.

## 2023-06-05 ENCOUNTER — TELEPHONE (OUTPATIENT)
Dept: ONCOLOGY | Facility: CLINIC | Age: 63
End: 2023-06-05
Payer: COMMERCIAL

## 2023-06-05 LAB
LAB AP CASE REPORT: NORMAL
LAB AP DIAGNOSIS COMMENT: NORMAL
PATH REPORT.FINAL DX SPEC: NORMAL
PATH REPORT.GROSS SPEC: NORMAL

## 2023-06-05 NOTE — PAYOR COMM NOTE
"Marilee George (62 y.o. Male)          DC SUMMARY FOR [id number]          Date of Birth   1960    Social Security Number       Address   [address] [place] [address]    Home Phone   [phone]    MRN   [id number]       Advent   Unknown    Marital Status   Unknown                            Admission Date   5/28/23    Admission Type   Emergency    Admitting Provider   Maria Guadalupe Kam MD    Attending Provider       Department, Room/Bed   [address], P390/1       Discharge Date   6/4/2023    Discharge Disposition   Home or Self Care    Discharge Destination                                 Attending Provider: (none)   Allergies: No Known Allergies    Isolation: None   Infection: None   Code Status: Prior    Ht: 182.9 cm (72\")   Wt: 104 kg (229 lb 15 oz)    Admission Cmt: None   Principal Problem: Acute pulmonary embolism, unspecified pulmonary embolism type, unspecified whether acute cor pulmonale present [I26.99]                   Active Insurance as of 5/28/2023       Primary Coverage       Payor Plan Insurance Group Employer/Plan Group    [employer] [id number]       Payor Plan Address Payor Plan Phone Number Payor Plan Fax Number Effective Dates    PO    3/1/2021 - None Entered    [facility] [id number]         Subscriber Name Subscriber Birth Date Member ID       MARILEE GEORGE 1960 [id number]                     Emergency Contacts        (Rel.) Home Phone Work Phone Mobile Phone    daquan de leon (Friend) -- -- [phone]                 Discharge Summary        Bernardo Azar MD at 06/04/23 1011              Date of Admission: 5/28/2023  Date of Discharge:  6/4/2023  Primary Care Physician: Mohini Cortes MD     Discharge Diagnosis:  Active Hospital Problems    Diagnosis  POA    **Acute pulmonary embolism, unspecified pulmonary embolism type, unspecified whether acute cor pulmonale present [I26.99]  Yes    Rectal " polyp [K62.1]  Yes    Metastasis to brain [C79.31]  Yes    Metastatic melanoma [C43.9]  Yes    Moderate Malnutrition (HCC) [E44.0]  Yes    Anemia [D64.9]  Yes    Left groin mass [R19.09]  Yes    Hepatic lesion [K76.9]  Yes    Abdominal lymphadenopathy [R59.0]  Yes    Left lower quadrant abdominal mass [R19.04]  Yes      Resolved Hospital Problems   No resolved problems to display.       Presenting Problem/History of Present Illness:  Left lower quadrant abdominal mass [R19.04]  Acute pulmonary embolism, unspecified pulmonary embolism type, unspecified whether acute cor pulmonale present [I26.99]     62 year old gentleman who presented to the emergency room with near syncope, weakness and malaise; his friend who is an md checked on him and noted that he appeared weak and in poor condition so she urged him to come to the ed; he has noted black and bloody stool for some time but did not seek medical attention; he has had weight loss; he has not seen any medical providers for many years     Hospital Course:  The patient is a 62 y.o. male who presented with DVT and PE and was found to have metastatic melanoma.  He has rectal mass liver metastasis pulmonary nodules bone mets and brain mets.  The lymph node biopsy is consistent with metastatic melanoma.  He did undergo colonoscopy on 6/2 for rectal bleeding and a polyp was resected and injected.  Pathology for that is still pending.  He underwent bedside cystoscopy on 5/29 which did show mass concerning for external invasion.  He had port placement on 6/1.  He has been transitioned to [facility] for the clot treatment and oncology plans on close follow-up outpatient to initiate treatment for his melanoma.  Outpatient radiation oncology consultation is planned.  He needs to follow-up with urology in 1 to 2 weeks.    Exam Today:  Constitutional:       General: He is not in acute distress.     Appearance: He is not diaphoretic.   HENT:      Head: Atraumatic.   Cardiovascular:       Rate and Rhythm: Normal rate and regular rhythm.   Pulmonary:      Effort: Pulmonary effort is normal.      Breath sounds: No wheezing.   Abdominal:      General: There is no distension.      Palpations: Abdomen is soft.   Musculoskeletal:      Right lower leg: Edema present.      Left lower leg: Edema present.      Comments: L>R   Skin:     General: Skin is warm and dry.   Neurological:      Mental Status: He is alert. Mental status is at baseline.   Psychiatric:         Mood and Affect: Mood normal.         Behavior: Behavior normal.     Results:  CHEST CTA:  1. There are multiple bilateral pulmonary thromboemboli. There are no  pulmonary thromboemboli within the main pulmonary arteries, but there is  nonocclusive thrombus within the proximal right interlobar pulmonary  artery extending into segmental and subsegmental pulmonary arteries and  there are pulmonary thromboemboli within the right upper and right  middle lobe pulmonary arteries. There is a smaller volume of pulmonary  thromboemboli within segmental and subsegmental pulmonary arteries on  the left. The RV to LV ratio is 1.1.  2. There are several pulmonary sub-6 mm pulmonary nodules in both lungs  as seen at the left lower lobe series 2 image 108. The nodules are  suspicious for metastases. There are no pleural or pericardial  effusions. There is no lymphadenopathy within the chest.     ABDOMEN/PELVIS:  1. The urinary bladder is markedly thickened and irregular and the  abnormal soft tissue is confluent with the prostate. There is bulky  lymphadenopathy within the pelvis and left groin. One of the left pelvic  nodes measures 8.6 x 7.4 cm and a slightly inferior [place] mass measures  10.7 x 6.7 cm. One of the large left groin nodes measures 6.5 x 4.8 cm.  There are multiple similar size and smaller size nodes adjacently. There  is thrombus within the left common femoral vein. The left external iliac  vein is compressed by the bulky intrapelvic lesions.  There is  lymphadenopathy throughout the pelvis and perirectal regions. There is a  soft tissue mass within the posterior aspect of the distal rectum which  appears similar to the lymphadenopathy but may possibly represent stool.  There is significantly less prominent right groin lymphadenopathy than  on the left. In addition, there is a 3.0 x 2.4 cm lateral hepatic  segment metastasis. Primary malignancy may be the urinary bladder, colon  and rectum, lymphoma, sarcoma. Tissue diagnosis is recommended. Left  groin lymphadenopathy is easily accessible for CT-guided biopsy. The  lateral hepatic segment metastasis is accessible as well.  2. There are lytic bone lesions as seen at the right iliac crest and  left posterior elements of L3. Staging with PET/CT is recommended.  3. The spleen measures 14 cm. There is no lymphadenopathy within the  abdomen. The gallbladder, pancreas, adrenals, and kidneys appear  unremarkable. There is no bowel ileus or obstruction. Appendix appears  within normal limits. There is a moderate size right inguinal hernia  containing a segment of small bowel without evidence for incarceration.    MRI Brain:  There is a 3 mm contrast-enhancing lesion seen within the anterior  aspect left middle frontal gyrus and a 4 mm contrast enhancing lesion is  noted within the lateral aspect of the right occipital lobe. These  lesions are most compatible with small metastatic foci.    Procedures Performed:  Procedure(s):  INFUSAPORT PLACEMENT  06/02 0655 COLONOSCOPY  - One 40 mm polyp in the distal rectum, removed with a hot snare. Resected and retrieved. Injected.  - Repeat colonoscopy in 1 year for surveillance.    Consults:   Consults       Date and Time Order Name Status Description    6/1/2023  2:34 PM Inpatient General Surgery Consult For Implanted Port Placement Completed     5/29/2023  9:00 AM Hematology & Oncology Inpatient Consult      5/28/2023  8:46 PM Inpatient Colorectal Surgery Consult  Completed     5/28/2023  8:46 PM Inpatient Urology Consult Completed              Discharge Disposition:  Home or Self Care    Discharge Medications:     Discharge Medications        New Medications        Instructions Start Date   apixaban 5 MG tablet tablet  Commonly known as: ELIQUIS   10 mg, Oral, Every 12 Hours Scheduled      apixaban 5 MG tablet tablet  Commonly known as: ELIQUIS   5 mg, Oral, Every 12 Hours Scheduled   Start Date: [facility] 10, 2023              Discharge Diet:   Diet Instructions       Diet: Regular/House Diet; Regular Texture (IDDSI 7); Thin (IDDSI 0)      Discharge Diet: Regular/House Diet    Texture: Regular Texture (IDDSI 7)    Fluid Consistency: Thin (IDDSI 0)            Activity at Discharge:   Activity Instructions       Activity as Tolerated              Follow-up Appointments:   Follow-up Information       Mohini Cortes MD .    Specialty: Internal Medicine  Contact information:  [id number] [facility] [id number] [phone]               Brodie Rocha MD Follow up.    Specialties: Hematology and Oncology, Oncology, Hematology  Contact information:  [address] [id number] [phone]               Eduardo Mueller MD Follow up.    Specialty: Urology  Contact information:  [address]  [facility] [address]  [place] [id number] [phone]                             Test Results Pending at Discharge:  Pending Labs       Order Current Status    Tissue Pathology Exam In process             Bernardo Azar MD  06/04/23  10:12 EDT    Time Spent on Discharge Activities: >30 minutes    Dictated portions using Dragon dictation software.    Electronically signed by Bernardo Azar MD at 06/04/23 1460

## 2023-06-05 NOTE — OUTREACH NOTE
Prep Survey      Flowsheet Row Responses   [facility] patient discharged from? [facility]   Is LACE score < 7 ? No   Eligibility Readm Mgmt   Discharge diagnosis acute pulmonary embolism   Does the patient have one of the following disease processes/diagnoses(primary or secondary)? Other   Does the patient have Home health ordered? No   Is there a DME ordered? No   Medication alerts for this patient eliquis   General alerts for this patient bone and brain mets   Prep survey completed? Yes            Gloria EVANGELISTA - Registered Nurse

## 2023-06-05 NOTE — TELEPHONE ENCOUNTER
Caller: [place]    Relationship to patient: Self    Best call back number: [phone]    Patient is needing: TO KNOW IF HE NEEDS WHOLE BODY SCAN. HE WAS NOT AWARE OF THIS SCAN AND WAS CALLED TO SCHEDULE IT.

## 2023-06-06 ENCOUNTER — TELEPHONE (OUTPATIENT)
Dept: ONCOLOGY | Facility: CLINIC | Age: 63
End: 2023-06-06
Payer: COMMERCIAL

## 2023-06-06 NOTE — TELEPHONE ENCOUNTER
----- Message from Allison Jordan sent at 6/5/2023 10:17 AM EDT -----    ----- Message -----  From: Brodie Rocha MD  Sent: 6/2/2023   4:55 PM EDT  To: Tori Lazaro RN, #    Needs a PET scan next week as well as radiation appointment and see me the week after for possible Opdivo and ipi

## 2023-06-06 NOTE — TELEPHONE ENCOUNTER
Returned call to patient. Explained that a PET scan was ordered to determine if there are any other areas in his body that show evidence of malignancy, and that they are typically unable to be performed while patient is admitted to the hospital for insurance reasons. Advised patient that his treatment plan is being entered and that per Dr. oRcha's and Dr. Loaiza's progress notes from his admission, we intend to start treatment as soon as possible. Patient has follow up with Dr. Rocha on 6/14 and will most likely begin treatment at that time.

## 2023-06-07 LAB
LAB AP CASE REPORT: NORMAL
LAB AP CLINICAL INFORMATION: NORMAL
LAB AP DIAGNOSIS COMMENT: NORMAL
LAB AP SPECIAL STAINS: NORMAL
Lab: NORMAL
PATH REPORT.ADDENDUM SPEC: NORMAL
PATH REPORT.FINAL DX SPEC: NORMAL
PATH REPORT.GROSS SPEC: NORMAL

## 2023-06-08 ENCOUNTER — READMISSION MANAGEMENT (OUTPATIENT)
Dept: CALL CENTER | Facility: HOSPITAL | Age: 63
End: 2023-06-08
Payer: COMMERCIAL

## 2023-06-08 NOTE — OUTREACH NOTE
Medical Week 1 Survey      Flowsheet Row Responses   [facility] patient discharged from? [facility]   Does the patient have one of the following disease processes/diagnoses(primary or secondary)? Other   Week 1 attempt successful? Yes   Call start time 1601   Call end time 1602   General alerts for this patient bone and brain mets   Discharge diagnosis acute pulmonary embolism   Person spoke with today (if not patient) and relationship patient   Meds reviewed with patient/caregiver? Yes   Is the patient having any side effects they believe may be caused by any medication additions or changes? No   Does the patient have all medications ordered at discharge? Yes   Is the patient taking all medications as directed (includes completed medication regime)? Yes   Does the patient have a primary care provider?  Yes   Has the patient kept scheduled appointments due by today? Yes   Psychosocial issues? No   Did the patient receive a copy of their discharge instructions? Yes   Nursing interventions Reviewed instructions with patient   What is the patient's perception of their health status since discharge? Improving   Is the patient/caregiver able to teach back signs and symptoms related to disease process for when to call PCP? Yes   Is the patient/caregiver able to teach back signs and symptoms related to disease process for when to call 911? Yes   Is the patient/caregiver able to teach back the hierarchy of who to call/visit for symptoms/problems? PCP, Specialist, Home health nurse, Urgent Care, ED, 911 Yes   If the patient is a current smoker, are they able to teach back resources for cessation? Not a smoker   Week 1 call completed? Yes   Graduated Yes   Graduated/Revoked comments Quick call. No needs at this time.            Christian FERNANDEZ - Registered Nurse

## 2023-06-12 ENCOUNTER — OFFICE VISIT (OUTPATIENT)
Dept: ONCOLOGY | Facility: CLINIC | Age: 63
End: 2023-06-12
Payer: COMMERCIAL

## 2023-06-12 ENCOUNTER — HOSPITAL ENCOUNTER (OUTPATIENT)
Dept: PET IMAGING | Facility: HOSPITAL | Age: 63
Discharge: HOME | End: 2023-06-12
Payer: COMMERCIAL

## 2023-06-12 VITALS
RESPIRATION RATE: 16 BRPM | BODY MASS INDEX: 30.53 KG/M2 | DIASTOLIC BLOOD PRESSURE: 76 MMHG | WEIGHT: 225.4 LBS | TEMPERATURE: 98.2 F | SYSTOLIC BLOOD PRESSURE: 110 MMHG | HEART RATE: 86 BPM | OXYGEN SATURATION: 99 % | HEIGHT: 72 IN

## 2023-06-12 DIAGNOSIS — C43.9 METASTATIC MELANOMA: ICD-10-CM

## 2023-06-12 DIAGNOSIS — C43.9 METASTATIC MELANOMA: Primary | ICD-10-CM

## 2023-06-12 DIAGNOSIS — R19.04 LEFT LOWER QUADRANT ABDOMINAL MASS: ICD-10-CM

## 2023-06-12 DIAGNOSIS — R93.5 ABNORMAL CT OF THE ABDOMEN: ICD-10-CM

## 2023-06-12 LAB — GLUCOSE BLDC GLUCOMTR-MCNC: 93 MG/DL (ref 70–130)

## 2023-06-12 PROCEDURE — 99214 OFFICE O/P EST MOD 30 MIN: CPT | Performed by: NURSE PRACTITIONER

## 2023-06-12 PROCEDURE — 82948 REAGENT STRIP/BLOOD GLUCOSE: CPT

## 2023-06-12 PROCEDURE — 78816 PET IMAGE W/CT FULL BODY: CPT

## 2023-06-12 PROCEDURE — 0 FLUDEOXYGLUCOSE F18 SOLUTION: Performed by: INTERNAL MEDICINE

## 2023-06-12 PROCEDURE — A9552 F18 FDG: HCPCS | Performed by: INTERNAL MEDICINE

## 2023-06-12 RX ORDER — ONDANSETRON HYDROCHLORIDE 8 MG/1
8 TABLET, FILM COATED ORAL EVERY 8 HOURS PRN
Qty: 30 TABLET | Refills: 5 | Status: ON HOLD | OUTPATIENT
Start: 2023-06-12 | End: 2023-06-19

## 2023-06-12 RX ADMIN — FLUDEOXYGLUCOSE F18 1 DOSE: 300 INJECTION INTRAVENOUS at 08:57

## 2023-06-12 NOTE — PROGRESS NOTES
TREATMENT  PREPARATION    King George  [id number]  1960    Chief Complaint: Treatment preparation and needs assessment    History of present illness:  King George is a 62 y.o. year old male who is here today for treatment preparation and needs assessment.  The patient has been diagnosed with   Encounter Diagnosis   Name Primary?    Metastatic melanoma Yes    and is scheduled to begin treatment with:     Oncology History:    Oncology/Hematology History   Metastatic melanoma   5/31/2023 Initial Diagnosis    Metastatic melanoma     6/14/2023 -  Chemotherapy    OP MELANOMA Nivolumab 1 mg/kg / Ipilimumab 3 mg/kg / Nivolumab 480 mg            The current medication list and allergy list were reviewed and reconciled.     Past Medical History, Past Surgical History, Social History, Family History have been reviewed and are without significant changes except as mentioned.    Physical Exam:    Vitals:    06/12/23 1304   BP: 110/76   Pulse: 86   Resp: 16   Temp: 98.2 °F (36.8 °C)   SpO2: 99%     Vitals:    06/12/23 1304   PainSc: 2  Comment: discomfort   PainLoc: Leg  Comment: Left leg        ECOG score: 0             Physical Exam  HENT:      Head: Normocephalic and atraumatic.   Eyes:      Extraocular Movements: Extraocular movements intact.      Conjunctiva/sclera: Conjunctivae normal.   Pulmonary:      Effort: Pulmonary effort is normal. No respiratory distress.   Musculoskeletal:      Left lower leg: Edema present.   Neurological:      General: No focal deficit present.      Mental Status: He is alert and oriented to person, place, and time.   Psychiatric:         Mood and Affect: Mood normal.         Behavior: Behavior normal.         NEEDS ASSESSMENTS    Genetics  The patient's new diagnosis and family history have been reviewed for genetic counseling needs. The patient will not be referred..     Psychosocial and Barriers to care  The patient has completed a PHQ-9 Depression Screening and the Distress  Thermometer (DT) today.  PHQ-9 results show PHQ-2 Total Score: 0 PHQ-9 Total Score: PHQ-9 Total Score: 0     The patient scored their distress today as Distress Level: 2 on a scale of 0-10 with 0 being no distress and 10 being extreme distress. Problems marked by the patient as being an issue for them within the last week include   .      Results were reviewed along with psychosocial resources offered by our cancer center.  Our Supportive Oncology team will be flagged for a score of 4 or above, and a same day call will be made for a score of 9 or 10.  A mental health referral is offered at that time. Patients who score less than 4 have been educated on our support services and can be referred to our  upon request.  The patient will not be referred to our .       Nutrition  The patient has completed the malnutrition screening today. They scored Malnutrition Screening Tool  Have you recently lost weight without trying?  If yes, how much weight have you lost?: 0--> No  Have you been eating poorly because of a decreased appetite?: 0--> No  MST score: 0   with a score of 0-1 meaning not at risk in a score of 2 or greater meaning at risk.  Patients with a score of 3 or higher will be referred to our oncology dietitian for support. Patients beginning at risk treatment regimens or who have dietary concerns will also be referred to our oncology dietitian. The patient will not be referred.    Functional Assessment  Persons who are age 70 or greater will be screened for qualification of a comprehensive geriatric assessment by our survivorship nurse practitioner.  Older adults with cancer face unique challenges. These may include an increased risk of drug reactions, financial burdens, and caregiver stress. The patient scored   . Patients scoring 14 or lower will referred for an older adult functional assessment with the survivorship advanced practice registered nurse to ensure all needed support is  "provided as patients plan for their treatments. NOT APPLICABLE    Intravenous Access Assessment  The patient and I discussed planned intravenous chemo/biotherapy as well as other IV treatments that are often needed throughout the course of treatment. These may include, but are not limited to blood transfusions, antibiotics, and IV hydration. Discussed that depending on selected treatment and vein assessment, patient may require venous access device (VAD) which could include but not limited to a Mediport or PICC line. Risks and benefits of VADs reviewed. The patient will be treated via Port.    Reproductive/Sexual Activity   People should avoid becoming pregnant and should not get a partner pregnant while undergoing chemo/biotherapy.  People of childbearing age should use effective contraception during active therapy. The best recommendation for all people is to use a barrier method for a minimum of 1 week after the last infusion of chemo/biotherapy to prevent your partner being exposed to byproducts from treatment medications in bodily fluids. Effective contraception should be discussed with your oncology team to make sure it is safe to take based on your diagnosis. Possible options include oral contraceptives, barrier methods. Chemo/biotherapy can change your ability to reproduce children in the future.  There are options for fertility preservation. NOT APPLICABLE    Advanced Care Planning  Advance Care Planning   The patient and I discussed advanced care planning, \"Conversations that Matter\".   This service is offered for development of advance directives with a certified ACP facilitator.  The patient does not have an up-to-date advanced directive. This document is not on file with our office. The patient is not interested in an appointment with one of our facilitators to create or update their advanced directives.    Have you reviewed your Advance Directive and is it valid for this stay?: No  Patient Requests " Assistance on Advance Directives: Patient Declined          Smoking cessation  Tobacco Use: Low Risk     Smoking Tobacco Use: Never    Smokeless Tobacco Use: Never    Passive Exposure: Never       Patient and I discussed their tobacco use history. Referral will not be made for smoking cessation.      Palliative Care  When appropriate, the patient and I discussed the availability palliative care services and when appropriate Hospice care. Palliative care is not the same as Hospice care which was explained to the patient.NOT APPLICABLE.    Survivorship   When appropriate, we discussed that we will refer the patient to survivorship clinic to discuss next steps following completion of planned treatment.  Reviewed this visit will include assessment of your physical, psychological, functional, and spiritual needs as a survivor and the need at attend this visit when scheduled.    TREATMENT EDUCATION    Today I met with the patient to discuss the chemo/biotherapy regimen recommended for treatment of Metastatic melanoma  .  The patient was given explanation of treatment premed side effects including office policy that prohibits patients to drive if sedating medications are administered, MD explanation given regarding benefits, side effects, toxicities and goals of treatment.  The patient received a Chemotherapy/Biotherapy Plan Summary including diagnosis and explanation of specific treatment plan.    SIDE EFFECTS:  Common side effects were discussed with the patient and/or significant other.  Discussion included where applicable hair loss/discoloration, anemia/fatigue, infection/chills/fever, appetite, bleeding risk/precautions, constipation, diarrhea, mouth sores, taste alteration, loss of appetite, nausea/vomiting, peripheral neuropathy, skin/nail changes, rash, muscle aches/weakness, photosensitivity, weight gain/loss, hearing loss, dizziness, menopausal symptoms, menstrual irregularity, sterility, high blood pressure,  heart damage, liver damage, lung damage, kidney damage, DVT/PE risk, fluid retention, pleural/pericardial effusion, somnolence, electrolyte/LFT imbalance, vein exercises and/or the possible need for vascular access/port placement.  The patient was advised that although uncommon, leakage of an infused medication from the vein or venous access device may lead to skin breakdown and/or other tissue damage.  The patient was advised that he/she may have pain, bleeding, and/or bruising from the insertion of a needle in their vein or venous access device (port).  The patient was further advised that, in spite of proper technique, infection with redness and irritation may rarely occur at the site where the needle was inserted.  The patient was advised that if complications occur, additional medical treatment is available.  Finally, where applicable we have reviewed rare but potential immune mediated side effects including shortness of breath, cough, chest pain (pneumonitis), abdominal pain, diarrhea (colitis), thyroiditis (hypothyroid or hyperthyroid), hepatitis and liver dysfunction, nephritis and renal dysfunction.    Discussion also included side effects specific to drugs in the treatment plan, specifically:    Treatment Plans       Name Type Plan Dates Plan Provider         Active    OP MELANOMA Nivolumab 1 mg/kg / Ipilimumab 3 mg/kg / Nivolumab 480 mg  ONCOLOGY TREATMENT  6/13/2023 - Present Brodie Rocha MD                      Questions answered and additional information discussed on topics including:  Anemia, Thrombocytopenia, Neutropenia, Nutrition and appetite changes, Constipation, Diarrhea, Nausea & vomiting, and Pain       Assessment and Plan:    Diagnoses and all orders for this visit:    1. Metastatic melanoma (Primary)      No orders of the defined types were placed in this encounter.        The patient and I have reviewed their diagnosis and scheduled treatment plan. Needs assessment was completed  where applicable including genetics, psychosocial needs, barriers to care, VAD evaluation, advanced care planning, survivorship, and palliative care services where indicated. Referrals have been ordered as appropriate based upon evaluation today and patient desires.   Chemo/biotherapy teaching was completed today and consent obtained. See separate documentation for further details.  Adequate time was given to answer questions.  Patient made aware of their care team members and contact information if they have questions or problems throughout the treatment course.  Discussion held and written information provided describing frequency of office visits and ongoing monitoring throughout the treatment plan.     Reviewed with patient any prescribed medication sent to pharmacy.  Education provided regarding proper storage, safe handling, and proper disposal of unused medication.  Proper handling of body fluids and waste discussed and written information provided.  If appropriate, patient had pretreatment labs drawn today.    Learning assessment completed at initial patient encounter. See separate flowsheet. Chemo/biotherapy education comprehension assessed at today's visit.    I spent 35 minutes caring for King on this date of service. This time includes time spent by me in the following activities: preparing for the visit, reviewing tests, obtaining and/or reviewing a separately obtained history, performing a medically appropriate examination and/or evaluation, counseling and educating the patient/family/caregiver, ordering medications, tests, or procedures, referring and communicating with other health care professionals, and documenting information in the medical record.     Guadalupe Gomes, APRN   06/12/23

## 2023-06-12 NOTE — PROGRESS NOTES
[facility] Hematology/Oncology Treatment Plan Summary    Name: King George  [facility]# [id number]  MD: Dr. Rocha    Diagnosis:     ICD-10-CM ICD-9-CM   1. Metastatic melanoma  C43.9 172.9      Goal of treatment: disease control    Treatment Medication(s):   Yervoy  Opdivo    Frequency: Yervoy every 21 days x 4 cycles  Opdivo every 21 days x 4 cycles with Yervoy then every 28 days    Number of cycles: to be determined    Starting on: 6/14/2023    Repeat after 2-3 cycles: CT Scan    Items for home use: Thermometer    Rx written for: [x] Nausea    [] Pre-Treatment   ondansetron 8 mg by mouth every 8 hours as needed for nausea    Notes:     Next Steps:     Completing Provider: TANYA Mnedez           Date/time: 06/12/2023      Please note: You will be seen by a provider frequently with your treatment plan. This plan may change depending on many factors, if so, this will be discussed with you by your physician.  Last update 03/2022.

## 2023-06-14 ENCOUNTER — OFFICE VISIT (OUTPATIENT)
Dept: ONCOLOGY | Facility: CLINIC | Age: 63
End: 2023-06-14
Payer: COMMERCIAL

## 2023-06-14 ENCOUNTER — INFUSION (OUTPATIENT)
Dept: ONCOLOGY | Facility: HOSPITAL | Age: 63
End: 2023-06-14
Payer: COMMERCIAL

## 2023-06-14 ENCOUNTER — HOSPITAL ENCOUNTER (OUTPATIENT)
Dept: PET IMAGING | Facility: HOSPITAL | Age: 63
Discharge: HOME | End: 2023-06-14
Admitting: INTERNAL MEDICINE
Payer: COMMERCIAL

## 2023-06-14 VITALS
HEART RATE: 79 BPM | HEIGHT: 72 IN | DIASTOLIC BLOOD PRESSURE: 69 MMHG | OXYGEN SATURATION: 100 % | BODY MASS INDEX: 30.56 KG/M2 | SYSTOLIC BLOOD PRESSURE: 102 MMHG | RESPIRATION RATE: 18 BRPM | TEMPERATURE: 96.4 F

## 2023-06-14 DIAGNOSIS — C43.9 METASTATIC MELANOMA: ICD-10-CM

## 2023-06-14 DIAGNOSIS — C79.31 METASTASIS TO BRAIN: ICD-10-CM

## 2023-06-14 DIAGNOSIS — C43.9 METASTATIC MELANOMA: Primary | ICD-10-CM

## 2023-06-14 DIAGNOSIS — R06.02 SHORTNESS OF BREATH: ICD-10-CM

## 2023-06-14 LAB
ALBUMIN SERPL-MCNC: 3.2 G/DL (ref 3.5–5.2)
ALBUMIN/GLOB SERPL: 0.6 G/DL (ref 1.1–2.4)
ALP SERPL-CCNC: 105 U/L (ref 38–116)
ALT SERPL W P-5'-P-CCNC: 17 U/L (ref 0–41)
ANION GAP SERPL CALCULATED.3IONS-SCNC: 14.4 MMOL/L (ref 5–15)
AST SERPL-CCNC: 13 U/L (ref 0–40)
BASOPHILS # BLD AUTO: 0.06 10*3/MM3 (ref 0–0.2)
BASOPHILS NFR BLD AUTO: 0.5 % (ref 0–1.5)
BILIRUB SERPL-MCNC: 0.8 MG/DL (ref 0.2–1.2)
BUN SERPL-MCNC: 13 MG/DL (ref 6–20)
BUN/CREAT SERPL: 11.2 (ref 7.3–30)
CALCIUM SPEC-SCNC: 9.2 MG/DL (ref 8.5–10.2)
CHLORIDE SERPL-SCNC: 98 MMOL/L (ref 98–107)
CO2 SERPL-SCNC: 20.6 MMOL/L (ref 22–29)
CREAT SERPL-MCNC: 1.16 MG/DL (ref 0.7–1.3)
DEPRECATED RDW RBC AUTO: 45.6 FL (ref 37–54)
EGFRCR SERPLBLD CKD-EPI 2021: 72.1 ML/MIN/1.73
EOSINOPHIL # BLD AUTO: 0.68 10*3/MM3 (ref 0–0.4)
EOSINOPHIL NFR BLD AUTO: 5.9 % (ref 0.3–6.2)
ERYTHROCYTE [DISTWIDTH] IN BLOOD BY AUTOMATED COUNT: 14.2 % (ref 12.3–15.4)
GLOBULIN UR ELPH-MCNC: 5.1 GM/DL (ref 1.8–3.5)
GLUCOSE SERPL-MCNC: 155 MG/DL (ref 74–124)
HCT VFR BLD AUTO: 37.8 % (ref 37.5–51)
HGB BLD-MCNC: 11.7 G/DL (ref 13–17.7)
IMM GRANULOCYTES # BLD AUTO: 0.04 10*3/MM3 (ref 0–0.05)
IMM GRANULOCYTES NFR BLD AUTO: 0.3 % (ref 0–0.5)
LYMPHOCYTES # BLD AUTO: 1.42 10*3/MM3 (ref 0.7–3.1)
LYMPHOCYTES NFR BLD AUTO: 12.4 % (ref 19.6–45.3)
MCH RBC QN AUTO: 27.1 PG (ref 26.6–33)
MCHC RBC AUTO-ENTMCNC: 31 G/DL (ref 31.5–35.7)
MCV RBC AUTO: 87.5 FL (ref 79–97)
MONOCYTES # BLD AUTO: 0.72 10*3/MM3 (ref 0.1–0.9)
MONOCYTES NFR BLD AUTO: 6.3 % (ref 5–12)
NEUTROPHILS NFR BLD AUTO: 74.6 % (ref 42.7–76)
NEUTROPHILS NFR BLD AUTO: 8.55 10*3/MM3 (ref 1.7–7)
NRBC BLD AUTO-RTO: 0 /100 WBC (ref 0–0.2)
PLATELET # BLD AUTO: 388 10*3/MM3 (ref 140–450)
PMV BLD AUTO: 8.6 FL (ref 6–12)
POTASSIUM SERPL-SCNC: 4.1 MMOL/L (ref 3.5–4.7)
PROT SERPL-MCNC: 8.3 G/DL (ref 6.3–8)
RBC # BLD AUTO: 4.32 10*6/MM3 (ref 4.14–5.8)
SODIUM SERPL-SCNC: 133 MMOL/L (ref 134–145)
T4 FREE SERPL-MCNC: 1.15 NG/DL (ref 0.93–1.7)
TSH SERPL DL<=0.05 MIU/L-ACNC: 4.55 UIU/ML (ref 0.27–4.2)
WBC NRBC COR # BLD: 11.47 10*3/MM3 (ref 3.4–10.8)

## 2023-06-14 PROCEDURE — 84439 ASSAY OF FREE THYROXINE: CPT | Performed by: INTERNAL MEDICINE

## 2023-06-14 PROCEDURE — 71275 CT ANGIOGRAPHY CHEST: CPT

## 2023-06-14 PROCEDURE — 85025 COMPLETE CBC W/AUTO DIFF WBC: CPT

## 2023-06-14 PROCEDURE — 96417 CHEMO IV INFUS EACH ADDL SEQ: CPT

## 2023-06-14 PROCEDURE — 25010000002 NIVOLUMAB 100 MG/10ML SOLUTION 10 ML VIAL: Performed by: INTERNAL MEDICINE

## 2023-06-14 PROCEDURE — 84443 ASSAY THYROID STIM HORMONE: CPT | Performed by: INTERNAL MEDICINE

## 2023-06-14 PROCEDURE — 96413 CHEMO IV INFUSION 1 HR: CPT

## 2023-06-14 PROCEDURE — 80053 COMPREHEN METABOLIC PANEL: CPT

## 2023-06-14 PROCEDURE — 25510000001 IOPAMIDOL PER 1 ML: Performed by: INTERNAL MEDICINE

## 2023-06-14 PROCEDURE — 25010000002 IPILIMUMAB 200 MG/40ML SOLUTION 40 ML VIAL: Performed by: INTERNAL MEDICINE

## 2023-06-14 RX ORDER — SODIUM CHLORIDE 9 MG/ML
250 INJECTION, SOLUTION INTRAVENOUS ONCE
Status: CANCELLED | OUTPATIENT
Start: 2023-06-14

## 2023-06-14 RX ORDER — SODIUM CHLORIDE 9 MG/ML
250 INJECTION, SOLUTION INTRAVENOUS ONCE
Status: COMPLETED | OUTPATIENT
Start: 2023-06-14 | End: 2023-06-14

## 2023-06-14 RX ORDER — LIDOCAINE AND PRILOCAINE 25; 25 MG/G; MG/G
1 CREAM TOPICAL AS NEEDED
Qty: 30 G | Refills: 1 | Status: ON HOLD | OUTPATIENT
Start: 2023-06-14 | End: 2023-06-19

## 2023-06-14 RX ADMIN — IOPAMIDOL 95 ML: 755 INJECTION, SOLUTION INTRAVENOUS at 12:10

## 2023-06-14 RX ADMIN — SODIUM CHLORIDE 250 ML: 9 INJECTION, SOLUTION INTRAVENOUS at 09:39

## 2023-06-14 RX ADMIN — SODIUM CHLORIDE 310 MG: 900 INJECTION, SOLUTION INTRAVENOUS at 10:14

## 2023-06-14 RX ADMIN — SODIUM CHLORIDE 100 MG: 9 INJECTION, SOLUTION INTRAVENOUS at 09:40

## 2023-06-14 NOTE — PROGRESS NOTES
Subjective   REASON FOR CONSULTATION: Widely metastatic malignancy with associated DVT and pulmonary embolism-final path shows malignant melanoma BRAF V600E positive    Referring MD:    Mohini Cortes MD      History of present illness: Patient is a 62-year-old male with recently diagnosed widely metastatic malignant melanoma involving the left groin with extensive adenopathy liver metastasis lung metastasis bone metastasis due to small asymptomatic brain metastasis.    He was just discharged on Eliquis because of DVT and PE and states that he has been little more short of breath in the last 48 hours and today was very short of breath walking from downstairs to our office.  He has no chest pain.  His left leg remains markedly swollen due to obstructive venous disease in the groin and iliac region from lymph nodes but this should be preventative for PE because of obstruction.    Is no fever or chills    He had a port placed and chemo education and a PET scan which confirms the disease that we had seen previously in addition shows the extensive skin lesions in the left groin which are the site of his primary tumor    Colonoscopy during the hospitalization because of bleeding per rectum showed a tubulovillous adenoma but no malignancy    He has ready to start his immunotherapy with ipilimumab and Opdivo.  He knows what to expect in terms of side effect and pretreatment hormone levels have been drawn    He is aware that this is not a curable situation typically but that he may not therapy can be very effective in this situation in a subset of patients    We will set up for him to see radiation therapy next week \    Active Ambulatory Problems     Diagnosis Date Noted    Acute pulmonary embolism, unspecified pulmonary embolism type, unspecified whether acute cor pulmonale present 05/28/2023    Anemia 05/29/2023    Left groin mass 05/29/2023    Hepatic lesion 05/29/2023    Abdominal lymphadenopathy 05/29/2023    Moderate  "malnutrition 05/30/2023    Left lower quadrant abdominal mass 05/28/2023    Metastatic melanoma 05/31/2023    Rectal polyp 06/02/2023    Metastasis to brain 06/02/2023     Resolved Ambulatory Problems     Diagnosis Date Noted    No Resolved Ambulatory Problems     Past Medical History:   Diagnosis Date    Colon polyps      Past Surgical History:   Procedure Laterality Date    COLONOSCOPY N/A 06/02/2023    40 MM TUBULOVILLOUS ADENOMA POLYP IN DISTAL RECTUM, RESCOPE IN 1 YR, DR. SHANNA MOTTA AT [facility]    COLONOSCOPY W/ POLYPECTOMY N/A 06/02/2023    40 MM POLYP IN DISTAL RECTUM, RESECTION AND RETREIVAL COMPLETE, RESCOPE IN 1 YR, DR. SHANNA MOTTA AT [facility]    PORTACATH PLACEMENT  06/02/2023    DONE AT [facility]    US GUIDED LYMPH NODE BIOPSY  05/30/2023    METASTATIC MELANOMA, DONE AT [facility]    VENOUS ACCESS DEVICE (PORT) INSERTION N/A 06/02/2023    Procedure: INFUSAPORT PLACEMENT;  Surgeon: Vicki Layne MD;  Location: [facility];  Service: General;  Laterality: N/A;       Family History have been reviewed and are without significant changes except as mentioned.    Review of Systems   Constitutional:  Positive for activity change and appetite change.   Respiratory:  Positive for shortness of breath.    Cardiovascular:  Positive for leg swelling (Left leg) and syncope.   A comprehensive 14 point review of systems was performed and was negative except as mentioned.    Medications:  The current medication list was reviewed in the EMR    ALLERGIES:  No Known Allergies    Objective      Vitals:    06/14/23 0825   BP: 102/69   Pulse: 79   Resp: 18   Temp: 96.4 °F (35.8 °C)   TempSrc: Temporal   SpO2: 100%   Weight: Comment: Unable to stand   Height: 182.9 cm (72.01\")   PainSc: 0-No pain         6/14/2023     8:07 AM   Current Status   ECOG score 0       Physical Exam    CONSTITUTIONAL:  Vital signs reviewed.  No distress, looks comfortable.  EYES:  Conjunctivae and lids unremarkable.  PERRLA  EARS,NOSE,MOUTH,THROAT:  Ears and nose " appear unremarkable.  Lips, teeth, gums appear unremarkable.  RESPIRATORY:  Normal respiratory effort.  Lungs clear to auscultation bilaterally.  CARDIOVASCULAR:  Normal S1, S2.  No murmurs rubs or gallops.  3+ edema in his entire left leg to the thigh   GASTROINTESTINAL: Abdomen appears unremarkable.  Nontender.  No hepatomegaly.  No splenomegaly.BULKY left inguinal adenopathy  LYMPHATIC:  No cervical, supraclavicular, axillary lymphadenopathy.  SKIN:  Warm.  No rashes.  Nodular pigmented in-transit cutaneous lesions seen in the left groin consistent with melanoma  PSYCHIATRIC:  Normal judgment and insight.  Normal mood and affect.    I have reexamined the patient and the results are consistent with the previously documented exam. Brodie Rocha MD       RECENT LABS:  Hematology WBC   Date Value Ref Range Status   06/14/2023 11.47 (H) 3.40 - 10.80 10*3/mm3 Final     RBC   Date Value Ref Range Status   06/14/2023 4.32 4.14 - 5.80 10*6/mm3 Final     Hemoglobin   Date Value Ref Range Status   06/14/2023 11.7 (L) 13.0 - 17.7 g/dL Final     Hematocrit   Date Value Ref Range Status   06/14/2023 37.8 37.5 - 51.0 % Final     Platelets   Date Value Ref Range Status   06/14/2023 388 140 - 450 10*3/mm3 Final     Lab Results   Component Value Date    GLUCOSE 155 (H) 06/14/2023    BUN 13 06/14/2023    CREATININE 1.16 06/14/2023    EGFR 72.1 06/14/2023    BCR 11.2 06/14/2023    K 4.1 06/14/2023    CO2 20.6 (L) 06/14/2023    CALCIUM 9.2 06/14/2023    ALBUMIN 3.2 (L) 06/14/2023    BILITOT 0.8 06/14/2023    AST 13 06/14/2023    ALT 17 06/14/2023            Final Diagnosis  1. Lymph Node, Left Groin, U/S-Guided Core Needle Biopsy:  A. METASTATIC MELANOMA (SEE COMMENT).  jab/pkm    Assessment & Plan     1.  Generalized abdominal lymphadenopathy with a rectal mass liver mets and pulmonary nodules and bone mets suspicious for rectal carcinoma with metastasis-cannot exclude bladder cancer or lymphoma although lymphoma less likely  to have lytic bone lesions and liver involvement  CEA/PSA  normal-  Cystoscopy performed 5/29/2023 with mass effect involving the left lateral wall of the bladder and 1 cm erythematous area along the posterior wall concerning for external invasion.  Path shows melanoma- BRAF V600E +  mri brain shows 2 small brain metastasis  PET scan confirms primary site in the left groin with [place] liver lung and brain metastasis  Ipilimumab and Opdivo started on 6/14/2023     2.  Incidental bilateral PEs and thrombus in the left external iliac due to compression by bulky nodes on IV heparin  Continues on heparin drip.  Discharged on Eliquis 10 twice daily taper down to 5 twice daily  Worsening shortness of breath on 6/14/2020 3 repeat CT angiogram shows no new PEs effusions or obvious cause of shortness of breath  Eliquis continued at 5 twice daily     3.  Mild anemia - normal ferritin  Hemoglobin stable today     4.  Family history of breast cancer in his mother at age 86 and prostate cancer his father in his 60s     Plan     1.Opdivo 1 mg/kg plus ipilimumab 3 mg/kg IV every 3 weeks  2.  Repeat CT angiogram after chemotherapy today because of worsening shortness of breath  3.  Appointment with radiation therapy  4.  Return in 1 week for toxicity check  5.  Return in 3 weeks for cycle #2    Discussed with the patient and his daughter    I spent 40 total minutes, face-to-face, caring for King today. Greater than 50% of this time involved counseling and/or coordination of care as documented within this note.  Addendum radiology called and said there was no PE or progression of PE or effusion or cause for shortness of breath on CT angiogram      6/14/2023      CC:

## 2023-06-15 LAB — ACTH PLAS-MCNC: 55.2 PG/ML (ref 7.2–63.3)

## 2023-06-19 ENCOUNTER — TELEPHONE (OUTPATIENT)
Dept: ONCOLOGY | Facility: CLINIC | Age: 63
End: 2023-06-19
Payer: COMMERCIAL

## 2023-06-19 PROBLEM — Z86.718 PERSONAL HISTORY OF VENOUS THROMBOSIS AND EMBOLISM: Status: ACTIVE | Noted: 2023-06-19

## 2023-06-19 PROBLEM — L27.0 DRUG RASH: Status: ACTIVE | Noted: 2023-06-19

## 2023-06-19 PROBLEM — N17.9 AKI (ACUTE KIDNEY INJURY): Status: ACTIVE | Noted: 2023-06-19

## 2023-06-19 NOTE — TELEPHONE ENCOUNTER
Pt called in stating he developed a rash over the weekend. He spoke to MD on call who advised him to monitor it. Started on neck and chest and has now spread to arms and legs Not itching and denies and SOA or wheezing, and has not had nausea, vomiting or diarrhea. Rash is red and slightly raised, but not peeling or blistering. Reviewed wit Dr. Rocha. Pt will come in for APRN visit this week for eval 1 week after treatment and return to see her with his next nivo/ipi on July 5th. Returned call to discuss plan with pt, who now tells me he has tried to eat something and noticed mouth pain and that his tongue is red. Denied any throat pain for swelling, stating it's just my tongue.Denies any white patches, just some redness. Per Dr. Rocha advised to do warm salt water rinses and call back should he notice any white patches develop and of course to proceed to ER if he feels any swelling in his throat starting or SOA, wheezing or fever. He v/u. After hanging up phone with patient, Dr. Rocha received call from and ER physician who is also a friend of the patient. She has advised him to go to the ER as he told her he is unable to eat or take in fluids due to his throat being swollen and he had a fever of 102 on Saturday.

## 2023-06-20 ENCOUNTER — TELEPHONE (OUTPATIENT)
Dept: ONCOLOGY | Facility: CLINIC | Age: 63
End: 2023-06-20

## 2023-06-20 PROBLEM — Z86.711 HISTORY OF PULMONARY EMBOLISM: Status: ACTIVE | Noted: 2023-05-28

## 2023-06-20 PROBLEM — R13.10 ODYNOPHAGIA: Status: ACTIVE | Noted: 2023-06-20

## 2023-06-20 PROBLEM — T45.1X5A ADVERSE EFFECT OF ANTINEOPLASTIC DRUG: Status: ACTIVE | Noted: 2023-06-20

## 2023-06-20 NOTE — TELEPHONE ENCOUNTER
Caller: King George    Relationship to patient: Self    Best call back number: [phone]    Patient is needing: TO LET US KNOW HE IS IN HOSPITAL AND NEEDS TO CANCEL 6-21-23 LAB AND F/U APPTS. HE WAS NOT SURE IF WE WOULD SEEM HIM IN THE HOSPITAL.

## 2023-06-22 PROBLEM — R13.10 ODYNOPHAGIA: Status: RESOLVED | Noted: 2023-06-20 | Resolved: 2023-06-22

## 2023-07-06 PROBLEM — Z45.9 ENCOUNTER FOR MANAGEMENT OF IMPLANTED DEVICE: Status: ACTIVE | Noted: 2023-07-06

## 2023-07-07 ENCOUNTER — APPOINTMENT (OUTPATIENT)
Dept: GENERAL RADIOLOGY | Facility: HOSPITAL | Age: 63
DRG: 659 | End: 2023-07-07
Payer: COMMERCIAL

## 2023-07-07 ENCOUNTER — HOSPITAL ENCOUNTER (INPATIENT)
Facility: HOSPITAL | Age: 63
LOS: 4 days | Discharge: HOME | DRG: 659 | End: 2023-07-14
Attending: EMERGENCY MEDICINE | Admitting: STUDENT IN AN ORGANIZED HEALTH CARE EDUCATION/TRAINING PROGRAM
Payer: COMMERCIAL

## 2023-07-07 DIAGNOSIS — R10.32 INGUINAL PAIN, LEFT: Primary | ICD-10-CM

## 2023-07-07 DIAGNOSIS — N17.9 AKI (ACUTE KIDNEY INJURY): ICD-10-CM

## 2023-07-07 DIAGNOSIS — C43.9 METASTATIC MELANOMA: ICD-10-CM

## 2023-07-07 DIAGNOSIS — R26.2 DIFFICULTY WALKING: ICD-10-CM

## 2023-07-07 PROBLEM — N13.30 HYDROURETERONEPHROSIS: Status: ACTIVE | Noted: 2023-07-07

## 2023-07-07 LAB
ALBUMIN SERPL-MCNC: 3.2 G/DL (ref 3.5–5.2)
ALBUMIN/GLOB SERPL: 0.7 G/DL
ALP SERPL-CCNC: 64 U/L (ref 39–117)
ALT SERPL W P-5'-P-CCNC: 14 U/L (ref 1–41)
ANION GAP SERPL CALCULATED.3IONS-SCNC: 10.3 MMOL/L (ref 5–15)
APTT PPP: 31.1 SECONDS (ref 22.7–35.4)
APTT PPP: 44.4 SECONDS (ref 22.7–35.4)
AST SERPL-CCNC: 13 U/L (ref 1–40)
BASOPHILS # BLD AUTO: 0.07 10*3/MM3 (ref 0–0.2)
BASOPHILS NFR BLD AUTO: 0.9 % (ref 0–1.5)
BILIRUB SERPL-MCNC: 0.4 MG/DL (ref 0–1.2)
BUN SERPL-MCNC: 23 MG/DL (ref 8–23)
BUN/CREAT SERPL: 12 (ref 7–25)
CALCIUM SPEC-SCNC: 9 MG/DL (ref 8.6–10.5)
CHLORIDE SERPL-SCNC: 102 MMOL/L (ref 98–107)
CO2 SERPL-SCNC: 20.7 MMOL/L (ref 22–29)
CREAT SERPL-MCNC: 1.91 MG/DL (ref 0.76–1.27)
DEPRECATED RDW RBC AUTO: 46.3 FL (ref 37–54)
EGFRCR SERPLBLD CKD-EPI 2021: 39.4 ML/MIN/1.73
EOSINOPHIL # BLD AUTO: 0.29 10*3/MM3 (ref 0–0.4)
EOSINOPHIL NFR BLD AUTO: 3.9 % (ref 0.3–6.2)
ERYTHROCYTE [DISTWIDTH] IN BLOOD BY AUTOMATED COUNT: 15.5 % (ref 12.3–15.4)
GLOBULIN UR ELPH-MCNC: 4.4 GM/DL
GLUCOSE SERPL-MCNC: 106 MG/DL (ref 65–99)
HCT VFR BLD AUTO: 34.7 % (ref 37.5–51)
HGB BLD-MCNC: 11.3 G/DL (ref 13–17.7)
IMM GRANULOCYTES # BLD AUTO: 0.03 10*3/MM3 (ref 0–0.05)
IMM GRANULOCYTES NFR BLD AUTO: 0.4 % (ref 0–0.5)
INR PPP: 1.32 (ref 0.9–1.1)
LYMPHOCYTES # BLD AUTO: 0.9 10*3/MM3 (ref 0.7–3.1)
LYMPHOCYTES NFR BLD AUTO: 12.1 % (ref 19.6–45.3)
MCH RBC QN AUTO: 27.2 PG (ref 26.6–33)
MCHC RBC AUTO-ENTMCNC: 32.6 G/DL (ref 31.5–35.7)
MCV RBC AUTO: 83.6 FL (ref 79–97)
MONOCYTES # BLD AUTO: 0.55 10*3/MM3 (ref 0.1–0.9)
MONOCYTES NFR BLD AUTO: 7.4 % (ref 5–12)
NEUTROPHILS NFR BLD AUTO: 5.6 10*3/MM3 (ref 1.7–7)
NEUTROPHILS NFR BLD AUTO: 75.3 % (ref 42.7–76)
NRBC BLD AUTO-RTO: 0 /100 WBC (ref 0–0.2)
PLATELET # BLD AUTO: 177 10*3/MM3 (ref 140–450)
PMV BLD AUTO: 8.6 FL (ref 6–12)
POTASSIUM SERPL-SCNC: 4.4 MMOL/L (ref 3.5–5.2)
PROT SERPL-MCNC: 7.6 G/DL (ref 6–8.5)
PROTHROMBIN TIME: 16.6 SECONDS (ref 11.7–14.2)
RBC # BLD AUTO: 4.15 10*6/MM3 (ref 4.14–5.8)
SODIUM SERPL-SCNC: 133 MMOL/L (ref 136–145)
WBC NRBC COR # BLD: 7.44 10*3/MM3 (ref 3.4–10.8)

## 2023-07-07 PROCEDURE — 99222 1ST HOSP IP/OBS MODERATE 55: CPT | Performed by: INTERNAL MEDICINE

## 2023-07-07 PROCEDURE — 80053 COMPREHEN METABOLIC PANEL: CPT | Performed by: EMERGENCY MEDICINE

## 2023-07-07 PROCEDURE — 85025 COMPLETE CBC W/AUTO DIFF WBC: CPT | Performed by: EMERGENCY MEDICINE

## 2023-07-07 PROCEDURE — 73502 X-RAY EXAM HIP UNI 2-3 VIEWS: CPT

## 2023-07-07 PROCEDURE — G0378 HOSPITAL OBSERVATION PER HR: HCPCS

## 2023-07-07 PROCEDURE — 25010000002 HEPARIN (PORCINE) 25000-0.45 UT/250ML-% SOLUTION: Performed by: NURSE PRACTITIONER

## 2023-07-07 PROCEDURE — 85730 THROMBOPLASTIN TIME PARTIAL: CPT | Performed by: NURSE PRACTITIONER

## 2023-07-07 PROCEDURE — 85610 PROTHROMBIN TIME: CPT | Performed by: NURSE PRACTITIONER

## 2023-07-07 PROCEDURE — 99285 EMERGENCY DEPT VISIT HI MDM: CPT

## 2023-07-07 RX ORDER — HEPARIN SODIUM 10000 [USP'U]/100ML
18 INJECTION, SOLUTION INTRAVENOUS
Status: DISCONTINUED | OUTPATIENT
Start: 2023-07-07 | End: 2023-07-14

## 2023-07-07 RX ORDER — FOLIC ACID 1 MG/1
1 TABLET ORAL DAILY
Status: DISCONTINUED | OUTPATIENT
Start: 2023-07-07 | End: 2023-07-14 | Stop reason: HOSPADM

## 2023-07-07 RX ORDER — BISACODYL 10 MG
10 SUPPOSITORY, RECTAL RECTAL DAILY PRN
Status: DISCONTINUED | OUTPATIENT
Start: 2023-07-07 | End: 2023-07-14 | Stop reason: HOSPADM

## 2023-07-07 RX ORDER — ACETAMINOPHEN 160 MG/5ML
650 SOLUTION ORAL EVERY 4 HOURS PRN
Status: DISCONTINUED | OUTPATIENT
Start: 2023-07-07 | End: 2023-07-14 | Stop reason: HOSPADM

## 2023-07-07 RX ORDER — PANTOPRAZOLE SODIUM 40 MG/1
40 TABLET, DELAYED RELEASE ORAL
Status: DISCONTINUED | OUTPATIENT
Start: 2023-07-07 | End: 2023-07-09

## 2023-07-07 RX ORDER — SODIUM CHLORIDE 0.9 % (FLUSH) 0.9 %
10 SYRINGE (ML) INJECTION EVERY 12 HOURS SCHEDULED
Status: DISCONTINUED | OUTPATIENT
Start: 2023-07-07 | End: 2023-07-14 | Stop reason: HOSPADM

## 2023-07-07 RX ORDER — ACETAMINOPHEN 325 MG/1
650 TABLET ORAL EVERY 4 HOURS PRN
Status: DISCONTINUED | OUTPATIENT
Start: 2023-07-07 | End: 2023-07-14 | Stop reason: HOSPADM

## 2023-07-07 RX ORDER — ONDANSETRON 2 MG/ML
4 INJECTION INTRAMUSCULAR; INTRAVENOUS EVERY 6 HOURS PRN
Status: DISCONTINUED | OUTPATIENT
Start: 2023-07-07 | End: 2023-07-14 | Stop reason: HOSPADM

## 2023-07-07 RX ORDER — SODIUM CHLORIDE 9 MG/ML
75 INJECTION, SOLUTION INTRAVENOUS CONTINUOUS
Status: DISCONTINUED | OUTPATIENT
Start: 2023-07-07 | End: 2023-07-11

## 2023-07-07 RX ORDER — CHOLECALCIFEROL (VITAMIN D3) 125 MCG
1000 CAPSULE ORAL DAILY
Status: DISCONTINUED | OUTPATIENT
Start: 2023-07-08 | End: 2023-07-14 | Stop reason: HOSPADM

## 2023-07-07 RX ORDER — HEPARIN SODIUM 5000 [USP'U]/ML
40-80 INJECTION, SOLUTION INTRAVENOUS; SUBCUTANEOUS EVERY 6 HOURS PRN
Status: DISCONTINUED | OUTPATIENT
Start: 2023-07-07 | End: 2023-07-14 | Stop reason: ALTCHOICE

## 2023-07-07 RX ORDER — PREDNISONE 20 MG/1
10 TABLET ORAL DAILY
Status: DISCONTINUED | OUTPATIENT
Start: 2023-07-08 | End: 2023-07-11

## 2023-07-07 RX ORDER — SODIUM CHLORIDE 0.9 % (FLUSH) 0.9 %
10 SYRINGE (ML) INJECTION AS NEEDED
Status: DISCONTINUED | OUTPATIENT
Start: 2023-07-07 | End: 2023-07-14 | Stop reason: HOSPADM

## 2023-07-07 RX ORDER — HYDROCODONE BITARTRATE AND ACETAMINOPHEN 5; 325 MG/1; MG/1
1 TABLET ORAL EVERY 4 HOURS PRN
Status: ACTIVE | OUTPATIENT
Start: 2023-07-07 | End: 2023-07-14

## 2023-07-07 RX ORDER — AMOXICILLIN 250 MG
2 CAPSULE ORAL 2 TIMES DAILY
Status: DISCONTINUED | OUTPATIENT
Start: 2023-07-07 | End: 2023-07-14 | Stop reason: HOSPADM

## 2023-07-07 RX ORDER — SODIUM CHLORIDE 9 MG/ML
40 INJECTION, SOLUTION INTRAVENOUS AS NEEDED
Status: DISCONTINUED | OUTPATIENT
Start: 2023-07-07 | End: 2023-07-14 | Stop reason: HOSPADM

## 2023-07-07 RX ORDER — POLYETHYLENE GLYCOL 3350 17 G/17G
17 POWDER, FOR SOLUTION ORAL DAILY PRN
Status: DISCONTINUED | OUTPATIENT
Start: 2023-07-07 | End: 2023-07-14 | Stop reason: HOSPADM

## 2023-07-07 RX ORDER — BISACODYL 5 MG/1
5 TABLET, DELAYED RELEASE ORAL DAILY PRN
Status: DISCONTINUED | OUTPATIENT
Start: 2023-07-07 | End: 2023-07-14 | Stop reason: HOSPADM

## 2023-07-07 RX ORDER — ACETAMINOPHEN 650 MG/1
650 SUPPOSITORY RECTAL EVERY 4 HOURS PRN
Status: DISCONTINUED | OUTPATIENT
Start: 2023-07-07 | End: 2023-07-14 | Stop reason: HOSPADM

## 2023-07-07 RX ORDER — ONDANSETRON HYDROCHLORIDE 8 MG/1
TABLET, FILM COATED ORAL EVERY 8 HOURS PRN
COMMUNITY

## 2023-07-07 RX ADMIN — SODIUM CHLORIDE 75 ML/HR: 9 INJECTION, SOLUTION INTRAVENOUS at 14:14

## 2023-07-07 RX ADMIN — SENNOSIDES AND DOCUSATE SODIUM 2 TABLET: 50; 8.6 TABLET ORAL at 16:22

## 2023-07-07 RX ADMIN — Medication 10 ML: at 16:22

## 2023-07-07 RX ADMIN — Medication 10 ML: at 20:13

## 2023-07-07 RX ADMIN — HEPARIN SODIUM 18 UNITS/KG/HR: 10000 INJECTION, SOLUTION INTRAVENOUS at 16:19

## 2023-07-07 RX ADMIN — PANTOPRAZOLE SODIUM 40 MG: 40 TABLET, DELAYED RELEASE ORAL at 17:28

## 2023-07-07 NOTE — ED NOTES
"Nursing report ED to floor  King George  63 y.o.  male    HPI :   Chief Complaint   Patient presents with    Leg Pain     DVT r/o to Left leg, swelling to entire left leg up into groin. +3 pitting Edema throughout leg into groin. PE to left lung 3 weeks ago, Eliquis BID.        Admitting doctor:   Farhat Hobbs MD    Admitting diagnosis:   The primary encounter diagnosis was Inguinal pain, left. Diagnoses of LENIN (acute kidney injury), Difficulty walking, and Metastatic melanoma were also pertinent to this visit.    Code status:   Current Code Status       Date Active Code Status Order ID Comments User Context       Prior            Allergies:   Patient has no known allergies.    Isolation:   No active isolations    Intake and Output  No intake or output data in the 24 hours ending 07/07/23 1200    Weight:       07/07/23  1040   Weight: 102 kg (225 lb)       Most recent vitals:   Vitals:    07/07/23 1005 07/07/23 1023 07/07/23 1040   BP:  138/85    Pulse: 83     Resp: 16     Temp: 98.3 °F (36.8 °C)     SpO2: 100%     Weight:   102 kg (225 lb)   Height:   182.9 cm (72\")       Active LDAs/IV Access:   Lines, Drains & Airways       Active LDAs       Name Placement date Placement time Site Days    Peripheral IV 07/07/23 1040 Posterior;Right Hand 07/07/23  1040  Hand  less than 1    Single Lumen Implantable Port Chest --  --  Chest  --                    Labs (abnormal labs have a star):   Labs Reviewed   COMPREHENSIVE METABOLIC PANEL - Abnormal; Notable for the following components:       Result Value    Glucose 106 (*)     Creatinine 1.91 (*)     Sodium 133 (*)     CO2 20.7 (*)     Albumin 3.2 (*)     eGFR 39.4 (*)     All other components within normal limits    Narrative:     GFR Normal >60  Chronic Kidney Disease <60  Kidney Failure <15     CBC WITH AUTO DIFFERENTIAL - Abnormal; Notable for the following components:    Hemoglobin 11.3 (*)     Hematocrit 34.7 (*)     RDW 15.5 (*)     Lymphocyte % 12.1 (*)  "    All other components within normal limits   CBC AND DIFFERENTIAL    Narrative:     The following orders were created for panel order CBC & Differential.  Procedure                               Abnormality         Status                     ---------                               -----------         ------                     CBC Auto Differential[[id number]]        Abnormal            Final result                 Please view results for these tests on the individual orders.       EKG:   No orders to display       Meds given in ED:   Medications   sodium chloride 0.9 % flush 10 mL (has no administration in time range)   sodium chloride 0.9 % flush 10 mL (has no administration in time range)   sodium chloride 0.9 % flush 10 mL (has no administration in time range)   sodium chloride 0.9 % infusion 40 mL (has no administration in time range)   sennosides-docusate (PERICOLACE) 8.6-50 MG per tablet 2 tablet (has no administration in time range)     And   polyethylene glycol (MIRALAX) packet 17 g (has no administration in time range)     And   bisacodyl (DULCOLAX) EC tablet 5 mg (has no administration in time range)     And   bisacodyl (DULCOLAX) suppository 10 mg (has no administration in time range)   sodium chloride 0.9 % infusion (has no administration in time range)   acetaminophen (TYLENOL) tablet 650 mg (has no administration in time range)     Or   acetaminophen (TYLENOL) 160 MG/5ML solution 650 mg (has no administration in time range)     Or   acetaminophen (TYLENOL) suppository 650 mg (has no administration in time range)   HYDROcodone-acetaminophen (NORCO) 5-325 MG per tablet 1 tablet (has no administration in time range)   ondansetron (ZOFRAN) injection 4 mg (has no administration in time range)       Imaging results:  XR Hip With or Without Pelvis 2 - 3 View Left    Result Date: 7/7/2023   As described.  This report was finalized on 7/7/2023 10:54 AM by Dr. Alejandro Yu M.D.       Ambulatory status:    - with assist    Social issues:   Social History     Socioeconomic History    Marital status: Unknown   Tobacco Use    Smoking status: Never     Passive exposure: Never    Smokeless tobacco: Never   Vaping Use    Vaping Use: Unknown   Substance and Sexual Activity    Alcohol use: Never    Drug use: Never    Sexual activity: Never       NIH Stroke Scale:       Cha Pollard RN  07/07/23 12:00 EDT

## 2023-07-07 NOTE — H&P
Patient Name:  King George  YOB: 1960  MRN:  [id number]  Admit Date:  7/7/2023  Patient Care Team:  Mohini Cortes MD as PCP - General (Internal Medicine)  Bernardo Azar MD as Referring Physician (Hospitalist)  Brodie Rocha MD as Consulting Physician (Hematology and Oncology)      Subjective   History Present Illness     Chief Complaint   Patient presents with    Leg Pain     DVT r/o to Left leg, swelling to entire left leg up into groin. +3 pitting Edema throughout leg into groin. PE to left lung 3 weeks ago, Eliquis BID.      History of Present Illness  Mr. George is a 63 y.o. non-smoker with a history of recently diagnosed metastatic melanoma and PE/DVT that presents to [facility] complaining of leg pain. The patient has had a very complicated medical history as of late. He was admitted to this facility 5/28-6/4/23 where he was initially diagnosed with new metastatic melanoma. He was found to have liver, lung, bone and brain involvement and had colonoscopy for rectal bleeding where a polyp was resected and injected. He also had a cystoscopy that revealed mass effect on the lateral wall of the bladder with erythematous area on the posterior bladder wall that was concerning for external invasion. He underwent port placement and transitioned to [facility] for outpatient follow up with oncology.    He returned to the hospital shortly after this for new onset of dysphagia as well as rash. He received his first immunotherapy on 6/14 and was felt to have a reaction with LENIN noted as well. His Lenin responded well to fluids and oncology placed him on steroids with vast improvement in his symptoms.    The patient states he was due to see urology today given some ongoing LENIN noted by oncology. CT A/P was obtained 7/5 for this reason and he was found to have bilateral hydroureteronephrosis that was new from prior Cts with progression of disease. He was being referred to urology  today for this reason for possible stenting. He received his immunotherapy just yesterday.   The patient states he was attempting to go to his urology appointment today when his left leg began hurting and gave out on him. He states he noticed some burning in his left groin after immunotherapy yesterday but bent down to put on his shoes today when he had a sudden surge of knife like pain in the area. He states this took him to his knees. He called his oncologist who said to go to the urology appointment if he was able but to otherwise go to the ED for his symptoms. He states he attempted to go to the urologist but couldn't get out of the car so came here instead. He states his pain has been slowly decreasing since this time. He took two Tylenol earlier and over time the pain has lessened. He denies any increased swelling in his legs and denies any recent chest pain or dyspnea. He feels his appetite is good and denies any GI complaints. He has been having urgency/frequency and does not always feel like he is emptying his bladder. He took his Eliquis this morning.   In the ED, he was afebrile and hemodynamically stable. Lab work showed a creatinine of 1.91, sodium 133 and normal LFTs. WBC were 7.44 and hemoglobin 11.3. X-ray of his hip was negative for any acute findings. He is being admitted for further treatment at this time.    Review of Systems   Constitutional:  Negative for chills and fever.   HENT:  Negative for congestion and ear pain.    Respiratory:  Negative for cough and shortness of breath.    Cardiovascular:  Positive for leg swelling. Negative for chest pain.   Gastrointestinal:  Negative for abdominal pain, constipation, diarrhea and vomiting.   Genitourinary:  Positive for frequency and urgency. Negative for difficulty urinating and dysuria.   Musculoskeletal:  Positive for arthralgias, gait problem and myalgias.   Skin:  Negative for color change and pallor.   Neurological:  Positive for weakness.  Negative for dizziness and light-headedness.   Psychiatric/Behavioral:  Negative for confusion. The patient is not nervous/anxious.       Personal History     Past Medical History:   Diagnosis Date    Abdominal lymphadenopathy 05/29/2023    Acute pulmonary embolism, unspecified pulmonary embolism type, unspecified whether acute cor pulmonale present 05/28/2023    ADMITTED TO [facility]    Anemia 05/29/2023    Colon polyps     FOLLOWED BY DR. SHANNA MOTTA    Drug rash 6/19/2023    Hepatic lesion 05/29/2023    Left lower quadrant abdominal mass 05/28/2023    LEFT INGUINAL BX SHOWED METASTATIC MELANOMA    Metastasis to brain 06/02/2023    Metastatic melanoma 05/31/2023    Moderate Malnutrition (HCC) 05/30/2023    Personal history of venous thrombosis and embolism 6/19/2023     Past Surgical History:   Procedure Laterality Date    COLONOSCOPY N/A 06/02/2023    40 MM TUBULOVILLOUS ADENOMA POLYP IN DISTAL RECTUM, RESCOPE IN 1 YR, DR. SHANNA MOTTA AT [facility]    COLONOSCOPY W/ POLYPECTOMY N/A 06/02/2023    40 MM POLYP IN DISTAL RECTUM, RESECTION AND RETREIVAL COMPLETE, RESCOPE IN 1 YR, DR. SHANNA MOTTA AT [facility]    PORTACATH PLACEMENT  06/02/2023    DONE AT [facility]    US GUIDED LYMPH NODE BIOPSY  05/30/2023    METASTATIC MELANOMA, DONE AT [facility]    VENOUS ACCESS DEVICE (PORT) INSERTION N/A 06/02/2023    Procedure: INFUSAPORT PLACEMENT;  Surgeon: Vicki Layne MD;  Location: [facility];  Service: General;  Laterality: N/A;     Family History   Problem Relation Age of Onset    Breast cancer Mother     Prostate cancer Father      Social History     Tobacco Use    Smoking status: Never     Passive exposure: Never    Smokeless tobacco: Never   Vaping Use    Vaping Use: Unknown   Substance Use Topics    Alcohol use: Never    Drug use: Never     Medications Prior to Admission   Medication Sig Dispense Refill Last Dose    apixaban (ELIQUIS) 5 MG tablet tablet Take 1 tablet by mouth Every 12 (Twelve) Hours. Indications: DVT/PE (active  thrombosis) 60 tablet 0 7/7/2023    folic acid (FOLVITE) 1 MG tablet Take 1 tablet by mouth Daily. 30 tablet 0 7/7/2023    ondansetron (ZOFRAN) 8 MG tablet Take  by mouth Every 8 (Eight) Hours As Needed for Nausea or Vomiting.       pantoprazole (PROTONIX) 40 MG EC tablet Take 1 tablet by mouth 2 (Two) Times a Day Before Meals. 30 tablet 0 7/6/2023    predniSONE (DELTASONE) 10 MG tablet Take 1 tablet by mouth Daily. 30 tablet 3 7/7/2023    vitamin B-12 (CYANOCOBALAMIN) 1000 MCG tablet Take 1 tablet by mouth Daily. 30 tablet 0 7/7/2023     Allergies:  No Known Allergies    Objective    Objective     Vital Signs  Temp:  [97.2 °F (36.2 °C)-98.3 °F (36.8 °C)] 97.2 °F (36.2 °C)  Heart Rate:  [61-83] 68  Resp:  [16-18] 16  BP: (138-140)/(78-85) 139/84  SpO2:  [99 %-100 %] 99 %  on   ;   Device (Oxygen Therapy): room air  Body mass index is 30.52 kg/m².    Physical Exam  Vitals and nursing note reviewed.   Constitutional:       Appearance: He is ill-appearing. He is not toxic-appearing.   HENT:      Head: Normocephalic and atraumatic.      Right Ear: External ear normal.      Left Ear: External ear normal.      Nose: Nose normal.   Cardiovascular:      Rate and Rhythm: Normal rate and regular rhythm.      Pulses: Normal pulses.   Pulmonary:      Effort: Pulmonary effort is normal. No respiratory distress.      Breath sounds: Normal breath sounds.   Abdominal:      General: Bowel sounds are normal. There is no distension.      Palpations: Abdomen is soft.      Tenderness: There is no abdominal tenderness.   Musculoskeletal:         General: Swelling and tenderness present. Normal range of motion.      Comments: Moderate 1+ edema LLE, mild RLE   Skin:     General: Skin is warm and dry.      Findings: No bruising.      Comments: Healing rash to left shin.   Neurological:      General: No focal deficit present.      Mental Status: He is alert and oriented to person, place, and time.      Sensory: No sensory deficit.       Motor: Weakness present.      Coordination: Coordination normal.   Psychiatric:         Mood and Affect: Mood normal.         Behavior: Behavior normal.       Results Review:  I reviewed the patient's new clinical results.  I reviewed the patient's new imaging results and agree with the interpretation.  I reviewed the patient's other test results and agree with the interpretation  I personally viewed and interpreted the patient's EKG/Telemetry data  Discussed with ED provider.    Lab Results (last 24 hours)       Procedure Component Value Units Date/Time    CBC & Differential [[id number]]  (Abnormal) Collected: 07/07/23 1031    Specimen: Blood Updated: 07/07/23 1052    Narrative:      The following orders were created for panel order CBC & Differential.  Procedure                               Abnormality         Status                     ---------                               -----------         ------                     CBC Auto Differential[[id number]]        Abnormal            Final result                 Please view results for these tests on the individual orders.    Comprehensive Metabolic Panel [[id number]]  (Abnormal) Collected: 07/07/23 1031    Specimen: Blood Updated: 07/07/23 1113     Glucose 106 mg/dL      BUN 23 mg/dL      Creatinine 1.91 mg/dL      Sodium 133 mmol/L      Potassium 4.4 mmol/L      Comment: Slight hemolysis detected by analyzer. Results may be affected.        Chloride 102 mmol/L      CO2 20.7 mmol/L      Calcium 9.0 mg/dL      Total Protein 7.6 g/dL      Albumin 3.2 g/dL      ALT (SGPT) 14 U/L      AST (SGOT) 13 U/L      Comment: Slight hemolysis detected by analyzer. Results may be affected.        Alkaline Phosphatase 64 U/L      Total Bilirubin 0.4 mg/dL      Globulin 4.4 gm/dL      A/G Ratio 0.7 g/dL      BUN/Creatinine Ratio 12.0     Anion Gap 10.3 mmol/L      eGFR 39.4 mL/min/1.73     Narrative:      GFR Normal >60  Chronic Kidney Disease <60  Kidney Failure <15      CBC Auto  Differential [[id number]]  (Abnormal) Collected: 07/07/23 1031    Specimen: Blood Updated: 07/07/23 1052     WBC 7.44 10*3/mm3      RBC 4.15 10*6/mm3      Hemoglobin 11.3 g/dL      Hematocrit 34.7 %      MCV 83.6 fL      MCH 27.2 pg      MCHC 32.6 g/dL      RDW 15.5 %      RDW-SD 46.3 fl      MPV 8.6 fL      Platelets 177 10*3/mm3      Neutrophil % 75.3 %      Lymphocyte % 12.1 %      Monocyte % 7.4 %      Eosinophil % 3.9 %      Basophil % 0.9 %      Immature Grans % 0.4 %      Neutrophils, Absolute 5.60 10*3/mm3      Lymphocytes, Absolute 0.90 10*3/mm3      Monocytes, Absolute 0.55 10*3/mm3      Eosinophils, Absolute 0.29 10*3/mm3      Basophils, Absolute 0.07 10*3/mm3      Immature Grans, Absolute 0.03 10*3/mm3      nRBC 0.0 /100 WBC     Protime-INR [[id number]] Collected: 07/07/23 1531    Specimen: Blood Updated: 07/07/23 1544    aPTT [[id number]] Collected: 07/07/23 1531    Specimen: Blood Updated: 07/07/23 1544            Imaging Results (Last 24 Hours)       Procedure Component Value Units Date/Time    XR Hip With or Without Pelvis 2 - 3 View Left [[id number]] Collected: 07/07/23 1052     Updated: 07/07/23 1057    Narrative:      XR HIP W OR WO PELVIS 2-3 VIEW LEFT-     INDICATIONS: Pain     TECHNIQUE: Frontal view of the pelvis, 2 views of the left hip     COMPARISON: None available     FINDINGS:     No acute fracture, erosion, or dislocation is identified. Prominent  degenerative changes of the hips are noted, right more than left,  including joint space narrowing, marginal spurring, subcortical  eburnation. Follow-up/further evaluation can be obtained as indications  persist.       Impression:         As described.     This report was finalized on 7/7/2023 10:54 AM by Dr. Alejandro Yu M.D.                  Assessment/Plan     Active Hospital Problems    Diagnosis  POA    **Inguinal pain, left [R10.32]  Yes    Hydroureteronephrosis [N13.30]  Yes    LENIN (acute kidney injury) [N17.9]  Yes    Personal  history of venous thrombosis and embolism [Z86.718]  Not Applicable    Metastasis to brain [C79.31]  Yes    Metastatic melanoma [C43.9]  Yes    Left groin mass [R19.09]  Yes    Abdominal lymphadenopathy [R59.0]  Yes     Mr. George is a 63 year old male who presented to the hospital with complaints of left leg pain and difficulty walking. He was recently found to have extensive metastatic melanoma back in May of this year. He has had liver/lung/bone/brain involvement and currently receiving immunotherapy with Dr. Rocha with oncology. He has had some notable LENIN recently with CT A/P on 7/5 showing some new bilateral hydroureteronephrosis. He was due to see urology today but had worsening left groin/hip pain after receiving immunotherapy yesterday making it difficult to go to this appointment. He is being admitted for further care at this time.    Inguinal pain, left  -In the setting of known hypermetabolic thickening of the skin in the left upper thigh/groin/lateral scrotum felt to be the primary site of malignancy. He has extensive lymphadenopathy in the abdomen worse on the left with extensive left lower extremity DVT on prior PET/CT.  -X-ray of hip without acute findings.  -Pain currently improving at this time. Monitor for changes and consider CT/MRI if needed.  -Pain medication as needed.    LENIN  Hydroureteronephrosis  -Creatinine worsening since last admission with CT findings of new hydroureteronephrosis.  -Hydrate with NS at 75 cc/hr for now.  -Monitor urine output. Check bladder scans.  -Ask urology to see. Likely needs stenting. Will hold Eliquis (last dose 7/7 AM) and start heparin drip without initial bolus.     Metastatic melanoma  Metastasis to brain  Abdominal lymphadenopathy  History of PE/DVT  -Hematology/oncology to see.  -Hold Eliquis and start heparin given likely needs for intervention.  -Further management per oncology.  -Resume prednisone that he was on prior to admission given immunotherapy  reaction.      I discussed the patients findings and my recommendations with patient, family, nursing staff, and Dr. Hobbs .    VTE Prophylaxis -  heparin drip .  Code Status - Full code. Further goals of care per oncology discretion.       TANYA Mcadams  [facility]  07/07/23  15:47 EDT

## 2023-07-07 NOTE — ED PROVIDER NOTES
EMERGENCY DEPARTMENT ENCOUNTER    Room Number:  14/14  Date seen:  7/7/2023  PCP: Mohini Cortes MD  Historian(s): patient      HPI:  Chief Complaint: hip pain, leg pain  A complete HPI/ROS/PMH/PSH/SH/FH are unobtainable / limited due to: none  Context: [place] is a 63 y.o. male who presents to the ED c/o pain in the left hip and leg that is making it difficult for him to walk today.  He has a known history of metastatic melanoma and follows with Dr. Rocha in the hematology oncology clinic.  He has initiated immunotherapy treatments and had a immunotherapy treatment 2 days ago.  He has known retroperitoneal lymphadenopathy involvement and also there is record of a subcapsular metastases in the left hip.  [facility] with urology due to hydronephrosis thought to be related to his metastatic disease.  He was told that he might need ureteral stenting.  However, this morning when he woke up, he was having tremendous difficulties bearing weight or even flexing the hip because of new pain in that area.  He tried to get in the car and even to lift his leg was too difficult for him to do that.  So he decided to come here for further evaluation for since he could not safely make it to the urology clinic.        PAST MEDICAL HISTORY  Active Ambulatory Problems     Diagnosis Date Noted    History of pulmonary embolism 05/28/2023    Anemia 05/29/2023    Left groin mass 05/29/2023    Hepatic lesion 05/29/2023    Abdominal lymphadenopathy 05/29/2023    Moderate malnutrition 05/30/2023    Left lower quadrant abdominal mass 05/28/2023    Metastatic melanoma 05/31/2023    Rectal polyp 06/02/2023    Metastasis to brain 06/02/2023    LENIN (acute kidney injury) 06/19/2023    Drug rash 06/19/2023    Personal history of venous thrombosis and embolism 06/19/2023    Adverse effect of antineoplastic drug 06/20/2023    Encounter for management of implanted device 07/06/2023      Resolved Ambulatory Problems     Diagnosis Date Noted    Odynophagia 06/20/2023     Past Medical History:   Diagnosis Date    Acute pulmonary embolism, unspecified pulmonary embolism type, unspecified whether acute cor pulmonale present 05/28/2023    Colon polyps          PAST SURGICAL HISTORY  Past Surgical History:   Procedure Laterality Date    COLONOSCOPY N/A 06/02/2023    42 MM TUBULOVILLOUS ADENOMA POLYP IN DISTAL RECTUM, RESCOPE IN 1 YR, DR. SHANNA MOTTA AT [facility]    COLONOSCOPY W/ POLYPECTOMY N/A 06/02/2023    40 MM POLYP IN DISTAL RECTUM, RESECTION AND RETREIVAL COMPLETE, RESCOPE IN 1 YR, DR. SHANNA MOTTA AT [facility]    PORTACATH PLACEMENT  06/02/2023    DONE AT [facility]    US GUIDED LYMPH NODE BIOPSY  05/30/2023    METASTATIC MELANOMA, DONE AT [facility]    VENOUS ACCESS DEVICE (PORT) INSERTION N/A 06/02/2023    Procedure: INFUSAPORT PLACEMENT;  Surgeon: Vicki Layne MD;  Location: [facility];  Service: General;  Laterality: N/A;         FAMILY HISTORY  History reviewed. No pertinent family history.      SOCIAL HISTORY  Social History     Socioeconomic History    Marital status: Unknown   Tobacco Use    Smoking status: Never     Passive exposure: Never    Smokeless tobacco: Never   Vaping Use    Vaping Use: Unknown   Substance and Sexual Activity    Alcohol use: Never    Drug use: Never    Sexual activity: Never         ALLERGIES  Patient has no known allergies.        REVIEW OF SYSTEMS  Review of Systems   Constitutional:  Negative for activity change and fever.   HENT: Negative.     Eyes:  Negative for pain and visual disturbance.   Respiratory:  Negative for cough and shortness of breath.    Cardiovascular:  Positive for leg swelling. Negative for chest pain.   Gastrointestinal:  Negative for abdominal pain.   Musculoskeletal:  Positive for arthralgias, gait problem, joint swelling and myalgias.   Skin:  Negative for color change.   Neurological:  Negative for syncope and headaches.   All other systems  reviewed and are negative.         PHYSICAL EXAM  ED Triage Vitals [07/07/23 1005]   Temp Heart Rate Resp BP SpO2   98.3 °F (36.8 °C) 83 16 -- 100 %      Temp src Heart Rate Source Patient Position BP Location FiO2 (%)   -- -- -- -- --       Physical Exam      GENERAL: Calm, no diaphoresis, little bit anxious but no acute distress  HENT: nares patent, normocephalic and atraumatic  EYES: no scleral icterus, EOMI, normal conjunctiva  CV: regular rhythm, normal rate, normal distal pulses  RESPIRATORY: normal effort, no stridor  ABDOMEN: soft, nontender in all quadrants  MUSCULOSKELETAL: The left lower extremity is asymmetrically swollen throughout its entirety in comparison to the right side.  The left proximal thigh and anterior lateral hip show sequelae of metastatic melanoma dermatologically.  There are some tenderness in the left inguinal region.  NEURO: alert, moves all extremities, follows commands  PSYCH:  calm, cooperative  SKIN: warm, dry    Vital signs and nursing notes reviewed.        LAB RESULTS  Recent Results (from the past 24 hour(s))   Comprehensive metabolic panel    Collection Time: 07/06/23  2:31 PM    Specimen: Blood   Result Value Ref Range    Glucose 102 (H) 65 - 99 mg/dL    BUN 21 8 - 23 mg/dL    Creatinine 1.89 (C) 0.70 - 1.30 mg/dL    Sodium 135 (L) 136 - 145 mmol/L    Potassium 4.7 3.5 - 5.2 mmol/L    Chloride 103 98 - 107 mmol/L    CO2 21.4 (L) 22.0 - 29.0 mmol/L    Calcium 9.1 8.6 - 10.5 mg/dL    Total Protein 7.8 6.0 - 8.5 g/dL    Albumin 3.4 (L) 3.5 - 5.2 g/dL    ALT (SGPT) 9 1 - 41 U/L    AST (SGOT) 17 1 - 40 U/L    Alkaline Phosphatase 72 39 - 117 U/L    Total Bilirubin 0.5 0.0 - 1.2 mg/dL    Globulin 4.4 gm/dL    A/G Ratio 0.8 g/dL    BUN/Creatinine Ratio 11.1 7.0 - 25.0    Anion Gap 10.6 5.0 - 15.0 mmol/L    eGFR 39.9 (L) >60.0 mL/min/1.73   CBC Auto Differential    Collection Time: 07/06/23  2:31 PM    Specimen: Blood   Result Value Ref Range    WBC 8.06 3.40 - 10.80 10*3/mm3    RBC  4.16 4.14 - 5.80 10*6/mm3    Hemoglobin 11.3 (L) 13.0 - 17.7 g/dL    Hematocrit 36.9 (L) 37.5 - 51.0 %    MCV 88.7 79.0 - 97.0 fL    MCH 27.2 26.6 - 33.0 pg    MCHC 30.6 (L) 31.5 - 35.7 g/dL    RDW 16.5 (H) 12.3 - 15.4 %    RDW-SD 52.9 37.0 - 54.0 fl    MPV 8.3 6.0 - 12.0 fL    Platelets 219 140 - 450 10*3/mm3    Neutrophil % 77.9 (H) 42.7 - 76.0 %    Lymphocyte % 12.0 (L) 19.6 - 45.3 %    Monocyte % 7.4 5.0 - 12.0 %    Eosinophil % 1.9 0.3 - 6.2 %    Basophil % 0.6 0.0 - 1.5 %    Immature Grans % 0.2 0.0 - 0.5 %    Neutrophils, Absolute 6.27 1.70 - 7.00 10*3/mm3    Lymphocytes, Absolute 0.97 0.70 - 3.10 10*3/mm3    Monocytes, Absolute 0.60 0.10 - 0.90 10*3/mm3    Eosinophils, Absolute 0.15 0.00 - 0.40 10*3/mm3    Basophils, Absolute 0.05 0.00 - 0.20 10*3/mm3    Immature Grans, Absolute 0.02 0.00 - 0.05 10*3/mm3    nRBC 0.0 0.0 - 0.2 /100 WBC   Comprehensive Metabolic Panel    Collection Time: 07/07/23 10:31 AM    Specimen: Blood   Result Value Ref Range    Glucose 106 (H) 65 - 99 mg/dL    BUN 23 8 - 23 mg/dL    Creatinine 1.91 (H) 0.76 - 1.27 mg/dL    Sodium 133 (L) 136 - 145 mmol/L    Potassium 4.4 3.5 - 5.2 mmol/L    Chloride 102 98 - 107 mmol/L    CO2 20.7 (L) 22.0 - 29.0 mmol/L    Calcium 9.0 8.6 - 10.5 mg/dL    Total Protein 7.6 6.0 - 8.5 g/dL    Albumin 3.2 (L) 3.5 - 5.2 g/dL    ALT (SGPT) 14 1 - 41 U/L    AST (SGOT) 13 1 - 40 U/L    Alkaline Phosphatase 64 39 - 117 U/L    Total Bilirubin 0.4 0.0 - 1.2 mg/dL    Globulin 4.4 gm/dL    A/G Ratio 0.7 g/dL    BUN/Creatinine Ratio 12.0 7.0 - 25.0    Anion Gap 10.3 5.0 - 15.0 mmol/L    eGFR 39.4 (L) >60.0 mL/min/1.73   CBC Auto Differential    Collection Time: 07/07/23 10:31 AM    Specimen: Blood   Result Value Ref Range    WBC 7.44 3.40 - 10.80 10*3/mm3    RBC 4.15 4.14 - 5.80 10*6/mm3    Hemoglobin 11.3 (L) 13.0 - 17.7 g/dL    Hematocrit 34.7 (L) 37.5 - 51.0 %    MCV 83.6 79.0 - 97.0 fL    MCH 27.2 26.6 - 33.0 pg    MCHC 32.6 31.5 - 35.7 g/dL    RDW 15.5 (H) 12.3  - 15.4 %    RDW-SD 46.3 37.0 - 54.0 fl    MPV 8.6 6.0 - 12.0 fL    Platelets 177 140 - 450 10*3/mm3    Neutrophil % 75.3 42.7 - 76.0 %    Lymphocyte % 12.1 (L) 19.6 - 45.3 %    Monocyte % 7.4 5.0 - 12.0 %    Eosinophil % 3.9 0.3 - 6.2 %    Basophil % 0.9 0.0 - 1.5 %    Immature Grans % 0.4 0.0 - 0.5 %    Neutrophils, Absolute 5.60 1.70 - 7.00 10*3/mm3    Lymphocytes, Absolute 0.90 0.70 - 3.10 10*3/mm3    Monocytes, Absolute 0.55 0.10 - 0.90 10*3/mm3    Eosinophils, Absolute 0.29 0.00 - 0.40 10*3/mm3    Basophils, Absolute 0.07 0.00 - 0.20 10*3/mm3    Immature Grans, Absolute 0.03 0.00 - 0.05 10*3/mm3    nRBC 0.0 0.0 - 0.2 /100 WBC       Ordered the above labs and reviewed the results.        RADIOLOGY  XR Hip With or Without Pelvis 2 - 3 View Left    Result Date: 7/7/2023  XR HIP W OR WO PELVIS 2-3 VIEW LEFT-  INDICATIONS: Pain  TECHNIQUE: Frontal view of the pelvis, 2 views of the left hip  COMPARISON: None available  FINDINGS:  No acute fracture, erosion, or dislocation is identified. Prominent degenerative changes of the hips are noted, right more than left, including joint space narrowing, marginal spurring, subcortical eburnation. Follow-up/further evaluation can be obtained as indications persist.       As described.  This report was finalized on 7/7/2023 10:54 AM by Dr. Alejandro Yu M.D.       Ordered the above noted radiological studies. Reviewed by me in PACS.          PROCEDURES  Procedures      MEDICATIONS GIVEN IN ER  Medications   sodium chloride 0.9 % flush 10 mL (has no administration in time range)   sodium chloride 0.9 % flush 10 mL (has no administration in time range)   sodium chloride 0.9 % flush 10 mL (has no administration in time range)   sodium chloride 0.9 % infusion 40 mL (has no administration in time range)   sennosides-docusate (PERICOLACE) 8.6-50 MG per tablet 2 tablet (has no administration in time range)     And   polyethylene glycol (MIRALAX) packet 17 g (has no administration  in time range)     And   bisacodyl (DULCOLAX) EC tablet 5 mg (has no administration in time range)     And   bisacodyl (DULCOLAX) suppository 10 mg (has no administration in time range)   sodium chloride 0.9 % infusion (has no administration in time range)   acetaminophen (TYLENOL) tablet 650 mg (has no administration in time range)     Or   acetaminophen (TYLENOL) 160 MG/5ML solution 650 mg (has no administration in time range)     Or   acetaminophen (TYLENOL) suppository 650 mg (has no administration in time range)   HYDROcodone-acetaminophen (NORCO) 5-325 MG per tablet 1 tablet (has no administration in time range)   ondansetron (ZOFRAN) injection 4 mg (has no administration in time range)       MEDICAL DECISION MAKING, PROGRESS, and CONSULTS    All labs have been independently reviewed by me.  All radiology studies have been reviewed by me and I have also reviewed the radiology report.   EKG's independently viewed and interpreted by me.  Discussion below represents my analysis of pertinent findings related to patient's condition, differential diagnosis, treatment plan and final disposition.      Additional sources:  - Discussed/ obtained information from independent historians: None    - External (non-ED) record review: I reviewed the most recent oncology progress note from July 5, 2023 when patient had continued care for metastatic melanoma.  Plan was to hold chemotherapy at that time until CT scan and CMP completed.    - Chronic or social conditions impacting care: Multiple medical problems including metastatic melanoma and DVT/pulmonary bliss on anticoagulation.      Orders placed during this visit:  Orders Placed This Encounter   Procedures    XR Hip With or Without Pelvis 2 - 3 View Left    Comprehensive Metabolic Panel    CBC Auto Differential    Comprehensive Metabolic Panel    CBC (No Diff)    NPO Diet NPO Type: Strict NPO    Vital Signs    Intake & Output    Weigh Patient    Oral Care    Saline Lock &  Maintain IV Access    Hematology and Oncology (on-call MD unless specified)    [facility] (on-call MD unless specified) Details    Inpatient Urology Consult    Hematology & Oncology Inpatient Consult    Incentive Spirometry    Insert Peripheral IV    Insert Peripheral IV    Initiate Observation Status    CBC & Differential           Differential diagnosis includes but is not limited to:    Hip fracture, hip dislocation, DVT, inguinal lymphadenopathy, musculoskeletal pain      Independent interpretation of labs, radiology studies, and discussions with consultants:  ED Course as of 07/07/23 1150   Fri Jul 07, 2023   1040 I just discussed with Dr. Conti from hematology oncology.  She recommends patient to be admitted to [facility] for further medical management today and they will consult.  She encouraged imaging of the left hip to rule out any new pathologic fracture component given patient has known history of subcapsular metastases in the left hip area. [SALINA]   1138 Creatinine(!): 1.91 [SALINA]   1145 I just discussed with Iraida from [facility] about this patient and she agrees to accept him to the hospitalist team on behalf of Dr. Hobbs. [SALINA]   1149 I independently interpreted the x-ray of the left hip and my findings are: No fracture or dislocation evident. [SALINA]      ED Course User Index  [SALINA] Perfecto Rivera MD         DIAGNOSIS  Final diagnoses:   Inguinal pain, left   LENIN (acute kidney injury)   Difficulty walking   Metastatic melanoma         DISPOSITION  Observation to hospitalist,   hem-onc consult        Latest Documented Vital Signs:  As of 11:50 EDT  BP- 138/85 HR- 83 Temp- 98.3 °F (36.8 °C) O2 sat- 100%              --    Please note that portions of this were completed with a voice recognition program.       Note Disclaimer: At [facility], we believe that sharing information builds trust and better relationships. You are receiving this note because you are receiving care at [facility] or recently visited. It is  possible you will see health information before a provider has talked with you about it. This kind of information can be easy to misunderstand. To help you fully understand what it means for your health, we urge you to discuss this note with your provider.             Perfecto Rivera MD  07/07/23 8039

## 2023-07-07 NOTE — PLAN OF CARE
Goal Outcome Evaluation:  AOX4. VSS.  UP ad antonio.  Heparin gtt started. Awaiting Urology Consult.  IVF continuous.  NPO after midnight.  R PAC accessed.  Positive blood return.

## 2023-07-07 NOTE — CONSULTS
.     REASON FOR CONSULTATION:     Provide an opinion on any further workup or treatment  Metastatic melanoma                             REQUESTING PHYSICIAN: No ref. provider found  RECORDS OBTAINED:  Records of the patient's history including those obtained from the referring provider were reviewed and summarized in detail.    HISTORY OF PRESENT ILLNESS:  The patient is a 63 y.o. year old male  who is here for follow-up with the above-mentioned history.  Patient is a 63-year-old male with widely metastatic malignant melanoma involving the left groin with extensive lymphadenopathy, hepatic metastasis, pulmonary metastasis and metastatic disease to the brain who was started on treatment with ipilimumab and nivolumab on 6/14/2023.  Subsequently he developed a rash which was thought to be secondary to immunotherapy and this improved with steroids.  Patient does have chronic left lower extremity edema from lymphedema due to obstructive lymphadenopathy in his groin.  Patient had a normal creatinine before initiation of treatment subsequently creatinine increased to 2.2.  An obstructive etiology was suspected and a CT of the abdomen was obtained on 7/5/2023 on which moderate hydronephrosis and dilated proximal ureters were noted.  This was new from the scan on 5/28/2023.  There was increase in size of the retroperitoneal lymphadenopathy.  He had a scheduled appointment with urology on 7/7/2023.  Patient was trying to get to that appointment and when he got out of his couch he experienced severe pain in his left lower extremity as he bent down to tie his shoes.  He did not think that he would be able to make it to the appointment and hence presented to the emergency room for further management of the pain.  X-ray of the left hip obtained in the ER was negative.  Most recent treatment was with nivolumab only on 7/6/2023    Past Medical History:   Diagnosis Date    Abdominal lymphadenopathy 05/29/2023    Acute  pulmonary embolism, unspecified pulmonary embolism type, unspecified whether acute cor pulmonale present 05/28/2023    ADMITTED TO [facility]    Anemia 05/29/2023    Colon polyps     FOLLOWED BY DR. SHANNA MOTTA    Drug rash 6/19/2023    Hepatic lesion 05/29/2023    Left lower quadrant abdominal mass 05/28/2023    LEFT INGUINAL BX SHOWED METASTATIC MELANOMA    Metastasis to brain 06/02/2023    Metastatic melanoma 05/31/2023    Moderate Malnutrition (HCC) 05/30/2023    Personal history of venous thrombosis and embolism 6/19/2023     Past Surgical History:   Procedure Laterality Date    COLONOSCOPY N/A 06/02/2023    40 MM TUBULOVILLOUS ADENOMA POLYP IN DISTAL RECTUM, RESCOPE IN 1 YR, DR. SHANNA MOTTA AT [facility]    COLONOSCOPY W/ POLYPECTOMY N/A 06/02/2023    40 MM POLYP IN DISTAL RECTUM, RESECTION AND RETREIVAL COMPLETE, RESCOPE IN 1 YR, DR. SHANNA MOTTA AT [facility]    PORTACATH PLACEMENT  06/02/2023    DONE AT [facility]    US GUIDED LYMPH NODE BIOPSY  05/30/2023    METASTATIC MELANOMA, DONE AT [facility]    VENOUS ACCESS DEVICE (PORT) INSERTION N/A 06/02/2023    Procedure: INFUSAPORT PLACEMENT;  Surgeon: Vicki Layne MD;  Location: [facility];  Service: General;  Laterality: N/A;       MEDICATIONS    Current Facility-Administered Medications:     acetaminophen (TYLENOL) tablet 650 mg, 650 mg, Oral, Q4H PRN **OR** acetaminophen (TYLENOL) 160 MG/5ML solution 650 mg, 650 mg, Oral, Q4H PRN **OR** acetaminophen (TYLENOL) suppository 650 mg, 650 mg, Rectal, Q4H PRN, Sudha Douglas APRN    sennosides-docusate (PERICOLACE) 8.6-50 MG per tablet 2 tablet, 2 tablet, Oral, BID **AND** polyethylene glycol (MIRALAX) packet 17 g, 17 g, Oral, Daily PRN **AND** bisacodyl (DULCOLAX) EC tablet 5 mg, 5 mg, Oral, Daily PRN **AND** bisacodyl (DULCOLAX) suppository 10 mg, 10 mg, Rectal, Daily PRN, Sudha Douglas APRN    folic acid (FOLVITE) tablet 1 mg, 1 mg, Oral, Daily, Sudha Douglas APRN    heparin (porcine) 5000 UNIT/ML injection 4,100-8,200 Units,  40-80 Units/kg, Intravenous, Q6H PRN, Sudha Douglas APRN    heparin 73693 units/250 mL (100 units/mL) in 0.45 % NaCl infusion, 18 Units/kg/hr, Intravenous, Titrated, Sudha Douglas APRN    HYDROcodone-acetaminophen (NORCO) 5-325 MG per tablet 1 tablet, 1 tablet, Oral, Q4H PRN, Sudha Douglas APRN    ondansetron (ZOFRAN) injection 4 mg, 4 mg, Intravenous, Q6H PRN, Sudha Douglas APRN    pantoprazole (PROTONIX) EC tablet 40 mg, 40 mg, Oral, BID AC, Sudha Douglas APRN    [START ON 7/8/2023] predniSONE (DELTASONE) tablet 10 mg, 10 mg, Oral, Daily, Sudha Douglas APRN    [COMPLETED] Insert Peripheral IV, , , Once **AND** sodium chloride 0.9 % flush 10 mL, 10 mL, Intravenous, PRN, Perfecto Rivera MD    sodium chloride 0.9 % flush 10 mL, 10 mL, Intravenous, Q12H, Sudha Douglas APRN    sodium chloride 0.9 % flush 10 mL, 10 mL, Intravenous, PRN, Sudha Douglas APRN    sodium chloride 0.9 % infusion 40 mL, 40 mL, Intravenous, PRN, Sudha Douglas APRN    sodium chloride 0.9 % infusion, 75 mL/hr, Intravenous, Continuous, Sudha Douglas APRN, Last Rate: 75 mL/hr at 07/07/23 1414, 75 mL/hr at 07/07/23 1414    [START ON 7/8/2023] vitamin B-12 (CYANOCOBALAMIN) tablet 1,000 mcg, 1,000 mcg, Oral, Daily, Sudha Douglas APRN    ALLERGIES:   No Known Allergies    SOCIAL HISTORY:       Social History     Socioeconomic History    Marital status: Unknown   Tobacco Use    Smoking status: Never     Passive exposure: Never    Smokeless tobacco: Never   Vaping Use    Vaping Use: Unknown   Substance and Sexual Activity    Alcohol use: Never    Drug use: Never    Sexual activity: Defer         FAMILY HISTORY:  Family History   Problem Relation Age of Onset    Breast cancer Mother     Prostate cancer Father        REVIEW OF SYSTEMS:  Review of Systems   Constitutional:  Positive for activity change.   HENT: Negative.     Respiratory: Negative.     Cardiovascular:  Positive for leg swelling.   Gastrointestinal: Negative.   "  Musculoskeletal:  Positive for arthralgias and gait problem.   Allergic/Immunologic: Negative.    Hematological: Negative.    Psychiatric/Behavioral: Negative.              Vitals:    07/07/23 1023 07/07/23 1040 07/07/23 1321 07/07/23 1542   BP: 138/85  140/78 139/84   BP Location:   Left arm Left arm   Patient Position:   Lying Lying   Pulse:   61 68   Resp:   18 16   Temp:    97.2 °F (36.2 °C)   TempSrc:   Oral Oral   SpO2:   99% 99%   Weight:  102 kg (225 lb)     Height:  182.9 cm (72\")           7/6/2023     2:33 PM   Current Status   ECOG score 0      PHYSICAL EXAM:      CONSTITUTIONAL:  Vital signs reviewed.  No distress, looks comfortable.  EYES:  Conjunctivae and lids unremarkable.  PERRLA  EARS,NOSE,MOUTH,THROAT:  Ears and nose appear unremarkable.  Lips, teeth, gums appear unremarkable.  RESPIRATORY:  Normal respiratory effort.  Lungs clear to auscultation bilaterally.  CARDIOVASCULAR:  Normal S1, S2.  No murmurs rubs or gallops.   GASTROINTESTINAL: Abdomen appears unremarkable.  Nontender.  No hepatomegaly.  No splenomegaly.  LYMPHATIC:  No cervical, supraclavicular, axillary lymphadenopathy.  MUSCULOSKELETAL: Swelling of the left lower extremity secondary to lymphedema  PSYCHIATRIC:  Normal judgment and insight.  Normal mood and affect.      RECENT LABS:        WBC   Date Value Ref Range Status   07/07/2023 7.44 3.40 - 10.80 10*3/mm3 Final   07/06/2023 8.06 3.40 - 10.80 10*3/mm3 Final   07/05/2023 9.93 3.40 - 10.80 10*3/mm3 Final     Hemoglobin   Date Value Ref Range Status   07/07/2023 11.3 (L) 13.0 - 17.7 g/dL Final   07/06/2023 11.3 (L) 13.0 - 17.7 g/dL Final   07/05/2023 11.4 (L) 13.0 - 17.7 g/dL Final     Platelets   Date Value Ref Range Status   07/07/2023 177 140 - 450 10*3/mm3 Final   07/06/2023 219 140 - 450 10*3/mm3 Final   07/05/2023 234 140 - 450 10*3/mm3 Final       Assessment & Plan   [unfilled]      [place]     *Obstructive uropathy  CT abdomen from 7/5/2023 reviewed and shows " hydroureteronephrosis.  Consult urology.  Could be secondary to compression from increase in size of the retroperitoneal lymphadenopathy.    *Metastatic melanoma  He received 1 cycle of ipilimumab and nivolumab.,  This was on 6/14/2023  Subsequently ipilimumab discontinued due to worsening renal function and rash  He received nivolumab only on 7/6/2023  CT of the abdomen from 7/5/2023 shows increase in size of the retroperitoneal lymphadenopathy  Too soon to assess treatment response    *Left lower extremity pain  History of the hip without any evidence of abnormalities  PET/CT 12/20/2023 does not show any focal lesions in the left hip  Could be secondary to significant lymphedema.  Patient reports improvement in pain since admission to the hospital  Continue norco    *Incidental bilateral PE and thrombus in the left external iliac vein due to compression by the bulky nodes  Continue heparin    *Anemia  Hemoglobin at baseline  Continue to monitor    Recommendations  Consult Urology  Continue [place] for pain management

## 2023-07-07 NOTE — PROGRESS NOTES
Clinical Pharmacy Services: Medication History    King George is a 63 y.o. male presenting to [facility] for   Chief Complaint   Patient presents with    Leg Pain     DVT r/o to Left leg, swelling to entire left leg up into groin. +3 pitting Edema throughout leg into groin. PE to left lung 3 weeks ago, Eliquis BID.        He  has a past medical history of Abdominal lymphadenopathy (05/29/2023), Acute pulmonary embolism, unspecified pulmonary embolism type, unspecified whether acute cor pulmonale present (05/28/2023), Anemia (05/29/2023), Colon polyps, Drug rash (6/19/2023), Hepatic lesion (05/29/2023), Left lower quadrant abdominal mass (05/28/2023), Metastasis to brain (06/02/2023), Metastatic melanoma (05/31/2023), Moderate Malnutrition (HCC) (05/30/2023), and Personal history of venous thrombosis and embolism (6/19/2023).    Allergies as of 07/07/2023    (No Known Allergies)       Medication information was obtained from: Patient   Pharmacy and Phone Number:     Prior to Admission Medications       Prescriptions Last Dose Informant Patient Reported? Taking?    apixaban (ELIQUIS) 5 MG tablet tablet 7/7/2023 Pharmacy, Self No Yes    Take 1 tablet by mouth Every 12 (Twelve) Hours. Indications: DVT/PE (active thrombosis)    folic acid (FOLVITE) 1 MG tablet 7/7/2023 Pharmacy, Self No Yes    Take 1 tablet by mouth Daily.    ondansetron (ZOFRAN) 8 MG tablet  Pharmacy Yes Yes    Take  by mouth Every 8 (Eight) Hours As Needed for Nausea or Vomiting.    pantoprazole (PROTONIX) 40 MG EC tablet 7/6/2023 Pharmacy, Self No Yes    Take 1 tablet by mouth 2 (Two) Times a Day Before Meals.    predniSONE (DELTASONE) 10 MG tablet 7/7/2023 Pharmacy, Self No Yes    Take 1 tablet by mouth Daily.    vitamin B-12 (CYANOCOBALAMIN) 1000 MCG tablet 7/7/2023 Pharmacy, Self No Yes    Take 1 tablet by mouth Daily.              Medication notes:     This medication list is complete to the best of my knowledge as of  7/7/2023    Please call if questions.    Jude Alarcon  Medication History Technician  [phone]    7/7/2023 10:55 EDT

## 2023-07-08 LAB
ALBUMIN SERPL-MCNC: 3 G/DL (ref 3.5–5.2)
ALBUMIN/GLOB SERPL: 0.8 G/DL
ALP SERPL-CCNC: 62 U/L (ref 39–117)
ALT SERPL W P-5'-P-CCNC: 12 U/L (ref 1–41)
ANION GAP SERPL CALCULATED.3IONS-SCNC: 7.5 MMOL/L (ref 5–15)
APTT PPP: 93.6 SECONDS (ref 22.7–35.4)
AST SERPL-CCNC: 14 U/L (ref 1–40)
BASOPHILS # BLD AUTO: 0.05 10*3/MM3 (ref 0–0.2)
BASOPHILS NFR BLD AUTO: 0.6 % (ref 0–1.5)
BILIRUB SERPL-MCNC: 0.6 MG/DL (ref 0–1.2)
BUN SERPL-MCNC: 22 MG/DL (ref 8–23)
BUN/CREAT SERPL: 13.1 (ref 7–25)
CALCIUM SPEC-SCNC: 8.5 MG/DL (ref 8.6–10.5)
CHLORIDE SERPL-SCNC: 105 MMOL/L (ref 98–107)
CO2 SERPL-SCNC: 22.5 MMOL/L (ref 22–29)
CREAT SERPL-MCNC: 1.68 MG/DL (ref 0.76–1.27)
DEPRECATED RDW RBC AUTO: 46.2 FL (ref 37–54)
EGFRCR SERPLBLD CKD-EPI 2021: 45.9 ML/MIN/1.73
EOSINOPHIL # BLD AUTO: 0.19 10*3/MM3 (ref 0–0.4)
EOSINOPHIL NFR BLD AUTO: 2.4 % (ref 0.3–6.2)
ERYTHROCYTE [DISTWIDTH] IN BLOOD BY AUTOMATED COUNT: 15.5 % (ref 12.3–15.4)
GLOBULIN UR ELPH-MCNC: 4 GM/DL
GLUCOSE SERPL-MCNC: 107 MG/DL (ref 65–99)
HCT VFR BLD AUTO: 30.5 % (ref 37.5–51)
HGB BLD-MCNC: 9.9 G/DL (ref 13–17.7)
IMM GRANULOCYTES # BLD AUTO: 0.02 10*3/MM3 (ref 0–0.05)
IMM GRANULOCYTES NFR BLD AUTO: 0.2 % (ref 0–0.5)
LYMPHOCYTES # BLD AUTO: 1.16 10*3/MM3 (ref 0.7–3.1)
LYMPHOCYTES NFR BLD AUTO: 14.5 % (ref 19.6–45.3)
MCH RBC QN AUTO: 26.8 PG (ref 26.6–33)
MCHC RBC AUTO-ENTMCNC: 32.5 G/DL (ref 31.5–35.7)
MCV RBC AUTO: 82.4 FL (ref 79–97)
MONOCYTES # BLD AUTO: 0.67 10*3/MM3 (ref 0.1–0.9)
MONOCYTES NFR BLD AUTO: 8.4 % (ref 5–12)
NEUTROPHILS NFR BLD AUTO: 5.93 10*3/MM3 (ref 1.7–7)
NEUTROPHILS NFR BLD AUTO: 73.9 % (ref 42.7–76)
NRBC BLD AUTO-RTO: 0 /100 WBC (ref 0–0.2)
PLATELET # BLD AUTO: 181 10*3/MM3 (ref 140–450)
PMV BLD AUTO: 8.8 FL (ref 6–12)
POTASSIUM SERPL-SCNC: 4.2 MMOL/L (ref 3.5–5.2)
PROT SERPL-MCNC: 7 G/DL (ref 6–8.5)
RBC # BLD AUTO: 3.7 10*6/MM3 (ref 4.14–5.8)
SODIUM SERPL-SCNC: 135 MMOL/L (ref 136–145)
WBC NRBC COR # BLD: 8.02 10*3/MM3 (ref 3.4–10.8)

## 2023-07-08 PROCEDURE — 99232 SBSQ HOSP IP/OBS MODERATE 35: CPT | Performed by: INTERNAL MEDICINE

## 2023-07-08 PROCEDURE — 63710000001 PREDNISONE PER 1 MG: Performed by: NURSE PRACTITIONER

## 2023-07-08 PROCEDURE — 25010000002 HEPARIN (PORCINE) PER 1000 UNITS: Performed by: NURSE PRACTITIONER

## 2023-07-08 PROCEDURE — 25010000002 HEPARIN (PORCINE) 25000-0.45 UT/250ML-% SOLUTION: Performed by: NURSE PRACTITIONER

## 2023-07-08 PROCEDURE — 85025 COMPLETE CBC W/AUTO DIFF WBC: CPT | Performed by: NURSE PRACTITIONER

## 2023-07-08 PROCEDURE — 80053 COMPREHEN METABOLIC PANEL: CPT | Performed by: NURSE PRACTITIONER

## 2023-07-08 PROCEDURE — G0378 HOSPITAL OBSERVATION PER HR: HCPCS

## 2023-07-08 PROCEDURE — 85730 THROMBOPLASTIN TIME PARTIAL: CPT | Performed by: HOSPITALIST

## 2023-07-08 RX ADMIN — SENNOSIDES AND DOCUSATE SODIUM 2 TABLET: 50; 8.6 TABLET ORAL at 09:45

## 2023-07-08 RX ADMIN — PREDNISONE 10 MG: 20 TABLET ORAL at 09:46

## 2023-07-08 RX ADMIN — SENNOSIDES AND DOCUSATE SODIUM 2 TABLET: 50; 8.6 TABLET ORAL at 20:08

## 2023-07-08 RX ADMIN — PANTOPRAZOLE SODIUM 40 MG: 40 TABLET, DELAYED RELEASE ORAL at 09:45

## 2023-07-08 RX ADMIN — Medication 10 ML: at 09:46

## 2023-07-08 RX ADMIN — SODIUM CHLORIDE 75 ML/HR: 9 INJECTION, SOLUTION INTRAVENOUS at 18:05

## 2023-07-08 RX ADMIN — HEPARIN SODIUM 22 UNITS/KG/HR: 10000 INJECTION, SOLUTION INTRAVENOUS at 16:00

## 2023-07-08 RX ADMIN — HEPARIN SODIUM 18 UNITS/KG/HR: 10000 INJECTION, SOLUTION INTRAVENOUS at 03:49

## 2023-07-08 RX ADMIN — ACETAMINOPHEN 650 MG: 325 TABLET, FILM COATED ORAL at 09:51

## 2023-07-08 RX ADMIN — Medication 10 ML: at 20:08

## 2023-07-08 RX ADMIN — Medication 1000 MCG: at 09:46

## 2023-07-08 RX ADMIN — HEPARIN SODIUM 8200 UNITS: 5000 INJECTION INTRAVENOUS; SUBCUTANEOUS at 00:12

## 2023-07-08 RX ADMIN — PANTOPRAZOLE SODIUM 40 MG: 40 TABLET, DELAYED RELEASE ORAL at 17:08

## 2023-07-08 RX ADMIN — FOLIC ACID 1 MG: 1 TABLET ORAL at 09:45

## 2023-07-08 NOTE — CONSULTS
Urology Consult  Patient Identification:  Name: King George  Age: 63 y.o.  Sex: male  : 1960  MRN: [id number]   Chief Complaint: left lower extremity pain  History of Present Illness:   Met melanoma - received 1cycle of ipilimumab and nivolumab 23    2. Bilateral hydronephrosis    3. Bilateral PE and thrombus in left external iliac vein  Heparin    CT A/P 23 - no hydro  CT Abd only 23 - new bilateral hydro mild/mod    Cr hx:  23 - 2.16  23 - 2.01  23 - 1.32  23 - 1.19  23 - 1.90  23 - 1.89  23 - 1.91  23 - 1.68    Afvss  Wbc 8.0  Cultures negative    No flank pain, no gross hem, no fever/chills, pain essentially resolved, no difficulty voiding    Problem List:  @[facility]@  Past Medical History:  Past Medical History:   Diagnosis Date    Abdominal lymphadenopathy 2023    Acute pulmonary embolism, unspecified pulmonary embolism type, unspecified whether acute cor pulmonale present 2023    ADMITTED TO [facility]    Anemia 2023    Colon polyps     FOLLOWED BY DR. SHANNA MOTTA    Drug rash 2023    Hepatic lesion 2023    Left lower quadrant abdominal mass 2023    LEFT INGUINAL BX SHOWED METASTATIC MELANOMA    Metastasis to brain 2023    Metastatic melanoma 2023    Moderate Malnutrition (HCC) 2023    Personal history of venous thrombosis and embolism 2023     Past Surgical History:  Past Surgical History:   Procedure Laterality Date    COLONOSCOPY N/A 2023    40 MM TUBULOVILLOUS ADENOMA POLYP IN DISTAL RECTUM, RESCOPE IN 1 YR, DR. SHANNA MOTTA AT [facility]    COLONOSCOPY W/ POLYPECTOMY N/A 2023    40 MM POLYP IN DISTAL RECTUM, RESECTION AND RETREIVAL COMPLETE, RESCOPE IN 1 YR, DR. SHANNA MOTTA AT [facility]    PORTACATH PLACEMENT  2023    DONE AT [facility]    US GUIDED LYMPH NODE BIOPSY  2023    METASTATIC MELANOMA, DONE AT [facility]    VENOUS ACCESS DEVICE (PORT) INSERTION N/A 2023    Procedure: INFUSAPORT  PLACEMENT;  Surgeon: Vicki Layne MD;  Location: [facility] OR;  Service: General;  Laterality: N/A;      Home Meds:  Medications Prior to Admission   Medication Sig Dispense Refill Last Dose    apixaban (ELIQUIS) 5 MG tablet tablet Take 1 tablet by mouth Every 12 (Twelve) Hours. Indications: DVT/PE (active thrombosis) 60 tablet 0 2023    folic acid (FOLVITE) 1 MG tablet Take 1 tablet by mouth Daily. 30 tablet 0 2023    ondansetron (ZOFRAN) 8 MG tablet Take  by mouth Every 8 (Eight) Hours As Needed for Nausea or Vomiting.       pantoprazole (PROTONIX) 40 MG EC tablet Take 1 tablet by mouth 2 (Two) Times a Day Before Meals. 30 tablet 0 2023    predniSONE (DELTASONE) 10 MG tablet Take 1 tablet by mouth Daily. 30 tablet 3 2023    vitamin B-12 (CYANOCOBALAMIN) 1000 MCG tablet Take 1 tablet by mouth Daily. 30 tablet 0 2023     Current Meds:   [unfilled]  Allergies:  No Known Allergies  Immunizations:  Immunization History   Administered Date(s) Administered    COVID-19 (PFIZER) Purple Cap Monovalent 2021, 2021     Social History:   Social History     Tobacco Use    Smoking status: Never     Passive exposure: Never    Smokeless tobacco: Never   Substance Use Topics    Alcohol use: Never      Family History:  Family History   Problem Relation Age of Onset    Breast cancer Mother     Prostate cancer Father       Review of Systems  negative 12 point system review except: right leg pain  Objective:  tMax 24 hrs: Temp (24hrs), Av.8 °F (36.6 °C), Min:97.2 °F (36.2 °C), Max:98.5 °F (36.9 °C)    Vitals Ranges:   Temp:  [97.2 °F (36.2 °C)-98.5 °F (36.9 °C)] 97.4 °F (36.3 °C)  Heart Rate:  [61-83] 76  Resp:  [16-18] 16  BP: (126-140)/(69-85) 126/69  Intake and Output Last 3 Shifts:   I/O last 3 completed shifts:  In: 210 [P.O.:210]  Out: 615 [Urine:615]  Exam:  /69 (BP Location: Left arm, Patient Position: Lying)   Pulse 76   Temp 97.4 °F (36.3 °C) (Oral)   Resp 16   Ht 182.9 cm  "(72\")   Wt 102 kg (225 lb)   SpO2 97%   BMI 30.52 kg/m²       General Appearance: Alert, cooperative, no distress, appears stated age   Head: Normocephalic, without obvious abnormality, atraumatic   ENT: Normal hearing, external inspection, ears and nose   Eyes: Normal pupils/irises, external inspection, conjunctivae, eyelids   Back: No CVA tenderness   Respiratory: Normal effort, palpation, auscultation   CV: Normal auscultation, carotid pulses, no edema   Abdomen: No hernia, soft, nontender, no masses   :    Musculoskeletal: Normal extremities, nails, digits   Skin: Normal skin color, texture, no rashes or lesion   Neurologic/psych: Normal orientation, mood, affect           Data Review:  CBC:   Results from last 7 days   Lab Units 07/08/23  0612   WBC 10*3/mm3 8.02   RBC 10*6/mm3 3.70*     BMP:   Results from last 7 days   Lab Units 07/08/23  0612   GLUCOSE mg/dL 107*   CO2 mmol/L 22.5   BUN mg/dL 22   CREATININE mg/dL 1.68*   CALCIUM mg/dL 8.5*      Assessment:    Inguinal pain, left    Left groin mass    Abdominal lymphadenopathy    Metastatic melanoma    Metastasis to brain    LENIN (acute kidney injury)    Personal history of venous thrombosis and embolism    Hydroureteronephrosis      Bilateral hydronephrosis  Met Melanoma    Plan:  Discussed with Dr. Vic Figueroa - will hold on stents for now, Cr improving and hydro is mild  Monitor renal function  Imaging study to evaluate distal ureters and hydronephrosis status tomorrow/Monday  Will follow    TANYA Soto  7/8/2023        "

## 2023-07-08 NOTE — PLAN OF CARE
Goal Outcome Evaluation:  Plan of Care Reviewed With: patient        Progress: no change       VSS. Heparin infusing. Port with blood return, dressing CDI. PTT redraw at 0610. NPO.

## 2023-07-08 NOTE — PROGRESS NOTES
[facility]     LOS: 0 days     Subjective:    CC:Leg Pain (DVT r/o to Left leg, swelling to entire left leg up into groin. +3 pitting Edema throughout leg into groin. PE to left lung 3 weeks ago, Eliquis BID. )    DIET:  Diet Order   Procedures    Diet: Regular/House Diet; Texture: Regular Texture (IDDSI 7); Fluid Consistency: Thin (IDDSI 0)   Left inguinal pain is much improved, he is able to walk  No cp  No soa    Objective:    Vital Signs:  Temp:  [97 °F (36.1 °C)-98.5 °F (36.9 °C)] 97.1 °F (36.2 °C)  Heart Rate:  [70-83] 70  Resp:  [16-18] 16  BP: (125-128)/(65-75) 126/72    SpO2:  [96 %-99 %] 97 %  on   ;   Device (Oxygen Therapy): room air  Body mass index is 30.52 kg/m².    Physical Exam  Constitutional:       Appearance: Normal appearance.   HENT:      Head: Normocephalic and atraumatic.   Cardiovascular:      Rate and Rhythm: Normal rate and regular rhythm.      Heart sounds: No murmur heard.    No friction rub.   Pulmonary:      Effort: Pulmonary effort is normal.      Breath sounds: Normal breath sounds.   Abdominal:      General: Bowel sounds are normal. There is no distension.      Palpations: Abdomen is soft.      Tenderness: There is no abdominal tenderness.   Musculoskeletal:      Left lower leg: Edema present.   Skin:     General: Skin is warm and dry.   Neurological:      General: No focal deficit present.      Mental Status: He is alert and oriented to person, place, and time.   Psychiatric:         Mood and Affect: Mood normal.         Behavior: Behavior normal.       Results Review:    Glucose   Date Value Ref Range Status   07/08/2023 107 (H) 65 - 99 mg/dL Final   07/07/2023 106 (H) 65 - 99 mg/dL Final   07/06/2023 102 (H) 65 - 99 mg/dL Final     Results from last 7 days   Lab Units 07/08/23  0612   WBC 10*3/mm3 8.02   HEMOGLOBIN g/dL 9.9*   HEMATOCRIT % 30.5*   PLATELETS 10*3/mm3 181     Results from last 7 days   Lab Units 07/08/23  0612   SODIUM mmol/L 135*    POTASSIUM mmol/L 4.2   CHLORIDE mmol/L 105   CO2 mmol/L 22.5   BUN mg/dL 22   CREATININE mg/dL 1.68*   CALCIUM mg/dL 8.5*   BILIRUBIN mg/dL 0.6   ALK PHOS U/L 62   ALT (SGPT) U/L 12   AST (SGOT) U/L 14   GLUCOSE mg/dL 107*     Results from last 7 days   Lab Units 07/08/23  0612 07/07/23  2228 07/07/23  1531   INR   --   --  1.32*   APTT seconds 93.6*   < > 31.1    < > = values in this interval not displayed.             Cultures:  No results found for: BLOODCX, URINECX, WOUNDCX, MRSACX, RESPCX, STOOLCX    I have reviewed daily medications and changes in CPOE    Scheduled meds  folic acid, 1 mg, Oral, Daily  pantoprazole, 40 mg, Oral, BID AC  predniSONE, 10 mg, Oral, Daily  senna-docusate sodium, 2 tablet, Oral, BID  sodium chloride, 10 mL, Intravenous, Q12H  vitamin B-12, 1,000 mcg, Oral, Daily        heparin, 18 Units/kg/hr, Last Rate: 22 Units/kg/hr (07/08/23 1600)  sodium chloride, 75 mL/hr, Last Rate: 75 mL/hr (07/08/23 0853)      PRN meds    acetaminophen **OR** acetaminophen **OR** acetaminophen    senna-docusate sodium **AND** polyethylene glycol **AND** bisacodyl **AND** bisacodyl    heparin (porcine)    HYDROcodone-acetaminophen    ondansetron    [COMPLETED] Insert Peripheral IV **AND** sodium chloride    sodium chloride    sodium chloride        Inguinal pain, left    Left groin mass    Abdominal lymphadenopathy    Metastatic melanoma    Metastasis to brain    LENIN (acute kidney injury)    Personal history of venous thrombosis and embolism    Hydroureteronephrosis        Assessment/Plan:  Mr. George is a 63 year old male who presented to the hospital with complaints of left leg pain and difficulty walking. He was recently found to have extensive metastatic melanoma back in May of this year. He has had liver/lung/bone/brain involvement and currently receiving immunotherapy with Dr. Rocha with oncology. He has had some notable LENIN recently with CT A/P on 7/5 showing some new bilateral hydroureteronephrosis.  He was due to see urology today but had worsening left groin/hip pain after receiving immunotherapy yesterday making it difficult to go to this appointment. He is being admitted for further care at this time.     Inguinal pain, left  -In the setting of known hypermetabolic thickening of the skin in the left upper thigh/groin/lateral scrotum felt to be the primary site of malignancy. He has extensive lymphadenopathy in the abdomen worse on the left with extensive left lower extremity DVT on prior PET/CT.  -X-ray of hip without acute findings.  -Pain in hip has resolved  -Pain medication as needed     LENIN  Hydroureteronephrosis  -Creatinine worsening since last admission with CT findings of new hydroureteronephrosis.  -Hydrate with NS at 75 cc/hr   -Monitor urine output. Check bladder scans.  -Urology to see. Repeat ct. Will hold Eliquis (last dose 7/7 AM) and start heparin drip without initial bolus.      Metastatic melanoma  Metastasis to brain  Abdominal lymphadenopathy  History of PE/DVT  -Hematology/oncology to see.  -Hold Eliquis and start heparin given likely needs for intervention.  -Further management per oncology.  -Resume prednisone that he was on prior to admission given immunotherapy reaction.       Price Banuelos MD  07/08/23  16:49 EDT

## 2023-07-08 NOTE — PROGRESS NOTES
REASON FOR FOLLOWUP/CHIEF COMPLAINT:   Metastatic melanoma  Obstructive uropathy    HISTORY OF PRESENT ILLNESS:   Patient reports feeling much better today.  The left groin pain has improved.  He has been evaluated by urology and they plan to do a CT of the abdomen and pelvis on Monday to reassess the situation.    Past Medical History, Past Surgical History, Social History, Family History have been reviewed and are without significant changes except as mentioned.    Review of Systems   Review of Systems  Review of systems as mentioned in the HPI    Medications:  The current medication list was reviewed in the EMR    ALLERGIES:  No Known Allergies           Vitals:    07/07/23 1922 07/08/23 0338 07/08/23 0715 07/08/23 1123   BP: 128/72 128/75 126/69 125/65   BP Location: Left arm Left arm Left arm Left arm   Patient Position: Lying Lying Lying Lying   Pulse: 76 83 76 80   Resp: 16 16 16 18   Temp: 97.6 °F (36.4 °C) 98.5 °F (36.9 °C) 97.4 °F (36.3 °C) 97 °F (36.1 °C)   TempSrc: Oral Oral Oral Oral   SpO2: 99% 96% 97% 99%   Weight:       Height:         Physical Exam    CONSTITUTIONAL:  Vital signs reviewed.  No distress, looks comfortable.  EYES:  Conjunctivae and lids unremarkable.  PERRLA  EARS,NOSE,MOUTH,THROAT:  Ears and nose appear unremarkable.  Lips, teeth, gums appear unremarkable.  RESPIRATORY:  Normal respiratory effort.  Lungs clear to auscultation bilaterally.  CARDIOVASCULAR:  Normal S1, S2.  No murmurs rubs or gallops.    Left lower extremity lymphedema   GASTROINTESTINAL: Abdomen appears unremarkable.  Nontender.  No hepatomegaly.  No splenomegaly.  NEURO: cranial nerves 2-12 grossly intact.  No focal deficits.  Appears to have equal strength all 4 extremities.  MUSCULOSKELETAL:  Unremarkable digits/nails.  No cyanosis or clubbing.  SKIN:  Warm.  No rashes.  PSYCHIATRIC:  Normal judgment and insight.  Normal mood and affect.       RECENT LABS:  WBC   Date Value Ref Range Status   07/08/2023  8.02 3.40 - 10.80 10*3/mm3 Final   07/07/2023 7.44 3.40 - 10.80 10*3/mm3 Final   07/06/2023 8.06 3.40 - 10.80 10*3/mm3 Final     Hemoglobin   Date Value Ref Range Status   07/08/2023 9.9 (L) 13.0 - 17.7 g/dL Final   07/07/2023 11.3 (L) 13.0 - 17.7 g/dL Final   07/06/2023 11.3 (L) 13.0 - 17.7 g/dL Final     Platelets   Date Value Ref Range Status   07/08/2023 181 140 - 450 10*3/mm3 Final   07/07/2023 177 140 - 450 10*3/mm3 Final   07/06/2023 219 140 - 450 10*3/mm3 Final       ASSESSMENT/PLAN:  King George [id number]     *Obstructive uropathy  CT abdomen from 7/5/2023 reviewed and shows hydroureteronephrosis.  Consult urology.  Could be secondary to compression from increase in size of the retroperitoneal lymphadenopathy.  Evaluated by urology and plan to manage conservatively and repeat CT abdomen planned for 7/10/2023     *Metastatic melanoma  He received 1 cycle of ipilimumab and nivolumab.,  This was on 6/14/2023  Subsequently ipilimumab discontinued due to worsening renal function and rash  He received nivolumab only on 7/6/2023  CT of the abdomen from 7/5/2023 shows increase in size of the retroperitoneal lymphadenopathy  Too soon to assess treatment response     *Left lower extremity pain  History of the hip without any evidence of abnormalities  PET/CT 12/20/2023 does not show any focal lesions in the left hip  Could be secondary to significant lymphedema.  Continue norco  Pain has improved.  He is now able to ambulate     *Incidental bilateral PE and thrombus in the left external iliac vein due to compression by the bulky nodes  Continue heparin     *Anemia  Hemoglobin at baseline  Continue to monitor     Recommendations  Management of hydroureteronephrosis per urology  Continue Arley for pain management

## 2023-07-09 ENCOUNTER — APPOINTMENT (OUTPATIENT)
Dept: CT IMAGING | Facility: HOSPITAL | Age: 63
DRG: 659 | End: 2023-07-09
Payer: COMMERCIAL

## 2023-07-09 ENCOUNTER — APPOINTMENT (OUTPATIENT)
Dept: ULTRASOUND IMAGING | Facility: HOSPITAL | Age: 63
DRG: 659 | End: 2023-07-09
Payer: COMMERCIAL

## 2023-07-09 LAB
ANION GAP SERPL CALCULATED.3IONS-SCNC: 9 MMOL/L (ref 5–15)
APTT PPP: 74 SECONDS (ref 22.7–35.4)
BASOPHILS # BLD AUTO: 0.05 10*3/MM3 (ref 0–0.2)
BASOPHILS NFR BLD AUTO: 0.6 % (ref 0–1.5)
BUN SERPL-MCNC: 19 MG/DL (ref 8–23)
BUN/CREAT SERPL: 10.4 (ref 7–25)
CALCIUM SPEC-SCNC: 8.2 MG/DL (ref 8.6–10.5)
CHLORIDE SERPL-SCNC: 102 MMOL/L (ref 98–107)
CHLORIDE UR-SCNC: 144 MMOL/L
CO2 SERPL-SCNC: 21 MMOL/L (ref 22–29)
CREAT SERPL-MCNC: 1.82 MG/DL (ref 0.76–1.27)
CREAT UR-MCNC: 80.4 MG/DL
DEPRECATED RDW RBC AUTO: 48.5 FL (ref 37–54)
EGFRCR SERPLBLD CKD-EPI 2021: 41.7 ML/MIN/1.73
EOSINOPHIL # BLD AUTO: 0.29 10*3/MM3 (ref 0–0.4)
EOSINOPHIL NFR BLD AUTO: 3.8 % (ref 0.3–6.2)
EOSINOPHIL SPEC QL MICRO: 0 % EOS/100 CELLS (ref 0–0)
ERYTHROCYTE [DISTWIDTH] IN BLOOD BY AUTOMATED COUNT: 15.8 % (ref 12.3–15.4)
GLUCOSE SERPL-MCNC: 127 MG/DL (ref 65–99)
HCT VFR BLD AUTO: 30.7 % (ref 37.5–51)
HGB BLD-MCNC: 10 G/DL (ref 13–17.7)
IMM GRANULOCYTES # BLD AUTO: 0.03 10*3/MM3 (ref 0–0.05)
IMM GRANULOCYTES NFR BLD AUTO: 0.4 % (ref 0–0.5)
LYMPHOCYTES # BLD AUTO: 1.18 10*3/MM3 (ref 0.7–3.1)
LYMPHOCYTES NFR BLD AUTO: 15.3 % (ref 19.6–45.3)
MCH RBC QN AUTO: 27.2 PG (ref 26.6–33)
MCHC RBC AUTO-ENTMCNC: 32.6 G/DL (ref 31.5–35.7)
MCV RBC AUTO: 83.7 FL (ref 79–97)
MONOCYTES # BLD AUTO: 0.69 10*3/MM3 (ref 0.1–0.9)
MONOCYTES NFR BLD AUTO: 8.9 % (ref 5–12)
NEUTROPHILS NFR BLD AUTO: 5.49 10*3/MM3 (ref 1.7–7)
NEUTROPHILS NFR BLD AUTO: 71 % (ref 42.7–76)
NRBC BLD AUTO-RTO: 0 /100 WBC (ref 0–0.2)
PLATELET # BLD AUTO: 189 10*3/MM3 (ref 140–450)
PMV BLD AUTO: 9.1 FL (ref 6–12)
POTASSIUM SERPL-SCNC: 3.9 MMOL/L (ref 3.5–5.2)
PROT ?TM UR-MCNC: 17.3 MG/DL
PROT/CREAT UR: 215.2 MG/G CREA (ref 0–200)
RBC # BLD AUTO: 3.67 10*6/MM3 (ref 4.14–5.8)
SODIUM SERPL-SCNC: 132 MMOL/L (ref 136–145)
SODIUM UR-SCNC: 148 MMOL/L
WBC NRBC COR # BLD: 7.73 10*3/MM3 (ref 3.4–10.8)

## 2023-07-09 PROCEDURE — 82436 ASSAY OF URINE CHLORIDE: CPT | Performed by: INTERNAL MEDICINE

## 2023-07-09 PROCEDURE — 80048 BASIC METABOLIC PNL TOTAL CA: CPT | Performed by: NURSE PRACTITIONER

## 2023-07-09 PROCEDURE — 86160 COMPLEMENT ANTIGEN: CPT | Performed by: INTERNAL MEDICINE

## 2023-07-09 PROCEDURE — 84156 ASSAY OF PROTEIN URINE: CPT | Performed by: INTERNAL MEDICINE

## 2023-07-09 PROCEDURE — 85730 THROMBOPLASTIN TIME PARTIAL: CPT | Performed by: HOSPITALIST

## 2023-07-09 PROCEDURE — 74176 CT ABD & PELVIS W/O CONTRAST: CPT

## 2023-07-09 PROCEDURE — 25010000002 HEPARIN (PORCINE) 25000-0.45 UT/250ML-% SOLUTION: Performed by: NURSE PRACTITIONER

## 2023-07-09 PROCEDURE — 76775 US EXAM ABDO BACK WALL LIM: CPT

## 2023-07-09 PROCEDURE — 86038 ANTINUCLEAR ANTIBODIES: CPT | Performed by: INTERNAL MEDICINE

## 2023-07-09 PROCEDURE — 82570 ASSAY OF URINE CREATININE: CPT | Performed by: INTERNAL MEDICINE

## 2023-07-09 PROCEDURE — G0378 HOSPITAL OBSERVATION PER HR: HCPCS

## 2023-07-09 PROCEDURE — 85025 COMPLETE CBC W/AUTO DIFF WBC: CPT | Performed by: NURSE PRACTITIONER

## 2023-07-09 PROCEDURE — 99232 SBSQ HOSP IP/OBS MODERATE 35: CPT | Performed by: INTERNAL MEDICINE

## 2023-07-09 PROCEDURE — 84300 ASSAY OF URINE SODIUM: CPT | Performed by: INTERNAL MEDICINE

## 2023-07-09 PROCEDURE — 87205 SMEAR GRAM STAIN: CPT | Performed by: INTERNAL MEDICINE

## 2023-07-09 PROCEDURE — 63710000001 PREDNISONE PER 1 MG: Performed by: NURSE PRACTITIONER

## 2023-07-09 PROCEDURE — 86431 RHEUMATOID FACTOR QUANT: CPT | Performed by: INTERNAL MEDICINE

## 2023-07-09 RX ORDER — FAMOTIDINE 20 MG/1
20 TABLET, FILM COATED ORAL
Status: DISCONTINUED | OUTPATIENT
Start: 2023-07-09 | End: 2023-07-14 | Stop reason: HOSPADM

## 2023-07-09 RX ADMIN — PREDNISONE 10 MG: 20 TABLET ORAL at 09:00

## 2023-07-09 RX ADMIN — PANTOPRAZOLE SODIUM 40 MG: 40 TABLET, DELAYED RELEASE ORAL at 09:00

## 2023-07-09 RX ADMIN — Medication 10 ML: at 21:02

## 2023-07-09 RX ADMIN — Medication 10 ML: at 09:01

## 2023-07-09 RX ADMIN — FAMOTIDINE 20 MG: 20 TABLET, FILM COATED ORAL at 18:19

## 2023-07-09 RX ADMIN — HEPARIN SODIUM 22 UNITS/KG/HR: 10000 INJECTION, SOLUTION INTRAVENOUS at 02:19

## 2023-07-09 RX ADMIN — SODIUM CHLORIDE 75 ML/HR: 9 INJECTION, SOLUTION INTRAVENOUS at 06:06

## 2023-07-09 RX ADMIN — SENNOSIDES AND DOCUSATE SODIUM 2 TABLET: 50; 8.6 TABLET ORAL at 09:00

## 2023-07-09 RX ADMIN — Medication 1000 MCG: at 09:00

## 2023-07-09 RX ADMIN — SENNOSIDES AND DOCUSATE SODIUM 2 TABLET: 50; 8.6 TABLET ORAL at 21:01

## 2023-07-09 RX ADMIN — SODIUM CHLORIDE 75 ML/HR: 9 INJECTION, SOLUTION INTRAVENOUS at 18:15

## 2023-07-09 RX ADMIN — FOLIC ACID 1 MG: 1 TABLET ORAL at 09:00

## 2023-07-09 RX ADMIN — HEPARIN SODIUM 22 UNITS/KG/HR: 10000 INJECTION, SOLUTION INTRAVENOUS at 14:15

## 2023-07-09 NOTE — PROGRESS NOTES
[facility]     LOS: 0 days     Subjective:    CC:Leg Pain (DVT r/o to Left leg, swelling to entire left leg up into groin. +3 pitting Edema throughout leg into groin. PE to left lung 3 weeks ago, Eliquis BID. )    DIET:  Diet Order   Procedures    Diet: Regular/House Diet; Texture: Regular Texture (IDDSI 7); Fluid Consistency: Thin (IDDSI 0)    NPO Diet NPO Type: Strict NPO   Left inguinal pain is much improved, he is able to walk  No cp  No soa    Objective:    Vital Signs:  Temp:  [97 °F (36.1 °C)-98.1 °F (36.7 °C)] 97.4 °F (36.3 °C)  Heart Rate:  [70-80] 73  Resp:  [16-18] 16  BP: (116-137)/(65-76) 130/75    SpO2:  [96 %-99 %] 97 %  on   ;   Device (Oxygen Therapy): room air  Body mass index is 30.52 kg/m².    Physical Exam  Constitutional:       Appearance: Normal appearance.   HENT:      Head: Normocephalic and atraumatic.   Cardiovascular:      Rate and Rhythm: Normal rate and regular rhythm.      Heart sounds: No murmur heard.    No friction rub.   Pulmonary:      Effort: Pulmonary effort is normal.      Breath sounds: Normal breath sounds.   Abdominal:      General: Bowel sounds are normal. There is no distension.      Palpations: Abdomen is soft.      Tenderness: There is no abdominal tenderness.   Musculoskeletal:      Left lower leg: Edema present.   Skin:     General: Skin is warm and dry.   Neurological:      General: No focal deficit present.      Mental Status: He is alert and oriented to person, place, and time.   Psychiatric:         Mood and Affect: Mood normal.         Behavior: Behavior normal.       Results Review:    Glucose   Date Value Ref Range Status   07/09/2023 127 (H) 65 - 99 mg/dL Final   07/08/2023 107 (H) 65 - 99 mg/dL Final   07/07/2023 106 (H) 65 - 99 mg/dL Final   07/06/2023 102 (H) 65 - 99 mg/dL Final     Results from last 7 days   Lab Units 07/09/23  0608   WBC 10*3/mm3 7.73   HEMOGLOBIN g/dL 10.0*   HEMATOCRIT % 30.7*   PLATELETS 10*3/mm3 189        Results from last 7 days   Lab Units 07/09/23  0902 07/08/23  0612   SODIUM mmol/L 132* 135*   POTASSIUM mmol/L 3.9 4.2   CHLORIDE mmol/L 102 105   CO2 mmol/L 21.0* 22.5   BUN mg/dL 19 22   CREATININE mg/dL 1.82* 1.68*   CALCIUM mg/dL 8.2* 8.5*   BILIRUBIN mg/dL  --  0.6   ALK PHOS U/L  --  62   ALT (SGPT) U/L  --  12   AST (SGOT) U/L  --  14   GLUCOSE mg/dL 127* 107*       Results from last 7 days   Lab Units 07/09/23  0608 07/07/23  2228 07/07/23  1531   INR   --   --  1.32*   APTT seconds 74.0*   < > 31.1    < > = values in this interval not displayed.               Cultures:  No results found for: BLOODCX, URINECX, WOUNDCX, MRSACX, RESPCX, STOOLCX    I have reviewed daily medications and changes in CPOE    Scheduled meds  folic acid, 1 mg, Oral, Daily  pantoprazole, 40 mg, Oral, BID AC  predniSONE, 10 mg, Oral, Daily  senna-docusate sodium, 2 tablet, Oral, BID  sodium chloride, 10 mL, Intravenous, Q12H  vitamin B-12, 1,000 mcg, Oral, Daily        heparin, 18 Units/kg/hr, Last Rate: 22 Units/kg/hr (07/09/23 0219)  sodium chloride, 75 mL/hr, Last Rate: 75 mL/hr (07/09/23 0935)      PRN meds    acetaminophen **OR** acetaminophen **OR** acetaminophen    senna-docusate sodium **AND** polyethylene glycol **AND** bisacodyl **AND** bisacodyl    heparin (porcine)    HYDROcodone-acetaminophen    ondansetron    [COMPLETED] Insert Peripheral IV **AND** sodium chloride    sodium chloride    sodium chloride        Inguinal pain, left    Left groin mass    Abdominal lymphadenopathy    Metastatic melanoma    Metastasis to brain    LENIN (acute kidney injury)    Personal history of venous thrombosis and embolism    Hydroureteronephrosis        Assessment/Plan:  Mr. George is a 63 year old male who presented to the hospital with complaints of left leg pain and difficulty walking. He was recently found to have extensive metastatic melanoma back in May of this year. He has had liver/lung/bone/brain involvement and currently  receiving immunotherapy with Dr. Rocha with oncology. He has had some notable LENIN recently with CT A/P on 7/5 showing some new bilateral hydroureteronephrosis. He was due to see urology today but had worsening left groin/hip pain after receiving immunotherapy yesterday making it difficult to go to this appointment. He is being admitted for further care at this time.     Inguinal pain, left  -In the setting of known hypermetabolic thickening of the skin in the left upper thigh/groin/lateral scrotum felt to be the primary site of malignancy. He has extensive lymphadenopathy in the abdomen worse on the left with extensive left lower extremity DVT on prior PET/CT.  -X-ray of hip without acute findings.  -Pain in hip has resolved  -Pain medication as needed     LENIN  Hydroureteronephrosis  -Creatinine worsening since last admission with CT findings of new hydroureteronephrosis.  -Hydrate with NS at 75 cc/hr   -Monitor urine output. Check bladder scans.  -Urology to see. Repeat ct. Will hold Eliquis (last dose 7/7 AM) and start heparin drip without initial bolus.      Metastatic melanoma  Metastasis to brain  Abdominal lymphadenopathy  History of PE/DVT  -Hematology/oncology to see.  -Hold Eliquis and start heparin given likely needs for intervention.  -Further management per oncology.  -Resume prednisone that he was on prior to admission given immunotherapy reaction.     Doing better, ct scan of abd and pelvis    Price Banuelos MD  07/09/23  11:21 EDT

## 2023-07-09 NOTE — PROGRESS NOTES
Urology Progress Note    Patient Identification:  Name:  King George  Age:  63 y.o.  Sex:  male  :  1960  MRN:  [id number]    Chief Complaint:  left lower extremity pain     History of Present Illness:  Met melanoma - received 1cycle of ipilimumab and nivolumab 23     2. Bilateral hydronephrosis     3. Bilateral PE and thrombus in left external iliac vein  Heparin     CT A/P 23 - no hydro  CT Abd only 23 - new bilateral hydro mild/mod     Cr hx:  23 - 2.16  23 - 2.01  23 - 1.32  23 - 1.19  23 - 1.90  23 - 1.89  23 - 1.91  23 - 1.68  23 - pending     Afvss  Wbc 8.4  Cultures negative     No flank pain, no gross hem, no fever/chills, pain essentially resolved, no difficulty voiding  Problem List:    Past Medical History:  Past Medical History:   Diagnosis Date    Abdominal lymphadenopathy 2023    Acute pulmonary embolism, unspecified pulmonary embolism type, unspecified whether acute cor pulmonale present 2023    ADMITTED TO [facility]    Anemia 2023    Colon polyps     FOLLOWED BY DR. SHANNA MOTTA    Drug rash 2023    Hepatic lesion 2023    Left lower quadrant abdominal mass 2023    LEFT INGUINAL BX SHOWED METASTATIC MELANOMA    Metastasis to brain 2023    Metastatic melanoma 2023    Moderate Malnutrition (HCC) 2023    Personal history of venous thrombosis and embolism 2023     Past Surgical History:  Past Surgical History:   Procedure Laterality Date    COLONOSCOPY N/A 2023    40 MM TUBULOVILLOUS ADENOMA POLYP IN DISTAL RECTUM, RESCOPE IN 1 YR, DR. SHANNA MOTTA AT [facility]    COLONOSCOPY W/ POLYPECTOMY N/A 2023    40 MM POLYP IN DISTAL RECTUM, RESECTION AND RETREIVAL COMPLETE, RESCOPE IN 1 YR, DR. SHANNA MOTTA AT [facility]    PORTACATH PLACEMENT  2023    DONE AT [facility]    US GUIDED LYMPH NODE BIOPSY  2023    METASTATIC MELANOMA, DONE AT [facility]    VENOUS ACCESS DEVICE (PORT) INSERTION N/A  06/02/2023    Procedure: INFUSAPORT PLACEMENT;  Surgeon: Vicki Layne MD;  Location: [facility];  Service: General;  Laterality: N/A;     Home Meds:  Medications Prior to Admission   Medication Sig Dispense Refill Last Dose    apixaban (ELIQUIS) 5 MG tablet tablet Take 1 tablet by mouth Every 12 (Twelve) Hours. Indications: DVT/PE (active thrombosis) 60 tablet 0 7/7/2023    folic acid (FOLVITE) 1 MG tablet Take 1 tablet by mouth Daily. 30 tablet 0 7/7/2023    ondansetron (ZOFRAN) 8 MG tablet Take  by mouth Every 8 (Eight) Hours As Needed for Nausea or Vomiting.       pantoprazole (PROTONIX) 40 MG EC tablet Take 1 tablet by mouth 2 (Two) Times a Day Before Meals. 30 tablet 0 7/6/2023    predniSONE (DELTASONE) 10 MG tablet Take 1 tablet by mouth Daily. 30 tablet 3 7/7/2023    vitamin B-12 (CYANOCOBALAMIN) 1000 MCG tablet Take 1 tablet by mouth Daily. 30 tablet 0 7/7/2023     Current Meds:    Current Facility-Administered Medications:     acetaminophen (TYLENOL) tablet 650 mg, 650 mg, Oral, Q4H PRN, 650 mg at 07/08/23 0951 **OR** acetaminophen (TYLENOL) 160 MG/5ML solution 650 mg, 650 mg, Oral, Q4H PRN **OR** acetaminophen (TYLENOL) suppository 650 mg, 650 mg, Rectal, Q4H PRN, Sudha Douglas APRN    sennosides-docusate (PERICOLACE) 8.6-50 MG per tablet 2 tablet, 2 tablet, Oral, BID, 2 tablet at 07/09/23 0900 **AND** polyethylene glycol (MIRALAX) packet 17 g, 17 g, Oral, Daily PRN **AND** bisacodyl (DULCOLAX) EC tablet 5 mg, 5 mg, Oral, Daily PRN **AND** bisacodyl (DULCOLAX) suppository 10 mg, 10 mg, Rectal, Daily PRN, Sudha Douglas APRN    folic acid (FOLVITE) tablet 1 mg, 1 mg, Oral, Daily, Sudha Douglas APRN, 1 mg at 07/09/23 0900    heparin (porcine) 5000 UNIT/ML injection 4,100-8,200 Units, 40-80 Units/kg, Intravenous, Q6H PRN, Sudha Douglas APRN, 8,200 Units at 07/08/23 0012    heparin 69068 units/250 mL (100 units/mL) in 0.45 % NaCl infusion, 18 Units/kg/hr, Intravenous, Titrated, Sudha Douglas,  "APRN, Last Rate: 22.4 mL/hr at 07/09/23 0219, 22 Units/kg/hr at 07/09/23 0219    HYDROcodone-acetaminophen (NORCO) 5-325 MG per tablet 1 tablet, 1 tablet, Oral, Q4H PRN, Sudha Douglas APRN    ondansetron (ZOFRAN) injection 4 mg, 4 mg, Intravenous, Q6H PRN, Sudha Douglas APRN    pantoprazole (PROTONIX) EC tablet 40 mg, 40 mg, Oral, BID AC, Sudha Douglas APRN, 40 mg at 07/09/23 0900    predniSONE (DELTASONE) tablet 10 mg, 10 mg, Oral, Daily, Sudha Douglas APRN, 10 mg at 07/09/23 0900    [COMPLETED] Insert Peripheral IV, , , Once **AND** sodium chloride 0.9 % flush 10 mL, 10 mL, Intravenous, PRN, Perfecto Rivera MD    sodium chloride 0.9 % flush 10 mL, 10 mL, Intravenous, Q12H, Sudha Douglas APRN, 10 mL at 07/09/23 0901    sodium chloride 0.9 % flush 10 mL, 10 mL, Intravenous, PRN, Sudha Douglas APRN    sodium chloride 0.9 % infusion 40 mL, 40 mL, Intravenous, PRN, Sudha Douglas APRN    sodium chloride 0.9 % infusion, 75 mL/hr, Intravenous, Continuous, Sudha Douglas APRN, Last Rate: 75 mL/hr at 07/09/23 0935, 75 mL/hr at 07/09/23 0935    vitamin B-12 (CYANOCOBALAMIN) tablet 1,000 mcg, 1,000 mcg, Oral, Daily, Sudha Douglas APRN, 1,000 mcg at 07/09/23 0900  Allergies:  Patient has no known allergies.    Review of Systems:  Negative 12 point system review except: left leg swelling    Objective:  tMax 24 hours:  [unfilled]  Vital Sign Ranges:  Temp:  [97 °F (36.1 °C)-98.1 °F (36.7 °C)] 97.4 °F (36.3 °C)  Heart Rate:  [70-80] 73  Resp:  [16-18] 16  BP: (116-137)/(65-76) 130/75  Intake and Output Last 3 Shifts:  I/O last 3 completed shifts:  In: 1378.8 [P.O.:210; I.V.:1168.8]  Out: 615 [Urine:615]    Exam:  /75 (BP Location: Left arm, Patient Position: Lying)   Pulse 73   Temp 97.4 °F (36.3 °C) (Oral)   Resp 16   Ht 182.9 cm (72\")   Wt 102 kg (225 lb)   SpO2 97%   BMI 30.52 kg/m²    General Appearance:  Alert, cooperative, no acute distress, general         appearance is normal   Head:  " Normocephalic, without obvious abnormality, atraumatic   Eyes:          Pupils/Irises normal. Exterior inspection conjunctivae       and lids normal.   Ears:  Normal external inspection   Nose: Exterior inspection of nose is normal   Throat: Lips, mucosa, and tongue normal   Neck: No adenopathy, supple, symmetrical, trachea midline   Back:   No CVA tenderness   Lungs:   Respirations unlabored; normal effort, no audible     abnormality   CV: Regular rhythm and normal rate, no edema   Abdomen:   No hernia, examination of the abdomen is normal with     no masses, tenderness, or distension    Male :      Musculoskeletal: Inspection of the nails and digits reveals no abnormalities   Skin: Inspection normal, palpation normal   Neurologic/Psych: Orientation normal; Mood/Affect normal     Data Review:  All labs (24hrs):    Lab Results (last 24 hours)       Procedure Component Value Units Date/Time    Basic Metabolic Panel [[id number]] Collected: 07/09/23 0902    Specimen: Blood Updated: 07/09/23 0930    CBC & Differential [[id number]]  (Abnormal) Collected: 07/09/23 0608    Specimen: Blood Updated: 07/09/23 0649    Narrative:      The following orders were created for panel order CBC & Differential.  Procedure                               Abnormality         Status                     ---------                               -----------         ------                     CBC Auto Differential[[id number]]        Abnormal            Final result                 Please view results for these tests on the individual orders.    CBC Auto Differential [[id number]]  (Abnormal) Collected: 07/09/23 0608    Specimen: Blood Updated: 07/09/23 0649     WBC 7.73 10*3/mm3      RBC 3.67 10*6/mm3      Hemoglobin 10.0 g/dL      Hematocrit 30.7 %      MCV 83.7 fL      MCH 27.2 pg      MCHC 32.6 g/dL      RDW 15.8 %      RDW-SD 48.5 fl      MPV 9.1 fL      Platelets 189 10*3/mm3      Neutrophil % 71.0 %      Lymphocyte % 15.3 %      Monocyte %  8.9 %      Eosinophil % 3.8 %      Basophil % 0.6 %      Immature Grans % 0.4 %      Neutrophils, Absolute 5.49 10*3/mm3      Lymphocytes, Absolute 1.18 10*3/mm3      Monocytes, Absolute 0.69 10*3/mm3      Eosinophils, Absolute 0.29 10*3/mm3      Basophils, Absolute 0.05 10*3/mm3      Immature Grans, Absolute 0.03 10*3/mm3      nRBC 0.0 /100 WBC     aPTT [[id number]]  (Abnormal) Collected: 07/09/23 0608    Specimen: Blood Updated: 07/09/23 0645     PTT 74.0 seconds             Assessment:  Bilateral hydronephrosis  Met Melanoma  Plan:  Cr pending  Check repeat ct a/p today/tomorrow  Npo after midnight for possible cysto/bilateral stent placements  Okay to stay on hep gtt     Xenia George, TANYA  7/9/2023

## 2023-07-09 NOTE — PROGRESS NOTES
REASON FOR FOLLOWUP/CHIEF COMPLAINT:   Metastatic melanoma  Obstructive uropathy    HISTORY OF PRESENT ILLNESS:   He has no new complaints today  Pain is better.  Continues on Norco    Past Medical History, Past Surgical History, Social History, Family History have been reviewed and are without significant changes except as mentioned.    Review of Systems   Review of Systems  Review of systems as mentioned in the HPI    Medications:  The current medication list was reviewed in the EMR    ALLERGIES:  No Known Allergies           Vitals:    07/08/23 2309 07/09/23 0314 07/09/23 0717 07/09/23 1132   BP: 124/71 137/76 130/75 144/72   BP Location: Left arm Left arm Left arm Left arm   Patient Position: Lying Lying Lying Lying   Pulse: 80 75 73 77   Resp: 16 16 16 16   Temp: 98 °F (36.7 °C) 97.1 °F (36.2 °C) 97.4 °F (36.3 °C) 98.2 °F (36.8 °C)   TempSrc: Oral Oral Oral Oral   SpO2: 97% 96% 97% 97%   Weight:       Height:         Physical Exam    CONSTITUTIONAL:  Vital signs reviewed.  No distress, looks comfortable.  EYES:  Conjunctivae and lids unremarkable.  PERRLA  EARS,NOSE,MOUTH,THROAT:  Ears and nose appear unremarkable.  Lips, teeth, gums appear unremarkable.  RESPIRATORY:  Normal respiratory effort.  Lungs clear to auscultation bilaterally.  CARDIOVASCULAR:  Normal S1, S2.  No murmurs rubs or gallops.    Left lower extremity lymphedema   GASTROINTESTINAL: Abdomen appears unremarkable.  Nontender.  No hepatomegaly.  No splenomegaly.  NEURO: cranial nerves 2-12 grossly intact.  No focal deficits.  Appears to have equal strength all 4 extremities.  MUSCULOSKELETAL:  Unremarkable digits/nails.  No cyanosis or clubbing.  SKIN:  Warm.  No rashes.  PSYCHIATRIC:  Normal judgment and insight.  Normal mood and affect.     Risk for Readmission (LACE) Score: 10 (7/9/2023  6:00 AM)         RECENT LABS:  WBC   Date Value Ref Range Status   07/09/2023 7.73 3.40 - 10.80 10*3/mm3 Final   07/08/2023 8.02 3.40 - 10.80  10*3/mm3 Final   07/07/2023 7.44 3.40 - 10.80 10*3/mm3 Final   07/06/2023 8.06 3.40 - 10.80 10*3/mm3 Final     Hemoglobin   Date Value Ref Range Status   07/09/2023 10.0 (L) 13.0 - 17.7 g/dL Final   07/08/2023 9.9 (L) 13.0 - 17.7 g/dL Final   07/07/2023 11.3 (L) 13.0 - 17.7 g/dL Final   07/06/2023 11.3 (L) 13.0 - 17.7 g/dL Final     Platelets   Date Value Ref Range Status   07/09/2023 189 140 - 450 10*3/mm3 Final   07/08/2023 181 140 - 450 10*3/mm3 Final   07/07/2023 177 140 - 450 10*3/mm3 Final   07/06/2023 219 140 - 450 10*3/mm3 Final       ASSESSMENT/PLAN:  King George [id number]     *Obstructive uropathy  CT abdomen from 7/5/2023 reviewed and shows hydroureteronephrosis.  Consult urology.  Could be secondary to compression from increase in size of the retroperitoneal lymphadenopathy.  Evaluated by urology and plan to manage conservatively and repeat CT abdomen planned for 7/10/2023  Creatinine stable at 1.82     *Metastatic melanoma  He received 1 cycle of ipilimumab and nivolumab.,  This was on 6/14/2023  Subsequently ipilimumab discontinued due to worsening renal function and rash  He received nivolumab only on 7/6/2023  CT of the abdomen from 7/5/2023 shows increase in size of the retroperitoneal lymphadenopathy  Too soon to assess treatment response     *Left lower extremity pain  History of the hip without any evidence of abnormalities  PET/CT 12/20/2023 does not show any focal lesions in the left hip  Could be secondary to significant lymphedema.  Continue norco  Pain has improved.  He is now able to ambulate     *Incidental bilateral PE and thrombus in the left external iliac vein due to compression by the bulky nodes  Continue heparin in case he needs stents     *Anemia  Hemoglobin at baseline, at 10.0  Continue to monitor     Recommendations  Management of hydroureteronephrosis per urology  Continue [place] for pain management

## 2023-07-09 NOTE — PLAN OF CARE
Goal Outcome Evaluation:  Plan of Care Reviewed With: patient        Progress: no change  Outcome Evaluation: VSS. Patient is a/o x4. Nephrology consulted and they ordered multiple test on his urine, renal u/s being completed now at the bedside. CT a/p completed today as well. NPO after midnight for possible stent placement with urology tomorrow. Nephrology may consider renal bx in the next few days. Up ad antonio. Edema in the left lower extremity. Heparin gtt infusing through peripheral IV site. NS at 75ml/hr through right port.

## 2023-07-09 NOTE — CONSULTS
[facility] Consult Note      Patient Name: King George  : 1960  MRN: [id number]  Primary Care Physician:  Mohini Cortes MD  Referring Physician: No ref. provider found  Date of admission: 2023    Subjective     Reason for Consult:  LENIN    HPI:   King George is a 63 y.o. male past medical history of metastatic malignant melanoma was started on ipilimumab and nivolumab on 2023 and subsequently he developed rash that was thought it is due to immunotherapy followed by initiation of steroid.  Patient received received nivolumab only on 2023 . his creatinine at time of initiation of treatment was 1.1 mg/dL and worsened to 2.1 mg/dL however it improved down to 1.1 on .  At time of admission his creatinine was 1.9 mg/dL and has been stagnant.  Of note CT of the abdomen on 2023 showed obstructive uropathy.  Urology has been consulted and they have been discussing stent placement in the patient.  Were consulted to help with management of his acute kidney injury.    Review of Systems:   14 point review of systems is otherwise negative except for mentioned above on HPI    Personal History     Past Medical History:   Diagnosis Date    Abdominal lymphadenopathy 2023    Acute pulmonary embolism, unspecified pulmonary embolism type, unspecified whether acute cor pulmonale present 2023    ADMITTED TO [facility]    Anemia 2023    Colon polyps     FOLLOWED BY DR. SHANNA MOTTA    Drug rash 2023    Hepatic lesion 2023    Left lower quadrant abdominal mass 2023    LEFT INGUINAL BX SHOWED METASTATIC MELANOMA    Metastasis to brain 2023    Metastatic melanoma 2023    Moderate Malnutrition (HCC) 2023    Personal history of venous thrombosis and embolism 2023       Past Surgical History:   Procedure Laterality Date    COLONOSCOPY N/A 2023    40 MM TUBULOVILLOUS ADENOMA POLYP IN DISTAL RECTUM, RESCOPE IN 1 YR,   SHANNA MOTTA AT [facility]    COLONOSCOPY W/ POLYPECTOMY N/A 06/02/2023    40 MM POLYP IN DISTAL RECTUM, RESECTION AND RETREIVAL COMPLETE, RESCOPE IN 1 YR, DR. SHANNA MOTTA AT [facility]    PORTACATH PLACEMENT  06/02/2023    DONE AT [facility]    US GUIDED LYMPH NODE BIOPSY  05/30/2023    METASTATIC MELANOMA, DONE AT [facility]    VENOUS ACCESS DEVICE (PORT) INSERTION N/A 06/02/2023    Procedure: INFUSAPORT PLACEMENT;  Surgeon: Vicki Layne MD;  Location: [facility] OR;  Service: General;  Laterality: N/A;       Family History: family history includes Breast cancer in his mother; Prostate cancer in his father.    Social History:  reports that he has never smoked. He has never been exposed to tobacco smoke. He has never used smokeless tobacco. He reports that he does not drink alcohol and does not use drugs.    Home Medications:  Prior to Admission medications    Medication Sig Start Date End Date Taking? Authorizing Provider   apixaban (ELIQUIS) 5 MG tablet tablet Take 1 tablet by mouth Every 12 (Twelve) Hours. Indications: DVT/PE (active thrombosis) 7/5/23  Yes Brodie Rocha MD   folic acid (FOLVITE) 1 MG tablet Take 1 tablet by mouth Daily. 6/23/23  Yes Ladarius Calvo MD   ondansetron (ZOFRAN) 8 MG tablet Take  by mouth Every 8 (Eight) Hours As Needed for Nausea or Vomiting.   Yes Andre Montoya MD   pantoprazole (PROTONIX) 40 MG EC tablet Take 1 tablet by mouth 2 (Two) Times a Day Before Meals. 6/22/23  Yes Ladarius Calvo MD   predniSONE (DELTASONE) 10 MG tablet Take 1 tablet by mouth Daily. 7/6/23  Yes Brodie Rocha MD   vitamin B-12 (CYANOCOBALAMIN) 1000 MCG tablet Take 1 tablet by mouth Daily. 6/22/23  Yes Ladarius Calvo MD       Allergies:  No Known Allergies    Objective     Vitals:   Temp:  [97.1 °F (36.2 °C)-98.1 °F (36.7 °C)] 97.4 °F (36.3 °C)  Heart Rate:  [70-80] 73  Resp:  [16] 16  BP: (116-137)/(69-76) 130/75    Intake/Output Summary (Last 24 hours) at 7/9/2023 1121  Last data filed at  7/8/2023 1805  Gross per 24 hour   Intake 1168.8 ml   Output --   Net 1168.8 ml       Physical Exam:    General Appearance: alert, oriented x 3, no acute distress   Skin: warm and dry  HEENT: oral mucosa normal, nonicteric sclera  Neck: supple, no JVD  Lungs: CTA  Heart: RRR, normal S1 and S2  Abdomen: soft, nontender, nondistended  : no palpable bladder  Extremities: no edema, cyanosis or clubbing  Neuro: normal speech and mental status     Scheduled Meds:     folic acid, 1 mg, Oral, Daily  pantoprazole, 40 mg, Oral, BID AC  predniSONE, 10 mg, Oral, Daily  senna-docusate sodium, 2 tablet, Oral, BID  sodium chloride, 10 mL, Intravenous, Q12H  vitamin B-12, 1,000 mcg, Oral, Daily      IV Meds:   heparin, 18 Units/kg/hr, Last Rate: 22 Units/kg/hr (07/09/23 0219)  sodium chloride, 75 mL/hr, Last Rate: 75 mL/hr (07/09/23 0935)        Results Reviewed:   I have personally reviewed the results from the time of this admission to 7/9/2023 11:27 EDT     Lab Results   Component Value Date    GLUCOSE 127 (H) 07/09/2023    CALCIUM 8.2 (L) 07/09/2023     (L) 07/09/2023    K 3.9 07/09/2023    CO2 21.0 (L) 07/09/2023     07/09/2023    BUN 19 07/09/2023    CREATININE 1.82 (H) 07/09/2023    BCR 10.4 07/09/2023    ANIONGAP 9.0 07/09/2023      Lab Results   Component Value Date    MG 2.1 06/21/2023    ALBUMIN 3.0 (L) 07/08/2023           Assessment / Plan     ASSESSMENT:    Acute kidney injury initial event was 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  Patient is likely to go placement of stent tomorrow.  We will check for urine lisinopril.  We will hold all nephrotoxic medication and continue IV fluid for now.  Metastatic melanoma S/P  1 cycle of ipilimumab and nivolumab  on 6/14/2023 and second dose of Opdivo on July 6,  2023  Bilateral hydronephrosis that is likely due to obstructive uropathy due to lymphadenopathy      PLAN:    Continue IV fluids for now  Stent placement by urology  Will send urine eosinophils and continue to evaluate improvement of kidney function and need for kidney biopsy to rule out interstitial nephritis  Surveillance labs    Thank you for involving us in the care of King George.  Please feel free to call with any questions.    Zara Petit MD  07/09/23  11:27 EDT    [facility]  [phone]

## 2023-07-09 NOTE — PLAN OF CARE
Goal Outcome Evaluation:  Plan of Care Reviewed With: patient        Progress: no change         On heparin drip, labs to be drawn this AM. IVF infusing.

## 2023-07-10 ENCOUNTER — APPOINTMENT (OUTPATIENT)
Dept: GENERAL RADIOLOGY | Facility: HOSPITAL | Age: 63
DRG: 659 | End: 2023-07-10
Payer: COMMERCIAL

## 2023-07-10 PROBLEM — M79.605 ACUTE PAIN OF LEFT LOWER EXTREMITY: Status: ACTIVE | Noted: 2023-07-10

## 2023-07-10 LAB
ALBUMIN SERPL-MCNC: 2.8 G/DL (ref 3.5–5.2)
ALBUMIN/GLOB SERPL: 0.7 G/DL
ALP SERPL-CCNC: 61 U/L (ref 39–117)
ALT SERPL W P-5'-P-CCNC: 14 U/L (ref 1–41)
ANION GAP SERPL CALCULATED.3IONS-SCNC: 9.9 MMOL/L (ref 5–15)
APTT PPP: 67.4 SECONDS (ref 22.7–35.4)
APTT PPP: 76.2 SECONDS (ref 22.7–35.4)
AST SERPL-CCNC: 9 U/L (ref 1–40)
BASOPHILS # BLD AUTO: 0.04 10*3/MM3 (ref 0–0.2)
BASOPHILS NFR BLD AUTO: 0.7 % (ref 0–1.5)
BILIRUB SERPL-MCNC: 0.4 MG/DL (ref 0–1.2)
BUN SERPL-MCNC: 17 MG/DL (ref 8–23)
BUN/CREAT SERPL: 9.8 (ref 7–25)
CALCIUM SPEC-SCNC: 8.4 MG/DL (ref 8.6–10.5)
CHLORIDE SERPL-SCNC: 105 MMOL/L (ref 98–107)
CO2 SERPL-SCNC: 21.1 MMOL/L (ref 22–29)
CREAT SERPL-MCNC: 1.73 MG/DL (ref 0.76–1.27)
DEPRECATED RDW RBC AUTO: 46.9 FL (ref 37–54)
EGFRCR SERPLBLD CKD-EPI 2021: 44.4 ML/MIN/1.73
EOSINOPHIL # BLD AUTO: 0.3 10*3/MM3 (ref 0–0.4)
EOSINOPHIL NFR BLD AUTO: 4.9 % (ref 0.3–6.2)
ERYTHROCYTE [DISTWIDTH] IN BLOOD BY AUTOMATED COUNT: 15.7 % (ref 12.3–15.4)
GLOBULIN UR ELPH-MCNC: 4 GM/DL
GLUCOSE SERPL-MCNC: 100 MG/DL (ref 65–99)
HCT VFR BLD AUTO: 29.7 % (ref 37.5–51)
HGB BLD-MCNC: 9.8 G/DL (ref 13–17.7)
IMM GRANULOCYTES # BLD AUTO: 0.03 10*3/MM3 (ref 0–0.05)
IMM GRANULOCYTES NFR BLD AUTO: 0.5 % (ref 0–0.5)
LYMPHOCYTES # BLD AUTO: 0.92 10*3/MM3 (ref 0.7–3.1)
LYMPHOCYTES NFR BLD AUTO: 15.1 % (ref 19.6–45.3)
MCH RBC QN AUTO: 27.3 PG (ref 26.6–33)
MCHC RBC AUTO-ENTMCNC: 33 G/DL (ref 31.5–35.7)
MCV RBC AUTO: 82.7 FL (ref 79–97)
MONOCYTES # BLD AUTO: 0.54 10*3/MM3 (ref 0.1–0.9)
MONOCYTES NFR BLD AUTO: 8.9 % (ref 5–12)
NEUTROPHILS NFR BLD AUTO: 4.25 10*3/MM3 (ref 1.7–7)
NEUTROPHILS NFR BLD AUTO: 69.9 % (ref 42.7–76)
NRBC BLD AUTO-RTO: 0 /100 WBC (ref 0–0.2)
PLATELET # BLD AUTO: 172 10*3/MM3 (ref 140–450)
PMV BLD AUTO: 8.4 FL (ref 6–12)
POTASSIUM SERPL-SCNC: 3.9 MMOL/L (ref 3.5–5.2)
PROT SERPL-MCNC: 6.8 G/DL (ref 6–8.5)
RBC # BLD AUTO: 3.59 10*6/MM3 (ref 4.14–5.8)
SODIUM SERPL-SCNC: 136 MMOL/L (ref 136–145)
WBC NRBC COR # BLD: 6.08 10*3/MM3 (ref 3.4–10.8)

## 2023-07-10 PROCEDURE — 85730 THROMBOPLASTIN TIME PARTIAL: CPT | Performed by: NURSE PRACTITIONER

## 2023-07-10 PROCEDURE — 25010000002 CEFAZOLIN IN DEXTROSE 2-4 GM/100ML-% SOLUTION: Performed by: UROLOGY

## 2023-07-10 PROCEDURE — 25010000002 HYDROMORPHONE PER 4 MG: Performed by: ANESTHESIOLOGY

## 2023-07-10 PROCEDURE — BT141ZZ FLUOROSCOPY OF KIDNEYS, URETERS AND BLADDER USING LOW OSMOLAR CONTRAST: ICD-10-PCS | Performed by: UROLOGY

## 2023-07-10 PROCEDURE — C2617 STENT, NON-COR, TEM W/O DEL: HCPCS | Performed by: UROLOGY

## 2023-07-10 PROCEDURE — 63710000001 PREDNISONE PER 1 MG: Performed by: NURSE PRACTITIONER

## 2023-07-10 PROCEDURE — 74420 UROGRAPHY RTRGR +-KUB: CPT

## 2023-07-10 PROCEDURE — 25010000002 HEPARIN (PORCINE) PER 1000 UNITS: Performed by: NURSE PRACTITIONER

## 2023-07-10 PROCEDURE — 25010000002 HEPARIN (PORCINE) 25000-0.45 UT/250ML-% SOLUTION: Performed by: UROLOGY

## 2023-07-10 PROCEDURE — 85730 THROMBOPLASTIN TIME PARTIAL: CPT | Performed by: HOSPITALIST

## 2023-07-10 PROCEDURE — 85025 COMPLETE CBC W/AUTO DIFF WBC: CPT | Performed by: NURSE PRACTITIONER

## 2023-07-10 PROCEDURE — 25010000002 HEPARIN (PORCINE) 25000-0.45 UT/250ML-% SOLUTION: Performed by: NURSE PRACTITIONER

## 2023-07-10 PROCEDURE — C1769 GUIDE WIRE: HCPCS | Performed by: UROLOGY

## 2023-07-10 PROCEDURE — 99232 SBSQ HOSP IP/OBS MODERATE 35: CPT | Performed by: INTERNAL MEDICINE

## 2023-07-10 PROCEDURE — C1758 CATHETER, URETERAL: HCPCS | Performed by: UROLOGY

## 2023-07-10 PROCEDURE — 80053 COMPREHEN METABOLIC PANEL: CPT | Performed by: HOSPITALIST

## 2023-07-10 PROCEDURE — 0T788DZ DILATION OF BILATERAL URETERS WITH INTRALUMINAL DEVICE, VIA NATURAL OR ARTIFICIAL OPENING ENDOSCOPIC: ICD-10-PCS | Performed by: UROLOGY

## 2023-07-10 DEVICE — URETERAL STENT
Type: IMPLANTABLE DEVICE | Site: URETER | Status: FUNCTIONAL
Brand: PERCUFLEX™ PLUS

## 2023-07-10 RX ORDER — HYDROMORPHONE HYDROCHLORIDE 1 MG/ML
0.25 INJECTION, SOLUTION INTRAMUSCULAR; INTRAVENOUS; SUBCUTANEOUS
Status: DISCONTINUED | OUTPATIENT
Start: 2023-07-10 | End: 2023-07-10 | Stop reason: HOSPADM

## 2023-07-10 RX ORDER — PROMETHAZINE HYDROCHLORIDE 25 MG/1
25 TABLET ORAL ONCE AS NEEDED
Status: DISCONTINUED | OUTPATIENT
Start: 2023-07-10 | End: 2023-07-10 | Stop reason: HOSPADM

## 2023-07-10 RX ORDER — DIPHENHYDRAMINE HYDROCHLORIDE 50 MG/ML
12.5 INJECTION INTRAMUSCULAR; INTRAVENOUS
Status: DISCONTINUED | OUTPATIENT
Start: 2023-07-10 | End: 2023-07-10 | Stop reason: HOSPADM

## 2023-07-10 RX ORDER — PROMETHAZINE HYDROCHLORIDE 25 MG/1
25 SUPPOSITORY RECTAL ONCE AS NEEDED
Status: DISCONTINUED | OUTPATIENT
Start: 2023-07-10 | End: 2023-07-10 | Stop reason: HOSPADM

## 2023-07-10 RX ORDER — MIDAZOLAM HYDROCHLORIDE 1 MG/ML
1 INJECTION INTRAMUSCULAR; INTRAVENOUS
Status: DISCONTINUED | OUTPATIENT
Start: 2023-07-10 | End: 2023-07-10 | Stop reason: HOSPADM

## 2023-07-10 RX ORDER — SODIUM CHLORIDE 0.9 % (FLUSH) 0.9 %
3 SYRINGE (ML) INJECTION EVERY 12 HOURS SCHEDULED
Status: DISCONTINUED | OUTPATIENT
Start: 2023-07-10 | End: 2023-07-10 | Stop reason: HOSPADM

## 2023-07-10 RX ORDER — FAMOTIDINE 10 MG/ML
20 INJECTION, SOLUTION INTRAVENOUS ONCE
Status: COMPLETED | OUTPATIENT
Start: 2023-07-10 | End: 2023-07-10

## 2023-07-10 RX ORDER — HYDROCODONE BITARTRATE AND ACETAMINOPHEN 7.5; 325 MG/1; MG/1
1 TABLET ORAL EVERY 4 HOURS PRN
Status: DISCONTINUED | OUTPATIENT
Start: 2023-07-10 | End: 2023-07-10 | Stop reason: HOSPADM

## 2023-07-10 RX ORDER — HYDRALAZINE HYDROCHLORIDE 20 MG/ML
5 INJECTION INTRAMUSCULAR; INTRAVENOUS
Status: DISCONTINUED | OUTPATIENT
Start: 2023-07-10 | End: 2023-07-10 | Stop reason: HOSPADM

## 2023-07-10 RX ORDER — FLUMAZENIL 0.1 MG/ML
0.2 INJECTION INTRAVENOUS AS NEEDED
Status: DISCONTINUED | OUTPATIENT
Start: 2023-07-10 | End: 2023-07-10 | Stop reason: HOSPADM

## 2023-07-10 RX ORDER — LABETALOL HYDROCHLORIDE 5 MG/ML
5 INJECTION, SOLUTION INTRAVENOUS
Status: DISCONTINUED | OUTPATIENT
Start: 2023-07-10 | End: 2023-07-10 | Stop reason: HOSPADM

## 2023-07-10 RX ORDER — FENTANYL CITRATE 50 UG/ML
50 INJECTION, SOLUTION INTRAMUSCULAR; INTRAVENOUS ONCE AS NEEDED
Status: COMPLETED | OUTPATIENT
Start: 2023-07-10 | End: 2023-07-10

## 2023-07-10 RX ORDER — CEFAZOLIN SODIUM 2 G/100ML
2 INJECTION, SOLUTION INTRAVENOUS ONCE
Status: COMPLETED | OUTPATIENT
Start: 2023-07-10 | End: 2023-07-10

## 2023-07-10 RX ORDER — SODIUM CHLORIDE 0.9 % (FLUSH) 0.9 %
3-10 SYRINGE (ML) INJECTION AS NEEDED
Status: DISCONTINUED | OUTPATIENT
Start: 2023-07-10 | End: 2023-07-10 | Stop reason: HOSPADM

## 2023-07-10 RX ORDER — NALOXONE HCL 0.4 MG/ML
0.2 VIAL (ML) INJECTION AS NEEDED
Status: DISCONTINUED | OUTPATIENT
Start: 2023-07-10 | End: 2023-07-10 | Stop reason: HOSPADM

## 2023-07-10 RX ORDER — LIDOCAINE HYDROCHLORIDE 10 MG/ML
0.5 INJECTION, SOLUTION EPIDURAL; INFILTRATION; INTRACAUDAL; PERINEURAL ONCE AS NEEDED
Status: DISCONTINUED | OUTPATIENT
Start: 2023-07-10 | End: 2023-07-10 | Stop reason: HOSPADM

## 2023-07-10 RX ORDER — ONDANSETRON 2 MG/ML
4 INJECTION INTRAMUSCULAR; INTRAVENOUS ONCE AS NEEDED
Status: DISCONTINUED | OUTPATIENT
Start: 2023-07-10 | End: 2023-07-10 | Stop reason: HOSPADM

## 2023-07-10 RX ORDER — DROPERIDOL 2.5 MG/ML
0.62 INJECTION, SOLUTION INTRAMUSCULAR; INTRAVENOUS
Status: DISCONTINUED | OUTPATIENT
Start: 2023-07-10 | End: 2023-07-10 | Stop reason: HOSPADM

## 2023-07-10 RX ORDER — IPRATROPIUM BROMIDE AND ALBUTEROL SULFATE 2.5; .5 MG/3ML; MG/3ML
3 SOLUTION RESPIRATORY (INHALATION) ONCE AS NEEDED
Status: DISCONTINUED | OUTPATIENT
Start: 2023-07-10 | End: 2023-07-10 | Stop reason: HOSPADM

## 2023-07-10 RX ORDER — HYDROCODONE BITARTRATE AND ACETAMINOPHEN 5; 325 MG/1; MG/1
1 TABLET ORAL ONCE AS NEEDED
Status: DISCONTINUED | OUTPATIENT
Start: 2023-07-10 | End: 2023-07-10 | Stop reason: HOSPADM

## 2023-07-10 RX ORDER — FENTANYL CITRATE 50 UG/ML
25 INJECTION, SOLUTION INTRAMUSCULAR; INTRAVENOUS
Status: DISCONTINUED | OUTPATIENT
Start: 2023-07-10 | End: 2023-07-10 | Stop reason: HOSPADM

## 2023-07-10 RX ORDER — SODIUM CHLORIDE, SODIUM LACTATE, POTASSIUM CHLORIDE, CALCIUM CHLORIDE 600; 310; 30; 20 MG/100ML; MG/100ML; MG/100ML; MG/100ML
9 INJECTION, SOLUTION INTRAVENOUS CONTINUOUS
Status: DISCONTINUED | OUTPATIENT
Start: 2023-07-10 | End: 2023-07-14 | Stop reason: HOSPADM

## 2023-07-10 RX ORDER — EPHEDRINE SULFATE 50 MG/ML
5 INJECTION, SOLUTION INTRAVENOUS ONCE AS NEEDED
Status: DISCONTINUED | OUTPATIENT
Start: 2023-07-10 | End: 2023-07-10 | Stop reason: HOSPADM

## 2023-07-10 RX ADMIN — Medication 10 ML: at 22:49

## 2023-07-10 RX ADMIN — FAMOTIDINE 20 MG: 20 TABLET, FILM COATED ORAL at 22:49

## 2023-07-10 RX ADMIN — PREDNISONE 10 MG: 20 TABLET ORAL at 09:47

## 2023-07-10 RX ADMIN — Medication 10 ML: at 09:48

## 2023-07-10 RX ADMIN — HEPARIN SODIUM 22 UNITS/KG/HR: 10000 INJECTION, SOLUTION INTRAVENOUS at 01:04

## 2023-07-10 RX ADMIN — Medication 1000 MCG: at 09:47

## 2023-07-10 RX ADMIN — SODIUM CHLORIDE, POTASSIUM CHLORIDE, SODIUM LACTATE AND CALCIUM CHLORIDE 9 ML/HR: 600; 310; 30; 20 INJECTION, SOLUTION INTRAVENOUS at 15:56

## 2023-07-10 RX ADMIN — SENNOSIDES AND DOCUSATE SODIUM 1 TABLET: 50; 8.6 TABLET ORAL at 09:47

## 2023-07-10 RX ADMIN — FAMOTIDINE 20 MG: 10 INJECTION INTRAVENOUS at 15:59

## 2023-07-10 RX ADMIN — FOLIC ACID 1 MG: 1 TABLET ORAL at 09:47

## 2023-07-10 RX ADMIN — SODIUM CHLORIDE 75 ML/HR: 9 INJECTION, SOLUTION INTRAVENOUS at 07:23

## 2023-07-10 RX ADMIN — HYDROMORPHONE HYDROCHLORIDE 0.25 MG: 1 INJECTION, SOLUTION INTRAMUSCULAR; INTRAVENOUS; SUBCUTANEOUS at 18:21

## 2023-07-10 RX ADMIN — HEPARIN SODIUM 18 UNITS/KG/HR: 10000 INJECTION, SOLUTION INTRAVENOUS at 23:04

## 2023-07-10 RX ADMIN — HEPARIN SODIUM 24 UNITS/KG/HR: 10000 INJECTION, SOLUTION INTRAVENOUS at 11:59

## 2023-07-10 RX ADMIN — CEFAZOLIN SODIUM 2 G: 2 INJECTION, SOLUTION INTRAVENOUS at 16:10

## 2023-07-10 RX ADMIN — HEPARIN SODIUM 4100 UNITS: 5000 INJECTION INTRAVENOUS; SUBCUTANEOUS at 06:27

## 2023-07-10 RX ADMIN — HYDROMORPHONE HYDROCHLORIDE 0.25 MG: 1 INJECTION, SOLUTION INTRAMUSCULAR; INTRAVENOUS; SUBCUTANEOUS at 18:15

## 2023-07-10 RX ADMIN — FAMOTIDINE 20 MG: 20 TABLET, FILM COATED ORAL at 09:47

## 2023-07-10 NOTE — PROGRESS NOTES
REASON FOR FOLLOWUP/CHIEF COMPLAINT:   Metastatic melanoma  Obstructive uropathy    HISTORY OF PRESENT ILLNESS:   He has no new complaints today  Pain is better.  Continues on Norco    7/10/2023  Pain much improved no rash or diarrhea  Urinating well  Currently on IV heparin pending stents later today  Creatinine slightly lower at 1.7    Past Medical History, Past Surgical History, Social History, Family History have been reviewed and are without significant changes except as mentioned.    Review of Systems   Review of Systems  Review of systems as mentioned in the HPI    Medications:  The current medication list was reviewed in the EMR    ALLERGIES:  No Known Allergies           Vitals:    07/09/23 1132 07/09/23 2008 07/10/23 0505 07/10/23 0728   BP: 144/72 128/75 137/76 126/69   BP Location: Left arm Left arm Left arm Left arm   Patient Position: Lying Lying Lying Lying   Pulse: 77 73 71 (!) 48   Resp: 16 16 16 16   Temp: 98.2 °F (36.8 °C) 97.3 °F (36.3 °C) 97.2 °F (36.2 °C) 97.6 °F (36.4 °C)   TempSrc: Oral Oral Oral Oral   SpO2: 97% 98% 99% 95%   Weight:       Height:         Physical Exam    CONSTITUTIONAL:  Vital signs reviewed.  No distress, looks comfortable.  EYES:  Conjunctivae and lids unremarkable.  PERRLA  EARS,NOSE,MOUTH,THROAT:  Ears and nose appear unremarkable.  Lips, teeth, gums appear unremarkable.  RESPIRATORY:  Normal respiratory effort.  Lungs clear to auscultation bilaterally.  CARDIOVASCULAR:  Normal S1, S2.  No murmurs rubs or gallops.    Left lower extremity lymphedema   GASTROINTESTINAL: Abdomen appears unremarkable.  Nontender.  No hepatomegaly.  No splenomegaly.  NEURO: cranial nerves 2-12 grossly intact.  No focal deficits.  Appears to have equal strength all 4 extremities.  MUSCULOSKELETAL:  Unremarkable digits/nails.  No cyanosis or clubbing.  SKIN:  Warm.  No rashes.  PSYCHIATRIC:  Normal judgment and insight.  Normal mood and affect.   I have reexamined the patient and the  results are consistent with the previously documented exam. Brodie Rocha MD     Risk for Readmission (LACE) Score: 11 (7/10/2023  6:00 AM)         RECENT LABS:  WBC   Date Value Ref Range Status   07/10/2023 6.08 3.40 - 10.80 10*3/mm3 Final   07/09/2023 7.73 3.40 - 10.80 10*3/mm3 Final   07/08/2023 8.02 3.40 - 10.80 10*3/mm3 Final   07/07/2023 7.44 3.40 - 10.80 10*3/mm3 Final     Hemoglobin   Date Value Ref Range Status   07/10/2023 9.8 (L) 13.0 - 17.7 g/dL Final   07/09/2023 10.0 (L) 13.0 - 17.7 g/dL Final   07/08/2023 9.9 (L) 13.0 - 17.7 g/dL Final   07/07/2023 11.3 (L) 13.0 - 17.7 g/dL Final     Platelets   Date Value Ref Range Status   07/10/2023 172 140 - 450 10*3/mm3 Final   07/09/2023 189 140 - 450 10*3/mm3 Final   07/08/2023 181 140 - 450 10*3/mm3 Final   07/07/2023 177 140 - 450 10*3/mm3 Final       Lab Results   Component Value Date    GLUCOSE 100 (H) 07/10/2023    BUN 17 07/10/2023    CREATININE 1.73 (H) 07/10/2023    EGFR 44.4 (L) 07/10/2023    BCR 9.8 07/10/2023    K 3.9 07/10/2023    CO2 21.1 (L) 07/10/2023    CALCIUM 8.4 (L) 07/10/2023    ALBUMIN 2.8 (L) 07/10/2023    BILITOT 0.4 07/10/2023    AST 9 07/10/2023    ALT 14 07/10/2023         ASSESSMENT/PLAN:  King George [id number]     *Obstructive uropathy  CT abdomen from 7/5/2023 reviewed and shows hydroureteronephrosis.  Consult urology.  Could be secondary to compression from increase in size of the retroperitoneal lymphadenopathy.  Evaluated by urology and plan to manage conservatively and repeat CT abdomen planned for 7/10/2023  Creatinine stable at 1.82  Creatinine 1.74 stents today     *Metastatic melanoma  He received 1 cycle of ipilimumab and nivolumab.,  This was on 6/14/2023  Subsequently ipilimumab discontinued due to worsening renal function and rash  He received nivolumab only on 7/6/2023  CT of the abdomen from 7/5/2023 shows increase in size of the retroperitoneal lymphadenopathy  Too soon to assess treatment response     *Left  lower extremity pain  History of the hip without any evidence of abnormalities  PET/CT 12/20/2023 does not show any focal lesions in the left hip  Could be secondary to significant lymphedema.  Continue norco  Pain has improved.  He is now able to ambulate     *Incidental bilateral PE and thrombus in the left external iliac vein due to compression by the bulky nodes  Continue heparin in case he needs stents  Resume NOAC at discharge     *Anemia  Hemoglobin at baseline, at 10.0  Continue to monitor     Recommendations  Management of hydroureteronephrosis per urology  Continue [place] for pain management  Resume anticoagulation orally at discharge  Follow-up in our office in 2 weeks

## 2023-07-10 NOTE — PROGRESS NOTES
[facility]     LOS: 0 days     Subjective:    CC:Leg Pain (DVT r/o to Left leg, swelling to entire left leg up into groin. +3 pitting Edema throughout leg into groin. PE to left lung 3 weeks ago, Eliquis BID. )    DIET:  Diet Order   Procedures    NPO Diet NPO Type: Strict NPO   Left inguinal pain is much improved  No cp  No soa    Objective:    Vital Signs:  Temp:  [97.1 °F (36.2 °C)-97.6 °F (36.4 °C)] 97.1 °F (36.2 °C)  Heart Rate:  [48-81] 81  Resp:  [16] 16  BP: (115-137)/(66-76) 115/66    SpO2:  [95 %-99 %] 97 %  on   ;   Device (Oxygen Therapy): room air  Body mass index is 30.52 kg/m².    Physical Exam  Constitutional:       Appearance: Normal appearance.   HENT:      Head: Normocephalic and atraumatic.   Cardiovascular:      Rate and Rhythm: Normal rate and regular rhythm.      Heart sounds: No murmur heard.    No friction rub.   Pulmonary:      Effort: Pulmonary effort is normal.      Breath sounds: Normal breath sounds.   Abdominal:      General: Bowel sounds are normal. There is no distension.      Palpations: Abdomen is soft.      Tenderness: There is no abdominal tenderness.   Musculoskeletal:      Left lower leg: Edema present.   Skin:     General: Skin is warm and dry.   Neurological:      General: No focal deficit present.      Mental Status: He is alert and oriented to person, place, and time.   Psychiatric:         Mood and Affect: Mood normal.         Behavior: Behavior normal.       Results Review:    Glucose   Date Value Ref Range Status   07/10/2023 100 (H) 65 - 99 mg/dL Final   07/09/2023 127 (H) 65 - 99 mg/dL Final   07/08/2023 107 (H) 65 - 99 mg/dL Final     Results from last 7 days   Lab Units 07/10/23  0416   WBC 10*3/mm3 6.08   HEMOGLOBIN g/dL 9.8*   HEMATOCRIT % 29.7*   PLATELETS 10*3/mm3 172       Results from last 7 days   Lab Units 07/10/23  0416   SODIUM mmol/L 136   POTASSIUM mmol/L 3.9   CHLORIDE mmol/L 105   CO2 mmol/L 21.1*   BUN mg/dL 17    CREATININE mg/dL 1.73*   CALCIUM mg/dL 8.4*   BILIRUBIN mg/dL 0.4   ALK PHOS U/L 61   ALT (SGPT) U/L 14   AST (SGOT) U/L 9   GLUCOSE mg/dL 100*       Results from last 7 days   Lab Units 07/10/23  1221 07/07/23  2228 07/07/23  1531   INR   --   --  1.32*   APTT seconds 76.2*   < > 31.1    < > = values in this interval not displayed.               Cultures:  No results found for: BLOODCX, URINECX, WOUNDCX, MRSACX, RESPCX, STOOLCX    I have reviewed daily medications and changes in CPOE    Scheduled meds  famotidine, 20 mg, Oral, BID AC  folic acid, 1 mg, Oral, Daily  predniSONE, 10 mg, Oral, Daily  senna-docusate sodium, 2 tablet, Oral, BID  sodium chloride, 10 mL, Intravenous, Q12H  vitamin B-12, 1,000 mcg, Oral, Daily        heparin, 18 Units/kg/hr, Last Rate: 24 Units/kg/hr (07/10/23 1159)  sodium chloride, 75 mL/hr, Last Rate: 75 mL/hr (07/10/23 0723)      PRN meds    acetaminophen **OR** acetaminophen **OR** acetaminophen    senna-docusate sodium **AND** polyethylene glycol **AND** bisacodyl **AND** bisacodyl    heparin (porcine)    HYDROcodone-acetaminophen    ondansetron    [COMPLETED] Insert Peripheral IV **AND** sodium chloride    sodium chloride    sodium chloride        Inguinal pain, left    Left groin mass    Abdominal lymphadenopathy    Metastatic melanoma    Metastasis to brain    LENIN (acute kidney injury)    Personal history of venous thrombosis and embolism    Hydroureteronephrosis    Acute pain of left lower extremity        Assessment/Plan:  Mr. George is a 63 year old male who presented to the hospital with complaints of left leg pain and difficulty walking. He was recently found to have extensive metastatic melanoma back in May of this year. He has had liver/lung/bone/brain involvement and currently receiving immunotherapy with Dr. Rocha with oncology. He has had some notable LENIN recently with CT A/P on 7/5 showing some new bilateral hydroureteronephrosis. He was due to see urology today but  had worsening left groin/hip pain after receiving immunotherapy yesterday making it difficult to go to this appointment. He is being admitted for further care at this time.     Inguinal pain, left  -In the setting of known hypermetabolic thickening of the skin in the left upper thigh/groin/lateral scrotum felt to be the primary site of malignancy. He has extensive lymphadenopathy in the abdomen worse on the left with extensive left lower extremity DVT on prior PET/CT.  -X-ray of hip without acute findings.  -Pain in hip has resolved  -Pain medication as needed     LENIN  Hydroureteronephrosis  -Creatinine worsening since last admission with CT findings of new hydroureteronephrosis.  -Hydrate with NS at 75 cc/hr   -Monitor urine output. Check bladder scans.  -Urology to see. Repeat ct. Will hold Eliquis (last dose 7/7 AM) and start heparin drip without initial bolus.   -She had a nephrology evaluation     Metastatic melanoma  Metastasis to brain  Abdominal lymphadenopathy  History of PE/DVT  -Hematology/oncology to see.  -Hold Eliquis and start heparin given likely needs for intervention.  -Further management per oncology.  -Resume prednisone that he was on prior to admission given immunotherapy reaction.     Doing better, ct scan of abd and pelvis shows the hydronephrosis  -Creatinine remains elevated  -Plan is for OR with stent placement 2-day    Price Banuelos MD  07/10/23  13:26 EDT

## 2023-07-10 NOTE — PROGRESS NOTES
[facility] Progress Note      Patient Name: King George  : 1960  MRN: [id number]  Primary Care Physician:  Mohini Cortes MD  Date of admission: 2023    Subjective     Interval History:     Seen and examined.  Plan for stent placement today.  No new issues.    Review of Systems:   As noted above    Objective     Vitals:   Temp:  [97.2 °F (36.2 °C)-98.2 °F (36.8 °C)] 97.6 °F (36.4 °C)  Heart Rate:  [48-77] 48  Resp:  [16] 16  BP: (126-144)/(69-76) 126/69    Intake/Output Summary (Last 24 hours) at 7/10/2023 0814  Last data filed at 7/10/2023 0723  Gross per 24 hour   Intake 3287 ml   Output 200 ml   Net 3087 ml       Physical Exam:    General Appearance: alert, oriented x 3, no acute distress   Skin: warm and dry  HEENT: oral mucosa normal, nonicteric sclera  Neck: supple, no JVD  Lungs: CTA  Heart: RRR, normal S1 and S2  Abdomen: soft, nontender, nondistended  : no palpable bladder  Extremities: no edema, cyanosis or clubbing  Neuro: normal speech and mental status     Scheduled Meds:     famotidine, 20 mg, Oral, BID AC  folic acid, 1 mg, Oral, Daily  predniSONE, 10 mg, Oral, Daily  senna-docusate sodium, 2 tablet, Oral, BID  sodium chloride, 10 mL, Intravenous, Q12H  vitamin B-12, 1,000 mcg, Oral, Daily      IV Meds:   heparin, 18 Units/kg/hr, Last Rate: 24 Units/kg/hr (07/10/23 0630)  sodium chloride, 75 mL/hr, Last Rate: 75 mL/hr (07/10/23 0723)        Results Reviewed:   I have personally reviewed the results from the time of this admission to 7/10/2023 08:14 EDT     Results from last 7 days   Lab Units 07/10/23  0416 23  0902 23  0612 23  1031   SODIUM mmol/L 136 132* 135* 133*   POTASSIUM mmol/L 3.9 3.9 4.2 4.4   CHLORIDE mmol/L 105 102 105 102   CO2 mmol/L 21.1* 21.0* 22.5 20.7*   BUN mg/dL 17 19 22 23   CREATININE mg/dL 1.73* 1.82* 1.68* 1.91*   CALCIUM mg/dL 8.4* 8.2* 8.5* 9.0   BILIRUBIN mg/dL 0.4  --  0.6 0.4   ALK PHOS U/L 61  --  62 64    ALT (SGPT) U/L 14  --  12 14   AST (SGOT) U/L 9  --  14 13   GLUCOSE mg/dL 100* 127* 107* 106*     Estimated Creatinine Clearance: 55.4 mL/min (A) (by C-G formula based on SCr of 1.73 mg/dL (H)).          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  Eosinophil is negative but there  Metastatic melanoma S/P  1 cycle of ipilimumab and nivolumab  on 6/14/2023 and second dose of Opdivo on July 6, 2023  Bilateral hydronephrosis that is likely due to obstructive uropathy due to lymphadenopathy      PLAN:    Continue IV fluids for now.  We will monitor improvement of kidney function after stent placement and assess need for kidney biopsy if needed.  Stent placement by urology today  Surveillance labs    Thank you for involving us in the care of King George.  Please feel free to call with any questions.    Zara Petit MD  07/10/23  08:14 EDT    [facility]  [phone]

## 2023-07-10 NOTE — CASE MANAGEMENT/SOCIAL WORK
Discharge Planning Assessment  [facility]     Patient Name: King George  MRN: [id number]  Today's Date: 7/10/2023    Admit Date: 7/7/2023    Plan: Home   Discharge Needs Assessment       Row Name 07/10/23 1024       Living Environment    People in Home alone    Current Living Arrangements home    Potentially Unsafe Housing Conditions none    Primary Care Provided by self    Provides Primary Care For no one    Family Caregiver if Needed parent(s)    Quality of Family Relationships helpful;involved;supportive    Able to Return to Prior Arrangements yes       Resource/Environmental Concerns    Resource/Environmental Concerns none    Transportation Concerns none       Transition Planning    Patient/Family Anticipates Transition to home    Patient/Family Anticipated Services at Transition none    Transportation Anticipated family or friend will provide       Discharge Needs Assessment    Readmission Within the Last 30 Days current reason for admission unrelated to previous admission    Equipment Currently Used at Home none    Concerns to be Addressed no discharge needs identified;denies needs/concerns at this time    Anticipated Changes Related to Illness none    Equipment Needed After Discharge none    Provided Post Acute Provider List? N/A    Provided Post Acute Provider Quality & Resource List? N/A                   Discharge Plan       Row Name 07/10/23 1025       Plan    Plan Home    Patient/Family in Agreement with Plan yes    Plan Comments CCP met with patient at bedside, introduced self and explained role. Patient confirmed the information on his face sheet was accurate. Patient states that he lives at home alone. Patient confirmed his PCP is Mohini Cortes. Patinet states he is independent with his ADL’s and denies a history of HH/SNF. Patient denies using any DME. Patient is enrolled in the [facility] program. Patient states there are 3 steps to enter his home with a handrail on the left-hand side. Patient  denies any steps inside the home. Patient denies needs and states his mother can transport him at discharge. CCP to follow.                  Continued Care and Services - Admitted Since 7/7/2023    Coordination has not been started for this encounter.          Demographic Summary       Row Name 07/10/23 1017       General Information    Admission Type inpatient    Arrived From emergency department    Referral Source admission list    Reason for Consult discharge planning    Preferred Language English                   Functional Status       Row Name 07/10/23 1021       Functional Status    Usual Activity Tolerance good    Current Activity Tolerance moderate       Functional Status, IADL    Medications independent    Meal Preparation independent    Housekeeping independent    Laundry independent    Shopping independent       Mental Status    General Appearance WDL WDL       Mental Status Summary    Recent Changes in Mental Status/Cognitive Functioning no changes                   Psychosocial    No documentation.                  Abuse/Neglect    No documentation.                  Legal    No documentation.                  Substance Abuse    No documentation.                  Patient Forms    No documentation.

## 2023-07-10 NOTE — PAYOR COMM NOTE
"Marilee George (63 y.o. Male)        INPATIENT REQUEST FOR [id number]    THE MEMBER WAS ADMITTED AS OBSERVATION 7/7 AND CONVERTED TO INAPTIENT 7/10/23.    CONTACT [facility]  P# [phone]  F# [phone]         Date of Birth   1960    Social Security Number       Address   [address] [place] [address]    Home Phone   [phone]    MRN   [id number]       Yazidism   Unknown    Marital Status   Unknown                            Admission Date   7/7/23    Admission Type   Emergency    Admitting Provider   Farhat Hobbs MD    Attending Provider   Price Banuelos MD    Department, Room/Bed   [address], 99/1       Discharge Date       Discharge Disposition       Discharge Destination                                 Attending Provider: Price Banuelos MD    Allergies: No Known Allergies    Isolation: None   Infection: None   Code Status: CPR    Ht: 182.9 cm (72\")   Wt: 102 kg (225 lb)    Admission Cmt: None   Principal Problem: Inguinal pain, left [R10.32]                   Active Insurance as of 7/7/2023       Primary Coverage       Payor Plan Insurance Group Employer/Plan Group    [employer] [place] [id number]       Payor Plan Address Payor Plan Phone Number Payor Plan Fax Number Effective Dates    PO    3/1/2021 - None Entered    [facility] [id number]         Subscriber Name Subscriber Birth Date Member ID       MARILEE GEORGE 1960 [id number]                     Emergency Contacts        (Rel.) Home Phone Work Phone Mobile Phone    daquan de leon (Friend) -- -- [phone]    Komal George (Mother) -- -- [phone]                 History & Physical        Sudha Douglas APRN at 07/07/23 1449       Attestation signed by Farhat Hobbs MD at 07/07/23 1610      Brief Attending Admission Attestation   Addendum: I have reviewed the history and plan as obtained by TANYA Crawford and performed my own " independent history. I have personally examined the patient and conducted greater than 50% of medical decision making. My exam confirms her physical findings and I agree with the plan as listed above, with the addition of the following:       Brief Summary Statement/HPI  63 y.o. male with metastatic melanoma who presented with leg pain and weakness.  Patient had been referred to urology as an outpatient given LENIN with CT scan showing bilateral hydroureteronephrosis concerning for progression of his disease.  When he tried to get in the car to get to his appointment today he was too weak and it was too painful for him to get to the appointment.  He called his primary oncologist and they recommended presentation to the ER.    Remainder of detailed HPI is as noted above and has been reviewed by me for completeness.    I have personally reviewed:  [x]  Laboratory  [x]  Old records  [x]  Radiology   [x]  EKG/Telemetry   []  Microbiology    [x]  Cardiology/Vascular   [x]  Pathology     Attending Physical Exam  Temp:  [97.2 °F (36.2 °C)-98.3 °F (36.8 °C)] 97.2 °F (36.2 °C)  Heart Rate:  [61-83] 68  Resp:  [16-18] 16  BP: (138-140)/(78-85) 139/84  Constitutional: alert, cooperative, and no distress  Neck: no JVD  Respiratory: clear to auscultation, unlabored  Cardiovascular: regular rate and rhythm, no murmur  Abdominal: soft, non-tender, non-distended; BS normal  Musculoskeletal: lymphedema of LLE, diffuse tenderness, rash on left shin  Neurological: alert and oriented x 3    Assessment/Plan    Inguinal pain  Metastatic melanoma  Hx DVT  -Likely all related to malignancy, CT scan showing worsening adenopathy  -Oncology following  -Pain control with Norco 5 mg every 4 hours as needed, will add additional agents as needed; bowel regimen    LENIN  Hydroureteronephrosis  -Crt 1.91 OA (b/l ~1-1.2)  -Urology to see  -Heparin GTT with Eliquis on hold      Active Hospital Problems    Diagnosis  POA    **Inguinal pain, left [R10.32]   Yes    Hydroureteronephrosis [N13.30]  Yes    LENIN (acute kidney injury) [N17.9]  Yes    Personal history of venous thrombosis and embolism [Z86.718]  Not Applicable    Metastasis to brain [C79.31]  Yes    Metastatic melanoma [C43.9]  Yes    Left groin mass [R19.09]  Yes    Abdominal lymphadenopathy [R59.0]  Yes      Resolved Hospital Problems   No resolved problems to display.     See below for additional detailed assessment and plan developed with APRN which I have reviewed.      Electronically signed by Farhat Hobbs MD, 7/7/2023, 16:05 EDT.                         Patient Name:  King George  YOB: 1960  MRN:  [id number]  Admit Date:  7/7/2023  Patient Care Team:  Mohini Cortes MD as PCP - General (Internal Medicine)  Bernardo Azar MD as Referring Physician (Hospitalist)  Brodie Rocha MD as Consulting Physician (Hematology and Oncology)      Subjective   History Present Illness     Chief Complaint   Patient presents with    Leg Pain     DVT r/o to Left leg, swelling to entire left leg up into groin. +3 pitting Edema throughout leg into groin. PE to left lung 3 weeks ago, Eliquis BID.      History of Present Illness  Mr. George is a 63 y.o. non-smoker with a history of recently diagnosed metastatic melanoma and PE/DVT that presents to [facility] complaining of leg pain. The patient has had a very complicated medical history as of late. He was admitted to this facility 5/28-6/4/23 where he was initially diagnosed with new metastatic melanoma. He was found to have liver, lung, bone and brain involvement and had colonoscopy for rectal bleeding where a polyp was resected and injected. He also had a cystoscopy that revealed mass effect on the lateral wall of the bladder with erythematous area on the posterior bladder wall that was concerning for external invasion. He underwent port placement and transitioned to DOAC for outpatient follow up with oncology.    He returned  to the hospital shortly after this for new onset of dysphagia as well as rash. He received his first immunotherapy on 6/14 and was felt to have a reaction with GRUPO noted as well. His Grupo responded well to fluids and oncology placed him on steroids with vast improvement in his symptoms.    The patient states he was due to see urology today given some ongoing GRUPO noted by oncology. CT A/P was obtained 7/5 for this reason and he was found to have bilateral hydroureteronephrosis that was new from prior Cts with progression of disease. He was being referred to urology today for this reason for possible stenting. He received his immunotherapy just yesterday.   The patient states he was attempting to go to his urology appointment today when his left leg began hurting and gave out on him. He states he noticed some burning in his left groin after immunotherapy yesterday but bent down to put on his shoes today when he had a sudden surge of knife like pain in the area. He states this took him to his knees. He called his oncologist who said to go to the urology appointment if he was able but to otherwise go to the ED for his symptoms. He states he attempted to go to the urologist but couldn't get out of the car so came here instead. He states his pain has been slowly decreasing since this time. He took two Tylenol earlier and over time the pain has lessened. He denies any increased swelling in his legs and denies any recent chest pain or dyspnea. He feels his appetite is good and denies any GI complaints. He has been having urgency/frequency and does not always feel like he is emptying his bladder. He took his Eliquis this morning.   In the ED, he was afebrile and hemodynamically stable. Lab work showed a creatinine of 1.91, sodium 133 and normal LFTs. WBC were 7.44 and hemoglobin 11.3. X-ray of his hip was negative for any acute findings. He is being admitted for further treatment at this time.    Review of Systems    Constitutional:  Negative for chills and fever.   HENT:  Negative for congestion and ear pain.    Respiratory:  Negative for cough and shortness of breath.    Cardiovascular:  Positive for leg swelling. Negative for chest pain.   Gastrointestinal:  Negative for abdominal pain, constipation, diarrhea and vomiting.   Genitourinary:  Positive for frequency and urgency. Negative for difficulty urinating and dysuria.   Musculoskeletal:  Positive for arthralgias, gait problem and myalgias.   Skin:  Negative for color change and pallor.   Neurological:  Positive for weakness. Negative for dizziness and light-headedness.   Psychiatric/Behavioral:  Negative for confusion. The patient is not nervous/anxious.       Personal History     Past Medical History:   Diagnosis Date    Abdominal lymphadenopathy 05/29/2023    Acute pulmonary embolism, unspecified pulmonary embolism type, unspecified whether acute cor pulmonale present 05/28/2023    ADMITTED TO [facility]    Anemia 05/29/2023    Colon polyps     FOLLOWED BY DR. SHANNA MOTTA    Drug rash 6/19/2023    Hepatic lesion 05/29/2023    Left lower quadrant abdominal mass 05/28/2023    LEFT INGUINAL BX SHOWED METASTATIC MELANOMA    Metastasis to brain 06/02/2023    Metastatic melanoma 05/31/2023    Moderate Malnutrition (HCC) 05/30/2023    Personal history of venous thrombosis and embolism 6/19/2023     Past Surgical History:   Procedure Laterality Date    COLONOSCOPY N/A 06/02/2023    40 MM TUBULOVILLOUS ADENOMA POLYP IN DISTAL RECTUM, RESCOPE IN 1 YR, DR. SHANNA MOTTA AT [facility]    COLONOSCOPY W/ POLYPECTOMY N/A 06/02/2023    40 MM POLYP IN DISTAL RECTUM, RESECTION AND RETREIVAL COMPLETE, RESCOPE IN 1 YR, DR. SHANNA MOTTA AT [facility]    PORTACATH PLACEMENT  06/02/2023    DONE AT [facility]    US GUIDED LYMPH NODE BIOPSY  05/30/2023    METASTATIC MELANOMA, DONE AT [facility]    VENOUS ACCESS DEVICE (PORT) INSERTION N/A 06/02/2023    Procedure: INFUSAPORT PLACEMENT;  Surgeon: Vicki Layne MD;  Location:  [facility];  Service: General;  Laterality: N/A;     Family History   Problem Relation Age of Onset    Breast cancer Mother     Prostate cancer Father      Social History     Tobacco Use    Smoking status: Never     Passive exposure: Never    Smokeless tobacco: Never   Vaping Use    Vaping Use: Unknown   Substance Use Topics    Alcohol use: Never    Drug use: Never     Medications Prior to Admission   Medication Sig Dispense Refill Last Dose    apixaban (ELIQUIS) 5 MG tablet tablet Take 1 tablet by mouth Every 12 (Twelve) Hours. Indications: DVT/PE (active thrombosis) 60 tablet 0 7/7/2023    folic acid (FOLVITE) 1 MG tablet Take 1 tablet by mouth Daily. 30 tablet 0 7/7/2023    ondansetron (ZOFRAN) 8 MG tablet Take  by mouth Every 8 (Eight) Hours As Needed for Nausea or Vomiting.       pantoprazole (PROTONIX) 40 MG EC tablet Take 1 tablet by mouth 2 (Two) Times a Day Before Meals. 30 tablet 0 7/6/2023    predniSONE (DELTASONE) 10 MG tablet Take 1 tablet by mouth Daily. 30 tablet 3 7/7/2023    vitamin B-12 (CYANOCOBALAMIN) 1000 MCG tablet Take 1 tablet by mouth Daily. 30 tablet 0 7/7/2023     Allergies:  No Known Allergies    Objective    Objective     Vital Signs  Temp:  [97.2 °F (36.2 °C)-98.3 °F (36.8 °C)] 97.2 °F (36.2 °C)  Heart Rate:  [61-83] 68  Resp:  [16-18] 16  BP: (138-140)/(78-85) 139/84  SpO2:  [99 %-100 %] 99 %  on   ;   Device (Oxygen Therapy): room air  Body mass index is 30.52 kg/m².    Physical Exam  Vitals and nursing note reviewed.   Constitutional:       Appearance: He is ill-appearing. He is not toxic-appearing.   HENT:      Head: Normocephalic and atraumatic.      Right Ear: External ear normal.      Left Ear: External ear normal.      Nose: Nose normal.   Cardiovascular:      Rate and Rhythm: Normal rate and regular rhythm.      Pulses: Normal pulses.   Pulmonary:      Effort: Pulmonary effort is normal. No respiratory distress.      Breath sounds: Normal breath sounds.   Abdominal:       General: Bowel sounds are normal. There is no distension.      Palpations: Abdomen is soft.      Tenderness: There is no abdominal tenderness.   Musculoskeletal:         General: Swelling and tenderness present. Normal range of motion.      Comments: Moderate 1+ edema LLE, mild RLE   Skin:     General: Skin is warm and dry.      Findings: No bruising.      Comments: Healing rash to left shin.   Neurological:      General: No focal deficit present.      Mental Status: He is alert and oriented to person, place, and time.      Sensory: No sensory deficit.      Motor: Weakness present.      Coordination: Coordination normal.   Psychiatric:         Mood and Affect: Mood normal.         Behavior: Behavior normal.       Results Review:  I reviewed the patient's new clinical results.  I reviewed the patient's new imaging results and agree with the interpretation.  I reviewed the patient's other test results and agree with the interpretation  I personally viewed and interpreted the patient's EKG/Telemetry data  Discussed with ED provider.    Lab Results (last 24 hours)       Procedure Component Value Units Date/Time    CBC & Differential [[id number]]  (Abnormal) Collected: 07/07/23 1031    Specimen: Blood Updated: 07/07/23 1052    Narrative:      The following orders were created for panel order CBC & Differential.  Procedure                               Abnormality         Status                     ---------                               -----------         ------                     CBC Auto Differential[[id number]]        Abnormal            Final result                 Please view results for these tests on the individual orders.    Comprehensive Metabolic Panel [[id number]]  (Abnormal) Collected: 07/07/23 1031    Specimen: Blood Updated: 07/07/23 1113     Glucose 106 mg/dL      BUN 23 mg/dL      Creatinine 1.91 mg/dL      Sodium 133 mmol/L      Potassium 4.4 mmol/L      Comment: Slight hemolysis detected by analyzer.  Results may be affected.        Chloride 102 mmol/L      CO2 20.7 mmol/L      Calcium 9.0 mg/dL      Total Protein 7.6 g/dL      Albumin 3.2 g/dL      ALT (SGPT) 14 U/L      AST (SGOT) 13 U/L      Comment: Slight hemolysis detected by analyzer. Results may be affected.        Alkaline Phosphatase 64 U/L      Total Bilirubin 0.4 mg/dL      Globulin 4.4 gm/dL      A/G Ratio 0.7 g/dL      BUN/Creatinine Ratio 12.0     Anion Gap 10.3 mmol/L      eGFR 39.4 mL/min/1.73     Narrative:      GFR Normal >60  Chronic Kidney Disease <60  Kidney Failure <15      CBC Auto Differential [[id number]]  (Abnormal) Collected: 07/07/23 1031    Specimen: Blood Updated: 07/07/23 1052     WBC 7.44 10*3/mm3      RBC 4.15 10*6/mm3      Hemoglobin 11.3 g/dL      Hematocrit 34.7 %      MCV 83.6 fL      MCH 27.2 pg      MCHC 32.6 g/dL      RDW 15.5 %      RDW-SD 46.3 fl      MPV 8.6 fL      Platelets 177 10*3/mm3      Neutrophil % 75.3 %      Lymphocyte % 12.1 %      Monocyte % 7.4 %      Eosinophil % 3.9 %      Basophil % 0.9 %      Immature Grans % 0.4 %      Neutrophils, Absolute 5.60 10*3/mm3      Lymphocytes, Absolute 0.90 10*3/mm3      Monocytes, Absolute 0.55 10*3/mm3      Eosinophils, Absolute 0.29 10*3/mm3      Basophils, Absolute 0.07 10*3/mm3      Immature Grans, Absolute 0.03 10*3/mm3      nRBC 0.0 /100 WBC     Protime-INR [[id number]] Collected: 07/07/23 1531    Specimen: Blood Updated: 07/07/23 1544    aPTT [[id number]] Collected: 07/07/23 1531    Specimen: Blood Updated: 07/07/23 1544            Imaging Results (Last 24 Hours)       Procedure Component Value Units Date/Time    XR Hip With or Without Pelvis 2 - 3 View Left [[id number]] Collected: 07/07/23 1052     Updated: 07/07/23 1057    Narrative:      XR HIP W OR WO PELVIS 2-3 VIEW LEFT-     INDICATIONS: Pain     TECHNIQUE: Frontal view of the pelvis, 2 views of the left hip     COMPARISON: None available     FINDINGS:     No acute fracture, erosion, or dislocation is identified.  Prominent  degenerative changes of the hips are noted, right more than left,  including joint space narrowing, marginal spurring, subcortical  eburnation. Follow-up/further evaluation can be obtained as indications  persist.       Impression:         As described.     This report was finalized on 7/7/2023 10:54 AM by Dr. Alejandro Yu M.D.                  Assessment/Plan     Active Hospital Problems    Diagnosis  POA    **Inguinal pain, left [R10.32]  Yes    Hydroureteronephrosis [N13.30]  Yes    LENIN (acute kidney injury) [N17.9]  Yes    Personal history of venous thrombosis and embolism [Z86.718]  Not Applicable    Metastasis to brain [C79.31]  Yes    Metastatic melanoma [C43.9]  Yes    Left groin mass [R19.09]  Yes    Abdominal lymphadenopathy [R59.0]  Yes     Mr. George is a 63 year old male who presented to the hospital with complaints of left leg pain and difficulty walking. He was recently found to have extensive metastatic melanoma back in May of this year. He has had liver/lung/bone/brain involvement and currently receiving immunotherapy with Dr. Rocha with oncology. He has had some notable LENIN recently with CT A/P on 7/5 showing some new bilateral hydroureteronephrosis. He was due to see urology today but had worsening left groin/hip pain after receiving immunotherapy yesterday making it difficult to go to this appointment. He is being admitted for further care at this time.    Inguinal pain, left  -In the setting of known hypermetabolic thickening of the skin in the left upper thigh/groin/lateral scrotum felt to be the primary site of malignancy. He has extensive lymphadenopathy in the abdomen worse on the left with extensive left lower extremity DVT on prior PET/CT.  -X-ray of hip without acute findings.  -Pain currently improving at this time. Monitor for changes and consider CT/MRI if needed.  -Pain medication as needed.    LENIN  Hydroureteronephrosis  -Creatinine worsening since last admission  with CT findings of new hydroureteronephrosis.  -Hydrate with NS at 75 cc/hr for now.  -Monitor urine output. Check bladder scans.  -Ask urology to see. Likely needs stenting. Will hold Eliquis (last dose 7/7 AM) and start heparin drip without initial bolus.     Metastatic melanoma  Metastasis to brain  Abdominal lymphadenopathy  History of PE/DVT  -Hematology/oncology to see.  -Hold Eliquis and start heparin given likely needs for intervention.  -Further management per oncology.  -Resume prednisone that he was on prior to admission given immunotherapy reaction.      I discussed the patients findings and my recommendations with patient, family, nursing staff, and Dr. Hobbs .    VTE Prophylaxis -  heparin drip .  Code Status - Full code. Further goals of care per oncology discretion.       TANYA Mcadams  [facility]  07/07/23  15:47 EDT      Electronically signed by Farhat Hobbs MD at 07/07/23 1610          Emergency Department Notes        Cha Pollard, RN at 07/07/23 1200          Nursing report ED to floor  King George  63 y.o.  male    HPI :   Chief Complaint   Patient presents with    Leg Pain     DVT r/o to Left leg, swelling to entire left leg up into groin. +3 pitting Edema throughout leg into groin. PE to left lung 3 weeks ago, Eliquis BID.        Admitting doctor:   Farhat Hobbs MD    Admitting diagnosis:   The primary encounter diagnosis was Inguinal pain, left. Diagnoses of LENIN (acute kidney injury), Difficulty walking, and Metastatic melanoma were also pertinent to this visit.    Code status:   Current Code Status       Date Active Code Status Order ID Comments User Context       Prior            Allergies:   Patient has no known allergies.    Isolation:   No active isolations    Intake and Output  No intake or output data in the 24 hours ending 07/07/23 1200    Weight:       07/07/23  1040   Weight: 102 kg (225 lb)       Most recent vitals:   Vitals:  "   07/07/23 1005 07/07/23 1023 07/07/23 1040   BP:  138/85    Pulse: 83     Resp: 16     Temp: 98.3 °F (36.8 °C)     SpO2: 100%     Weight:   102 kg (225 lb)   Height:   182.9 cm (72\")       Active LDAs/IV Access:   Lines, Drains & Airways       Active LDAs       Name Placement date Placement time Site Days    Peripheral IV 07/07/23 1040 Posterior;Right Hand 07/07/23  1040  Hand  less than 1    Single Lumen Implantable Port Chest --  --  Chest  --                    Labs (abnormal labs have a star):   Labs Reviewed   COMPREHENSIVE METABOLIC PANEL - Abnormal; Notable for the following components:       Result Value    Glucose 106 (*)     Creatinine 1.91 (*)     Sodium 133 (*)     CO2 20.7 (*)     Albumin 3.2 (*)     eGFR 39.4 (*)     All other components within normal limits    Narrative:     GFR Normal >60  Chronic Kidney Disease <60  Kidney Failure <15     CBC WITH AUTO DIFFERENTIAL - Abnormal; Notable for the following components:    Hemoglobin 11.3 (*)     Hematocrit 34.7 (*)     RDW 15.5 (*)     Lymphocyte % 12.1 (*)     All other components within normal limits   CBC AND DIFFERENTIAL    Narrative:     The following orders were created for panel order CBC & Differential.  Procedure                               Abnormality         Status                     ---------                               -----------         ------                     CBC Auto Differential[[id number]]        Abnormal            Final result                 Please view results for these tests on the individual orders.       EKG:   No orders to display       Meds given in ED:   Medications   sodium chloride 0.9 % flush 10 mL (has no administration in time range)   sodium chloride 0.9 % flush 10 mL (has no administration in time range)   sodium chloride 0.9 % flush 10 mL (has no administration in time range)   sodium chloride 0.9 % infusion 40 mL (has no administration in time range)   sennosides-docusate (PERICOLACE) 8.6-50 MG per tablet 2 " tablet (has no administration in time range)     And   polyethylene glycol (MIRALAX) packet 17 g (has no administration in time range)     And   bisacodyl (DULCOLAX) EC tablet 5 mg (has no administration in time range)     And   bisacodyl (DULCOLAX) suppository 10 mg (has no administration in time range)   sodium chloride 0.9 % infusion (has no administration in time range)   acetaminophen (TYLENOL) tablet 650 mg (has no administration in time range)     Or   acetaminophen (TYLENOL) 160 MG/5ML solution 650 mg (has no administration in time range)     Or   acetaminophen (TYLENOL) suppository 650 mg (has no administration in time range)   HYDROcodone-acetaminophen (NORCO) 5-325 MG per tablet 1 tablet (has no administration in time range)   ondansetron (ZOFRAN) injection 4 mg (has no administration in time range)       Imaging results:  XR Hip With or Without Pelvis 2 - 3 View Left    Result Date: 7/7/2023   As described.  This report was finalized on 7/7/2023 10:54 AM by Dr. Alejandro Yu M.D.       Ambulatory status:   - with assist    Social issues:   Social History     Socioeconomic History    Marital status: Unknown   Tobacco Use    Smoking status: Never     Passive exposure: Never    Smokeless tobacco: Never   Vaping Use    Vaping Use: Unknown   Substance and Sexual Activity    Alcohol use: Never    Drug use: Never    Sexual activity: Never       NIH Stroke Scale:       Cha Pollard RN  07/07/23 12:00 EDT          Electronically signed by Cha Pollard RN at 07/07/23 1200       Perfecto Rivera MD at 07/07/23 1033           EMERGENCY DEPARTMENT ENCOUNTER    Room Number:  14/14  Date seen:  7/7/2023  PCP: Mohini Cortes MD  Historian(s): patient      HPI:  Chief Complaint: hip pain, leg pain  A complete HPI/ROS/PMH/PSH/SH/FH are unobtainable / limited due to: none  Context: King George is a 63 y.o. male who presents to the ED c/o pain in the left hip and leg that is making it difficult for him to walk  today.  He has a known history of metastatic melanoma and follows with Dr. Rocha in the hematology oncology clinic.  He has initiated immunotherapy treatments and had a immunotherapy treatment 2 days ago.  He has known retroperitoneal lymphadenopathy involvement and also there is record of a subcapsular metastases in the left hip.  [facility] with urology due to hydronephrosis thought to be related to his metastatic disease.  He was told that he might need ureteral stenting.  However, this morning when he woke up, he was having tremendous difficulties bearing weight or even flexing the hip because of new pain in that area.  He tried to get in the car and even to lift his leg was too difficult for him to do that.  So he decided to come here for further evaluation for since he could not safely make it to the urology clinic.        PAST MEDICAL HISTORY  Active Ambulatory Problems     Diagnosis Date Noted    History of pulmonary embolism 05/28/2023    Anemia 05/29/2023    Left groin mass 05/29/2023    Hepatic lesion 05/29/2023    Abdominal lymphadenopathy 05/29/2023    Moderate malnutrition 05/30/2023    Left lower quadrant abdominal mass 05/28/2023    Metastatic melanoma 05/31/2023    Rectal polyp 06/02/2023    Metastasis to brain 06/02/2023    LENIN (acute kidney injury) 06/19/2023    Drug rash 06/19/2023    Personal history of venous thrombosis and embolism 06/19/2023    Adverse effect of antineoplastic drug 06/20/2023    Encounter for management of implanted device 07/06/2023     Resolved Ambulatory Problems     Diagnosis Date Noted    Odynophagia 06/20/2023     Past Medical History:   Diagnosis Date    Acute pulmonary embolism, unspecified pulmonary embolism type, unspecified whether acute cor pulmonale present 05/28/2023    Colon polyps          PAST SURGICAL HISTORY  Past Surgical History:   Procedure Laterality Date    COLONOSCOPY N/A 06/02/2023    42 MM  TUBULOVILLOUS ADENOMA POLYP IN DISTAL RECTUM, RESCOPE IN 1 YR, DR. SHANNA MOTTA AT [facility]    COLONOSCOPY W/ POLYPECTOMY N/A 06/02/2023    42 MM POLYP IN DISTAL RECTUM, RESECTION AND RETREIVAL COMPLETE, RESCOPE IN 1 YR, DR. SHANNA MOTTA AT [facility]    PORTACATH PLACEMENT  06/02/2023    DONE AT [facility]    US GUIDED LYMPH NODE BIOPSY  05/30/2023    METASTATIC MELANOMA, DONE AT [facility]    VENOUS ACCESS DEVICE (PORT) INSERTION N/A 06/02/2023    Procedure: INFUSAPORT PLACEMENT;  Surgeon: Vicki Layne MD;  Location: [facility] MAIN OR;  Service: General;  Laterality: N/A;         FAMILY HISTORY  History reviewed. No pertinent family history.      SOCIAL HISTORY  Social History     Socioeconomic History    Marital status: Unknown   Tobacco Use    Smoking status: Never     Passive exposure: Never    Smokeless tobacco: Never   Vaping Use    Vaping Use: Unknown   Substance and Sexual Activity    Alcohol use: Never    Drug use: Never    Sexual activity: Never         ALLERGIES  Patient has no known allergies.        REVIEW OF SYSTEMS  Review of Systems   Constitutional:  Negative for activity change and fever.   HENT: Negative.     Eyes:  Negative for pain and visual disturbance.   Respiratory:  Negative for cough and shortness of breath.    Cardiovascular:  Positive for leg swelling. Negative for chest pain.   Gastrointestinal:  Negative for abdominal pain.   Musculoskeletal:  Positive for arthralgias, gait problem, joint swelling and myalgias.   Skin:  Negative for color change.   Neurological:  Negative for syncope and headaches.   All other systems reviewed and are negative.         PHYSICAL EXAM  ED Triage Vitals [07/07/23 1005]   Temp Heart Rate Resp BP SpO2   98.3 °F (36.8 °C) 83 16 -- 100 %      Temp src Heart Rate Source Patient Position BP Location FiO2 (%)   -- -- -- -- --       Physical Exam      GENERAL: Calm, no diaphoresis, little bit anxious but no acute distress  HENT: nares patent, normocephalic and atraumatic  EYES: no  scleral icterus, EOMI, normal conjunctiva  CV: regular rhythm, normal rate, normal distal pulses  RESPIRATORY: normal effort, no stridor  ABDOMEN: soft, nontender in all quadrants  MUSCULOSKELETAL: The left lower extremity is asymmetrically swollen throughout its entirety in comparison to the right side.  The left proximal thigh and anterior lateral hip show sequelae of metastatic melanoma dermatologically.  There are some tenderness in the left inguinal region.  NEURO: alert, moves all extremities, follows commands  PSYCH:  calm, cooperative  SKIN: warm, dry    Vital signs and nursing notes reviewed.        LAB RESULTS  Recent Results (from the past 24 hour(s))   Comprehensive metabolic panel    Collection Time: 07/06/23  2:31 PM    Specimen: Blood   Result Value Ref Range    Glucose 102 (H) 65 - 99 mg/dL    BUN 21 8 - 23 mg/dL    Creatinine 1.89 (C) 0.70 - 1.30 mg/dL    Sodium 135 (L) 136 - 145 mmol/L    Potassium 4.7 3.5 - 5.2 mmol/L    Chloride 103 98 - 107 mmol/L    CO2 21.4 (L) 22.0 - 29.0 mmol/L    Calcium 9.1 8.6 - 10.5 mg/dL    Total Protein 7.8 6.0 - 8.5 g/dL    Albumin 3.4 (L) 3.5 - 5.2 g/dL    ALT (SGPT) 9 1 - 41 U/L    AST (SGOT) 17 1 - 40 U/L    Alkaline Phosphatase 72 39 - 117 U/L    Total Bilirubin 0.5 0.0 - 1.2 mg/dL    Globulin 4.4 gm/dL    A/G Ratio 0.8 g/dL    BUN/Creatinine Ratio 11.1 7.0 - 25.0    Anion Gap 10.6 5.0 - 15.0 mmol/L    eGFR 39.9 (L) >60.0 mL/min/1.73   CBC Auto Differential    Collection Time: 07/06/23  2:31 PM    Specimen: Blood   Result Value Ref Range    WBC 8.06 3.40 - 10.80 10*3/mm3    RBC 4.16 4.14 - 5.80 10*6/mm3    Hemoglobin 11.3 (L) 13.0 - 17.7 g/dL    Hematocrit 36.9 (L) 37.5 - 51.0 %    MCV 88.7 79.0 - 97.0 fL    MCH 27.2 26.6 - 33.0 pg    MCHC 30.6 (L) 31.5 - 35.7 g/dL    RDW 16.5 (H) 12.3 - 15.4 %    RDW-SD 52.9 37.0 - 54.0 fl    MPV 8.3 6.0 - 12.0 fL    Platelets 219 140 - 450 10*3/mm3    Neutrophil % 77.9 (H) 42.7 - 76.0 %    Lymphocyte % 12.0 (L) 19.6 - 45.3 %     Monocyte % 7.4 5.0 - 12.0 %    Eosinophil % 1.9 0.3 - 6.2 %    Basophil % 0.6 0.0 - 1.5 %    Immature Grans % 0.2 0.0 - 0.5 %    Neutrophils, Absolute 6.27 1.70 - 7.00 10*3/mm3    Lymphocytes, Absolute 0.97 0.70 - 3.10 10*3/mm3    Monocytes, Absolute 0.60 0.10 - 0.90 10*3/mm3    Eosinophils, Absolute 0.15 0.00 - 0.40 10*3/mm3    Basophils, Absolute 0.05 0.00 - 0.20 10*3/mm3    Immature Grans, Absolute 0.02 0.00 - 0.05 10*3/mm3    nRBC 0.0 0.0 - 0.2 /100 WBC   Comprehensive Metabolic Panel    Collection Time: 07/07/23 10:31 AM    Specimen: Blood   Result Value Ref Range    Glucose 106 (H) 65 - 99 mg/dL    BUN 23 8 - 23 mg/dL    Creatinine 1.91 (H) 0.76 - 1.27 mg/dL    Sodium 133 (L) 136 - 145 mmol/L    Potassium 4.4 3.5 - 5.2 mmol/L    Chloride 102 98 - 107 mmol/L    CO2 20.7 (L) 22.0 - 29.0 mmol/L    Calcium 9.0 8.6 - 10.5 mg/dL    Total Protein 7.6 6.0 - 8.5 g/dL    Albumin 3.2 (L) 3.5 - 5.2 g/dL    ALT (SGPT) 14 1 - 41 U/L    AST (SGOT) 13 1 - 40 U/L    Alkaline Phosphatase 64 39 - 117 U/L    Total Bilirubin 0.4 0.0 - 1.2 mg/dL    Globulin 4.4 gm/dL    A/G Ratio 0.7 g/dL    BUN/Creatinine Ratio 12.0 7.0 - 25.0    Anion Gap 10.3 5.0 - 15.0 mmol/L    eGFR 39.4 (L) >60.0 mL/min/1.73   CBC Auto Differential    Collection Time: 07/07/23 10:31 AM    Specimen: Blood   Result Value Ref Range    WBC 7.44 3.40 - 10.80 10*3/mm3    RBC 4.15 4.14 - 5.80 10*6/mm3    Hemoglobin 11.3 (L) 13.0 - 17.7 g/dL    Hematocrit 34.7 (L) 37.5 - 51.0 %    MCV 83.6 79.0 - 97.0 fL    MCH 27.2 26.6 - 33.0 pg    MCHC 32.6 31.5 - 35.7 g/dL    RDW 15.5 (H) 12.3 - 15.4 %    RDW-SD 46.3 37.0 - 54.0 fl    MPV 8.6 6.0 - 12.0 fL    Platelets 177 140 - 450 10*3/mm3    Neutrophil % 75.3 42.7 - 76.0 %    Lymphocyte % 12.1 (L) 19.6 - 45.3 %    Monocyte % 7.4 5.0 - 12.0 %    Eosinophil % 3.9 0.3 - 6.2 %    Basophil % 0.9 0.0 - 1.5 %    Immature Grans % 0.4 0.0 - 0.5 %    Neutrophils, Absolute 5.60 1.70 - 7.00 10*3/mm3    Lymphocytes, Absolute 0.90 0.70 -  3.10 10*3/mm3    Monocytes, Absolute 0.55 0.10 - 0.90 10*3/mm3    Eosinophils, Absolute 0.29 0.00 - 0.40 10*3/mm3    Basophils, Absolute 0.07 0.00 - 0.20 10*3/mm3    Immature Grans, Absolute 0.03 0.00 - 0.05 10*3/mm3    nRBC 0.0 0.0 - 0.2 /100 WBC       Ordered the above labs and reviewed the results.        RADIOLOGY  XR Hip With or Without Pelvis 2 - 3 View Left    Result Date: 7/7/2023  XR HIP W OR WO PELVIS 2-3 VIEW LEFT-  INDICATIONS: Pain  TECHNIQUE: Frontal view of the pelvis, 2 views of the left hip  COMPARISON: None available  FINDINGS:  No acute fracture, erosion, or dislocation is identified. Prominent degenerative changes of the hips are noted, right more than left, including joint space narrowing, marginal spurring, subcortical eburnation. Follow-up/further evaluation can be obtained as indications persist.       As described.  This report was finalized on 7/7/2023 10:54 AM by Dr. Alejandro Yu M.D.       Ordered the above noted radiological studies. Reviewed by me in PACS.          PROCEDURES  Procedures      MEDICATIONS GIVEN IN ER  Medications   sodium chloride 0.9 % flush 10 mL (has no administration in time range)   sodium chloride 0.9 % flush 10 mL (has no administration in time range)   sodium chloride 0.9 % flush 10 mL (has no administration in time range)   sodium chloride 0.9 % infusion 40 mL (has no administration in time range)   sennosides-docusate (PERICOLACE) 8.6-50 MG per tablet 2 tablet (has no administration in time range)     And   polyethylene glycol (MIRALAX) packet 17 g (has no administration in time range)     And   bisacodyl (DULCOLAX) EC tablet 5 mg (has no administration in time range)     And   bisacodyl (DULCOLAX) suppository 10 mg (has no administration in time range)   sodium chloride 0.9 % infusion (has no administration in time range)   acetaminophen (TYLENOL) tablet 650 mg (has no administration in time range)     Or   acetaminophen (TYLENOL) 160 MG/5ML solution 650  mg (has no administration in time range)     Or   acetaminophen (TYLENOL) suppository 650 mg (has no administration in time range)   HYDROcodone-acetaminophen (NORCO) 5-325 MG per tablet 1 tablet (has no administration in time range)   ondansetron (ZOFRAN) injection 4 mg (has no administration in time range)       MEDICAL DECISION MAKING, PROGRESS, and CONSULTS    All labs have been independently reviewed by me.  All radiology studies have been reviewed by me and I have also reviewed the radiology report.   EKG's independently viewed and interpreted by me.  Discussion below represents my analysis of pertinent findings related to patient's condition, differential diagnosis, treatment plan and final disposition.      Additional sources:  - Discussed/ obtained information from independent historians: None    - External (non-ED) record review: I reviewed the most recent oncology progress note from July 5, 2023 when patient had continued care for metastatic melanoma.  Plan was to hold chemotherapy at that time until CT scan and CMP completed.    - Chronic or social conditions impacting care: Multiple medical problems including metastatic melanoma and DVT/pulmonary bliss on anticoagulation.      Orders placed during this visit:  Orders Placed This Encounter   Procedures    XR Hip With or Without Pelvis 2 - 3 View Left    Comprehensive Metabolic Panel    CBC Auto Differential    Comprehensive Metabolic Panel    CBC (No Diff)    NPO Diet NPO Type: Strict NPO    Vital Signs    Intake & Output    Weigh Patient    Oral Care    Saline Lock & Maintain IV Access    Hematology and Oncology (on-call MD unless specified)    [facility]HA (on-call MD unless specified) Details    Inpatient Urology Consult    Hematology & Oncology Inpatient Consult    Incentive Spirometry    Insert Peripheral IV    Insert Peripheral IV    Initiate Observation Status    CBC & Differential           Differential diagnosis includes but is not limited to:    Hip  fracture, hip dislocation, DVT, inguinal lymphadenopathy, musculoskeletal pain      Independent interpretation of labs, radiology studies, and discussions with consultants:  ED Course as of 07/07/23 1150   Fri Jul 07, 2023   1040 I just discussed with Dr. Conti from hematology oncology.  She recommends patient to be admitted to [facility] for further medical management today and they will consult.  She encouraged imaging of the left hip to rule out any new pathologic fracture component given patient has known history of subcapsular metastases in the left hip area. [SALINA]   1138 Creatinine(!): 1.91 [SALINA]   1145 I just discussed with Iraida from [facility] about this patient and she agrees to accept him to the hospitalist team on behalf of Dr. Hobbs. [SALINA]   1149 I independently interpreted the x-ray of the left hip and my findings are: No fracture or dislocation evident. [SALINA]      ED Course User Index  [SALINA] Perfecto Rivera MD         DIAGNOSIS  Final diagnoses:   Inguinal pain, left   LENIN (acute kidney injury)   Difficulty walking   Metastatic melanoma         DISPOSITION  Observation to hospitalist,   hem-onc consult        Latest Documented Vital Signs:  As of 11:50 EDT  BP- 138/85 HR- 83 Temp- 98.3 °F (36.8 °C) O2 sat- 100%              --    Please note that portions of this were completed with a voice recognition program.       Note Disclaimer: At [facility], we believe that sharing information builds trust and better relationships. You are receiving this note because you are receiving care at [facility] or recently visited. It is possible you will see health information before a provider has talked with you about it. This kind of information can be easy to misunderstand. To help you fully understand what it means for your health, we urge you to discuss this note with your provider.             Perfecto Rivera MD  07/07/23 1150      Electronically signed by Perfecto Rivera MD at 07/07/23 1150       Carmen Falcon RN  at 23 1004          Patient from home via pv; c/o left hip pain from immunotherapy treatment yesterday.     Electronically signed by Carmen Falcon RN at 23 1005          Physician Progress Notes (last 72 hours)        Zara Petit MD at 07/10/23 0814              [facility] Progress Note      Patient Name: King George  : 1960  MRN: [id number]  Primary Care Physician:  Mohini Cortes MD  Date of admission: 2023    Subjective     Interval History:     Seen and examined.  Plan for stent placement today.  No new issues.    Review of Systems:   As noted above    Objective     Vitals:   Temp:  [97.2 °F (36.2 °C)-98.2 °F (36.8 °C)] 97.6 °F (36.4 °C)  Heart Rate:  [48-77] 48  Resp:  [16] 16  BP: (126-144)/(69-76) 126/69    Intake/Output Summary (Last 24 hours) at 7/10/2023 0814  Last data filed at 7/10/2023 0723  Gross per 24 hour   Intake 3287 ml   Output 200 ml   Net 3087 ml       Physical Exam:    General Appearance: alert, oriented x 3, no acute distress   Skin: warm and dry  HEENT: oral mucosa normal, nonicteric sclera  Neck: supple, no JVD  Lungs: CTA  Heart: RRR, normal S1 and S2  Abdomen: soft, nontender, nondistended  : no palpable bladder  Extremities: no edema, cyanosis or clubbing  Neuro: normal speech and mental status     Scheduled Meds:     famotidine, 20 mg, Oral, BID AC  folic acid, 1 mg, Oral, Daily  predniSONE, 10 mg, Oral, Daily  senna-docusate sodium, 2 tablet, Oral, BID  sodium chloride, 10 mL, Intravenous, Q12H  vitamin B-12, 1,000 mcg, Oral, Daily      IV Meds:   heparin, 18 Units/kg/hr, Last Rate: 24 Units/kg/hr (07/10/23 0630)  sodium chloride, 75 mL/hr, Last Rate: 75 mL/hr (07/10/23 0723)        Results Reviewed:   I have personally reviewed the results from the time of this admission to 7/10/2023 08:14 EDT     Results from last 7 days   Lab Units 07/10/23  0416 23  0902 23  0612 23  1031   SODIUM mmol/L 136 132* 135*  133*   POTASSIUM mmol/L 3.9 3.9 4.2 4.4   CHLORIDE mmol/L 105 102 105 102   CO2 mmol/L 21.1* 21.0* 22.5 20.7*   BUN mg/dL 17 19 22 23   CREATININE mg/dL 1.73* 1.82* 1.68* 1.91*   CALCIUM mg/dL 8.4* 8.2* 8.5* 9.0   BILIRUBIN mg/dL 0.4  --  0.6 0.4   ALK PHOS U/L 61  --  62 64   ALT (SGPT) U/L 14  --  12 14   AST (SGOT) U/L 9  --  14 13   GLUCOSE mg/dL 100* 127* 107* 106*     Estimated Creatinine Clearance: 55.4 mL/min (A) (by C-G formula based on SCr of 1.73 mg/dL (H)).          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  Eosinophil is negative but there  Metastatic melanoma S/P  1 cycle of ipilimumab and nivolumab  on 6/14/2023 and second dose of Opdivo on July 6, 2023  Bilateral hydronephrosis that is likely due to obstructive uropathy due to lymphadenopathy      PLAN:    Continue IV fluids for now.  We will monitor improvement of kidney function after stent placement and assess need for kidney biopsy if needed.  Stent placement by urology today  Surveillance labs    Thank you for involving us in the care of King George.  Please feel free to call with any questions.    Zara Petit MD  07/10/23  08:14 EDT    [facility]  [phone]               Electronically signed by Zara Petit MD at 07/10/23 0815       Brodie Rocha MD at 07/10/23 0810            Subjective      REASON FOR FOLLOWUP/CHIEF  COMPLAINT:   Metastatic melanoma  Obstructive uropathy    HISTORY OF PRESENT ILLNESS:   He has no new complaints today  Pain is better.  Continues on Norco    7/10/2023  Pain much improved no rash or diarrhea  Urinating well  Currently on IV heparin pending stents later today  Creatinine slightly lower at 1.7    Past Medical History, Past Surgical History, Social History, Family History have been reviewed and are without significant changes except as mentioned.    Review of Systems   Review of Systems  Review of systems as mentioned in the HPI    Medications:  The current medication list was reviewed in the EMR    ALLERGIES:  No Known Allergies    Objective       Vitals:    07/09/23 1132 07/09/23 2008 07/10/23 0505 07/10/23 0728   BP: 144/72 128/75 137/76 126/69   BP Location: Left arm Left arm Left arm Left arm   Patient Position: Lying Lying Lying Lying   Pulse: 77 73 71 (!) 48   Resp: 16 16 16 16   Temp: 98.2 °F (36.8 °C) 97.3 °F (36.3 °C) 97.2 °F (36.2 °C) 97.6 °F (36.4 °C)   TempSrc: Oral Oral Oral Oral   SpO2: 97% 98% 99% 95%   Weight:       Height:         Physical Exam    CONSTITUTIONAL:  Vital signs reviewed.  No distress, looks comfortable.  EYES:  Conjunctivae and lids unremarkable.  PERRLA  EARS,NOSE,MOUTH,THROAT:  Ears and nose appear unremarkable.  Lips, teeth, gums appear unremarkable.  RESPIRATORY:  Normal respiratory effort.  Lungs clear to auscultation bilaterally.  CARDIOVASCULAR:  Normal S1, S2.  No murmurs rubs or gallops.    Left lower extremity lymphedema   GASTROINTESTINAL: Abdomen appears unremarkable.  Nontender.  No hepatomegaly.  No splenomegaly.  NEURO: cranial nerves 2-12 grossly intact.  No focal deficits.  Appears to have equal strength all 4 extremities.  MUSCULOSKELETAL:  Unremarkable digits/nails.  No cyanosis or clubbing.  SKIN:  Warm.  No rashes.  PSYCHIATRIC:  Normal judgment and insight.  Normal mood and affect.   I have reexamined the patient and the results are consistent  with the previously documented exam. Brodie Rocha MD     Risk for Readmission (LACE) Score: 11 (7/10/2023  6:00 AM)         RECENT LABS:  WBC   Date Value Ref Range Status   07/10/2023 6.08 3.40 - 10.80 10*3/mm3 Final   07/09/2023 7.73 3.40 - 10.80 10*3/mm3 Final   07/08/2023 8.02 3.40 - 10.80 10*3/mm3 Final   07/07/2023 7.44 3.40 - 10.80 10*3/mm3 Final     Hemoglobin   Date Value Ref Range Status   07/10/2023 9.8 (L) 13.0 - 17.7 g/dL Final   07/09/2023 10.0 (L) 13.0 - 17.7 g/dL Final   07/08/2023 9.9 (L) 13.0 - 17.7 g/dL Final   07/07/2023 11.3 (L) 13.0 - 17.7 g/dL Final     Platelets   Date Value Ref Range Status   07/10/2023 172 140 - 450 10*3/mm3 Final   07/09/2023 189 140 - 450 10*3/mm3 Final   07/08/2023 181 140 - 450 10*3/mm3 Final   07/07/2023 177 140 - 450 10*3/mm3 Final       Lab Results   Component Value Date    GLUCOSE 100 (H) 07/10/2023    BUN 17 07/10/2023    CREATININE 1.73 (H) 07/10/2023    EGFR 44.4 (L) 07/10/2023    BCR 9.8 07/10/2023    K 3.9 07/10/2023    CO2 21.1 (L) 07/10/2023    CALCIUM 8.4 (L) 07/10/2023    ALBUMIN 2.8 (L) 07/10/2023    BILITOT 0.4 07/10/2023    AST 9 07/10/2023    ALT 14 07/10/2023       Assessment & Plan  ASSESSMENT/PLAN:  King George P399/1     *Obstructive uropathy  CT abdomen from 7/5/2023 reviewed and shows hydroureteronephrosis.  Consult urology.  Could be secondary to compression from increase in size of the retroperitoneal lymphadenopathy.  Evaluated by urology and plan to manage conservatively and repeat CT abdomen planned for 7/10/2023  Creatinine stable at 1.82  Creatinine 1.74 stents today     *Metastatic melanoma  He received 1 cycle of ipilimumab and nivolumab.,  This was on 6/14/2023  Subsequently ipilimumab discontinued due to worsening renal function and rash  He received nivolumab only on 7/6/2023  CT of the abdomen from 7/5/2023 shows increase in size of the retroperitoneal lymphadenopathy  Too soon to assess treatment response     *Left lower  "extremity pain  History of the hip without any evidence of abnormalities  PET/CT 12/20/2023 does not show any focal lesions in the left hip  Could be secondary to significant lymphedema.  Continue norco  Pain has improved.  He is now able to ambulate     *Incidental bilateral PE and thrombus in the left external iliac vein due to compression by the bulky nodes  Continue heparin in case he needs stents  Resume NOAC at discharge     *Anemia  Hemoglobin at baseline, at 10.0  Continue to monitor     Recommendations  Management of hydroureteronephrosis per urology  Continue [place] for pain management  Resume anticoagulation orally at discharge  Follow-up in our office in 2 weeks       Electronically signed by Brodie Rocha MD at 07/10/23 1005       Vic Montelongo Jr., MD at 07/10/23 0714             LOS: 0 days   Patient Care Team:  Mohini Cortes MD as PCP - General (Internal Medicine)  Bernardo Azar MD as Referring Physician (Hospitalist)  Brodie Rocha MD as Consulting Physician (Hematology and Oncology)      Subjective   Interval History: Denies pain    Objective     ROS   12 POINT NEG ROS PERTINENT IN HPI      Vital Signs  Temp:  [97.2 °F (36.2 °C)-98.2 °F (36.8 °C)] 97.2 °F (36.2 °C)  Heart Rate:  [71-77] 71  Resp:  [16] 16  BP: (128-144)/(72-76) 137/76      Intake/Output Summary (Last 24 hours) at 7/10/2023 0715  Last data filed at 7/9/2023 1815  Gross per 24 hour   Intake 2312 ml   Output 200 ml   Net 2112 ml       Flowsheet Rows      Flowsheet Row First Filed Value   Admission Height 182.9 cm (72\") Documented at 07/07/2023 1040   Admission Weight 102 kg (225 lb) Documented at 07/07/2023 1040            Physical Exam:     General appearance: alert, cooperative, oriented        Results Review:     I reviewed the patient's new clinical results.  Lab Results (all)       Procedure Component Value Units Date/Time    aPTT [[id number]]  (Abnormal) Collected: 07/10/23 0416    Specimen: Blood " Updated: 07/10/23 0513     PTT 67.4 seconds     Comprehensive Metabolic Panel [[id number]]  (Abnormal) Collected: 07/10/23 0416    Specimen: Blood Updated: 07/10/23 0459     Glucose 100 mg/dL      BUN 17 mg/dL      Creatinine 1.73 mg/dL      Sodium 136 mmol/L      Potassium 3.9 mmol/L      Chloride 105 mmol/L      CO2 21.1 mmol/L      Calcium 8.4 mg/dL      Total Protein 6.8 g/dL      Albumin 2.8 g/dL      ALT (SGPT) 14 U/L      AST (SGOT) 9 U/L      Alkaline Phosphatase 61 U/L      Total Bilirubin 0.4 mg/dL      Globulin 4.0 gm/dL      A/G Ratio 0.7 g/dL      BUN/Creatinine Ratio 9.8     Anion Gap 9.9 mmol/L      eGFR 44.4 mL/min/1.73     Narrative:      GFR Normal >60  Chronic Kidney Disease <60  Kidney Failure <15      CBC & Differential [[id number]]  (Abnormal) Collected: 07/10/23 0416    Specimen: Blood Updated: 07/10/23 0439    Narrative:      The following orders were created for panel order CBC & Differential.  Procedure                               Abnormality         Status                     ---------                               -----------         ------                     CBC Auto Differential[[id number]]        Abnormal            Final result                 Please view results for these tests on the individual orders.    CBC Auto Differential [[id number]]  (Abnormal) Collected: 07/10/23 0416    Specimen: Blood Updated: 07/10/23 0439     WBC 6.08 10*3/mm3      RBC 3.59 10*6/mm3      Hemoglobin 9.8 g/dL      Hematocrit 29.7 %      MCV 82.7 fL      MCH 27.3 pg      MCHC 33.0 g/dL      RDW 15.7 %      RDW-SD 46.9 fl      MPV 8.4 fL      Platelets 172 10*3/mm3      Neutrophil % 69.9 %      Lymphocyte % 15.1 %      Monocyte % 8.9 %      Eosinophil % 4.9 %      Basophil % 0.7 %      Immature Grans % 0.5 %      Neutrophils, Absolute 4.25 10*3/mm3      Lymphocytes, Absolute 0.92 10*3/mm3      Monocytes, Absolute 0.54 10*3/mm3      Eosinophils, Absolute 0.30 10*3/mm3      Basophils, Absolute 0.04 10*3/mm3       Immature Grans, Absolute 0.03 10*3/mm3      nRBC 0.0 /100 WBC     Eosinophil Smear - Urine, Urine, Clean Catch [[id number]]  (Normal) Collected: 07/09/23 1405    Specimen: Urine, Clean Catch Updated: 07/09/23 1558     Eosinophil Smear 0 % EOS/100 Cells     Protein / Creatinine Ratio, Urine - Urine, Clean Catch [[id number]]  (Abnormal) Collected: 07/09/23 1405    Specimen: Urine, Clean Catch Updated: 07/09/23 1449     Protein/Creatinine Ratio, Urine 215.2 mg/G Crea      Creatinine, Urine 80.4 mg/dL      Total Protein, Urine 17.3 mg/dL     Sodium, Urine, Random - Urine, Clean Catch [[id number]] Collected: 07/09/23 1407    Specimen: Urine, Clean Catch Updated: 07/09/23 1437     Sodium, Urine 148 mmol/L     Narrative:      Reference intervals for random urine have not been established.  Clinical usage is dependent upon physician's interpretation in combination with other laboratory tests.       Chloride, Urine, Random - Urine, Clean Catch [[id number]] Collected: 07/09/23 1407    Specimen: Urine, Clean Catch Updated: 07/09/23 1437     Chloride, Urine 144 mmol/L     Narrative:      Reference intervals for random urine have not been established.  Clinical usage is dependent upon physician's interpretation in combination with other laboratory tests.       C3+C4+ANITHA+RA [[id number]] Collected: 07/09/23 1303    Specimen: Blood Updated: 07/09/23 1316    Basic Metabolic Panel [[id number]]  (Abnormal) Collected: 07/09/23 0902    Specimen: Blood Updated: 07/09/23 0954     Glucose 127 mg/dL      BUN 19 mg/dL      Creatinine 1.82 mg/dL      Sodium 132 mmol/L      Potassium 3.9 mmol/L      Chloride 102 mmol/L      CO2 21.0 mmol/L      Calcium 8.2 mg/dL      BUN/Creatinine Ratio 10.4     Anion Gap 9.0 mmol/L      eGFR 41.7 mL/min/1.73     Narrative:      GFR Normal >60  Chronic Kidney Disease <60  Kidney Failure <15      CBC & Differential [[id number]]  (Abnormal) Collected: 07/09/23 0608    Specimen: Blood Updated: 07/09/23 0649     Narrative:      The following orders were created for panel order CBC & Differential.  Procedure                               Abnormality         Status                     ---------                               -----------         ------                     CBC Auto Differential[[id number]]        Abnormal            Final result                 Please view results for these tests on the individual orders.    CBC Auto Differential [[id number]]  (Abnormal) Collected: 07/09/23 0608    Specimen: Blood Updated: 07/09/23 0649     WBC 7.73 10*3/mm3      RBC 3.67 10*6/mm3      Hemoglobin 10.0 g/dL      Hematocrit 30.7 %      MCV 83.7 fL      MCH 27.2 pg      MCHC 32.6 g/dL      RDW 15.8 %      RDW-SD 48.5 fl      MPV 9.1 fL      Platelets 189 10*3/mm3      Neutrophil % 71.0 %      Lymphocyte % 15.3 %      Monocyte % 8.9 %      Eosinophil % 3.8 %      Basophil % 0.6 %      Immature Grans % 0.4 %      Neutrophils, Absolute 5.49 10*3/mm3      Lymphocytes, Absolute 1.18 10*3/mm3      Monocytes, Absolute 0.69 10*3/mm3      Eosinophils, Absolute 0.29 10*3/mm3      Basophils, Absolute 0.05 10*3/mm3      Immature Grans, Absolute 0.03 10*3/mm3      nRBC 0.0 /100 WBC     aPTT [[id number]]  (Abnormal) Collected: 07/09/23 0608    Specimen: Blood Updated: 07/09/23 0645     PTT 74.0 seconds     aPTT [[id number]]  (Abnormal) Collected: 07/08/23 0612    Specimen: Blood Updated: 07/08/23 0703     PTT 93.6 seconds     Comprehensive Metabolic Panel [[id number]]  (Abnormal) Collected: 07/08/23 0612    Specimen: Blood Updated: 07/08/23 0655     Glucose 107 mg/dL      BUN 22 mg/dL      Creatinine 1.68 mg/dL      Sodium 135 mmol/L      Potassium 4.2 mmol/L      Chloride 105 mmol/L      CO2 22.5 mmol/L      Calcium 8.5 mg/dL      Total Protein 7.0 g/dL      Albumin 3.0 g/dL      ALT (SGPT) 12 U/L      AST (SGOT) 14 U/L      Alkaline Phosphatase 62 U/L      Total Bilirubin 0.6 mg/dL      Globulin 4.0 gm/dL      A/G Ratio 0.8 g/dL      BUN/Creatinine  Ratio 13.1     Anion Gap 7.5 mmol/L      eGFR 45.9 mL/min/1.73     Narrative:      GFR Normal >60  Chronic Kidney Disease <60  Kidney Failure <15      CBC & Differential [[id number]]  (Abnormal) Collected: 07/08/23 0612    Specimen: Blood Updated: 07/08/23 0636    Narrative:      The following orders were created for panel order CBC & Differential.  Procedure                               Abnormality         Status                     ---------                               -----------         ------                     CBC Auto Differential[[id number]]        Abnormal            Final result                 Please view results for these tests on the individual orders.    CBC Auto Differential [[id number]]  (Abnormal) Collected: 07/08/23 0612    Specimen: Blood Updated: 07/08/23 0636     WBC 8.02 10*3/mm3      RBC 3.70 10*6/mm3      Hemoglobin 9.9 g/dL      Hematocrit 30.5 %      MCV 82.4 fL      MCH 26.8 pg      MCHC 32.5 g/dL      RDW 15.5 %      RDW-SD 46.2 fl      MPV 8.8 fL      Platelets 181 10*3/mm3      Neutrophil % 73.9 %      Lymphocyte % 14.5 %      Monocyte % 8.4 %      Eosinophil % 2.4 %      Basophil % 0.6 %      Immature Grans % 0.2 %      Neutrophils, Absolute 5.93 10*3/mm3      Lymphocytes, Absolute 1.16 10*3/mm3      Monocytes, Absolute 0.67 10*3/mm3      Eosinophils, Absolute 0.19 10*3/mm3      Basophils, Absolute 0.05 10*3/mm3      Immature Grans, Absolute 0.02 10*3/mm3      nRBC 0.0 /100 WBC     aPTT [[id number]]  (Abnormal) Collected: 07/07/23 2228    Specimen: Blood from Arm, Left Updated: 07/07/23 2326     PTT 44.4 seconds     Protime-INR [[id number]]  (Abnormal) Collected: 07/07/23 1531    Specimen: Blood Updated: 07/07/23 1610     Protime 16.6 Seconds      INR 1.32    aPTT [[id number]]  (Normal) Collected: 07/07/23 1531    Specimen: Blood Updated: 07/07/23 1610     PTT 31.1 seconds     Comprehensive Metabolic Panel [[id number]]  (Abnormal) Collected: 07/07/23 1031    Specimen: Blood Updated:  07/07/23 1113     Glucose 106 mg/dL      BUN 23 mg/dL      Creatinine 1.91 mg/dL      Sodium 133 mmol/L      Potassium 4.4 mmol/L      Comment: Slight hemolysis detected by analyzer. Results may be affected.        Chloride 102 mmol/L      CO2 20.7 mmol/L      Calcium 9.0 mg/dL      Total Protein 7.6 g/dL      Albumin 3.2 g/dL      ALT (SGPT) 14 U/L      AST (SGOT) 13 U/L      Comment: Slight hemolysis detected by analyzer. Results may be affected.        Alkaline Phosphatase 64 U/L      Total Bilirubin 0.4 mg/dL      Globulin 4.4 gm/dL      A/G Ratio 0.7 g/dL      BUN/Creatinine Ratio 12.0     Anion Gap 10.3 mmol/L      eGFR 39.4 mL/min/1.73     Narrative:      GFR Normal >60  Chronic Kidney Disease <60  Kidney Failure <15      CBC & Differential [[id number]]  (Abnormal) Collected: 07/07/23 1031    Specimen: Blood Updated: 07/07/23 1052    Narrative:      The following orders were created for panel order CBC & Differential.  Procedure                               Abnormality         Status                     ---------                               -----------         ------                     CBC Auto Differential[[id number]]        Abnormal            Final result                 Please view results for these tests on the individual orders.    CBC Auto Differential [[id number]]  (Abnormal) Collected: 07/07/23 1031    Specimen: Blood Updated: 07/07/23 1052     WBC 7.44 10*3/mm3      RBC 4.15 10*6/mm3      Hemoglobin 11.3 g/dL      Hematocrit 34.7 %      MCV 83.6 fL      MCH 27.2 pg      MCHC 32.6 g/dL      RDW 15.5 %      RDW-SD 46.3 fl      MPV 8.6 fL      Platelets 177 10*3/mm3      Neutrophil % 75.3 %      Lymphocyte % 12.1 %      Monocyte % 7.4 %      Eosinophil % 3.9 %      Basophil % 0.9 %      Immature Grans % 0.4 %      Neutrophils, Absolute 5.60 10*3/mm3      Lymphocytes, Absolute 0.90 10*3/mm3      Monocytes, Absolute 0.55 10*3/mm3      Eosinophils, Absolute 0.29 10*3/mm3      Basophils, Absolute 0.07  10*3/mm3      Immature Grans, Absolute 0.03 10*3/mm3      nRBC 0.0 /100 WBC             Imaging Results (All)       Procedure Component Value Units Date/Time    US Renal Bilateral [[id number]] Collected: 07/09/23 2021     Updated: 07/10/23 0705    Narrative:      Examination: Renal sonography     HISTORY: 63-year-old male with a history of acute renal sufficiency     FINDINGS: Sonographic evaluation of the kidneys was performed.  Comparison is made to a CT of the abdomen and pelvis without contrast  dated 07/09/2023. The right kidney measures 14.3 x 6.6 x 6.7 cm. The  left kidney measures 13.0 x 7.7 x 5.7 cm. Moderate bilateral renal  pelvocaliectasis is noted. There is a suggestion of an intrapelvic area  of heterogenous echotexture in the right renal pelvis. Also, material of  variable echotexture is seen within the left renal pelvis. Normal renal  cortical thickness and echotexture is seen in both kidneys. The urinary  bladder demonstrates an irregular thickened appearance and measures up  to 1.9 cm in thickness posteriorly.        Impression:      1.There is moderate bilateral renal pelviectasis with suspected complex  material and/or debris within the bilateral renal pelves. This may be  associated with chronic urinary stasis and/or vesicle ureteral reflux.  2. There is an irregular thickened appearance of the wall of the urinary  bladder. This may be secondary to trabeculation of the wall of the  urinary bladder due to an element of chronic bladder outlet obstruction.  However, cystitis and/or malignancy cannot be completely excluded.     This report was finalized on 7/10/2023 7:02 AM by Dr. Yo Neely M.D.       CT Abdomen Pelvis Without Contrast [[id number]] Collected: 07/09/23 1713     Updated: 07/09/23 1951    Narrative:      Examination: CT of the abdomen and pelvis without contrast     HISTORY: 63-year-old male complaint of left inguinal pain undergoing  evaluation for hydronephrosis     TECHNIQUE:  Contiguous axial images were obtained through the abdomen and  pelvis without intravenous administration of nonionic contrast. Oral  contrast  was not administered. Radiation dose reduction techniques were  utilized, including automated exposure control and exposure modulation  based on body size.     COMPARISON: CT of the abdomen and pelvis with contrast, 05/28/2023     FINDINGS: The visualized portion of the lung basesare clear. The  visualized portion of the heart has a normal noncontrasted appearance .  The liver demonstrates a nodular surface contour in the left lobe at the  site of known hepatic lesions. Mild heterogeneity in the right lobe of  the liver that is nonspecific is noted. The gallbladder has a normal  appearance. The spleen measures on the order of 15.9 cm in the anterior  to posterior dimension. On the prior examination the spleen measures  14.4 cm. The pancreas has a normal appearance. The kidneys demonstrate  moderate bilateral renal pelvocaliectasis and ureterectasis that are  symmetric. No evidence for a radiopaque genitourinary calculus is  appreciated. The urinary bladder is suboptimally distended. The urinary  bladder wall measures on the order 1 cm in thickness. Bulky adenopathy  of the pelvis and inguinal regions is noted. There is a 12.1 x 7.1 cm  conglomerate of lymph nodes in the left pelvic sidewall region that  previously measured on the order of 10.7 x 6.8 cm. There is a 7.0 x 4.9  cm left inguinal lymph node that previously measured 6.5 x 4.8 cm. There  is a 7.8 x 4.0 cm left inguinal lymph node that previously measured 6.8  x 3.9 cm. Thickening of the left inguinal skin has developed in the  interim and the thickening has a nodular appearance with the skin  measuring up to 1.3 cm in thickness. Adenopathy in the pelvis and  retroperitoneum is also reidentified and there is bulky adenopathy in  the right inguinal region. There is a 3.7 x 2.9 cm right inguinal lymph  node that  previously measured 1.4 x 1.3 cm. There is a stable lytic  lesion in the right iliac crest. A stable lytic lesion in the left  aspect of the posterior element of L3. No new suspicious osseous lytic  lesions are visualized. The previously noted right inguinal hernia is no  longer appreciated.     . The adrenal glands appear normal. The spleen is enlarged and measures  on the order of 15.9 cm compared to 14.4 cm previously. The bowel has a  normal appearance.          Impression:      1.There has been interval enlargement of bulky inguinal adenopathy and  pelvic adenopathy as described. There has also been interval development  of bilateral symmetric hydronephrosis and hydroureter without a visible  genitourinary calculus. This may be secondary to distal obstruction of  the ureters by the pelvic adenopathy or possibly due to vesicoureteral  reflux.  2. Limited evaluation of the liver, but there may be a new lesion in the  right lobe of the liver. The known liver lesions are poorly visualized  on the current examination.  3. Interval increase in splenomegaly as described.  4. Stable osseous lytic lesions.     This report was finalized on 7/9/2023 7:47 PM by Dr. Yo Neely M.D.       XR Hip With or Without Pelvis 2 - 3 View Left [[id number]] Collected: 07/07/23 1052     Updated: 07/07/23 1057    Narrative:      XR HIP W OR WO PELVIS 2-3 VIEW LEFT-     INDICATIONS: Pain     TECHNIQUE: Frontal view of the pelvis, 2 views of the left hip     COMPARISON: None available     FINDINGS:     No acute fracture, erosion, or dislocation is identified. Prominent  degenerative changes of the hips are noted, right more than left,  including joint space narrowing, marginal spurring, subcortical  eburnation. Follow-up/further evaluation can be obtained as indications  persist.       Impression:         As described.     This report was finalized on 7/7/2023 10:54 AM by Dr. Alejandro Yu M.D.               Medication Review:    Current Facility-Administered Medications   Medication Dose Route Frequency Provider Last Rate Last Admin    acetaminophen (TYLENOL) tablet 650 mg  650 mg Oral Q4H PRN Sudha Douglas APRN   650 mg at 07/08/23 0951    Or    acetaminophen (TYLENOL) 160 MG/5ML solution 650 mg  650 mg Oral Q4H PRN Sudha Douglas APRN        Or    acetaminophen (TYLENOL) suppository 650 mg  650 mg Rectal Q4H PRN Sudha Douglas APRN        sennosides-docusate (PERICOLACE) 8.6-50 MG per tablet 2 tablet  2 tablet Oral BID Sudha Douglas APRN   2 tablet at 07/09/23 2101    And    polyethylene glycol (MIRALAX) packet 17 g  17 g Oral Daily PRN Sudha Douglas APRN        And    bisacodyl (DULCOLAX) EC tablet 5 mg  5 mg Oral Daily PRN Sudha Douglas APRN        And    bisacodyl (DULCOLAX) suppository 10 mg  10 mg Rectal Daily PRN Sudha Douglas APRN        famotidine (PEPCID) tablet 20 mg  20 mg Oral BID AC Zara Petit MD   20 mg at 07/09/23 1819    folic acid (FOLVITE) tablet 1 mg  1 mg Oral Daily Sudha Douglas APRN   1 mg at 07/09/23 0900    heparin (porcine) 5000 UNIT/ML injection 4,100-8,200 Units  40-80 Units/kg Intravenous Q6H PRN Sudha Douglas APRN   4,100 Units at 07/10/23 0627    heparin 62945 units/250 mL (100 units/mL) in 0.45 % NaCl infusion  18 Units/kg/hr Intravenous Titrated Sudha Douglas APRN 24.4 mL/hr at 07/10/23 0630 24 Units/kg/hr at 07/10/23 0630    HYDROcodone-acetaminophen (NORCO) 5-325 MG per tablet 1 tablet  1 tablet Oral Q4H PRN Sudha Douglas APRN        ondansetron (ZOFRAN) injection 4 mg  4 mg Intravenous Q6H PRN Sduha Douglas APRN        predniSONE (DELTASONE) tablet 10 mg  10 mg Oral Daily Sudha Douglas APRN   10 mg at 07/09/23 0900    sodium chloride 0.9 % flush 10 mL  10 mL Intravenous PRN Perfecto Rivera MD        sodium chloride 0.9 % flush 10 mL  10 mL Intravenous Q12H Sudha Douglas APRN   10 mL at 07/09/23 2102    sodium chloride 0.9 % flush 10 mL  10 mL Intravenous PRN Misael,  TANYA Velez        sodium chloride 0.9 % infusion 40 mL  40 mL Intravenous PRN Sudha Douglas APRN        sodium chloride 0.9 % infusion  75 mL/hr Intravenous Continuous Sudha Douglas APRN 75 mL/hr at 07/09/23 1815 75 mL/hr at 07/09/23 1815    vitamin B-12 (CYANOCOBALAMIN) tablet 1,000 mcg  1,000 mcg Oral Daily Sudha Douglas APRN   1,000 mcg at 07/09/23 0900       Current Facility-Administered Medications:     acetaminophen (TYLENOL) tablet 650 mg, 650 mg, Oral, Q4H PRN, 650 mg at 07/08/23 0951 **OR** acetaminophen (TYLENOL) 160 MG/5ML solution 650 mg, 650 mg, Oral, Q4H PRN **OR** acetaminophen (TYLENOL) suppository 650 mg, 650 mg, Rectal, Q4H PRN, Sudha Douglas APRN    sennosides-docusate (PERICOLACE) 8.6-50 MG per tablet 2 tablet, 2 tablet, Oral, BID, 2 tablet at 07/09/23 2101 **AND** polyethylene glycol (MIRALAX) packet 17 g, 17 g, Oral, Daily PRN **AND** bisacodyl (DULCOLAX) EC tablet 5 mg, 5 mg, Oral, Daily PRN **AND** bisacodyl (DULCOLAX) suppository 10 mg, 10 mg, Rectal, Daily PRN, Sudha Douglas APRN    famotidine (PEPCID) tablet 20 mg, 20 mg, Oral, BID AC, Zara Petit MD, 20 mg at 07/09/23 1819    folic acid (FOLVITE) tablet 1 mg, 1 mg, Oral, Daily, Sudha Douglas APRN, 1 mg at 07/09/23 0900    heparin (porcine) 5000 UNIT/ML injection 4,100-8,200 Units, 40-80 Units/kg, Intravenous, Q6H PRN, Sudha Douglas APRN, 4,100 Units at 07/10/23 0627    heparin 58750 units/250 mL (100 units/mL) in 0.45 % NaCl infusion, 18 Units/kg/hr, Intravenous, Titrated, Sudha Douglas APRN, Last Rate: 24.4 mL/hr at 07/10/23 0630, 24 Units/kg/hr at 07/10/23 0630    HYDROcodone-acetaminophen (NORCO) 5-325 MG per tablet 1 tablet, 1 tablet, Oral, Q4H PRN, Sudha Douglas APRN    ondansetron (ZOFRAN) injection 4 mg, 4 mg, Intravenous, Q6H PRN, Sudha Douglas APRN    predniSONE (DELTASONE) tablet 10 mg, 10 mg, Oral, Daily, Sudha Douglas APRN, 10 mg at 07/09/23 0900    [COMPLETED] Insert Peripheral IV, , , Once  **AND** sodium chloride 0.9 % flush 10 mL, 10 mL, Intravenous, PRN, Perfecto Rivera MD    sodium chloride 0.9 % flush 10 mL, 10 mL, Intravenous, Q12H, Sudha Douglas APRN, 10 mL at 07/09/23 2102    sodium chloride 0.9 % flush 10 mL, 10 mL, Intravenous, PRN, Sudha Douglas APRN    sodium chloride 0.9 % infusion 40 mL, 40 mL, Intravenous, PRN, Sudha Douglas APRN    sodium chloride 0.9 % infusion, 75 mL/hr, Intravenous, Continuous, Sudha Douglas APRN, Last Rate: 75 mL/hr at 07/09/23 1815, 75 mL/hr at 07/09/23 1815    vitamin B-12 (CYANOCOBALAMIN) tablet 1,000 mcg, 1,000 mcg, Oral, Daily, Sudha Douglas APRN, 1,000 mcg at 07/09/23 0900  Medications Discontinued During This Encounter   Medication Reason    dexamethasone 0.5 MG/5ML solution *Therapy completed    pantoprazole (PROTONIX) EC tablet 40 mg        Assessment & Plan         Inguinal pain, left    Left groin mass    Abdominal lymphadenopathy    Metastatic melanoma    Metastasis to brain    LENIN (acute kidney injury)    Personal history of venous thrombosis and embolism    Hydroureteronephrosis        Plan   - recommended cystoscopy and bilateral stent placement. We discussed the benefits and risks of the procedure, particularly the risk of discomfort related to the stents, and he wishes to proceed.    Vic Montelongo Jr., MD  07/10/23  07:15 EDT        Electronically signed by Vic Montelongo Jr., MD at 07/10/23 0716       Karen Conti MD at 07/09/23 1214            Subjective      REASON FOR FOLLOWUP/CHIEF COMPLAINT:   Metastatic melanoma  Obstructive uropathy    HISTORY OF PRESENT ILLNESS:   He has no new complaints today  Pain is better.  Continues on Norco    Past Medical History, Past Surgical History, Social History, Family History have been reviewed and are without significant changes except as mentioned.    Review of Systems   Review of Systems  Review of systems as mentioned in the HPI    Medications:  The current medication list  was reviewed in the EMR    ALLERGIES:  No Known Allergies    Objective       Vitals:    07/08/23 2309 07/09/23 0314 07/09/23 0717 07/09/23 1132   BP: 124/71 137/76 130/75 144/72   BP Location: Left arm Left arm Left arm Left arm   Patient Position: Lying Lying Lying Lying   Pulse: 80 75 73 77   Resp: 16 16 16 16   Temp: 98 °F (36.7 °C) 97.1 °F (36.2 °C) 97.4 °F (36.3 °C) 98.2 °F (36.8 °C)   TempSrc: Oral Oral Oral Oral   SpO2: 97% 96% 97% 97%   Weight:       Height:         Physical Exam    CONSTITUTIONAL:  Vital signs reviewed.  No distress, looks comfortable.  EYES:  Conjunctivae and lids unremarkable.  PERRLA  EARS,NOSE,MOUTH,THROAT:  Ears and nose appear unremarkable.  Lips, teeth, gums appear unremarkable.  RESPIRATORY:  Normal respiratory effort.  Lungs clear to auscultation bilaterally.  CARDIOVASCULAR:  Normal S1, S2.  No murmurs rubs or gallops.    Left lower extremity lymphedema   GASTROINTESTINAL: Abdomen appears unremarkable.  Nontender.  No hepatomegaly.  No splenomegaly.  NEURO: cranial nerves 2-12 grossly intact.  No focal deficits.  Appears to have equal strength all 4 extremities.  MUSCULOSKELETAL:  Unremarkable digits/nails.  No cyanosis or clubbing.  SKIN:  Warm.  No rashes.  PSYCHIATRIC:  Normal judgment and insight.  Normal mood and affect.     Risk for Readmission (LACE) Score: 10 (7/9/2023  6:00 AM)         RECENT LABS:  WBC   Date Value Ref Range Status   07/09/2023 7.73 3.40 - 10.80 10*3/mm3 Final   07/08/2023 8.02 3.40 - 10.80 10*3/mm3 Final   07/07/2023 7.44 3.40 - 10.80 10*3/mm3 Final   07/06/2023 8.06 3.40 - 10.80 10*3/mm3 Final     Hemoglobin   Date Value Ref Range Status   07/09/2023 10.0 (L) 13.0 - 17.7 g/dL Final   07/08/2023 9.9 (L) 13.0 - 17.7 g/dL Final   07/07/2023 11.3 (L) 13.0 - 17.7 g/dL Final   07/06/2023 11.3 (L) 13.0 - 17.7 g/dL Final     Platelets   Date Value Ref Range Status   07/09/2023 189 140 - 450 10*3/mm3 Final   07/08/2023 181 140 - 450 10*3/mm3 Final   07/07/2023  177 140 - 450 10*3/mm3 Final   07/06/2023 219 140 - 450 10*3/mm3 Final     Assessment & Plan  ASSESSMENT/PLAN:  [place] [id number]/1     *Obstructive uropathy  CT abdomen from 7/5/2023 reviewed and shows hydroureteronephrosis.  Consult urology.  Could be secondary to compression from increase in size of the retroperitoneal lymphadenopathy.  Evaluated by urology and plan to manage conservatively and repeat CT abdomen planned for 7/10/2023  Creatinine stable at 1.82     *Metastatic melanoma  He received 1 cycle of ipilimumab and nivolumab.,  This was on 6/14/2023  Subsequently ipilimumab discontinued due to worsening renal function and rash  He received nivolumab only on 7/6/2023  CT of the abdomen from 7/5/2023 shows increase in size of the retroperitoneal lymphadenopathy  Too soon to assess treatment response     *Left lower extremity pain  History of the hip without any evidence of abnormalities  PET/CT 12/20/2023 does not show any focal lesions in the left hip  Could be secondary to significant lymphedema.  Continue norco  Pain has improved.  He is now able to ambulate     *Incidental bilateral PE and thrombus in the left external iliac vein due to compression by the bulky nodes  Continue heparin in case he needs stents     *Anemia  Hemoglobin at baseline, at 10.0  Continue to monitor     Recommendations  Management of hydroureteronephrosis per urology  Continue Baldwin for pain management       Electronically signed by Karen Conti MD at 07/09/23 1220       Price Banuelos MD at 07/09/23 1121              [facility]     LOS: 0 days     Subjective:    CC:Leg Pain (DVT r/o to Left leg, swelling to entire left leg up into groin. +3 pitting Edema throughout leg into groin. PE to left lung 3 weeks ago, Eliquis BID. )    DIET:  Diet Order   Procedures    Diet: Regular/House Diet; Texture: Regular Texture (IDDSI 7); Fluid Consistency: Thin (IDDSI 0)    NPO Diet NPO Type: Strict NPO    Left inguinal pain is much improved, he is able to walk  No cp  No soa    Objective:    Vital Signs:  Temp:  [97 °F (36.1 °C)-98.1 °F (36.7 °C)] 97.4 °F (36.3 °C)  Heart Rate:  [70-80] 73  Resp:  [16-18] 16  BP: (116-137)/(65-76) 130/75    SpO2:  [96 %-99 %] 97 %  on   ;   Device (Oxygen Therapy): room air  Body mass index is 30.52 kg/m².    Physical Exam  Constitutional:       Appearance: Normal appearance.   HENT:      Head: Normocephalic and atraumatic.   Cardiovascular:      Rate and Rhythm: Normal rate and regular rhythm.      Heart sounds: No murmur heard.    No friction rub.   Pulmonary:      Effort: Pulmonary effort is normal.      Breath sounds: Normal breath sounds.   Abdominal:      General: Bowel sounds are normal. There is no distension.      Palpations: Abdomen is soft.      Tenderness: There is no abdominal tenderness.   Musculoskeletal:      Left lower leg: Edema present.   Skin:     General: Skin is warm and dry.   Neurological:      General: No focal deficit present.      Mental Status: He is alert and oriented to person, place, and time.   Psychiatric:         Mood and Affect: Mood normal.         Behavior: Behavior normal.       Results Review:    Glucose   Date Value Ref Range Status   07/09/2023 127 (H) 65 - 99 mg/dL Final   07/08/2023 107 (H) 65 - 99 mg/dL Final   07/07/2023 106 (H) 65 - 99 mg/dL Final   07/06/2023 102 (H) 65 - 99 mg/dL Final     Results from last 7 days   Lab Units 07/09/23  0608   WBC 10*3/mm3 7.73   HEMOGLOBIN g/dL 10.0*   HEMATOCRIT % 30.7*   PLATELETS 10*3/mm3 189       Results from last 7 days   Lab Units 07/09/23  0902 07/08/23  0612   SODIUM mmol/L 132* 135*   POTASSIUM mmol/L 3.9 4.2   CHLORIDE mmol/L 102 105   CO2 mmol/L 21.0* 22.5   BUN mg/dL 19 22   CREATININE mg/dL 1.82* 1.68*   CALCIUM mg/dL 8.2* 8.5*   BILIRUBIN mg/dL  --  0.6   ALK PHOS U/L  --  62   ALT (SGPT) U/L  --  12   AST (SGOT) U/L  --  14   GLUCOSE mg/dL 127* 107*       Results from last 7 days   Lab  Units 07/09/23  0608 07/07/23  2228 07/07/23  1531   INR   --   --  1.32*   APTT seconds 74.0*   < > 31.1    < > = values in this interval not displayed.               Cultures:  No results found for: BLOODCX, URINECX, WOUNDCX, MRSACX, RESPCX, STOOLCX    I have reviewed daily medications and changes in CPOE    Scheduled meds  folic acid, 1 mg, Oral, Daily  pantoprazole, 40 mg, Oral, BID AC  predniSONE, 10 mg, Oral, Daily  senna-docusate sodium, 2 tablet, Oral, BID  sodium chloride, 10 mL, Intravenous, Q12H  vitamin B-12, 1,000 mcg, Oral, Daily        heparin, 18 Units/kg/hr, Last Rate: 22 Units/kg/hr (07/09/23 0219)  sodium chloride, 75 mL/hr, Last Rate: 75 mL/hr (07/09/23 0935)      PRN meds    acetaminophen **OR** acetaminophen **OR** acetaminophen    senna-docusate sodium **AND** polyethylene glycol **AND** bisacodyl **AND** bisacodyl    heparin (porcine)    HYDROcodone-acetaminophen    ondansetron    [COMPLETED] Insert Peripheral IV **AND** sodium chloride    sodium chloride    sodium chloride        Inguinal pain, left    Left groin mass    Abdominal lymphadenopathy    Metastatic melanoma    Metastasis to brain    LENIN (acute kidney injury)    Personal history of venous thrombosis and embolism    Hydroureteronephrosis        Assessment/Plan:  Mr. George is a 63 year old male who presented to the hospital with complaints of left leg pain and difficulty walking. He was recently found to have extensive metastatic melanoma back in May of this year. He has had liver/lung/bone/brain involvement and currently receiving immunotherapy with Dr. Rocha with oncology. He has had some notable LENIN recently with CT A/P on 7/5 showing some new bilateral hydroureteronephrosis. He was due to see urology today but had worsening left groin/hip pain after receiving immunotherapy yesterday making it difficult to go to this appointment. He is being admitted for further care at this time.     Inguinal pain, left  -In the setting of  known hypermetabolic thickening of the skin in the left upper thigh/groin/lateral scrotum felt to be the primary site of malignancy. He has extensive lymphadenopathy in the abdomen worse on the left with extensive left lower extremity DVT on prior PET/CT.  -X-ray of hip without acute findings.  -Pain in hip has resolved  -Pain medication as needed     LENIN  Hydroureteronephrosis  -Creatinine worsening since last admission with CT findings of new hydroureteronephrosis.  -Hydrate with NS at 75 cc/hr   -Monitor urine output. Check bladder scans.  -Urology to see. Repeat ct. Will hold Eliquis (last dose  AM) and start heparin drip without initial bolus.      Metastatic melanoma  Metastasis to brain  Abdominal lymphadenopathy  History of PE/DVT  -Hematology/oncology to see.  -Hold Eliquis and start heparin given likely needs for intervention.  -Further management per oncology.  -Resume prednisone that he was on prior to admission given immunotherapy reaction.     Doing better, ct scan of abd and pelvis    Price Banuelos MD  23  11:21 EDT       Electronically signed by Price Banuelos MD at 23 1128       Xenia George APRN at 23 0946            Urology Progress Note    Patient Identification:  Name:  King George  Age:  63 y.o.  Sex:  male  :  1960  MRN:  [id number]    Chief Complaint:  left lower extremity pain     History of Present Illness:  Met melanoma - received 1cycle of ipilimumab and nivolumab 23     2. Bilateral hydronephrosis     3. Bilateral PE and thrombus in left external iliac vein  Heparin     CT A/P 23 - no hydro  CT Abd only 23 - new bilateral hydro mild/mod     Cr hx:  23 - 2.16  23 - 2.01  23 - 1.32  23 - 1.19  23 - 1.90  23 - 1.89  23 - 1.91  23 - 1.68  23 - pending     Afvss  Wbc 8.4  Cultures negative     No flank pain, no gross hem, no fever/chills, pain essentially resolved, no difficulty  voiding  Problem List:    Past Medical History:  Past Medical History:   Diagnosis Date    Abdominal lymphadenopathy 05/29/2023    Acute pulmonary embolism, unspecified pulmonary embolism type, unspecified whether acute cor pulmonale present 05/28/2023    ADMITTED TO [facility]    Anemia 05/29/2023    Colon polyps     FOLLOWED BY DR. SHANNA MOTTA    Drug rash 6/19/2023    Hepatic lesion 05/29/2023    Left lower quadrant abdominal mass 05/28/2023    LEFT INGUINAL BX SHOWED METASTATIC MELANOMA    Metastasis to brain 06/02/2023    Metastatic melanoma 05/31/2023    Moderate Malnutrition (HCC) 05/30/2023    Personal history of venous thrombosis and embolism 6/19/2023     Past Surgical History:  Past Surgical History:   Procedure Laterality Date    COLONOSCOPY N/A 06/02/2023    40 MM TUBULOVILLOUS ADENOMA POLYP IN DISTAL RECTUM, RESCOPE IN 1 YR, DR. SHANNA MOTTA AT [facility]    COLONOSCOPY W/ POLYPECTOMY N/A 06/02/2023    40 MM POLYP IN DISTAL RECTUM, RESECTION AND RETREIVAL COMPLETE, RESCOPE IN 1 YR, DR. SHANNA MOTTA AT [facility]    PORTACATH PLACEMENT  06/02/2023    DONE AT [facility]    US GUIDED LYMPH NODE BIOPSY  05/30/2023    METASTATIC MELANOMA, DONE AT [facility]    VENOUS ACCESS DEVICE (PORT) INSERTION N/A 06/02/2023    Procedure: INFUSAPORT PLACEMENT;  Surgeon: Vicki Layne MD;  Location: [facility];  Service: General;  Laterality: N/A;     Home Meds:  Medications Prior to Admission   Medication Sig Dispense Refill Last Dose    apixaban (ELIQUIS) 5 MG tablet tablet Take 1 tablet by mouth Every 12 (Twelve) Hours. Indications: DVT/PE (active thrombosis) 60 tablet 0 7/7/2023    folic acid (FOLVITE) 1 MG tablet Take 1 tablet by mouth Daily. 30 tablet 0 7/7/2023    ondansetron (ZOFRAN) 8 MG tablet Take  by mouth Every 8 (Eight) Hours As Needed for Nausea or Vomiting.       pantoprazole (PROTONIX) 40 MG EC tablet Take 1 tablet by mouth 2 (Two) Times a Day Before Meals. 30 tablet 0 7/6/2023    predniSONE (DELTASONE) 10 MG tablet Take 1 tablet  by mouth Daily. 30 tablet 3 7/7/2023    vitamin B-12 (CYANOCOBALAMIN) 1000 MCG tablet Take 1 tablet by mouth Daily. 30 tablet 0 7/7/2023     Current Meds:    Current Facility-Administered Medications:     acetaminophen (TYLENOL) tablet 650 mg, 650 mg, Oral, Q4H PRN, 650 mg at 07/08/23 0951 **OR** acetaminophen (TYLENOL) 160 MG/5ML solution 650 mg, 650 mg, Oral, Q4H PRN **OR** acetaminophen (TYLENOL) suppository 650 mg, 650 mg, Rectal, Q4H PRN, Sudha Douglas APRN    sennosides-docusate (PERICOLACE) 8.6-50 MG per tablet 2 tablet, 2 tablet, Oral, BID, 2 tablet at 07/09/23 0900 **AND** polyethylene glycol (MIRALAX) packet 17 g, 17 g, Oral, Daily PRN **AND** bisacodyl (DULCOLAX) EC tablet 5 mg, 5 mg, Oral, Daily PRN **AND** bisacodyl (DULCOLAX) suppository 10 mg, 10 mg, Rectal, Daily PRN, Sudha Douglas APRN    folic acid (FOLVITE) tablet 1 mg, 1 mg, Oral, Daily, Sudha Douglas APRN, 1 mg at 07/09/23 0900    heparin (porcine) 5000 UNIT/ML injection 4,100-8,200 Units, 40-80 Units/kg, Intravenous, Q6H PRN, Sudha Douglas APRN, 8,200 Units at 07/08/23 0012    heparin 44283 units/250 mL (100 units/mL) in 0.45 % NaCl infusion, 18 Units/kg/hr, Intravenous, Titrated, Sudha Douglas APRN, Last Rate: 22.4 mL/hr at 07/09/23 0219, 22 Units/kg/hr at 07/09/23 0219    HYDROcodone-acetaminophen (NORCO) 5-325 MG per tablet 1 tablet, 1 tablet, Oral, Q4H PRN, Sudha Douglas APRN    ondansetron (ZOFRAN) injection 4 mg, 4 mg, Intravenous, Q6H PRN, Sudha Douglas APRN    pantoprazole (PROTONIX) EC tablet 40 mg, 40 mg, Oral, BID AC, Sudha Douglas APRN, 40 mg at 07/09/23 0900    predniSONE (DELTASONE) tablet 10 mg, 10 mg, Oral, Daily, Sudha Douglas APRN, 10 mg at 07/09/23 0900    [COMPLETED] Insert Peripheral IV, , , Once **AND** sodium chloride 0.9 % flush 10 mL, 10 mL, Intravenous, PRN, Perfecto Rivera MD    sodium chloride 0.9 % flush 10 mL, 10 mL, Intravenous, Q12H, Sudha Douglas APRN, 10 mL at 07/09/23 0901    sodium  "chloride 0.9 % flush 10 mL, 10 mL, Intravenous, PRN, Sudha Douglas, TANYA    sodium chloride 0.9 % infusion 40 mL, 40 mL, Intravenous, PRN, Sudha Douglas APRN    sodium chloride 0.9 % infusion, 75 mL/hr, Intravenous, Continuous, Sudha Douglas APRN, Last Rate: 75 mL/hr at 07/09/23 0935, 75 mL/hr at 07/09/23 0935    vitamin B-12 (CYANOCOBALAMIN) tablet 1,000 mcg, 1,000 mcg, Oral, Daily, Sudha Douglas APRN, 1,000 mcg at 07/09/23 0900  Allergies:  Patient has no known allergies.    Review of Systems:  Negative 12 point system review except: left leg swelling    Objective:  tMax 24 hours:  [unfilled]  Vital Sign Ranges:  Temp:  [97 °F (36.1 °C)-98.1 °F (36.7 °C)] 97.4 °F (36.3 °C)  Heart Rate:  [70-80] 73  Resp:  [16-18] 16  BP: (116-137)/(65-76) 130/75  Intake and Output Last 3 Shifts:  I/O last 3 completed shifts:  In: 1378.8 [P.O.:210; I.V.:1168.8]  Out: 615 [Urine:615]    Exam:  /75 (BP Location: Left arm, Patient Position: Lying)   Pulse 73   Temp 97.4 °F (36.3 °C) (Oral)   Resp 16   Ht 182.9 cm (72\")   Wt 102 kg (225 lb)   SpO2 97%   BMI 30.52 kg/m²    General Appearance:  Alert, cooperative, no acute distress, general         appearance is normal   Head:  Normocephalic, without obvious abnormality, atraumatic   Eyes:          Pupils/Irises normal. Exterior inspection conjunctivae       and lids normal.   Ears:  Normal external inspection   Nose: Exterior inspection of nose is normal   Throat: Lips, mucosa, and tongue normal   Neck: No adenopathy, supple, symmetrical, trachea midline   Back:   No CVA tenderness   Lungs:   Respirations unlabored; normal effort, no audible     abnormality   CV: Regular rhythm and normal rate, no edema   Abdomen:   No hernia, examination of the abdomen is normal with     no masses, tenderness, or distension    Male :      Musculoskeletal: Inspection of the nails and digits reveals no abnormalities   Skin: Inspection normal, palpation normal   Neurologic/Psych: " Orientation normal; Mood/Affect normal     Data Review:  All labs (24hrs):    Lab Results (last 24 hours)       Procedure Component Value Units Date/Time    Basic Metabolic Panel [[id number]] Collected: 07/09/23 0902    Specimen: Blood Updated: 07/09/23 0930    CBC & Differential [[id number]]  (Abnormal) Collected: 07/09/23 0608    Specimen: Blood Updated: 07/09/23 0649    Narrative:      The following orders were created for panel order CBC & Differential.  Procedure                               Abnormality         Status                     ---------                               -----------         ------                     CBC Auto Differential[[id number]]        Abnormal            Final result                 Please view results for these tests on the individual orders.    CBC Auto Differential [[id number]]  (Abnormal) Collected: 07/09/23 0608    Specimen: Blood Updated: 07/09/23 0649     WBC 7.73 10*3/mm3      RBC 3.67 10*6/mm3      Hemoglobin 10.0 g/dL      Hematocrit 30.7 %      MCV 83.7 fL      MCH 27.2 pg      MCHC 32.6 g/dL      RDW 15.8 %      RDW-SD 48.5 fl      MPV 9.1 fL      Platelets 189 10*3/mm3      Neutrophil % 71.0 %      Lymphocyte % 15.3 %      Monocyte % 8.9 %      Eosinophil % 3.8 %      Basophil % 0.6 %      Immature Grans % 0.4 %      Neutrophils, Absolute 5.49 10*3/mm3      Lymphocytes, Absolute 1.18 10*3/mm3      Monocytes, Absolute 0.69 10*3/mm3      Eosinophils, Absolute 0.29 10*3/mm3      Basophils, Absolute 0.05 10*3/mm3      Immature Grans, Absolute 0.03 10*3/mm3      nRBC 0.0 /100 WBC     aPTT [[id number]]  (Abnormal) Collected: 07/09/23 0608    Specimen: Blood Updated: 07/09/23 0645     PTT 74.0 seconds             Assessment:  Bilateral hydronephrosis  Met Melanoma  Plan:  Cr pending  Check repeat ct a/p today/tomorrow  Npo after midnight for possible cysto/bilateral stent placements  Okay to stay on hep gtt     TANYA Soto  7/9/2023    Electronically signed by Ariel  TANYA Michaels at 07/09/23 0953       Price Banuelos MD at 07/08/23 1649              [facility]     LOS: 0 days     Subjective:    CC:Leg Pain (DVT r/o to Left leg, swelling to entire left leg up into groin. +3 pitting Edema throughout leg into groin. PE to left lung 3 weeks ago, Eliquis BID. )    DIET:  Diet Order   Procedures    Diet: Regular/House Diet; Texture: Regular Texture (IDDSI 7); Fluid Consistency: Thin (IDDSI 0)   Left inguinal pain is much improved, he is able to walk  No cp  No soa    Objective:    Vital Signs:  Temp:  [97 °F (36.1 °C)-98.5 °F (36.9 °C)] 97.1 °F (36.2 °C)  Heart Rate:  [70-83] 70  Resp:  [16-18] 16  BP: (125-128)/(65-75) 126/72    SpO2:  [96 %-99 %] 97 %  on   ;   Device (Oxygen Therapy): room air  Body mass index is 30.52 kg/m².    Physical Exam  Constitutional:       Appearance: Normal appearance.   HENT:      Head: Normocephalic and atraumatic.   Cardiovascular:      Rate and Rhythm: Normal rate and regular rhythm.      Heart sounds: No murmur heard.    No friction rub.   Pulmonary:      Effort: Pulmonary effort is normal.      Breath sounds: Normal breath sounds.   Abdominal:      General: Bowel sounds are normal. There is no distension.      Palpations: Abdomen is soft.      Tenderness: There is no abdominal tenderness.   Musculoskeletal:      Left lower leg: Edema present.   Skin:     General: Skin is warm and dry.   Neurological:      General: No focal deficit present.      Mental Status: He is alert and oriented to person, place, and time.   Psychiatric:         Mood and Affect: Mood normal.         Behavior: Behavior normal.       Results Review:    Glucose   Date Value Ref Range Status   07/08/2023 107 (H) 65 - 99 mg/dL Final   07/07/2023 106 (H) 65 - 99 mg/dL Final   07/06/2023 102 (H) 65 - 99 mg/dL Final     Results from last 7 days   Lab Units 07/08/23  0612   WBC 10*3/mm3 8.02   HEMOGLOBIN g/dL 9.9*   HEMATOCRIT % 30.5*   PLATELETS 10*3/mm3  181     Results from last 7 days   Lab Units 07/08/23  0612   SODIUM mmol/L 135*   POTASSIUM mmol/L 4.2   CHLORIDE mmol/L 105   CO2 mmol/L 22.5   BUN mg/dL 22   CREATININE mg/dL 1.68*   CALCIUM mg/dL 8.5*   BILIRUBIN mg/dL 0.6   ALK PHOS U/L 62   ALT (SGPT) U/L 12   AST (SGOT) U/L 14   GLUCOSE mg/dL 107*     Results from last 7 days   Lab Units 07/08/23  0612 07/07/23  2228 07/07/23  1531   INR   --   --  1.32*   APTT seconds 93.6*   < > 31.1    < > = values in this interval not displayed.             Cultures:  No results found for: BLOODCX, URINECX, WOUNDCX, MRSACX, RESPCX, STOOLCX    I have reviewed daily medications and changes in CPOE    Scheduled meds  folic acid, 1 mg, Oral, Daily  pantoprazole, 40 mg, Oral, BID AC  predniSONE, 10 mg, Oral, Daily  senna-docusate sodium, 2 tablet, Oral, BID  sodium chloride, 10 mL, Intravenous, Q12H  vitamin B-12, 1,000 mcg, Oral, Daily        heparin, 18 Units/kg/hr, Last Rate: 22 Units/kg/hr (07/08/23 1600)  sodium chloride, 75 mL/hr, Last Rate: 75 mL/hr (07/08/23 0853)      PRN meds    acetaminophen **OR** acetaminophen **OR** acetaminophen    senna-docusate sodium **AND** polyethylene glycol **AND** bisacodyl **AND** bisacodyl    heparin (porcine)    HYDROcodone-acetaminophen    ondansetron    [COMPLETED] Insert Peripheral IV **AND** sodium chloride    sodium chloride    sodium chloride        Inguinal pain, left    Left groin mass    Abdominal lymphadenopathy    Metastatic melanoma    Metastasis to brain    LENIN (acute kidney injury)    Personal history of venous thrombosis and embolism    Hydroureteronephrosis        Assessment/Plan:  Mr. George is a 63 year old male who presented to the hospital with complaints of left leg pain and difficulty walking. He was recently found to have extensive metastatic melanoma back in May of this year. He has had liver/lung/bone/brain involvement and currently receiving immunotherapy with Dr. Rocha with oncology. He has had some notable  LENIN recently with CT A/P on 7/5 showing some new bilateral hydroureteronephrosis. He was due to see urology today but had worsening left groin/hip pain after receiving immunotherapy yesterday making it difficult to go to this appointment. He is being admitted for further care at this time.     Inguinal pain, left  -In the setting of known hypermetabolic thickening of the skin in the left upper thigh/groin/lateral scrotum felt to be the primary site of malignancy. He has extensive lymphadenopathy in the abdomen worse on the left with extensive left lower extremity DVT on prior PET/CT.  -X-ray of hip without acute findings.  -Pain in hip has resolved  -Pain medication as needed     LENIN  Hydroureteronephrosis  -Creatinine worsening since last admission with CT findings of new hydroureteronephrosis.  -Hydrate with NS at 75 cc/hr   -Monitor urine output. Check bladder scans.  -Urology to see. Repeat ct. Will hold Eliquis (last dose 7/7 AM) and start heparin drip without initial bolus.      Metastatic melanoma  Metastasis to brain  Abdominal lymphadenopathy  History of PE/DVT  -Hematology/oncology to see.  -Hold Eliquis and start heparin given likely needs for intervention.  -Further management per oncology.  -Resume prednisone that he was on prior to admission given immunotherapy reaction.       Price Banuelos MD  07/08/23  16:49 EDT       Electronically signed by Price Banuelos MD at 07/09/23 1126       Karen Conti MD at 07/08/23 1536            Subjective      REASON FOR FOLLOWUP/CHIEF COMPLAINT:   Metastatic melanoma  Obstructive uropathy    HISTORY OF PRESENT ILLNESS:   Patient reports feeling much better today.  The left groin pain has improved.  He has been evaluated by urology and they plan to do a CT of the abdomen and pelvis on Monday to reassess the situation.    Past Medical History, Past Surgical History, Social History, Family History have been reviewed and are without significant changes  except as mentioned.    Review of Systems   Review of Systems  Review of systems as mentioned in the HPI    Medications:  The current medication list was reviewed in the EMR    ALLERGIES:  No Known Allergies    Objective       Vitals:    07/07/23 1922 07/08/23 0338 07/08/23 0715 07/08/23 1123   BP: 128/72 128/75 126/69 125/65   BP Location: Left arm Left arm Left arm Left arm   Patient Position: Lying Lying Lying Lying   Pulse: 76 83 76 80   Resp: 16 16 16 18   Temp: 97.6 °F (36.4 °C) 98.5 °F (36.9 °C) 97.4 °F (36.3 °C) 97 °F (36.1 °C)   TempSrc: Oral Oral Oral Oral   SpO2: 99% 96% 97% 99%   Weight:       Height:         Physical Exam    CONSTITUTIONAL:  Vital signs reviewed.  No distress, looks comfortable.  EYES:  Conjunctivae and lids unremarkable.  PERRLA  EARS,NOSE,MOUTH,THROAT:  Ears and nose appear unremarkable.  Lips, teeth, gums appear unremarkable.  RESPIRATORY:  Normal respiratory effort.  Lungs clear to auscultation bilaterally.  CARDIOVASCULAR:  Normal S1, S2.  No murmurs rubs or gallops.    Left lower extremity lymphedema   GASTROINTESTINAL: Abdomen appears unremarkable.  Nontender.  No hepatomegaly.  No splenomegaly.  NEURO: cranial nerves 2-12 grossly intact.  No focal deficits.  Appears to have equal strength all 4 extremities.  MUSCULOSKELETAL:  Unremarkable digits/nails.  No cyanosis or clubbing.  SKIN:  Warm.  No rashes.  PSYCHIATRIC:  Normal judgment and insight.  Normal mood and affect.       RECENT LABS:  WBC   Date Value Ref Range Status   07/08/2023 8.02 3.40 - 10.80 10*3/mm3 Final   07/07/2023 7.44 3.40 - 10.80 10*3/mm3 Final   07/06/2023 8.06 3.40 - 10.80 10*3/mm3 Final     Hemoglobin   Date Value Ref Range Status   07/08/2023 9.9 (L) 13.0 - 17.7 g/dL Final   07/07/2023 11.3 (L) 13.0 - 17.7 g/dL Final   07/06/2023 11.3 (L) 13.0 - 17.7 g/dL Final     Platelets   Date Value Ref Range Status   07/08/2023 181 140 - 450 10*3/mm3 Final   07/07/2023 177 140 - 450 10*3/mm3 Final   07/06/2023 219  140 - 450 10*3/mm3 Final     Assessment & Plan  ASSESSMENT/PLAN:  King George [id number]     *Obstructive uropathy  CT abdomen from 2023 reviewed and shows hydroureteronephrosis.  Consult urology.  Could be secondary to compression from increase in size of the retroperitoneal lymphadenopathy.  Evaluated by urology and plan to manage conservatively and repeat CT abdomen planned for 7/10/2023     *Metastatic melanoma  He received 1 cycle of ipilimumab and nivolumab.,  This was on 2023  Subsequently ipilimumab discontinued due to worsening renal function and rash  He received nivolumab only on 2023  CT of the abdomen from 2023 shows increase in size of the retroperitoneal lymphadenopathy  Too soon to assess treatment response     *Left lower extremity pain  History of the hip without any evidence of abnormalities  PET/CT 2023 does not show any focal lesions in the left hip  Could be secondary to significant lymphedema.  Continue norco  Pain has improved.  He is now able to ambulate     *Incidental bilateral PE and thrombus in the left external iliac vein due to compression by the bulky nodes  Continue heparin     *Anemia  Hemoglobin at baseline  Continue to monitor     Recommendations  Management of hydroureteronephrosis per urology  Continue [place] for pain management       Electronically signed by Karen Conti MD at 23 1538          Consult Notes (last 72 hours)        Zara Petit MD at 23 1126            [facility] Consult Note      Patient Name: King George  : 1960  MRN: [id number]  Primary Care Physician:  Mohini Cortes MD  Referring Physician: No ref. provider found  Date of admission: 2023    Subjective     Reason for Consult:  LENIN    HPI:   King George is a 63 y.o. male past medical history of metastatic malignant melanoma was started on ipilimumab and nivolumab on 2023 and subsequently he developed rash that was  thought it is due to immunotherapy followed by initiation of steroid.  Patient received received nivolumab only on 7/6/2023 . his creatinine at time of initiation of treatment was 1.1 mg/dL and worsened to 2.1 mg/dL however it improved down to 1.1 on June 22.  At time of admission his creatinine was 1.9 mg/dL and has been stagnant.  Of note CT of the abdomen on July 1, 2023 showed obstructive uropathy.  Urology has been consulted and they have been discussing stent placement in the patient.  Were consulted to help with management of his acute kidney injury.    Review of Systems:   14 point review of systems is otherwise negative except for mentioned above on HPI    Personal History     Past Medical History:   Diagnosis Date    Abdominal lymphadenopathy 05/29/2023    Acute pulmonary embolism, unspecified pulmonary embolism type, unspecified whether acute cor pulmonale present 05/28/2023    ADMITTED TO [facility]    Anemia 05/29/2023    Colon polyps     FOLLOWED BY DR. SHANNA MOTTA    Drug rash 6/19/2023    Hepatic lesion 05/29/2023    Left lower quadrant abdominal mass 05/28/2023    LEFT INGUINAL BX SHOWED METASTATIC MELANOMA    Metastasis to brain 06/02/2023    Metastatic melanoma 05/31/2023    Moderate Malnutrition (HCC) 05/30/2023    Personal history of venous thrombosis and embolism 6/19/2023       Past Surgical History:   Procedure Laterality Date    COLONOSCOPY N/A 06/02/2023    40 MM TUBULOVILLOUS ADENOMA POLYP IN DISTAL RECTUM, RESCOPE IN 1 YR, DR. SHANNA MOTTA AT [facility]    COLONOSCOPY W/ POLYPECTOMY N/A 06/02/2023    40 MM POLYP IN DISTAL RECTUM, RESECTION AND RETREIVAL COMPLETE, RESCOPE IN 1 YR, DR. SHANNA MOTTA AT [facility]    PORTACATH PLACEMENT  06/02/2023    DONE AT [facility]    US GUIDED LYMPH NODE BIOPSY  05/30/2023    METASTATIC MELANOMA, DONE AT [facility]    VENOUS ACCESS DEVICE (PORT) INSERTION N/A 06/02/2023    Procedure: INFUSAPORT PLACEMENT;  Surgeon: Vicki Layne MD;  Location: [facility] OR;  Service: General;   Laterality: N/A;       Family History: family history includes Breast cancer in his mother; Prostate cancer in his father.    Social History:  reports that he has never smoked. He has never been exposed to tobacco smoke. He has never used smokeless tobacco. He reports that he does not drink alcohol and does not use drugs.    Home Medications:  Prior to Admission medications    Medication Sig Start Date End Date Taking? Authorizing Provider   apixaban (ELIQUIS) 5 MG tablet tablet Take 1 tablet by mouth Every 12 (Twelve) Hours. Indications: DVT/PE (active thrombosis) 7/5/23  Yes Brodie Rocha MD   folic acid (FOLVITE) 1 MG tablet Take 1 tablet by mouth Daily. 6/23/23  Yes Ladarius Calvo MD   ondansetron (ZOFRAN) 8 MG tablet Take  by mouth Every 8 (Eight) Hours As Needed for Nausea or Vomiting.   Yes Andre Montoya MD   pantoprazole (PROTONIX) 40 MG EC tablet Take 1 tablet by mouth 2 (Two) Times a Day Before Meals. 6/22/23  Yes Ladarius Calvo MD   predniSONE (DELTASONE) 10 MG tablet Take 1 tablet by mouth Daily. 7/6/23  Yes Brodie Rocha MD   vitamin B-12 (CYANOCOBALAMIN) 1000 MCG tablet Take 1 tablet by mouth Daily. 6/22/23  Yes Ladarius Calvo MD       Allergies:  No Known Allergies    Objective     Vitals:   Temp:  [97.1 °F (36.2 °C)-98.1 °F (36.7 °C)] 97.4 °F (36.3 °C)  Heart Rate:  [70-80] 73  Resp:  [16] 16  BP: (116-137)/(69-76) 130/75    Intake/Output Summary (Last 24 hours) at 7/9/2023 1127  Last data filed at 7/8/2023 1805  Gross per 24 hour   Intake 1168.8 ml   Output --   Net 1168.8 ml       Physical Exam:    General Appearance: alert, oriented x 3, no acute distress   Skin: warm and dry  HEENT: oral mucosa normal, nonicteric sclera  Neck: supple, no JVD  Lungs: CTA  Heart: RRR, normal S1 and S2  Abdomen: soft, nontender, nondistended  : no palpable bladder  Extremities: no edema, cyanosis or clubbing  Neuro: normal speech and mental status     Scheduled Meds:     folic  acid, 1 mg, Oral, Daily  pantoprazole, 40 mg, Oral, BID AC  predniSONE, 10 mg, Oral, Daily  senna-docusate sodium, 2 tablet, Oral, BID  sodium chloride, 10 mL, Intravenous, Q12H  vitamin B-12, 1,000 mcg, Oral, Daily      IV Meds:   heparin, 18 Units/kg/hr, Last Rate: 22 Units/kg/hr (07/09/23 0219)  sodium chloride, 75 mL/hr, Last Rate: 75 mL/hr (07/09/23 0935)        Results Reviewed:   I have personally reviewed the results from the time of this admission to 7/9/2023 11:27 EDT     Lab Results   Component Value Date    GLUCOSE 127 (H) 07/09/2023    CALCIUM 8.2 (L) 07/09/2023     (L) 07/09/2023    K 3.9 07/09/2023    CO2 21.0 (L) 07/09/2023     07/09/2023    BUN 19 07/09/2023    CREATININE 1.82 (H) 07/09/2023    BCR 10.4 07/09/2023    ANIONGAP 9.0 07/09/2023      Lab Results   Component Value Date    MG 2.1 06/21/2023    ALBUMIN 3.0 (L) 07/08/2023           Assessment / Plan     ASSESSMENT:    Acute kidney injury initial event was 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  Patient is likely to go placement of stent tomorrow.  We will check for urine lisinopril.  We will hold all nephrotoxic medication and continue IV fluid for now.  Metastatic melanoma S/P  1 cycle of ipilimumab and nivolumab  on 6/14/2023 and second dose of Opdivo on July 6, 2023  Bilateral hydronephrosis that is likely due to obstructive uropathy due to lymphadenopathy      PLAN:    Continue IV fluids for now  Stent placement by urology  Will send urine eosinophils and continue to evaluate improvement of kidney function and need for kidney biopsy to rule out interstitial nephritis  Surveillance labs    Thank you for involving us in the care of King George.  Please feel free to call with any questions.    Zara Petit MD  07/09/23  11:27  EDT    [facility]  [phone]    Electronically signed by Zara Petit MD at 07/10/23 0814       Xenia George APRN at 23 0933          Urology Consult  Patient Identification:  Name: King George  Age: 63 y.o.  Sex: male  : 1960  MRN: [id number]   Chief Complaint: left lower extremity pain  History of Present Illness:   Met melanoma - received 1cycle of ipilimumab and nivolumab 23    2. Bilateral hydronephrosis    3. Bilateral PE and thrombus in left external iliac vein  Heparin    CT A/P 23 - no hydro  CT Abd only 23 - new bilateral hydro mild/mod    Cr hx:  23 - 2.16  23 - 2.01  23 - 1.32  23 - 1.19  23 - 1.90  23 - 1.89  23 - 1.91  23 - 1.68    Afvss  Wbc 8.0  Cultures negative    No flank pain, no gross hem, no fever/chills, pain essentially resolved, no difficulty voiding    Problem List:  [unfilled]  Past Medical History:  Past Medical History:   Diagnosis Date    Abdominal lymphadenopathy 2023    Acute pulmonary embolism, unspecified pulmonary embolism type, unspecified whether acute cor pulmonale present 2023    ADMITTED TO [facility]    Anemia 2023    Colon polyps     FOLLOWED BY DR. SHANNA MOTTA    Drug rash 2023    Hepatic lesion 2023    Left lower quadrant abdominal mass 2023    LEFT INGUINAL BX SHOWED METASTATIC MELANOMA    Metastasis to brain 2023    Metastatic melanoma 2023    Moderate Malnutrition (HCC) 2023    Personal history of venous thrombosis and embolism 2023     Past Surgical History:  Past Surgical History:   Procedure Laterality Date    COLONOSCOPY N/A 2023    40 MM TUBULOVILLOUS ADENOMA POLYP IN DISTAL RECTUM, RESCOPE IN 1 YR, DR. SHANNA MOTTA AT [facility]    COLONOSCOPY W/ POLYPECTOMY N/A 2023    40 MM POLYP IN DISTAL RECTUM, RESECTION AND RETREIVAL COMPLETE, RESCOPE IN 1 YR, DR. SHANNA MOTTA AT [facility]    PORTACATH PLACEMENT   2023    DONE AT [facility]    US GUIDED LYMPH NODE BIOPSY  2023    METASTATIC MELANOMA, DONE AT [facility]    VENOUS ACCESS DEVICE (PORT) INSERTION N/A 2023    Procedure: INFUSAPORT PLACEMENT;  Surgeon: Vicki Layne MD;  Location: [facility] OR;  Service: General;  Laterality: N/A;      Home Meds:  Medications Prior to Admission   Medication Sig Dispense Refill Last Dose    apixaban (ELIQUIS) 5 MG tablet tablet Take 1 tablet by mouth Every 12 (Twelve) Hours. Indications: DVT/PE (active thrombosis) 60 tablet 0 2023    folic acid (FOLVITE) 1 MG tablet Take 1 tablet by mouth Daily. 30 tablet 0 2023    ondansetron (ZOFRAN) 8 MG tablet Take  by mouth Every 8 (Eight) Hours As Needed for Nausea or Vomiting.       pantoprazole (PROTONIX) 40 MG EC tablet Take 1 tablet by mouth 2 (Two) Times a Day Before Meals. 30 tablet 0 2023    predniSONE (DELTASONE) 10 MG tablet Take 1 tablet by mouth Daily. 30 tablet 3 2023    vitamin B-12 (CYANOCOBALAMIN) 1000 MCG tablet Take 1 tablet by mouth Daily. 30 tablet 0 2023     Current Meds:   [unfilled]  Allergies:  No Known Allergies  Immunizations:  Immunization History   Administered Date(s) Administered    COVID-19 (PFIZER) Purple Cap Monovalent 2021, 2021     Social History:   Social History     Tobacco Use    Smoking status: Never     Passive exposure: Never    Smokeless tobacco: Never   Substance Use Topics    Alcohol use: Never      Family History:  Family History   Problem Relation Age of Onset    Breast cancer Mother     Prostate cancer Father       Review of Systems  negative 12 point system review except: right leg pain  Objective:  tMax 24 hrs: Temp (24hrs), Av.8 °F (36.6 °C), Min:97.2 °F (36.2 °C), Max:98.5 °F (36.9 °C)    Vitals Ranges:   Temp:  [97.2 °F (36.2 °C)-98.5 °F (36.9 °C)] 97.4 °F (36.3 °C)  Heart Rate:  [61-83] 76  Resp:  [16-18] 16  BP: (126-140)/(69-85) 126/69  Intake and Output Last 3 Shifts:   I/O last 3 completed  "shifts:  In: 210 [P.O.:210]  Out: 615 [Urine:615]  Exam:  /69 (BP Location: Left arm, Patient Position: Lying)   Pulse 76   Temp 97.4 °F (36.3 °C) (Oral)   Resp 16   Ht 182.9 cm (72\")   Wt 102 kg (225 lb)   SpO2 97%   BMI 30.52 kg/m²       General Appearance: Alert, cooperative, no distress, appears stated age   Head: Normocephalic, without obvious abnormality, atraumatic   ENT: Normal hearing, external inspection, ears and nose   Eyes: Normal pupils/irises, external inspection, conjunctivae, eyelids   Back: No CVA tenderness   Respiratory: Normal effort, palpation, auscultation   CV: Normal auscultation, carotid pulses, no edema   Abdomen: No hernia, soft, nontender, no masses   :    Musculoskeletal: Normal extremities, nails, digits   Skin: Normal skin color, texture, no rashes or lesion   Neurologic/psych: Normal orientation, mood, affect           Data Review:  CBC:   Results from last 7 days   Lab Units 07/08/23  0612   WBC 10*3/mm3 8.02   RBC 10*6/mm3 3.70*     BMP:   Results from last 7 days   Lab Units 07/08/23  0612   GLUCOSE mg/dL 107*   CO2 mmol/L 22.5   BUN mg/dL 22   CREATININE mg/dL 1.68*   CALCIUM mg/dL 8.5*      Assessment:    Inguinal pain, left    Left groin mass    Abdominal lymphadenopathy    Metastatic melanoma    Metastasis to brain    LENIN (acute kidney injury)    Personal history of venous thrombosis and embolism    Hydroureteronephrosis      Bilateral hydronephrosis  Met Melanoma    Plan:  Discussed with Dr. Vic Figueroa - will hold on stents for now, Cr improving and hydro is mild  Monitor renal function  Imaging study to evaluate distal ureters and hydronephrosis status tomorrow/Monday  Will follow    TANYA Soto  7/8/2023          Electronically signed by Xenia George APRN at 07/08/23 0944       Karen Conti MD at 07/07/23 3671        Consult Orders    1. Hematology & Oncology Inpatient Consult [[id number]] ordered by Sudha Douglas APRN at 07/07/23 1149 "                   Subjective  .     REASON FOR CONSULTATION:     Provide an opinion on any further workup or treatment  Metastatic melanoma                             REQUESTING PHYSICIAN: No ref. provider found  RECORDS OBTAINED:  Records of the patient's history including those obtained from the referring provider were reviewed and summarized in detail.    HISTORY OF PRESENT ILLNESS:  The patient is a 63 y.o. year old male  who is here for follow-up with the above-mentioned history.  Patient is a 63-year-old male with widely metastatic malignant melanoma involving the left groin with extensive lymphadenopathy, hepatic metastasis, pulmonary metastasis and metastatic disease to the brain who was started on treatment with ipilimumab and nivolumab on 6/14/2023.  Subsequently he developed a rash which was thought to be secondary to immunotherapy and this improved with steroids.  Patient does have chronic left lower extremity edema from lymphedema due to obstructive lymphadenopathy in his groin.  Patient had a normal creatinine before initiation of treatment subsequently creatinine increased to 2.2.  An obstructive etiology was suspected and a CT of the abdomen was obtained on 7/5/2023 on which moderate hydronephrosis and dilated proximal ureters were noted.  This was new from the scan on 5/28/2023.  There was increase in size of the retroperitoneal lymphadenopathy.  He had a scheduled appointment with urology on 7/7/2023.  Patient was trying to get to that appointment and when he got out of his couch he experienced severe pain in his left lower extremity as he bent down to tie his shoes.  He did not think that he would be able to make it to the appointment and hence presented to the emergency room for further management of the pain.  X-ray of the left hip obtained in the ER was negative.  Most recent treatment was with nivolumab only on 7/6/2023    Past Medical History:   Diagnosis Date    Abdominal lymphadenopathy  05/29/2023    Acute pulmonary embolism, unspecified pulmonary embolism type, unspecified whether acute cor pulmonale present 05/28/2023    ADMITTED TO [facility]    Anemia 05/29/2023    Colon polyps     FOLLOWED BY DR. SHANNA MOTTA    Drug rash 6/19/2023    Hepatic lesion 05/29/2023    Left lower quadrant abdominal mass 05/28/2023    LEFT INGUINAL BX SHOWED METASTATIC MELANOMA    Metastasis to brain 06/02/2023    Metastatic melanoma 05/31/2023    Moderate Malnutrition (HCC) 05/30/2023    Personal history of venous thrombosis and embolism 6/19/2023     Past Surgical History:   Procedure Laterality Date    COLONOSCOPY N/A 06/02/2023    40 MM TUBULOVILLOUS ADENOMA POLYP IN DISTAL RECTUM, RESCOPE IN 1 YR, DR. SHANNA MOTTA AT [facility]    COLONOSCOPY W/ POLYPECTOMY N/A 06/02/2023    40 MM POLYP IN DISTAL RECTUM, RESECTION AND RETREIVAL COMPLETE, RESCOPE IN 1 YR, DR. SHANNA MOTTA AT [facility]    PORTACATH PLACEMENT  06/02/2023    DONE AT [facility]    US GUIDED LYMPH NODE BIOPSY  05/30/2023    METASTATIC MELANOMA, DONE AT [facility]    VENOUS ACCESS DEVICE (PORT) INSERTION N/A 06/02/2023    Procedure: INFUSAPORT PLACEMENT;  Surgeon: Vicki Layne MD;  Location: [facility];  Service: General;  Laterality: N/A;       MEDICATIONS    Current Facility-Administered Medications:     acetaminophen (TYLENOL) tablet 650 mg, 650 mg, Oral, Q4H PRN **OR** acetaminophen (TYLENOL) 160 MG/5ML solution 650 mg, 650 mg, Oral, Q4H PRN **OR** acetaminophen (TYLENOL) suppository 650 mg, 650 mg, Rectal, Q4H PRN, Sudha Douglas APRN    sennosides-docusate (PERICOLACE) 8.6-50 MG per tablet 2 tablet, 2 tablet, Oral, BID **AND** polyethylene glycol (MIRALAX) packet 17 g, 17 g, Oral, Daily PRN **AND** bisacodyl (DULCOLAX) EC tablet 5 mg, 5 mg, Oral, Daily PRN **AND** bisacodyl (DULCOLAX) suppository 10 mg, 10 mg, Rectal, Daily PRN, Sudha Douglas APRN    folic acid (FOLVITE) tablet 1 mg, 1 mg, Oral, Daily, Sudha Douglas APRN    heparin (porcine) 5000 UNIT/ML injection  4,100-8,200 Units, 40-80 Units/kg, Intravenous, Q6H PRN, Sudha Douglas APRN    heparin 80335 units/250 mL (100 units/mL) in 0.45 % NaCl infusion, 18 Units/kg/hr, Intravenous, Titrated, Sudha Douglas APRN    HYDROcodone-acetaminophen (NORCO) 5-325 MG per tablet 1 tablet, 1 tablet, Oral, Q4H PRN, Sudha Douglas APRN    ondansetron (ZOFRAN) injection 4 mg, 4 mg, Intravenous, Q6H PRN, Sudha Douglas APRN    pantoprazole (PROTONIX) EC tablet 40 mg, 40 mg, Oral, BID AC, Sudha Douglas APRN    [START ON 7/8/2023] predniSONE (DELTASONE) tablet 10 mg, 10 mg, Oral, Daily, Sudha Douglas APRN    [COMPLETED] Insert Peripheral IV, , , Once **AND** sodium chloride 0.9 % flush 10 mL, 10 mL, Intravenous, PRN, Perfecto Rivera MD    sodium chloride 0.9 % flush 10 mL, 10 mL, Intravenous, Q12H, Sudha Douglas APRN    sodium chloride 0.9 % flush 10 mL, 10 mL, Intravenous, PRN, Sudha Douglas APRN    sodium chloride 0.9 % infusion 40 mL, 40 mL, Intravenous, PRN, Sudha Douglas APRN    sodium chloride 0.9 % infusion, 75 mL/hr, Intravenous, Continuous, Sudha Douglas APRN, Last Rate: 75 mL/hr at 07/07/23 1414, 75 mL/hr at 07/07/23 1414    [START ON 7/8/2023] vitamin B-12 (CYANOCOBALAMIN) tablet 1,000 mcg, 1,000 mcg, Oral, Daily, Sudha Douglas APRN    ALLERGIES:   No Known Allergies    SOCIAL HISTORY:       Social History     Socioeconomic History    Marital status: Unknown   Tobacco Use    Smoking status: Never     Passive exposure: Never    Smokeless tobacco: Never   Vaping Use    Vaping Use: Unknown   Substance and Sexual Activity    Alcohol use: Never    Drug use: Never    Sexual activity: Defer         FAMILY HISTORY:  Family History   Problem Relation Age of Onset    Breast cancer Mother     Prostate cancer Father        REVIEW OF SYSTEMS:  Review of Systems   Constitutional:  Positive for activity change.   HENT: Negative.     Respiratory: Negative.     Cardiovascular:  Positive for leg swelling.   Gastrointestinal:  "Negative.    Musculoskeletal:  Positive for arthralgias and gait problem.   Allergic/Immunologic: Negative.    Hematological: Negative.    Psychiatric/Behavioral: Negative.         Objective     Vitals:    07/07/23 1023 07/07/23 1040 07/07/23 1321 07/07/23 1542   BP: 138/85  140/78 139/84   BP Location:   Left arm Left arm   Patient Position:   Lying Lying   Pulse:   61 68   Resp:   18 16   Temp:    97.2 °F (36.2 °C)   TempSrc:   Oral Oral   SpO2:   99% 99%   Weight:  102 kg (225 lb)     Height:  182.9 cm (72\")           7/6/2023     2:33 PM   Current Status   ECOG score 0      PHYSICAL EXAM:      CONSTITUTIONAL:  Vital signs reviewed.  No distress, looks comfortable.  EYES:  Conjunctivae and lids unremarkable.  PERRLA  EARS,NOSE,MOUTH,THROAT:  Ears and nose appear unremarkable.  Lips, teeth, gums appear unremarkable.  RESPIRATORY:  Normal respiratory effort.  Lungs clear to auscultation bilaterally.  CARDIOVASCULAR:  Normal S1, S2.  No murmurs rubs or gallops.   GASTROINTESTINAL: Abdomen appears unremarkable.  Nontender.  No hepatomegaly.  No splenomegaly.  LYMPHATIC:  No cervical, supraclavicular, axillary lymphadenopathy.  MUSCULOSKELETAL: Swelling of the left lower extremity secondary to lymphedema  PSYCHIATRIC:  Normal judgment and insight.  Normal mood and affect.      RECENT LABS:        WBC   Date Value Ref Range Status   07/07/2023 7.44 3.40 - 10.80 10*3/mm3 Final   07/06/2023 8.06 3.40 - 10.80 10*3/mm3 Final   07/05/2023 9.93 3.40 - 10.80 10*3/mm3 Final     Hemoglobin   Date Value Ref Range Status   07/07/2023 11.3 (L) 13.0 - 17.7 g/dL Final   07/06/2023 11.3 (L) 13.0 - 17.7 g/dL Final   07/05/2023 11.4 (L) 13.0 - 17.7 g/dL Final     Platelets   Date Value Ref Range Status   07/07/2023 177 140 - 450 10*3/mm3 Final   07/06/2023 219 140 - 450 10*3/mm3 Final   07/05/2023 234 140 - 450 10*3/mm3 Final       Assessment & Plan   [unfilled]      King George     *Obstructive uropathy  CT abdomen from 7/5/2023 " reviewed and shows hydroureteronephrosis.  Consult urology.  Could be secondary to compression from increase in size of the retroperitoneal lymphadenopathy.    *Metastatic melanoma  He received 1 cycle of ipilimumab and nivolumab.,  This was on 6/14/2023  Subsequently ipilimumab discontinued due to worsening renal function and rash  He received nivolumab only on 7/6/2023  CT of the abdomen from 7/5/2023 shows increase in size of the retroperitoneal lymphadenopathy  Too soon to assess treatment response    *Left lower extremity pain  History of the hip without any evidence of abnormalities  PET/CT 12/20/2023 does not show any focal lesions in the left hip  Could be secondary to significant lymphedema.  Patient reports improvement in pain since admission to the hospital  Continue norco    *Incidental bilateral PE and thrombus in the left external iliac vein due to compression by the bulky nodes  Continue heparin    *Anemia  Hemoglobin at baseline  Continue to monitor    Recommendations  Consult Urology  Continue [place] for pain management                   Electronically signed by Karen Conti MD at 07/07/23 7242

## 2023-07-10 NOTE — PLAN OF CARE
Goal Outcome Evaluation:  Plan of Care Reviewed With: patient        Progress: no change  Outcome Evaluation: VSS. Patient is alert and oriented x 4. Heparin gtt infusing. Cysto with bilateral stent placement today with Dr. Montelongo. F/C draining cherry red urine. Orders to irrigate every 4 hours. Cath is leaking some. Continue to monitor.

## 2023-07-10 NOTE — OP NOTE
Operative Report    [facility]    Patient: King George  Age:      63 y.o.  :     1960  Sex:      male    Medical Record:  [id number]    Date of Operation/Procedure:  7/10/2023    Pre-operative Diagnosis:  Bilateral hydronephrosis    Post-operative Diagnosis:  Same    Surgeon:  Jayda    Name of Operation/Procedure:    Cystoscopy  Bilateral retrograde pyelogram  Bilateral ureteral stent placement    Findings/Complications:  No complications.    Extremely distorted trigone, particularly on the right. It appeared that there was direct invasion of tumor into the bladder lumen.     Bilateral retrograde pyelograms demonstrated moderate bilateral hydronephrosis to the level of the distal ureter. Tortuous ureters bilaterally. No filling defects.    Description of procedure: The patient was taken to the OR and placed under GA in lithotomy position.  Prepped and draped in sterile fashion.  The 21 Fr cystoscope was introduced and pancystoscopy was performed with findings as above.  A Sensor guidewire was passed through the left ureteral orifice into the kidney without difficulty. A Upperco catheter was inserted, and a retrograde pyelogram was performed with findings as above. A 4.8 Fr x 30 cm JJ stent was passed into the kidney into the proper anatomic position. This procedure was then repeated on the right side. Of note, it was quite difficult to identify the right ureteral orifice.    Estimated Blood Loss: 100ml    Specimens: * No orders in the log *    Fluids/Drains: suspected bilateral ureteral stents, 16 Fr nathan catheter    Vic Montelongo Jr., MD  7/10/2023  17:31 EDT

## 2023-07-10 NOTE — PROGRESS NOTES
"   LOS: 0 days   Patient Care Team:  Mohini Cortes MD as PCP - General (Internal Medicine)  Bernardo Azar MD as Referring Physician (Hospitalist)  Brodie Rocha MD as Consulting Physician (Hematology and Oncology)      Subjective   Interval History: Denies pain    Objective     ROS   12 POINT NEG ROS PERTINENT IN HPI      Vital Signs  Temp:  [97.2 °F (36.2 °C)-98.2 °F (36.8 °C)] 97.2 °F (36.2 °C)  Heart Rate:  [71-77] 71  Resp:  [16] 16  BP: (128-144)/(72-76) 137/76      Intake/Output Summary (Last 24 hours) at 7/10/2023 0715  Last data filed at 7/9/2023 1815  Gross per 24 hour   Intake 2312 ml   Output 200 ml   Net 2112 ml       Flowsheet Rows      Flowsheet Row First Filed Value   Admission Height 182.9 cm (72\") Documented at 07/07/2023 1040   Admission Weight 102 kg (225 lb) Documented at 07/07/2023 1040            Physical Exam:     General appearance: alert, cooperative, oriented        Results Review:     I reviewed the patient's new clinical results.  Lab Results (all)       Procedure Component Value Units Date/Time    aPTT [[id number]]  (Abnormal) Collected: 07/10/23 0416    Specimen: Blood Updated: 07/10/23 0513     PTT 67.4 seconds     Comprehensive Metabolic Panel [[id number]]  (Abnormal) Collected: 07/10/23 0416    Specimen: Blood Updated: 07/10/23 0459     Glucose 100 mg/dL      BUN 17 mg/dL      Creatinine 1.73 mg/dL      Sodium 136 mmol/L      Potassium 3.9 mmol/L      Chloride 105 mmol/L      CO2 21.1 mmol/L      Calcium 8.4 mg/dL      Total Protein 6.8 g/dL      Albumin 2.8 g/dL      ALT (SGPT) 14 U/L      AST (SGOT) 9 U/L      Alkaline Phosphatase 61 U/L      Total Bilirubin 0.4 mg/dL      Globulin 4.0 gm/dL      A/G Ratio 0.7 g/dL      BUN/Creatinine Ratio 9.8     Anion Gap 9.9 mmol/L      eGFR 44.4 mL/min/1.73     Narrative:      GFR Normal >60  Chronic Kidney Disease <60  Kidney Failure <15      CBC & Differential [[id number]]  (Abnormal) Collected: 07/10/23 0416    Specimen: Blood " Updated: 07/10/23 0439    Narrative:      The following orders were created for panel order CBC & Differential.  Procedure                               Abnormality         Status                     ---------                               -----------         ------                     CBC Auto Differential[[id number]]        Abnormal            Final result                 Please view results for these tests on the individual orders.    CBC Auto Differential [[id number]]  (Abnormal) Collected: 07/10/23 0416    Specimen: Blood Updated: 07/10/23 0439     WBC 6.08 10*3/mm3      RBC 3.59 10*6/mm3      Hemoglobin 9.8 g/dL      Hematocrit 29.7 %      MCV 82.7 fL      MCH 27.3 pg      MCHC 33.0 g/dL      RDW 15.7 %      RDW-SD 46.9 fl      MPV 8.4 fL      Platelets 172 10*3/mm3      Neutrophil % 69.9 %      Lymphocyte % 15.1 %      Monocyte % 8.9 %      Eosinophil % 4.9 %      Basophil % 0.7 %      Immature Grans % 0.5 %      Neutrophils, Absolute 4.25 10*3/mm3      Lymphocytes, Absolute 0.92 10*3/mm3      Monocytes, Absolute 0.54 10*3/mm3      Eosinophils, Absolute 0.30 10*3/mm3      Basophils, Absolute 0.04 10*3/mm3      Immature Grans, Absolute 0.03 10*3/mm3      nRBC 0.0 /100 WBC     Eosinophil Smear - Urine, Urine, Clean Catch [[id number]]  (Normal) Collected: 07/09/23 1405    Specimen: Urine, Clean Catch Updated: 07/09/23 1558     Eosinophil Smear 0 % EOS/100 Cells     Protein / Creatinine Ratio, Urine - Urine, Clean Catch [[id number]]  (Abnormal) Collected: 07/09/23 1405    Specimen: Urine, Clean Catch Updated: 07/09/23 1449     Protein/Creatinine Ratio, Urine 215.2 mg/G Crea      Creatinine, Urine 80.4 mg/dL      Total Protein, Urine 17.3 mg/dL     Sodium, Urine, Random - Urine, Clean Catch [[id number]] Collected: 07/09/23 1407    Specimen: Urine, Clean Catch Updated: 07/09/23 1437     Sodium, Urine 148 mmol/L     Narrative:      Reference intervals for random urine have not been established.  Clinical usage is  dependent upon physician's interpretation in combination with other laboratory tests.       Chloride, Urine, Random - Urine, Clean Catch [[id number]] Collected: 07/09/23 1407    Specimen: Urine, Clean Catch Updated: 07/09/23 1437     Chloride, Urine 144 mmol/L     Narrative:      Reference intervals for random urine have not been established.  Clinical usage is dependent upon physician's interpretation in combination with other laboratory tests.       C3+C4+ANITHA+RA [[id number]] Collected: 07/09/23 1303    Specimen: Blood Updated: 07/09/23 1316    Basic Metabolic Panel [[id number]]  (Abnormal) Collected: 07/09/23 0902    Specimen: Blood Updated: 07/09/23 0954     Glucose 127 mg/dL      BUN 19 mg/dL      Creatinine 1.82 mg/dL      Sodium 132 mmol/L      Potassium 3.9 mmol/L      Chloride 102 mmol/L      CO2 21.0 mmol/L      Calcium 8.2 mg/dL      BUN/Creatinine Ratio 10.4     Anion Gap 9.0 mmol/L      eGFR 41.7 mL/min/1.73     Narrative:      GFR Normal >60  Chronic Kidney Disease <60  Kidney Failure <15      CBC & Differential [[id number]]  (Abnormal) Collected: 07/09/23 0608    Specimen: Blood Updated: 07/09/23 0649    Narrative:      The following orders were created for panel order CBC & Differential.  Procedure                               Abnormality         Status                     ---------                               -----------         ------                     CBC Auto Differential[[id number]]        Abnormal            Final result                 Please view results for these tests on the individual orders.    CBC Auto Differential [[id number]]  (Abnormal) Collected: 07/09/23 0608    Specimen: Blood Updated: 07/09/23 0649     WBC 7.73 10*3/mm3      RBC 3.67 10*6/mm3      Hemoglobin 10.0 g/dL      Hematocrit 30.7 %      MCV 83.7 fL      MCH 27.2 pg      MCHC 32.6 g/dL      RDW 15.8 %      RDW-SD 48.5 fl      MPV 9.1 fL      Platelets 189 10*3/mm3      Neutrophil % 71.0 %      Lymphocyte % 15.3 %       Monocyte % 8.9 %      Eosinophil % 3.8 %      Basophil % 0.6 %      Immature Grans % 0.4 %      Neutrophils, Absolute 5.49 10*3/mm3      Lymphocytes, Absolute 1.18 10*3/mm3      Monocytes, Absolute 0.69 10*3/mm3      Eosinophils, Absolute 0.29 10*3/mm3      Basophils, Absolute 0.05 10*3/mm3      Immature Grans, Absolute 0.03 10*3/mm3      nRBC 0.0 /100 WBC     aPTT [[id number]]  (Abnormal) Collected: 07/09/23 0608    Specimen: Blood Updated: 07/09/23 0645     PTT 74.0 seconds     aPTT [[id number]]  (Abnormal) Collected: 07/08/23 0612    Specimen: Blood Updated: 07/08/23 0703     PTT 93.6 seconds     Comprehensive Metabolic Panel [[id number]]  (Abnormal) Collected: 07/08/23 0612    Specimen: Blood Updated: 07/08/23 0655     Glucose 107 mg/dL      BUN 22 mg/dL      Creatinine 1.68 mg/dL      Sodium 135 mmol/L      Potassium 4.2 mmol/L      Chloride 105 mmol/L      CO2 22.5 mmol/L      Calcium 8.5 mg/dL      Total Protein 7.0 g/dL      Albumin 3.0 g/dL      ALT (SGPT) 12 U/L      AST (SGOT) 14 U/L      Alkaline Phosphatase 62 U/L      Total Bilirubin 0.6 mg/dL      Globulin 4.0 gm/dL      A/G Ratio 0.8 g/dL      BUN/Creatinine Ratio 13.1     Anion Gap 7.5 mmol/L      eGFR 45.9 mL/min/1.73     Narrative:      GFR Normal >60  Chronic Kidney Disease <60  Kidney Failure <15      CBC & Differential [[id number]]  (Abnormal) Collected: 07/08/23 0612    Specimen: Blood Updated: 07/08/23 0636    Narrative:      The following orders were created for panel order CBC & Differential.  Procedure                               Abnormality         Status                     ---------                               -----------         ------                     CBC Auto Differential[[id number]]        Abnormal            Final result                 Please view results for these tests on the individual orders.    CBC Auto Differential [[id number]]  (Abnormal) Collected: 07/08/23 0612    Specimen: Blood Updated: 07/08/23 0636     WBC 8.02  10*3/mm3      RBC 3.70 10*6/mm3      Hemoglobin 9.9 g/dL      Hematocrit 30.5 %      MCV 82.4 fL      MCH 26.8 pg      MCHC 32.5 g/dL      RDW 15.5 %      RDW-SD 46.2 fl      MPV 8.8 fL      Platelets 181 10*3/mm3      Neutrophil % 73.9 %      Lymphocyte % 14.5 %      Monocyte % 8.4 %      Eosinophil % 2.4 %      Basophil % 0.6 %      Immature Grans % 0.2 %      Neutrophils, Absolute 5.93 10*3/mm3      Lymphocytes, Absolute 1.16 10*3/mm3      Monocytes, Absolute 0.67 10*3/mm3      Eosinophils, Absolute 0.19 10*3/mm3      Basophils, Absolute 0.05 10*3/mm3      Immature Grans, Absolute 0.02 10*3/mm3      nRBC 0.0 /100 WBC     aPTT [[id number]]  (Abnormal) Collected: 07/07/23 2228    Specimen: Blood from Arm, Left Updated: 07/07/23 2326     PTT 44.4 seconds     Protime-INR [[id number]]  (Abnormal) Collected: 07/07/23 1531    Specimen: Blood Updated: 07/07/23 1610     Protime 16.6 Seconds      INR 1.32    aPTT [[id number]]  (Normal) Collected: 07/07/23 1531    Specimen: Blood Updated: 07/07/23 1610     PTT 31.1 seconds     Comprehensive Metabolic Panel [[id number]]  (Abnormal) Collected: 07/07/23 1031    Specimen: Blood Updated: 07/07/23 1113     Glucose 106 mg/dL      BUN 23 mg/dL      Creatinine 1.91 mg/dL      Sodium 133 mmol/L      Potassium 4.4 mmol/L      Comment: Slight hemolysis detected by analyzer. Results may be affected.        Chloride 102 mmol/L      CO2 20.7 mmol/L      Calcium 9.0 mg/dL      Total Protein 7.6 g/dL      Albumin 3.2 g/dL      ALT (SGPT) 14 U/L      AST (SGOT) 13 U/L      Comment: Slight hemolysis detected by analyzer. Results may be affected.        Alkaline Phosphatase 64 U/L      Total Bilirubin 0.4 mg/dL      Globulin 4.4 gm/dL      A/G Ratio 0.7 g/dL      BUN/Creatinine Ratio 12.0     Anion Gap 10.3 mmol/L      eGFR 39.4 mL/min/1.73     Narrative:      GFR Normal >60  Chronic Kidney Disease <60  Kidney Failure <15      CBC & Differential [[id number]]  (Abnormal) Collected: 07/07/23 1030     Specimen: Blood Updated: 07/07/23 1052    Narrative:      The following orders were created for panel order CBC & Differential.  Procedure                               Abnormality         Status                     ---------                               -----------         ------                     CBC Auto Differential[[id number]]        Abnormal            Final result                 Please view results for these tests on the individual orders.    CBC Auto Differential [[id number]]  (Abnormal) Collected: 07/07/23 1031    Specimen: Blood Updated: 07/07/23 1052     WBC 7.44 10*3/mm3      RBC 4.15 10*6/mm3      Hemoglobin 11.3 g/dL      Hematocrit 34.7 %      MCV 83.6 fL      MCH 27.2 pg      MCHC 32.6 g/dL      RDW 15.5 %      RDW-SD 46.3 fl      MPV 8.6 fL      Platelets 177 10*3/mm3      Neutrophil % 75.3 %      Lymphocyte % 12.1 %      Monocyte % 7.4 %      Eosinophil % 3.9 %      Basophil % 0.9 %      Immature Grans % 0.4 %      Neutrophils, Absolute 5.60 10*3/mm3      Lymphocytes, Absolute 0.90 10*3/mm3      Monocytes, Absolute 0.55 10*3/mm3      Eosinophils, Absolute 0.29 10*3/mm3      Basophils, Absolute 0.07 10*3/mm3      Immature Grans, Absolute 0.03 10*3/mm3      nRBC 0.0 /100 WBC             Imaging Results (All)       Procedure Component Value Units Date/Time    US Renal Bilateral [[id number]] Collected: 07/09/23 2021     Updated: 07/10/23 0705    Narrative:      Examination: Renal sonography     HISTORY: 63-year-old male with a history of acute renal sufficiency     FINDINGS: Sonographic evaluation of the kidneys was performed.  Comparison is made to a CT of the abdomen and pelvis without contrast  dated 07/09/2023. The right kidney measures 14.3 x 6.6 x 6.7 cm. The  left kidney measures 13.0 x 7.7 x 5.7 cm. Moderate bilateral renal  pelvocaliectasis is noted. There is a suggestion of an intrapelvic area  of heterogenous echotexture in the right renal pelvis. Also, material of  variable echotexture is  seen within the left renal pelvis. Normal renal  cortical thickness and echotexture is seen in both kidneys. The urinary  bladder demonstrates an irregular thickened appearance and measures up  to 1.9 cm in thickness posteriorly.        Impression:      1.There is moderate bilateral renal pelviectasis with suspected complex  material and/or debris within the bilateral renal pelves. This may be  associated with chronic urinary stasis and/or vesicle ureteral reflux.  2. There is an irregular thickened appearance of the wall of the urinary  bladder. This may be secondary to trabeculation of the wall of the  urinary bladder due to an element of chronic bladder outlet obstruction.  However, cystitis and/or malignancy cannot be completely excluded.     This report was finalized on 7/10/2023 7:02 AM by Dr. Yo Neely M.D.       CT Abdomen Pelvis Without Contrast [[id number]] Collected: 07/09/23 1713     Updated: 07/09/23 1951    Narrative:      Examination: CT of the abdomen and pelvis without contrast     HISTORY: 63-year-old male complaint of left inguinal pain undergoing  evaluation for hydronephrosis     TECHNIQUE: Contiguous axial images were obtained through the abdomen and  pelvis without intravenous administration of nonionic contrast. Oral  contrast  was not administered. Radiation dose reduction techniques were  utilized, including automated exposure control and exposure modulation  based on body size.     COMPARISON: CT of the abdomen and pelvis with contrast, 05/28/2023     FINDINGS: The visualized portion of the lung basesare clear. The  visualized portion of the heart has a normal noncontrasted appearance .  The liver demonstrates a nodular surface contour in the left lobe at the  site of known hepatic lesions. Mild heterogeneity in the right lobe of  the liver that is nonspecific is noted. The gallbladder has a normal  appearance. The spleen measures on the order of 15.9 cm in the anterior  to posterior  dimension. On the prior examination the spleen measures  14.4 cm. The pancreas has a normal appearance. The kidneys demonstrate  moderate bilateral renal pelvocaliectasis and ureterectasis that are  symmetric. No evidence for a radiopaque genitourinary calculus is  appreciated. The urinary bladder is suboptimally distended. The urinary  bladder wall measures on the order 1 cm in thickness. Bulky adenopathy  of the pelvis and inguinal regions is noted. There is a 12.1 x 7.1 cm  conglomerate of lymph nodes in the left pelvic sidewall region that  previously measured on the order of 10.7 x 6.8 cm. There is a 7.0 x 4.9  cm left inguinal lymph node that previously measured 6.5 x 4.8 cm. There  is a 7.8 x 4.0 cm left inguinal lymph node that previously measured 6.8  x 3.9 cm. Thickening of the left inguinal skin has developed in the  interim and the thickening has a nodular appearance with the skin  measuring up to 1.3 cm in thickness. Adenopathy in the pelvis and  retroperitoneum is also reidentified and there is bulky adenopathy in  the right inguinal region. There is a 3.7 x 2.9 cm right inguinal lymph  node that previously measured 1.4 x 1.3 cm. There is a stable lytic  lesion in the right iliac crest. A stable lytic lesion in the left  aspect of the posterior element of L3. No new suspicious osseous lytic  lesions are visualized. The previously noted right inguinal hernia is no  longer appreciated.     . The adrenal glands appear normal. The spleen is enlarged and measures  on the order of 15.9 cm compared to 14.4 cm previously. The bowel has a  normal appearance.          Impression:      1.There has been interval enlargement of bulky inguinal adenopathy and  pelvic adenopathy as described. There has also been interval development  of bilateral symmetric hydronephrosis and hydroureter without a visible  genitourinary calculus. This may be secondary to distal obstruction of  the ureters by the pelvic adenopathy or  possibly due to vesicoureteral  reflux.  2. Limited evaluation of the liver, but there may be a new lesion in the  right lobe of the liver. The known liver lesions are poorly visualized  on the current examination.  3. Interval increase in splenomegaly as described.  4. Stable osseous lytic lesions.     This report was finalized on 7/9/2023 7:47 PM by Dr. Yo Neely M.D.       XR Hip With or Without Pelvis 2 - 3 View Left [[id number]] Collected: 07/07/23 1052     Updated: 07/07/23 1057    Narrative:      XR HIP W OR WO PELVIS 2-3 VIEW LEFT-     INDICATIONS: Pain     TECHNIQUE: Frontal view of the pelvis, 2 views of the left hip     COMPARISON: None available     FINDINGS:     No acute fracture, erosion, or dislocation is identified. Prominent  degenerative changes of the hips are noted, right more than left,  including joint space narrowing, marginal spurring, subcortical  eburnation. Follow-up/further evaluation can be obtained as indications  persist.       Impression:         As described.     This report was finalized on 7/7/2023 10:54 AM by Dr. Alejandro Yu M.D.               Medication Review:   Current Facility-Administered Medications   Medication Dose Route Frequency Provider Last Rate Last Admin    acetaminophen (TYLENOL) tablet 650 mg  650 mg Oral Q4H PRN Sudha Douglas APRN   650 mg at 07/08/23 0951    Or    acetaminophen (TYLENOL) 160 MG/5ML solution 650 mg  650 mg Oral Q4H PRN Sudha Douglas APRN        Or    acetaminophen (TYLENOL) suppository 650 mg  650 mg Rectal Q4H PRN Sudha Douglas APRN        sennosides-docusate (PERICOLACE) 8.6-50 MG per tablet 2 tablet  2 tablet Oral BID Sudha Douglas APRN   2 tablet at 07/09/23 2101    And    polyethylene glycol (MIRALAX) packet 17 g  17 g Oral Daily PRN Sudha Douglas APRN        And    bisacodyl (DULCOLAX) EC tablet 5 mg  5 mg Oral Daily PRN Sudha Douglas APRN        And    bisacodyl (DULCOLAX) suppository 10 mg  10 mg Rectal Daily PRN  Sudha Douglas APRN        famotidine (PEPCID) tablet 20 mg  20 mg Oral BID AC Zara Petit MD   20 mg at 07/09/23 1819    folic acid (FOLVITE) tablet 1 mg  1 mg Oral Daily Sudha Douglas APRN   1 mg at 07/09/23 0900    heparin (porcine) 5000 UNIT/ML injection 4,100-8,200 Units  40-80 Units/kg Intravenous Q6H PRN Sudha Douglas APRN   4,100 Units at 07/10/23 0627    heparin 79826 units/250 mL (100 units/mL) in 0.45 % NaCl infusion  18 Units/kg/hr Intravenous Titrated Sudha Douglas APRN 24.4 mL/hr at 07/10/23 0630 24 Units/kg/hr at 07/10/23 0630    HYDROcodone-acetaminophen (NORCO) 5-325 MG per tablet 1 tablet  1 tablet Oral Q4H PRN Sudha Douglas APRN        ondansetron (ZOFRAN) injection 4 mg  4 mg Intravenous Q6H PRN Sudha Douglas APRN        predniSONE (DELTASONE) tablet 10 mg  10 mg Oral Daily Sudha Douglas APRN   10 mg at 07/09/23 0900    sodium chloride 0.9 % flush 10 mL  10 mL Intravenous PRN Perfecto Rivera MD        sodium chloride 0.9 % flush 10 mL  10 mL Intravenous Q12H Sudha Douglas APRN   10 mL at 07/09/23 2102    sodium chloride 0.9 % flush 10 mL  10 mL Intravenous PRN Sudha Douglas APRN        sodium chloride 0.9 % infusion 40 mL  40 mL Intravenous PRN Sudha Douglas APRN        sodium chloride 0.9 % infusion  75 mL/hr Intravenous Continuous Sudha Douglas APRN 75 mL/hr at 07/09/23 1815 75 mL/hr at 07/09/23 1815    vitamin B-12 (CYANOCOBALAMIN) tablet 1,000 mcg  1,000 mcg Oral Daily Sudha Douglas APRN   1,000 mcg at 07/09/23 0900       Current Facility-Administered Medications:     acetaminophen (TYLENOL) tablet 650 mg, 650 mg, Oral, Q4H PRN, 650 mg at 07/08/23 0951 **OR** acetaminophen (TYLENOL) 160 MG/5ML solution 650 mg, 650 mg, Oral, Q4H PRN **OR** acetaminophen (TYLENOL) suppository 650 mg, 650 mg, Rectal, Q4H PRN, Sudha Douglas APRN    sennosides-docusate (PERICOLACE) 8.6-50 MG per tablet 2 tablet, 2 tablet, Oral, BID, 2 tablet at 07/09/23 2101 **AND** polyethylene  glycol (MIRALAX) packet 17 g, 17 g, Oral, Daily PRN **AND** bisacodyl (DULCOLAX) EC tablet 5 mg, 5 mg, Oral, Daily PRN **AND** bisacodyl (DULCOLAX) suppository 10 mg, 10 mg, Rectal, Daily PRN, Sudha oDuglas APRN    famotidine (PEPCID) tablet 20 mg, 20 mg, Oral, BID AC, Zara Petit MD, 20 mg at 07/09/23 1819    folic acid (FOLVITE) tablet 1 mg, 1 mg, Oral, Daily, Sudha Douglas APRN, 1 mg at 07/09/23 0900    heparin (porcine) 5000 UNIT/ML injection 4,100-8,200 Units, 40-80 Units/kg, Intravenous, Q6H PRN, Sudha Douglas APRN, 4,100 Units at 07/10/23 0627    heparin 22200 units/250 mL (100 units/mL) in 0.45 % NaCl infusion, 18 Units/kg/hr, Intravenous, Titrated, Sudha Douglas APRN, Last Rate: 24.4 mL/hr at 07/10/23 0630, 24 Units/kg/hr at 07/10/23 0630    HYDROcodone-acetaminophen (NORCO) 5-325 MG per tablet 1 tablet, 1 tablet, Oral, Q4H PRN, Sudha Douglas APRN    ondansetron (ZOFRAN) injection 4 mg, 4 mg, Intravenous, Q6H PRN, Sudha Douglas APRN    predniSONE (DELTASONE) tablet 10 mg, 10 mg, Oral, Daily, Sudha Douglas APRN, 10 mg at 07/09/23 0900    [COMPLETED] Insert Peripheral IV, , , Once **AND** sodium chloride 0.9 % flush 10 mL, 10 mL, Intravenous, PRN, Perfecto Rivera MD    sodium chloride 0.9 % flush 10 mL, 10 mL, Intravenous, Q12H, Sudha Douglas APRN, 10 mL at 07/09/23 2102    sodium chloride 0.9 % flush 10 mL, 10 mL, Intravenous, PRN, Sudha Douglas APRN    sodium chloride 0.9 % infusion 40 mL, 40 mL, Intravenous, PRN, Sudha Douglas APRN    sodium chloride 0.9 % infusion, 75 mL/hr, Intravenous, Continuous, Sudha Douglas APRN, Last Rate: 75 mL/hr at 07/09/23 1815, 75 mL/hr at 07/09/23 1815    vitamin B-12 (CYANOCOBALAMIN) tablet 1,000 mcg, 1,000 mcg, Oral, Daily, Sudha Douglas APRN, 1,000 mcg at 07/09/23 0900  Medications Discontinued During This Encounter   Medication Reason    dexamethasone 0.5 MG/5ML solution *Therapy completed    pantoprazole (PROTONIX) EC tablet 40 mg         Assessment & Plan         Inguinal pain, left    Left groin mass    Abdominal lymphadenopathy    Metastatic melanoma    Metastasis to brain    LENIN (acute kidney injury)    Personal history of venous thrombosis and embolism    Hydroureteronephrosis        Plan   - recommended cystoscopy and bilateral stent placement. We discussed the benefits and risks of the procedure, particularly the risk of discomfort related to the stents, and he wishes to proceed.    Vic Montelongo Jr., MD  07/10/23  07:15 EDT

## 2023-07-11 LAB
ALBUMIN SERPL-MCNC: 2.8 G/DL (ref 3.5–5.2)
ALBUMIN/GLOB SERPL: 0.7 G/DL
ALP SERPL-CCNC: 63 U/L (ref 39–117)
ALT SERPL W P-5'-P-CCNC: 13 U/L (ref 1–41)
ANA SER QL: NEGATIVE
ANION GAP SERPL CALCULATED.3IONS-SCNC: 9 MMOL/L (ref 5–15)
APTT PPP: 116 SECONDS (ref 22.7–35.4)
APTT PPP: 26.1 SECONDS (ref 22.7–35.4)
AST SERPL-CCNC: 13 U/L (ref 1–40)
BASOPHILS # BLD AUTO: 0.03 10*3/MM3 (ref 0–0.2)
BASOPHILS NFR BLD AUTO: 0.4 % (ref 0–1.5)
BILIRUB SERPL-MCNC: 0.3 MG/DL (ref 0–1.2)
BUN SERPL-MCNC: 14 MG/DL (ref 8–23)
BUN/CREAT SERPL: 13.2 (ref 7–25)
C3 SERPL-MCNC: 122 MG/DL (ref 82–167)
C4 SERPL-MCNC: 25 MG/DL (ref 12–38)
CALCIUM SPEC-SCNC: 8.5 MG/DL (ref 8.6–10.5)
CHLORIDE SERPL-SCNC: 105 MMOL/L (ref 98–107)
CO2 SERPL-SCNC: 22 MMOL/L (ref 22–29)
CREAT SERPL-MCNC: 1.06 MG/DL (ref 0.76–1.27)
DEPRECATED RDW RBC AUTO: 46.8 FL (ref 37–54)
EGFRCR SERPLBLD CKD-EPI 2021: 79.8 ML/MIN/1.73
EOSINOPHIL # BLD AUTO: 0.35 10*3/MM3 (ref 0–0.4)
EOSINOPHIL NFR BLD AUTO: 4.3 % (ref 0.3–6.2)
ERYTHROCYTE [DISTWIDTH] IN BLOOD BY AUTOMATED COUNT: 15.8 % (ref 12.3–15.4)
GLOBULIN UR ELPH-MCNC: 4.1 GM/DL
GLUCOSE SERPL-MCNC: 132 MG/DL (ref 65–99)
HCT VFR BLD AUTO: 30.9 % (ref 37.5–51)
HGB BLD-MCNC: 10.3 G/DL (ref 13–17.7)
IMM GRANULOCYTES # BLD AUTO: 0.03 10*3/MM3 (ref 0–0.05)
IMM GRANULOCYTES NFR BLD AUTO: 0.4 % (ref 0–0.5)
LYMPHOCYTES # BLD AUTO: 0.52 10*3/MM3 (ref 0.7–3.1)
LYMPHOCYTES NFR BLD AUTO: 6.4 % (ref 19.6–45.3)
MCH RBC QN AUTO: 27.6 PG (ref 26.6–33)
MCHC RBC AUTO-ENTMCNC: 33.3 G/DL (ref 31.5–35.7)
MCV RBC AUTO: 82.8 FL (ref 79–97)
MONOCYTES # BLD AUTO: 0.24 10*3/MM3 (ref 0.1–0.9)
MONOCYTES NFR BLD AUTO: 3 % (ref 5–12)
NEUTROPHILS NFR BLD AUTO: 6.9 10*3/MM3 (ref 1.7–7)
NEUTROPHILS NFR BLD AUTO: 85.5 % (ref 42.7–76)
NRBC BLD AUTO-RTO: 0 /100 WBC (ref 0–0.2)
PLATELET # BLD AUTO: 208 10*3/MM3 (ref 140–450)
PMV BLD AUTO: 9.1 FL (ref 6–12)
POTASSIUM SERPL-SCNC: 3.9 MMOL/L (ref 3.5–5.2)
PROT SERPL-MCNC: 6.9 G/DL (ref 6–8.5)
RBC # BLD AUTO: 3.73 10*6/MM3 (ref 4.14–5.8)
RHEUMATOID FACT SERPL-ACNC: 16.3 IU/ML
SODIUM SERPL-SCNC: 136 MMOL/L (ref 136–145)
WBC NRBC COR # BLD: 8.07 10*3/MM3 (ref 3.4–10.8)

## 2023-07-11 PROCEDURE — 85730 THROMBOPLASTIN TIME PARTIAL: CPT | Performed by: HOSPITALIST

## 2023-07-11 PROCEDURE — 85025 COMPLETE CBC W/AUTO DIFF WBC: CPT | Performed by: NURSE PRACTITIONER

## 2023-07-11 PROCEDURE — 85730 THROMBOPLASTIN TIME PARTIAL: CPT | Performed by: UROLOGY

## 2023-07-11 PROCEDURE — 99233 SBSQ HOSP IP/OBS HIGH 50: CPT | Performed by: INTERNAL MEDICINE

## 2023-07-11 PROCEDURE — 80053 COMPREHEN METABOLIC PANEL: CPT | Performed by: UROLOGY

## 2023-07-11 PROCEDURE — 63710000001 PREDNISONE PER 1 MG: Performed by: INTERNAL MEDICINE

## 2023-07-11 PROCEDURE — 63710000001 PREDNISONE PER 1 MG: Performed by: UROLOGY

## 2023-07-11 PROCEDURE — 85730 THROMBOPLASTIN TIME PARTIAL: CPT | Performed by: INTERNAL MEDICINE

## 2023-07-11 PROCEDURE — 25010000002 HEPARIN (PORCINE) 25000-0.45 UT/250ML-% SOLUTION: Performed by: UROLOGY

## 2023-07-11 RX ORDER — PREDNISONE 20 MG/1
60 TABLET ORAL DAILY
Status: DISCONTINUED | OUTPATIENT
Start: 2023-07-11 | End: 2023-07-14 | Stop reason: HOSPADM

## 2023-07-11 RX ADMIN — FAMOTIDINE 20 MG: 20 TABLET, FILM COATED ORAL at 06:46

## 2023-07-11 RX ADMIN — Medication 1000 MCG: at 08:55

## 2023-07-11 RX ADMIN — Medication 10 ML: at 22:55

## 2023-07-11 RX ADMIN — Medication 10 ML: at 08:56

## 2023-07-11 RX ADMIN — SODIUM CHLORIDE 75 ML/HR: 9 INJECTION, SOLUTION INTRAVENOUS at 05:34

## 2023-07-11 RX ADMIN — FOLIC ACID 1 MG: 1 TABLET ORAL at 08:55

## 2023-07-11 RX ADMIN — FAMOTIDINE 20 MG: 20 TABLET, FILM COATED ORAL at 18:01

## 2023-07-11 RX ADMIN — PREDNISONE 50 MG: 20 TABLET ORAL at 10:12

## 2023-07-11 RX ADMIN — SENNOSIDES AND DOCUSATE SODIUM 1 TABLET: 50; 8.6 TABLET ORAL at 08:55

## 2023-07-11 RX ADMIN — PREDNISONE 10 MG: 20 TABLET ORAL at 08:50

## 2023-07-11 RX ADMIN — SENNOSIDES AND DOCUSATE SODIUM 2 TABLET: 50; 8.6 TABLET ORAL at 22:54

## 2023-07-11 RX ADMIN — HEPARIN SODIUM 18 UNITS/KG/HR: 10000 INJECTION, SOLUTION INTRAVENOUS at 19:26

## 2023-07-11 NOTE — PLAN OF CARE
Goal Outcome Evaluation:  AOX4. VSS.  Manual irrigation of nathan catheter completed Q4H, last done at 1800.  Heparin gtt on hold x 6 hours today, resumed at 1645.  Next aPTT due at 2245.  Jobst stocking in place to E.

## 2023-07-11 NOTE — PROGRESS NOTES
[facility] Progress Note      Patient Name: King George  : 1960  MRN: [id number]  Primary Care Physician:  Mohini Cortes MD  Date of admission: 2023    Subjective     Interval History:     Seen and examined.  Had stent placed yesterday.  Bernstein catheter in place.  Alert oriented and answers all question properly.    Review of Systems:   As noted above    Objective     Vitals:   Temp:  [97.1 °F (36.2 °C)-98.4 °F (36.9 °C)] 97.3 °F (36.3 °C)  Heart Rate:  [66-85] 85  Resp:  [16] 16  BP: (104-157)/(59-89) 110/60  Flow (L/min):  [2-4] 2    Intake/Output Summary (Last 24 hours) at 2023 0816  Last data filed at 2023 0534  Gross per 24 hour   Intake 1355 ml   Output 3200 ml   Net -1845 ml       Physical Exam:    General Appearance: alert, oriented x 3, no acute distress   Skin: warm and dry  HEENT: oral mucosa normal, nonicteric sclera  Neck: supple, no JVD  Lungs: CTA  Heart: RRR, normal S1 and S2  Abdomen: soft, nontender, nondistended  : no palpable bladder  Extremities: no edema, cyanosis or clubbing  Neuro: normal speech and mental status     Scheduled Meds:     famotidine, 20 mg, Oral, BID AC  folic acid, 1 mg, Oral, Daily  predniSONE, 10 mg, Oral, Daily  senna-docusate sodium, 2 tablet, Oral, BID  sodium chloride, 10 mL, Intravenous, Q12H  vitamin B-12, 1,000 mcg, Oral, Daily      IV Meds:   heparin, 18 Units/kg/hr, Last Rate: Stopped (23)  lactated ringers, 9 mL/hr, Last Rate: 125 mL/hr (07/10/23 1623)  sodium chloride, 75 mL/hr, Last Rate: 75 mL/hr (23 0534)        Results Reviewed:   I have personally reviewed the results from the time of this admission to 2023 08:16 EDT     Results from last 7 days   Lab Units 23  0633 07/10/23  0416 23  0902 23  0612   SODIUM mmol/L 136 136 132* 135*   POTASSIUM mmol/L 3.9 3.9 3.9 4.2   CHLORIDE mmol/L 105 105 102 105   CO2 mmol/L 22.0 21.1* 21.0* 22.5   BUN mg/dL 14 17 19 22    CREATININE mg/dL 1.06 1.73* 1.82* 1.68*   CALCIUM mg/dL 8.5* 8.4* 8.2* 8.5*   BILIRUBIN mg/dL 0.3 0.4  --  0.6   ALK PHOS U/L 63 61  --  62   ALT (SGPT) U/L 13 14  --  12   AST (SGOT) U/L 13 9  --  14   GLUCOSE mg/dL 132* 100* 127* 107*     Estimated Creatinine Clearance: 90.5 mL/min (by C-G formula based on SCr of 1.06 mg/dL).          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  Eosinophil is negative but there  Metastatic melanoma S/P  1 cycle of ipilimumab and nivolumab  on 6/14/2023 and second dose of Opdivo on July 6, 2023  Bilateral hydronephrosis that is likely due to obstructive uropathy due to lymphadenopathy      PLAN:    We will discontinue IV fluid.  Patient is status post stent placement  Surveillance labs    Thank you for involving us in the care of King George.  Please feel free to call with any questions.    Zara Petit MD  07/11/23  08:16 EDT    [facility]  [phone]

## 2023-07-11 NOTE — PROGRESS NOTES
[facility]     LOS: 1 day     Subjective:    CC:Leg Pain (DVT r/o to Left leg, swelling to entire left leg up into groin. +3 pitting Edema throughout leg into groin. PE to left lung 3 weeks ago, Eliquis BID. )    DIET:  Diet Order   Procedures    Diet: Regular/House Diet; Texture: Regular Texture (IDDSI 7); Fluid Consistency: Thin (IDDSI 0)   Left inguinal pain is much improved  Skin has become more red on the arms legs chest and some onto the face    Objective:    Vital Signs:  Temp:  [97 °F (36.1 °C)-98.4 °F (36.9 °C)] 97 °F (36.1 °C)  Heart Rate:  [66-89] 89  Resp:  [16] 16  BP: (101-157)/(54-89) 101/54    SpO2:  [97 %-100 %] 97 %  on  Flow (L/min):  [2-4] 2;   Device (Oxygen Therapy): room air  Body mass index is 30.52 kg/m².    Physical Exam  Constitutional:       Appearance: Normal appearance.   HENT:      Head: Normocephalic and atraumatic.   Cardiovascular:      Rate and Rhythm: Normal rate and regular rhythm.      Heart sounds: No murmur heard.    No friction rub.   Pulmonary:      Effort: Pulmonary effort is normal.      Breath sounds: Normal breath sounds.   Abdominal:      General: Bowel sounds are normal. There is no distension.      Palpations: Abdomen is soft.      Tenderness: There is no abdominal tenderness.   Musculoskeletal:      Left lower leg: Edema present.   Skin:     General: Skin is warm and dry.      Comments: Erythema of skin   Neurological:      General: No focal deficit present.      Mental Status: He is alert and oriented to person, place, and time.   Psychiatric:         Mood and Affect: Mood normal.         Behavior: Behavior normal.       Results Review:    Glucose   Date Value Ref Range Status   07/11/2023 132 (H) 65 - 99 mg/dL Final   07/10/2023 100 (H) 65 - 99 mg/dL Final   07/09/2023 127 (H) 65 - 99 mg/dL Final     Results from last 7 days   Lab Units 07/11/23  0633   WBC 10*3/mm3 8.07   HEMOGLOBIN g/dL 10.3*   HEMATOCRIT % 30.9*   PLATELETS 10*3/mm3  208       Results from last 7 days   Lab Units 07/11/23  0633   SODIUM mmol/L 136   POTASSIUM mmol/L 3.9   CHLORIDE mmol/L 105   CO2 mmol/L 22.0   BUN mg/dL 14   CREATININE mg/dL 1.06   CALCIUM mg/dL 8.5*   BILIRUBIN mg/dL 0.3   ALK PHOS U/L 63   ALT (SGPT) U/L 13   AST (SGOT) U/L 13   GLUCOSE mg/dL 132*       Results from last 7 days   Lab Units 07/11/23  0633 07/07/23  2228 07/07/23  1531   INR   --   --  1.32*   APTT seconds 116.0*   < > 31.1    < > = values in this interval not displayed.               Cultures:  No results found for: BLOODCX, URINECX, WOUNDCX, MRSACX, RESPCX, STOOLCX    I have reviewed daily medications and changes in CPOE    Scheduled meds  famotidine, 20 mg, Oral, BID AC  folic acid, 1 mg, Oral, Daily  predniSONE, 60 mg, Oral, Daily  senna-docusate sodium, 2 tablet, Oral, BID  sodium chloride, 10 mL, Intravenous, Q12H  vitamin B-12, 1,000 mcg, Oral, Daily        heparin, 18 Units/kg/hr, Last Rate: Stopped (07/11/23 1600)  lactated ringers, 9 mL/hr, Last Rate: 125 mL/hr (07/10/23 1623)      PRN meds    acetaminophen **OR** acetaminophen **OR** acetaminophen    senna-docusate sodium **AND** polyethylene glycol **AND** bisacodyl **AND** bisacodyl    heparin (porcine)    HYDROcodone-acetaminophen    ondansetron    [COMPLETED] Insert Peripheral IV **AND** sodium chloride    sodium chloride    sodium chloride        Hydroureteronephrosis    Left groin mass    Abdominal lymphadenopathy    Metastatic melanoma    Metastasis to brain    LENIN (acute kidney injury)    Personal history of venous thrombosis and embolism    Inguinal pain, left    Acute pain of left lower extremity        Assessment/Plan:  Mr. George is a 63 year old male who presented to the hospital with complaints of left leg pain and difficulty walking. He was recently found to have extensive metastatic melanoma back in May of this year. He has had liver/lung/bone/brain involvement and currently receiving immunotherapy with Dr. Rocha with  oncology. He has had some notable LENIN recently with CT A/P on 7/5 showing some new bilateral hydroureteronephrosis. He was due to see urology today but had worsening left groin/hip pain after receiving immunotherapy yesterday making it difficult to go to this appointment. He is being admitted for further care at this time.     Inguinal pain, left  -In the setting of known hypermetabolic thickening of the skin in the left upper thigh/groin/lateral scrotum felt to be the primary site of malignancy. He has extensive lymphadenopathy in the abdomen worse on the left with extensive left lower extremity DVT on prior PET/CT.  -X-ray of hip without acute findings.  -Pain in hip has resolved  -Pain medication as needed     LENIN  Hydroureteronephrosis  -Creatinine worsening since last admission with CT findings of new hydroureteronephrosis.  -s/p stents  -nathan placed  -heparin gtt, some hematuria so stopped heparin for 6 hours  -seen by nephrology but renal function is resolved     Metastatic melanoma  Metastasis to brain  Abdominal lymphadenopathy  History of PE/DVT  -Hematology/oncology to see.  -Hold Eliquis and start heparin given likely needs for intervention.  -Further management per oncology.    Worsening skin rash so started on prednisone 60       Price Banuelos MD  07/11/23  12:52 EDT

## 2023-07-11 NOTE — PROGRESS NOTES
REASON FOR FOLLOWUP/CHIEF COMPLAINT:   Metastatic melanoma  Obstructive uropathy    HISTORY OF PRESENT ILLNESS:   He has no new complaints today  Pain is better.  Continues on Norco    7/10/2023  Pain much improved no rash or diarrhea  Urinating well  Currently on IV heparin pending stents later today  Creatinine slightly lower at 1.7    7/11  Generalized rash today is pruritic involving 80% of his body  No diarrhea shortness of breath  Urine bloody after stent placement with creatinine down to 1.07  We will hold heparin for 6 hours and watch-increase prednisone to 60 mg   to follow    Past Medical History, Past Surgical History, Social History, Family History have been reviewed and are without significant changes except as mentioned.    Review of Systems   Review of Systems  Review of systems as mentioned in the HPI    Medications:  The current medication list was reviewed in the EMR    ALLERGIES:  No Known Allergies           Vitals:    07/10/23 2019 07/11/23 0022 07/11/23 0516 07/11/23 0743   BP: 150/85 133/75 104/59 110/60   BP Location: Left arm Left arm Left arm Left arm   Patient Position: Lying Lying Lying Lying   Pulse: 66 75 80 85   Resp: 16 16 16 16   Temp: 98.4 °F (36.9 °C) 98 °F (36.7 °C) 98.1 °F (36.7 °C) 97.3 °F (36.3 °C)   TempSrc: Oral Oral Oral Oral   SpO2: 98% 97% 98% 100%   Weight:       Height:         Physical Exam    CONSTITUTIONAL:  Vital signs reviewed.  No distress, looks comfortable.  EYES:  Conjunctivae and lids unremarkable.  PERRLA  EARS,NOSE,MOUTH,THROAT:  Ears and nose appear unremarkable.  Lips, teeth, gums appear unremarkable.  RESPIRATORY:  Normal respiratory effort.  Lungs clear to auscultation bilaterally.  CARDIOVASCULAR:  Normal S1, S2.  No murmurs rubs or gallops.    Left lower extremity lymphedema   GASTROINTESTINAL: Abdomen appears unremarkable.  Nontender.  No hepatomegaly.  No splenomegaly.  NEURO: cranial nerves 2-12 grossly intact.  No focal deficits.  Appears  to have equal strength all 4 extremities.  MUSCULOSKELETAL:  Unremarkable digits/nails.  No cyanosis or clubbing.  SKIN:  Warm.  Generalized maculopapular rash over 70% of his body   PSYCHIATRIC:  Normal judgment and insight.  Normal mood and affect.     I have reexamined the patient and the results are consistent with the previously documented exam. Brodie Rocha MD     Risk for Readmission (LACE) Score: 15 (7/11/2023  6:00 AM)         RECENT LABS:  WBC   Date Value Ref Range Status   07/11/2023 8.07 3.40 - 10.80 10*3/mm3 Final   07/10/2023 6.08 3.40 - 10.80 10*3/mm3 Final   07/09/2023 7.73 3.40 - 10.80 10*3/mm3 Final     Hemoglobin   Date Value Ref Range Status   07/11/2023 10.3 (L) 13.0 - 17.7 g/dL Final   07/10/2023 9.8 (L) 13.0 - 17.7 g/dL Final   07/09/2023 10.0 (L) 13.0 - 17.7 g/dL Final     Platelets   Date Value Ref Range Status   07/11/2023 208 140 - 450 10*3/mm3 Final   07/10/2023 172 140 - 450 10*3/mm3 Final   07/09/2023 189 140 - 450 10*3/mm3 Final       Lab Results   Component Value Date    GLUCOSE 132 (H) 07/11/2023    BUN 14 07/11/2023    CREATININE 1.06 07/11/2023    EGFR 79.8 07/11/2023    BCR 13.2 07/11/2023    K 3.9 07/11/2023    CO2 22.0 07/11/2023    CALCIUM 8.5 (L) 07/11/2023    ALBUMIN 2.8 (L) 07/11/2023    BILITOT 0.3 07/11/2023    AST 13 07/11/2023    ALT 13 07/11/2023        CT ABD  IMPRESSION:  1.There has been interval enlargement of bulky inguinal adenopathy and  pelvic adenopathy as described. There has also been interval development  of bilateral symmetric hydronephrosis and hydroureter without a visible  genitourinary calculus. This may be secondary to distal obstruction of  the ureters by the pelvic adenopathy or possibly due to vesicoureteral  reflux.  2. Limited evaluation of the liver, but there may be a new lesion in the  right lobe of the liver. The known liver lesions are poorly visualized  on the current examination.  3. Interval increase in splenomegaly as  described.  4. Stable osseous lytic lesions.     This report was finalized on 7/9/2023      ASSESSMENT/PLAN:  King George [id number]     *Obstructive uropathy  CT abdomen from 7/5/2023 reviewed and shows hydroureteronephrosis.  Consult urology.  Could be secondary to compression from increase in size of the retroperitoneal lymphadenopathy.  Evaluated by urology and plan to manage conservatively and repeat CT abdomen planned for 7/10/2023  Creatinine stable at 1.82  Creatinine 1.74 stents today  Creatinine down to 1.07 urine bloody on heparin post stents     *Metastatic melanoma  He received 1 cycle of ipilimumab and nivolumab.,  This was on 6/14/2023  Subsequently ipilimumab discontinued due to worsening renal function and rash  He received nivolumab only on 7/6/2023  CT of the abdomen from 7/5/2023 shows increase in size of the retroperitoneal lymphadenopathy  Too soon to assess treatment response  Recurrent skin rash which is pruritic and generalized maculopapular we will increase prednisone to 60 mg/day     *Left lower extremity pain  History of the hip without any evidence of abnormalities  PET/CT 12/20/2023 does not show any focal lesions in the left hip  Could be secondary to significant lymphedema.  Continue norco  Pain has improved.  He is now able to ambulate     *Incidental bilateral PE and thrombus in the left external iliac vein due to compression by the bulky nodes  Continue heparin in case he needs stents  Resume NOAC at discharge  Hold heparin for 6 hours because of persistent hematuria post stent     *Anemia  Hemoglobin at baseline, at 10.0  Continue to monitor     Recommendations  Management of hydroureteronephrosis per urology  Continue Goree for pain management  Hold heparin for 6 hours then resume without bolus  Follow-up in our office 7/20 for immunotherapy  Increase prednisone to  60mg daily  Not ready for discharge yet

## 2023-07-12 PROBLEM — M79.605 ACUTE PAIN OF LOWER EXTREMITY, LEFT: Status: ACTIVE | Noted: 2023-07-12

## 2023-07-12 LAB
ALBUMIN SERPL-MCNC: 2.8 G/DL (ref 3.5–5.2)
ALBUMIN/GLOB SERPL: 0.8 G/DL
ALP SERPL-CCNC: 63 U/L (ref 39–117)
ALT SERPL W P-5'-P-CCNC: 14 U/L (ref 1–41)
ANION GAP SERPL CALCULATED.3IONS-SCNC: 7.4 MMOL/L (ref 5–15)
APTT PPP: 39.4 SECONDS (ref 22.7–35.4)
APTT PPP: 49.2 SECONDS (ref 22.7–35.4)
APTT PPP: 86.4 SECONDS (ref 22.7–35.4)
APTT PPP: 94.8 SECONDS (ref 22.7–35.4)
AST SERPL-CCNC: 11 U/L (ref 1–40)
BILIRUB SERPL-MCNC: 0.3 MG/DL (ref 0–1.2)
BUN SERPL-MCNC: 14 MG/DL (ref 8–23)
BUN/CREAT SERPL: 14.7 (ref 7–25)
CALCIUM SPEC-SCNC: 8.6 MG/DL (ref 8.6–10.5)
CHLORIDE SERPL-SCNC: 105 MMOL/L (ref 98–107)
CO2 SERPL-SCNC: 22.6 MMOL/L (ref 22–29)
CREAT SERPL-MCNC: 0.95 MG/DL (ref 0.76–1.27)
EGFRCR SERPLBLD CKD-EPI 2021: 91.1 ML/MIN/1.73
GLOBULIN UR ELPH-MCNC: 3.6 GM/DL
GLUCOSE SERPL-MCNC: 107 MG/DL (ref 65–99)
POTASSIUM SERPL-SCNC: 3.7 MMOL/L (ref 3.5–5.2)
PROT SERPL-MCNC: 6.4 G/DL (ref 6–8.5)
SODIUM SERPL-SCNC: 135 MMOL/L (ref 136–145)

## 2023-07-12 PROCEDURE — 63710000001 PREDNISONE PER 1 MG: Performed by: INTERNAL MEDICINE

## 2023-07-12 PROCEDURE — 80053 COMPREHEN METABOLIC PANEL: CPT | Performed by: UROLOGY

## 2023-07-12 PROCEDURE — 85730 THROMBOPLASTIN TIME PARTIAL: CPT | Performed by: HOSPITALIST

## 2023-07-12 PROCEDURE — 25010000002 HEPARIN (PORCINE) 25000-0.45 UT/250ML-% SOLUTION: Performed by: UROLOGY

## 2023-07-12 PROCEDURE — 85730 THROMBOPLASTIN TIME PARTIAL: CPT | Performed by: INTERNAL MEDICINE

## 2023-07-12 PROCEDURE — 99233 SBSQ HOSP IP/OBS HIGH 50: CPT | Performed by: INTERNAL MEDICINE

## 2023-07-12 PROCEDURE — 25010000002 HEPARIN (PORCINE) PER 1000 UNITS: Performed by: UROLOGY

## 2023-07-12 RX ADMIN — HEPARIN SODIUM 22 UNITS/KG/HR: 10000 INJECTION, SOLUTION INTRAVENOUS at 07:41

## 2023-07-12 RX ADMIN — FOLIC ACID 1 MG: 1 TABLET ORAL at 09:31

## 2023-07-12 RX ADMIN — SENNOSIDES AND DOCUSATE SODIUM 1 TABLET: 50; 8.6 TABLET ORAL at 09:31

## 2023-07-12 RX ADMIN — Medication 10 ML: at 09:32

## 2023-07-12 RX ADMIN — Medication 1000 MCG: at 09:30

## 2023-07-12 RX ADMIN — FAMOTIDINE 20 MG: 20 TABLET, FILM COATED ORAL at 06:53

## 2023-07-12 RX ADMIN — HEPARIN SODIUM 8200 UNITS: 5000 INJECTION INTRAVENOUS; SUBCUTANEOUS at 16:05

## 2023-07-12 RX ADMIN — FAMOTIDINE 20 MG: 20 TABLET, FILM COATED ORAL at 17:45

## 2023-07-12 RX ADMIN — PREDNISONE 60 MG: 20 TABLET ORAL at 09:30

## 2023-07-12 RX ADMIN — HEPARIN SODIUM 26 UNITS/KG/HR: 10000 INJECTION, SOLUTION INTRAVENOUS at 18:37

## 2023-07-12 RX ADMIN — HEPARIN SODIUM 8200 UNITS: 5000 INJECTION INTRAVENOUS; SUBCUTANEOUS at 01:03

## 2023-07-12 RX ADMIN — Medication 10 ML: at 20:55

## 2023-07-12 NOTE — PROGRESS NOTES
[facility]     LOS: 2 days     Subjective:    CC:Leg Pain (DVT r/o to Left leg, swelling to entire left leg up into groin. +3 pitting Edema throughout leg into groin. PE to left lung 3 weeks ago, Eliquis BID. )    DIET:  Diet Order   Procedures    Diet: Regular/House Diet; Texture: Regular Texture (IDDSI 7); Fluid Consistency: Thin (IDDSI 0)   Left inguinal pain is much improved  Diffuse skin erythema is better  Hematuria is better but had dark urine when standing and going to the bathroom    Objective:    Vital Signs:  Temp:  [96.8 °F (36 °C)-97.3 °F (36.3 °C)] 96.9 °F (36.1 °C)  Heart Rate:  [67-79] 77  Resp:  [16-18] 16  BP: (116-135)/(65-74) 116/68    SpO2:  [97 %-100 %] 99 %  on   ;   Device (Oxygen Therapy): room air  Body mass index is 30.52 kg/m².    Physical Exam  Constitutional:       Appearance: Normal appearance.   HENT:      Head: Normocephalic and atraumatic.   Cardiovascular:      Rate and Rhythm: Normal rate and regular rhythm.      Heart sounds: No murmur heard.    No friction rub.   Pulmonary:      Effort: Pulmonary effort is normal.      Breath sounds: Normal breath sounds.   Abdominal:      General: Bowel sounds are normal. There is no distension.      Palpations: Abdomen is soft.      Tenderness: There is no abdominal tenderness.   Musculoskeletal:      Left lower leg: Edema present.   Skin:     General: Skin is warm and dry.      Comments: Erythema of skin   Neurological:      General: No focal deficit present.      Mental Status: He is alert and oriented to person, place, and time.   Psychiatric:         Mood and Affect: Mood normal.         Behavior: Behavior normal.       Results Review:    Glucose   Date Value Ref Range Status   07/12/2023 107 (H) 65 - 99 mg/dL Final   07/11/2023 132 (H) 65 - 99 mg/dL Final   07/10/2023 100 (H) 65 - 99 mg/dL Final     Results from last 7 days   Lab Units 07/11/23  0633   WBC 10*3/mm3 8.07   HEMOGLOBIN g/dL 10.3*   HEMATOCRIT %  30.9*   PLATELETS 10*3/mm3 208       Results from last 7 days   Lab Units 07/12/23  0653   SODIUM mmol/L 135*   POTASSIUM mmol/L 3.7   CHLORIDE mmol/L 105   CO2 mmol/L 22.6   BUN mg/dL 14   CREATININE mg/dL 0.95   CALCIUM mg/dL 8.6   BILIRUBIN mg/dL 0.3   ALK PHOS U/L 63   ALT (SGPT) U/L 14   AST (SGOT) U/L 11   GLUCOSE mg/dL 107*       Results from last 7 days   Lab Units 07/12/23  0653 07/07/23  2228 07/07/23  1531   INR   --   --  1.32*   APTT seconds 86.4*   < > 31.1    < > = values in this interval not displayed.               Cultures:  No results found for: BLOODCX, URINECX, WOUNDCX, MRSACX, RESPCX, STOOLCX    I have reviewed daily medications and changes in CPOE    Scheduled meds  famotidine, 20 mg, Oral, BID AC  folic acid, 1 mg, Oral, Daily  predniSONE, 60 mg, Oral, Daily  senna-docusate sodium, 2 tablet, Oral, BID  sodium chloride, 10 mL, Intravenous, Q12H  vitamin B-12, 1,000 mcg, Oral, Daily        heparin, 18 Units/kg/hr, Last Rate: 22 Units/kg/hr (07/12/23 0741)  lactated ringers, 9 mL/hr, Last Rate: 125 mL/hr (07/10/23 1623)  Pharmacy Consult,       PRN meds    acetaminophen **OR** acetaminophen **OR** acetaminophen    senna-docusate sodium **AND** polyethylene glycol **AND** bisacodyl **AND** bisacodyl    heparin (porcine)    HYDROcodone-acetaminophen    ondansetron    Pharmacy Consult    [COMPLETED] Insert Peripheral IV **AND** sodium chloride    sodium chloride    sodium chloride        Hydroureteronephrosis    Left groin mass    Abdominal lymphadenopathy    Metastatic melanoma    Metastasis to brain    LENIN (acute kidney injury)    Personal history of venous thrombosis and embolism    Inguinal pain, left    Acute pain of left lower extremity        Assessment/Plan:  Mr. George is a 63 year old male who presented to the hospital with complaints of left leg pain and difficulty walking. He was recently found to have extensive metastatic melanoma back in May of this year. He has had  liver/lung/bone/brain involvement and currently receiving immunotherapy with Dr. Rocha with oncology. He has had some notable LENIN recently with CT A/P on 7/5 showing some new bilateral hydroureteronephrosis. He was due to see urology today but had worsening left groin/hip pain after receiving immunotherapy yesterday making it difficult to go to this appointment. He is being admitted for further care at this time.     Inguinal pain, left  -In the setting of known hypermetabolic thickening of the skin in the left upper thigh/groin/lateral scrotum felt to be the primary site of malignancy. He has extensive lymphadenopathy in the abdomen   -Prior imaging with small PEs noted  -X-ray of hip without acute findings.  -Pain in hip has resolved  -Pain medication as needed     LENIN  Hydroureteronephrosis  -s/p stents  -nathan placed  -hematuria is better, but still present  -Renal function has normalized  -seen by nephrology but renal function is resolved     Metastatic melanoma  Metastasis to brain  Abdominal lymphadenopathy  History of PE/DVT  -Hematology/oncology is following  -Continue with heparin for now, which is Eliquis once hematuria is improved    skin rash so started on prednisone 60, thought to be from immune therapy, rash is much better today    Price Banuelos MD  07/12/23  13:14 EDT

## 2023-07-12 NOTE — CONSULTS
DIAGNOSIS and REASON FOR CONSULTATION: metastatic melanoma with brain metastases  -  for advice and recommendations for this diagnosis    Referring Provider:  Danita Rocha MD    HISTORY OF PRESENT ILLNESS:  The patient is a 63 y.o.  male with metastatic melanoma recently diagnosed in May 2023.  He had a brain mri on 6/1/23 which showed a 3mm lesion anteiror left middle frontal gyrus and 4mm lesion lateral right occipital lobe.  Pet scan on 6/12/23 showed hypermetabolic thickening of the skin at the medial left upper  thigh, groin, and left lateral scrotum is suspected to represent the site of primary malignancy. There is very extensive metastatic lymphadenopathy throughout the pelvis and lower abdomen with bulky disease on the left. There is at least one liver metastasis, 3 cm at the  lateral hepatic segment, and there are at least 2 bone metastases involving the right scapula and right clavicular head. Diffuse enlargement/swelling of the left lower extremity likely  secondary to extensive DVT. Conservative surveillance is recommended for the photopenic sub-6 mm.      He received cycle 1 of ipilimumab and nivolumab on 6/14/23 however ipilimumab was discontinued due to worsening renal function and rash and he received nivolumab on 7/6/23.     He had an increase in his creatinine and obstructive etiology was suspected.  CT abdomen on 7/5/23 showed hydronephrosis and dilated ureters which were new compared to scans from May.  He was scheduled to see urology however, developed severe left hip and groin pain on as well as left leg pain on 7/7/23.  He presented to the ER on 7/7/23 and was admitted. He had a xray of left hip which showed no acute fracture or dislocation.  He had a CT abdomen and pelvis on 7/9/23 which showed interval enlargement of bulky inguinal, pelvis and retroperitoneal adenopathy.  A 12.1 x 7.1 cmconglomerate of lymph nodes in the left pelvic sidewall region that previously measured on the order of  10.7 x 6.8 cm. There is a 7.0 x 4.9 cm left inguinal lymph node that previously measured 6.5 x 4.8 cm. There is a 7.8 x 4.0 cm left inguinal lymph node that previously measured 6.8 x 3.9 cm. Thickening of the left inguinal skin has developed in the interim and the thickening has a nodular appearance.  A 3.7 x 2.9 cm right inguinal lymph node that previously measured 1.4 x 1.3 cm. There is a stable lytic lesion in the right iliac crest. A stable lytic lesion in the left aspect of the posterior element of L3.    He was evaluated by urology and underwent cystoscopy and bilateral stent placement with Dr. Montelongo on 7/10/23.  His creatinine droped to 1.07 after stent placement and is improving.      According to Dr. Rocha's from earlier today they will likely discontinue immunotherapy and start BRAF treatment which is not effective for CNS disease, thus she has consulted us to evaluate him for stereotactic radiosurgery for his brain metastases.    He is on Norco and pain in his left groin and leg has improved significantly.      I was asked to see the patient at the request of the referring provider noted above for advice and recommendations regarding this diagnosis.    Objective     Past Medical History: he  has a past medical history of Abdominal lymphadenopathy (05/29/2023), Acute pulmonary embolism, unspecified pulmonary embolism type, unspecified whether acute cor pulmonale present (05/28/2023), Anemia (05/29/2023), Colon polyps, Drug rash (6/19/2023), Hepatic lesion (05/29/2023), Left lower quadrant abdominal mass (05/28/2023), Metastasis to brain (06/02/2023), Metastatic melanoma (05/31/2023), Moderate Malnutrition (HCC) (05/30/2023), and Personal history of venous thrombosis and embolism (6/19/2023).    Past Surgical History:  he has a past surgical history that includes US Guided Lymph Node Biopsy (05/30/2023); Portacath placement (06/02/2023); Colonoscopy w/ polypectomy (N/A, 06/02/2023); Colonoscopy (N/A,  06/02/2023); Venous Access Device (Port) (N/A, 06/02/2023); and Cystoscopy w/ ureteral stent placement (Bilateral, 7/10/2023).    Meds:    Current Facility-Administered Medications:     acetaminophen (TYLENOL) tablet 650 mg, 650 mg, Oral, Q4H PRN, 650 mg at 07/08/23 0951 **OR** acetaminophen (TYLENOL) 160 MG/5ML solution 650 mg, 650 mg, Oral, Q4H PRN **OR** acetaminophen (TYLENOL) suppository 650 mg, 650 mg, Rectal, Q4H PRN, Vic Montelongo Jr., MD    sennosides-docusate (PERICOLACE) 8.6-50 MG per tablet 2 tablet, 2 tablet, Oral, BID, 2 tablet at 07/11/23 2254 **AND** polyethylene glycol (MIRALAX) packet 17 g, 17 g, Oral, Daily PRN **AND** bisacodyl (DULCOLAX) EC tablet 5 mg, 5 mg, Oral, Daily PRN **AND** bisacodyl (DULCOLAX) suppository 10 mg, 10 mg, Rectal, Daily PRN, Vic Montelongo Jr., MD    famotidine (PEPCID) tablet 20 mg, 20 mg, Oral, BID AC, Vic Montelongo Jr., MD, 20 mg at 07/12/23 0653    folic acid (FOLVITE) tablet 1 mg, 1 mg, Oral, Daily, Vic Montelongo Jr., MD, 1 mg at 07/11/23 0855    heparin (porcine) 5000 UNIT/ML injection 4,100-8,200 Units, 40-80 Units/kg, Intravenous, Q6H PRN, Vic Montelongo Jr., MD, 8,200 Units at 07/12/23 0103    heparin 90353 units/250 mL (100 units/mL) in 0.45 % NaCl infusion, 18 Units/kg/hr, Intravenous, Titrated, Vic Montelongo Jr., MD, Last Rate: 22.4 mL/hr at 07/12/23 0741, 22 Units/kg/hr at 07/12/23 0741    HYDROcodone-acetaminophen (NORCO) 5-325 MG per tablet 1 tablet, 1 tablet, Oral, Q4H PRN, Vic Montelongo Jr., MD    lactated ringers infusion, 9 mL/hr, Intravenous, Continuous, Vic Montelongo Jr., MD, Last Rate: 125 mL/hr at 07/10/23 1623, Currently Infusing at 07/10/23 1623    ondansetron (ZOFRAN) injection 4 mg, 4 mg, Intravenous, Q6H PRN, Vic Montelongo Jr., MD    predniSONE (DELTASONE) tablet 60 mg, 60 mg, Oral, Daily, Brodie Rocha MD, 50 mg at 07/11/23 1012    [COMPLETED] Insert Peripheral IV, , , Once  **AND** sodium chloride 0.9 % flush 10 mL, 10 mL, Intravenous, PRN, Vic Montelongo Jr., MD    sodium chloride 0.9 % flush 10 mL, 10 mL, Intravenous, Q12H, Vic Montelongo Jr., MD, 10 mL at 07/11/23 2255    sodium chloride 0.9 % flush 10 mL, 10 mL, Intravenous, PRN, Vic Montelongo Jr., MD    sodium chloride 0.9 % infusion 40 mL, 40 mL, Intravenous, PRN, Vic Montelongo Jr., MD    vitamin B-12 (CYANOCOBALAMIN) tablet 1,000 mcg, 1,000 mcg, Oral, Daily, Vic Montelongo Jr., MD, 1,000 mcg at 07/11/23 0855    Allergies:  No Known Allergies    Family History:  his family history includes Breast cancer in his mother; Prostate cancer in his father.    Social History:  he  reports that he has never smoked. He has never been exposed to tobacco smoke. He has never used smokeless tobacco. He reports that he does not drink alcohol and does not use drugs.    Pertinent Findings on   Review of Systems   Constitutional:  Positive for fatigue.   Cardiovascular:  Positive for leg swelling.   Gastrointestinal: Negative.    Skin: Negative.    Neurological: Negative.    Psychiatric/Behavioral: Negative.   :  Subjective     Vitals:    07/11/23 1656 07/11/23 1953 07/12/23 0300 07/12/23 0737   BP: 121/65 118/69 135/71 134/74   BP Location: Left arm Left arm Left arm Left arm   Patient Position: Lying Lying Lying Lying   Pulse: 79 75 67 71   Resp: 16 18 16 16   Temp: 97.3 °F (36.3 °C) 96.9 °F (36.1 °C) 97.2 °F (36.2 °C) 96.8 °F (36 °C)   TempSrc: Oral Oral Oral Oral   SpO2: 97% 98% 98% 100%   Weight:       Height:           Performance Status: (1) Restricted in physically strenuous activity, ambulatory and able to do work of light nature    Pertinent Findings on  Physical Exam  Constitutional:       Appearance: Normal appearance.   Eyes:      Extraocular Movements: Extraocular movements intact.   Musculoskeletal:         General: Normal range of motion.      Cervical back: Normal range of motion.   Neurological:       General: No focal deficit present.      Mental Status: He is alert.   Psychiatric:         Mood and Affect: Mood normal.   :       Assessment: 63 year old male with widely metastatic melanoma previously on nivolumab with progressive retroperitoneal and pelvic nodes causing obstruction and hydroureteronephrosis and brain metastases    Plan:  His immunotherapy will likely be discontinued and changed to BRAF thus I discussed with him my recommendation for stereotactic radiosurgery to the brain metastases.  He states he would like to continue with the immunotherapy a little longer.  He is going to discuss this with Dr. Rocha.  I gave him a follow up with me next week but he may cancel this if he proceeds with more immunotherapy.      Objective   I spent greater than 70 mins (33272) on the unit and of that time 40 minutes  in counseling and coordination of care, including my review of imaging and pathology; indications, goals, logistics, alternatives and risks - both common and rare - for my recommendations as well as surveillance and potential outcomes. NCCN Guidelines were consulted, discussed with the patient and used to make the above recommendations.

## 2023-07-12 NOTE — PROGRESS NOTES
[facility] Progress Note      Patient Name: King George  : 1960  MRN: [id number]  Primary Care Physician:  Mohini Cortes MD  Date of admission: 2023    Subjective     Interval History:     Seen and examined.  Feeling good.  No shortness of air or chest pain.  Had a quiet night.  On heparin drip.    Review of Systems:   As noted above    Objective     Vitals:   Temp:  [96.9 °F (36.1 °C)-97.3 °F (36.3 °C)] 97.2 °F (36.2 °C)  Heart Rate:  [67-89] 67  Resp:  [16-18] 16  BP: (101-135)/(54-71) 135/71    Intake/Output Summary (Last 24 hours) at 2023 0730  Last data filed at 2023 1802  Gross per 24 hour   Intake --   Output 1500 ml   Net -1500 ml       Physical Exam:    General Appearance: alert, oriented x 3, no acute distress   Skin: warm and dry  HEENT: oral mucosa normal, nonicteric sclera  Neck: supple, no JVD  Lungs: CTA  Heart: RRR, normal S1 and S2  Abdomen: soft, nontender, nondistended  : no palpable bladder  Extremities: no edema, cyanosis or clubbing  Neuro: normal speech and mental status     Scheduled Meds:     famotidine, 20 mg, Oral, BID AC  folic acid, 1 mg, Oral, Daily  predniSONE, 60 mg, Oral, Daily  senna-docusate sodium, 2 tablet, Oral, BID  sodium chloride, 10 mL, Intravenous, Q12H  vitamin B-12, 1,000 mcg, Oral, Daily      IV Meds:   heparin, 18 Units/kg/hr, Last Rate: 22 Units/kg/hr (23 0103)  lactated ringers, 9 mL/hr, Last Rate: 125 mL/hr (07/10/23 1623)        Results Reviewed:   I have personally reviewed the results from the time of this admission to 2023 07:30 EDT     Results from last 7 days   Lab Units 23  0633 07/10/23  0416 23  0902 23  0612   SODIUM mmol/L 136 136 132* 135*   POTASSIUM mmol/L 3.9 3.9 3.9 4.2   CHLORIDE mmol/L 105 105 102 105   CO2 mmol/L 22.0 21.1* 21.0* 22.5   BUN mg/dL 14 17 19 22   CREATININE mg/dL 1.06 1.73* 1.82* 1.68*   CALCIUM mg/dL 8.5* 8.4* 8.2* 8.5*   BILIRUBIN mg/dL 0.3 0.4   --  0.6   ALK PHOS U/L 63 61  --  62   ALT (SGPT) U/L 13 14  --  12   AST (SGOT) U/L 13 9  --  14   GLUCOSE mg/dL 132* 100* 127* 107*     Estimated Creatinine Clearance: 90.5 mL/min (by C-G formula based on SCr of 1.06 mg/dL).          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  Eosinophil is negative but there  Metastatic melanoma S/P  1 cycle of ipilimumab and nivolumab  on 6/14/2023 and second dose of Opdivo on July 6, 2023  Bilateral hydronephrosis that is likely due to obstructive uropathy due to lymphadenopathy      PLAN:    Awaiting morning labs.  Plan to remove Bernstein catheter if okay by urology  Surveillance labs    Thank you for involving us in the care of Kingonur George.  Please feel free to call with any questions.    Zara Petit MD  07/12/23  07:30 EDT    [facility]  [phone]

## 2023-07-12 NOTE — PLAN OF CARE
Goal Outcome Evaluation:   Patient A&Ox4 continues on heparin drip - next collection due at 2200 tonight.  Compression stocking to left leg removed & will need to be reapplied at 0010 on 7-13.  Right port flushes well with good blood return.  Bernstein continues with bloody output.  Urology to see patient tomorrow.  Up with assist.  VSS.

## 2023-07-12 NOTE — PROGRESS NOTES
REASON FOR FOLLOWUP/CHIEF COMPLAINT:   Metastatic melanoma  Obstructive uropathy    HISTORY OF PRESENT ILLNESS:   He has no new complaints today  Pain is better.  Continues on Norco    7/10/2023  Pain much improved no rash or diarrhea  Urinating well  Currently on IV heparin pending stents later today  Creatinine slightly lower at 1.7    7/11  Generalized rash today is pruritic involving 80% of his body  No diarrhea shortness of breath  Urine bloody after stent placement with creatinine down to 1.07  We will hold heparin for 6 hours and watch-increase prednisone to 60 mg   to follow    7/12  Rash at least 50% better with steroids  Hematuria improved despite heparin drip  Feels better today  We will plan to switch back to Eliquis  Do not think we can give more immunotherapy may have to switch to BRAF therapy in 4-6 weeks which will not treat the metastasis intracranially and will have radiation see him regarding this    Past Medical History, Past Surgical History, Social History, Family History have been reviewed and are without significant changes except as mentioned.    Review of Systems   Review of Systems  Review of systems as mentioned in the HPI    Medications:  The current medication list was reviewed in the EMR    ALLERGIES:  No Known Allergies           Vitals:    07/11/23 1656 07/11/23 1953 07/12/23 0300 07/12/23 0737   BP: 121/65 118/69 135/71 134/74   BP Location: Left arm Left arm Left arm Left arm   Patient Position: Lying Lying Lying Lying   Pulse: 79 75 67 71   Resp: 16 18 16 16   Temp: 97.3 °F (36.3 °C) 96.9 °F (36.1 °C) 97.2 °F (36.2 °C) 96.8 °F (36 °C)   TempSrc: Oral Oral Oral Oral   SpO2: 97% 98% 98% 100%   Weight:       Height:         Physical Exam    CONSTITUTIONAL:  Vital signs reviewed.  No distress, looks comfortable.  EYES:  Conjunctivae and lids unremarkable.  PERRLA  EARS,NOSE,MOUTH,THROAT:  Ears and nose appear unremarkable.  Lips, teeth, gums appear  unremarkable.  RESPIRATORY:  Normal respiratory effort.  Lungs clear to auscultation bilaterally.  CARDIOVASCULAR:  Normal S1, S2.  No murmurs rubs or gallops.    Left lower extremity lymphedema   GASTROINTESTINAL: Abdomen appears unremarkable.  Nontender.  No hepatomegaly.  No splenomegaly.  NEURO: cranial nerves 2-12 grossly intact.  No focal deficits.  Appears to have equal strength all 4 extremities.  MUSCULOSKELETAL:  Unremarkable digits/nails.  No cyanosis or clubbing.  Leg edema on the left improved with fitted stocking   SKIN:  Warm.  Generalized maculopapular rash over 70% of his body much less visible  PSYCHIATRIC:  Normal judgment and insight.  Normal mood and affect.     I have reexamined the patient and the results are consistent with the previously documented exam. Brodie Rocha MD     Risk for Readmission (LACE) Score: 15 (7/12/2023  6:00 AM)         RECENT LABS:  WBC   Date Value Ref Range Status   07/11/2023 8.07 3.40 - 10.80 10*3/mm3 Final   07/10/2023 6.08 3.40 - 10.80 10*3/mm3 Final     Hemoglobin   Date Value Ref Range Status   07/11/2023 10.3 (L) 13.0 - 17.7 g/dL Final   07/10/2023 9.8 (L) 13.0 - 17.7 g/dL Final     Platelets   Date Value Ref Range Status   07/11/2023 208 140 - 450 10*3/mm3 Final   07/10/2023 172 140 - 450 10*3/mm3 Final       Lab Results   Component Value Date    GLUCOSE 107 (H) 07/12/2023    BUN 14 07/12/2023    CREATININE 0.95 07/12/2023    EGFR 91.1 07/12/2023    BCR 14.7 07/12/2023    K 3.7 07/12/2023    CO2 22.6 07/12/2023    CALCIUM 8.6 07/12/2023    ALBUMIN 2.8 (L) 07/12/2023    BILITOT 0.3 07/12/2023    AST 11 07/12/2023    ALT 14 07/12/2023        CT ABD  IMPRESSION:  1.There has been interval enlargement of bulky inguinal adenopathy and  pelvic adenopathy as described. There has also been interval development  of bilateral symmetric hydronephrosis and hydroureter without a visible  genitourinary calculus. This may be secondary to distal obstruction of  the  ureters by the pelvic adenopathy or possibly due to vesicoureteral  reflux.  2. Limited evaluation of the liver, but there may be a new lesion in the  right lobe of the liver. The known liver lesions are poorly visualized  on the current examination.  3. Interval increase in splenomegaly as described.  4. Stable osseous lytic lesions.     This report was finalized on 7/9/2023      ASSESSMENT/PLAN:  King George [id number]     *Obstructive uropathy  CT abdomen from 7/5/2023 reviewed and shows hydroureteronephrosis.  Consult urology.  Could be secondary to compression from increase in size of the retroperitoneal lymphadenopathy.  Evaluated by urology and plan to manage conservatively and repeat CT abdomen planned for 7/10/2023  Creatinine stable at 1.82  Creatinine 1.74 stents today  Creatinine down to 1.07 urine bloody on heparin post stents  Hematuria  improved     *Metastatic melanoma BRAF mutated with widespread mets including 1 2 small brain metastasis  He received 1 cycle of ipilimumab and nivolumab.,  This was on 6/14/2023  Subsequently ipilimumab discontinued due to worsening renal function and rash  He received nivolumab only on 7/6/2023  CT of the abdomen from 7/5/2023 shows increase in size of the retroperitoneal lymphadenopathy  Too soon to assess treatment response  Recurrent skin rash which is pruritic and generalized maculopapular we will increase prednisone to 60 mg/day  We will have radiation see regarding SBR because we are unlikely to be able to give immunotherapy and  BRAF treatment is not effective intracranially     *Left lower extremity pain  History of the hip without any evidence of abnormalities  PET/CT 12/20/2023 does not show any focal lesions in the left hip  Could be secondary to significant lymphedema.  Continue norco  Pain has improved.  He is now able to ambulate     *Incidental bilateral PE and thrombus in the left external iliac vein due to compression by the bulky nodes  Continue  heparin in case he needs stents  Resume NOAC at discharge  Hold heparin for 6 hours because of persistent hematuria post stent     *Anemia  Hemoglobin at baseline, at 10.0  Continue to monitor     Recommendations  Management of hydroureteronephrosis per urology  Continue Berkley for pain management  Switch to Eliquis if okay with urology  Continue prednisone to  60mg daily  Radiation therapy to see for possible SBR  Fitted stocking to left leg  Ambulate  Home soon  I have discussed holding immunotherapy reimaging in 6 weeks and if there is obvious progression start BRAF directed therapy

## 2023-07-13 ENCOUNTER — APPOINTMENT (OUTPATIENT)
Dept: MRI IMAGING | Facility: HOSPITAL | Age: 63
DRG: 659 | End: 2023-07-13
Payer: COMMERCIAL

## 2023-07-13 LAB
ALBUMIN SERPL-MCNC: 2.7 G/DL (ref 3.5–5.2)
ALBUMIN/GLOB SERPL: 0.7 G/DL
ALP SERPL-CCNC: 65 U/L (ref 39–117)
ALT SERPL W P-5'-P-CCNC: 21 U/L (ref 1–41)
ANION GAP SERPL CALCULATED.3IONS-SCNC: 7.2 MMOL/L (ref 5–15)
APTT PPP: 93 SECONDS (ref 22.7–35.4)
AST SERPL-CCNC: 10 U/L (ref 1–40)
BILIRUB SERPL-MCNC: 0.2 MG/DL (ref 0–1.2)
BUN SERPL-MCNC: 13 MG/DL (ref 8–23)
BUN/CREAT SERPL: 15.9 (ref 7–25)
CALCIUM SPEC-SCNC: 8.7 MG/DL (ref 8.6–10.5)
CHLORIDE SERPL-SCNC: 107 MMOL/L (ref 98–107)
CO2 SERPL-SCNC: 22.8 MMOL/L (ref 22–29)
CREAT SERPL-MCNC: 0.82 MG/DL (ref 0.76–1.27)
EGFRCR SERPLBLD CKD-EPI 2021: 99.9 ML/MIN/1.73
GLOBULIN UR ELPH-MCNC: 3.7 GM/DL
GLUCOSE SERPL-MCNC: 91 MG/DL (ref 65–99)
POTASSIUM SERPL-SCNC: 3.7 MMOL/L (ref 3.5–5.2)
PROT SERPL-MCNC: 6.4 G/DL (ref 6–8.5)
SODIUM SERPL-SCNC: 137 MMOL/L (ref 136–145)

## 2023-07-13 PROCEDURE — A9577 INJ MULTIHANCE: HCPCS | Performed by: HOSPITALIST

## 2023-07-13 PROCEDURE — 25010000002 HEPARIN (PORCINE) 25000-0.45 UT/250ML-% SOLUTION: Performed by: UROLOGY

## 2023-07-13 PROCEDURE — 0 GADOBENATE DIMEGLUMINE 529 MG/ML SOLUTION: Performed by: HOSPITALIST

## 2023-07-13 PROCEDURE — 63710000001 PREDNISONE PER 1 MG: Performed by: INTERNAL MEDICINE

## 2023-07-13 PROCEDURE — 99232 SBSQ HOSP IP/OBS MODERATE 35: CPT | Performed by: INTERNAL MEDICINE

## 2023-07-13 PROCEDURE — 70553 MRI BRAIN STEM W/O & W/DYE: CPT

## 2023-07-13 PROCEDURE — 80053 COMPREHEN METABOLIC PANEL: CPT | Performed by: UROLOGY

## 2023-07-13 PROCEDURE — 85730 THROMBOPLASTIN TIME PARTIAL: CPT | Performed by: INTERNAL MEDICINE

## 2023-07-13 RX ADMIN — GADOBENATE DIMEGLUMINE 20 ML: 529 INJECTION, SOLUTION INTRAVENOUS at 21:46

## 2023-07-13 RX ADMIN — Medication 10 ML: at 19:46

## 2023-07-13 RX ADMIN — SENNOSIDES AND DOCUSATE SODIUM 2 TABLET: 50; 8.6 TABLET ORAL at 19:46

## 2023-07-13 RX ADMIN — PREDNISONE 60 MG: 20 TABLET ORAL at 10:04

## 2023-07-13 RX ADMIN — FAMOTIDINE 20 MG: 20 TABLET, FILM COATED ORAL at 10:04

## 2023-07-13 RX ADMIN — HEPARIN SODIUM 26 UNITS/KG/HR: 10000 INJECTION, SOLUTION INTRAVENOUS at 15:01

## 2023-07-13 RX ADMIN — Medication 10 ML: at 10:06

## 2023-07-13 RX ADMIN — Medication 1000 MCG: at 10:04

## 2023-07-13 RX ADMIN — HEPARIN SODIUM 26 UNITS/KG/HR: 10000 INJECTION, SOLUTION INTRAVENOUS at 04:02

## 2023-07-13 NOTE — PROGRESS NOTES
"   LOS: 3 days   Patient Care Team:  Mohini Cortes MD as PCP - General (Internal Medicine)  Bernardo Azar MD as Referring Physician (Hospitalist)  Brodie Rocha MD as Consulting Physician (Hematology and Oncology)  Amy Berry MD as Consulting Physician (Radiation Oncology)      Subjective   Interval History: Patient denies discomfort. Intermittent hematuria, currently better.    Objective     ROS   12 POINT NEG ROS PERTINENT IN HPI      Vital Signs  Temp:  [96.9 °F (36.1 °C)-98.4 °F (36.9 °C)] 97.5 °F (36.4 °C)  Heart Rate:  [64-74] 64  Resp:  [16] 16  BP: (121-147)/(68-84) 132/73      Intake/Output Summary (Last 24 hours) at 7/13/2023 1831  Last data filed at 7/13/2023 0927  Gross per 24 hour   Intake 30 ml   Output 3550 ml   Net -3520 ml       Flowsheet Rows      Flowsheet Row First Filed Value   Admission Height 182.9 cm (72\") Documented at 07/07/2023 1040   Admission Weight 102 kg (225 lb) Documented at 07/07/2023 1040            Physical Exam:     General appearance: alert, cooperative, oriented  Bernstein intact, urine light pink and clear.        Results Review:     I reviewed the patient's new clinical results.  Lab Results (all)       Procedure Component Value Units Date/Time    aPTT [[id number]]  (Abnormal) Collected: 07/13/23 0606    Specimen: Blood from Cannula Updated: 07/13/23 0736     PTT 93.0 seconds     Comprehensive Metabolic Panel [[id number]]  (Abnormal) Collected: 07/13/23 0606    Specimen: Blood Updated: 07/13/23 0733     Glucose 91 mg/dL      BUN 13 mg/dL      Creatinine 0.82 mg/dL      Sodium 137 mmol/L      Potassium 3.7 mmol/L      Chloride 107 mmol/L      CO2 22.8 mmol/L      Calcium 8.7 mg/dL      Total Protein 6.4 g/dL      Albumin 2.7 g/dL      ALT (SGPT) 21 U/L      AST (SGOT) 10 U/L      Alkaline Phosphatase 65 U/L      Total Bilirubin 0.2 mg/dL      Globulin 3.7 gm/dL      A/G Ratio 0.7 g/dL      BUN/Creatinine Ratio 15.9     Anion Gap 7.2 mmol/L      eGFR 99.9 " mL/min/1.73     Narrative:      GFR Normal >60  Chronic Kidney Disease <60  Kidney Failure <15      aPTT [[id number]]  (Abnormal) Collected: 07/12/23 2216    Specimen: Blood Updated: 07/12/23 2327     PTT 94.8 seconds     aPTT [[id number]]  (Abnormal) Collected: 07/12/23 1321    Specimen: Blood Updated: 07/12/23 1406     PTT 49.2 seconds     aPTT [[id number]]  (Abnormal) Collected: 07/12/23 0653    Specimen: Blood from Cannula Updated: 07/12/23 0752     PTT 86.4 seconds     Comprehensive Metabolic Panel [[id number]]  (Abnormal) Collected: 07/12/23 0653    Specimen: Blood Updated: 07/12/23 0736     Glucose 107 mg/dL      BUN 14 mg/dL      Creatinine 0.95 mg/dL      Sodium 135 mmol/L      Potassium 3.7 mmol/L      Chloride 105 mmol/L      CO2 22.6 mmol/L      Calcium 8.6 mg/dL      Total Protein 6.4 g/dL      Albumin 2.8 g/dL      ALT (SGPT) 14 U/L      AST (SGOT) 11 U/L      Alkaline Phosphatase 63 U/L      Total Bilirubin 0.3 mg/dL      Globulin 3.6 gm/dL      A/G Ratio 0.8 g/dL      BUN/Creatinine Ratio 14.7     Anion Gap 7.4 mmol/L      eGFR 91.1 mL/min/1.73     Narrative:      GFR Normal >60  Chronic Kidney Disease <60  Kidney Failure <15      aPTT [[id number]]  (Abnormal) Collected: 07/11/23 2256    Specimen: Blood, Central Line Updated: 07/12/23 0021     PTT 39.4 seconds     aPTT [[id number]]  (Normal) Collected: 07/11/23 1545    Specimen: Blood Updated: 07/11/23 1635     PTT 26.1 seconds     C3+C4+ANITHA+RA [[id number]]  (Abnormal) Collected: 07/09/23 1303    Specimen: Blood Updated: 07/11/23 1412     C3 Complement 122 mg/dL      C4 Complement 25 mg/dL      ANITHA Direct Negative     RA Latex Turbid 16.3 IU/mL     Narrative:      Performed at:  [address] [place] [address]  [id number]  : Lewis Horner PhD, Phone:  [phone]    aPTT [[id number]]  (Abnormal) Collected: 07/11/23 0633    Specimen: Blood Updated: 07/11/23 0717     .0 seconds     Comprehensive Metabolic Panel  [[id number]]  (Abnormal) Collected: 07/11/23 0633    Specimen: Blood Updated: 07/11/23 0717     Glucose 132 mg/dL      BUN 14 mg/dL      Creatinine 1.06 mg/dL      Sodium 136 mmol/L      Potassium 3.9 mmol/L      Chloride 105 mmol/L      CO2 22.0 mmol/L      Calcium 8.5 mg/dL      Total Protein 6.9 g/dL      Albumin 2.8 g/dL      ALT (SGPT) 13 U/L      AST (SGOT) 13 U/L      Alkaline Phosphatase 63 U/L      Total Bilirubin 0.3 mg/dL      Globulin 4.1 gm/dL      A/G Ratio 0.7 g/dL      BUN/Creatinine Ratio 13.2     Anion Gap 9.0 mmol/L      eGFR 79.8 mL/min/1.73     Narrative:      GFR Normal >60  Chronic Kidney Disease <60  Kidney Failure <15      CBC & Differential [[id number]]  (Abnormal) Collected: 07/11/23 0633    Specimen: Blood Updated: 07/11/23 0659    Narrative:      The following orders were created for panel order CBC & Differential.  Procedure                               Abnormality         Status                     ---------                               -----------         ------                     CBC Auto Differential[[id number]]        Abnormal            Final result                 Please view results for these tests on the individual orders.    CBC Auto Differential [[id number]]  (Abnormal) Collected: 07/11/23 0633    Specimen: Blood Updated: 07/11/23 0659     WBC 8.07 10*3/mm3      RBC 3.73 10*6/mm3      Hemoglobin 10.3 g/dL      Hematocrit 30.9 %      MCV 82.8 fL      MCH 27.6 pg      MCHC 33.3 g/dL      RDW 15.8 %      RDW-SD 46.8 fl      MPV 9.1 fL      Platelets 208 10*3/mm3      Neutrophil % 85.5 %      Lymphocyte % 6.4 %      Monocyte % 3.0 %      Eosinophil % 4.3 %      Basophil % 0.4 %      Immature Grans % 0.4 %      Neutrophils, Absolute 6.90 10*3/mm3      Lymphocytes, Absolute 0.52 10*3/mm3      Monocytes, Absolute 0.24 10*3/mm3      Eosinophils, Absolute 0.35 10*3/mm3      Basophils, Absolute 0.03 10*3/mm3      Immature Grans, Absolute 0.03 10*3/mm3      nRBC 0.0 /100 WBC     aPTT  [[id number]]  (Abnormal) Collected: 07/10/23 1221    Specimen: Blood Updated: 07/10/23 1252     PTT 76.2 seconds     aPTT [[id number]]  (Abnormal) Collected: 07/10/23 0416    Specimen: Blood Updated: 07/10/23 0513     PTT 67.4 seconds     Comprehensive Metabolic Panel [[id number]]  (Abnormal) Collected: 07/10/23 0416    Specimen: Blood Updated: 07/10/23 0459     Glucose 100 mg/dL      BUN 17 mg/dL      Creatinine 1.73 mg/dL      Sodium 136 mmol/L      Potassium 3.9 mmol/L      Chloride 105 mmol/L      CO2 21.1 mmol/L      Calcium 8.4 mg/dL      Total Protein 6.8 g/dL      Albumin 2.8 g/dL      ALT (SGPT) 14 U/L      AST (SGOT) 9 U/L      Alkaline Phosphatase 61 U/L      Total Bilirubin 0.4 mg/dL      Globulin 4.0 gm/dL      A/G Ratio 0.7 g/dL      BUN/Creatinine Ratio 9.8     Anion Gap 9.9 mmol/L      eGFR 44.4 mL/min/1.73     Narrative:      GFR Normal >60  Chronic Kidney Disease <60  Kidney Failure <15      CBC & Differential [[id number]]  (Abnormal) Collected: 07/10/23 0416    Specimen: Blood Updated: 07/10/23 0439    Narrative:      The following orders were created for panel order CBC & Differential.  Procedure                               Abnormality         Status                     ---------                               -----------         ------                     CBC Auto Differential[[id number]]        Abnormal            Final result                 Please view results for these tests on the individual orders.    CBC Auto Differential [[id number]]  (Abnormal) Collected: 07/10/23 0416    Specimen: Blood Updated: 07/10/23 0439     WBC 6.08 10*3/mm3      RBC 3.59 10*6/mm3      Hemoglobin 9.8 g/dL      Hematocrit 29.7 %      MCV 82.7 fL      MCH 27.3 pg      MCHC 33.0 g/dL      RDW 15.7 %      RDW-SD 46.9 fl      MPV 8.4 fL      Platelets 172 10*3/mm3      Neutrophil % 69.9 %      Lymphocyte % 15.1 %      Monocyte % 8.9 %      Eosinophil % 4.9 %      Basophil % 0.7 %      Immature Grans % 0.5 %       Neutrophils, Absolute 4.25 10*3/mm3      Lymphocytes, Absolute 0.92 10*3/mm3      Monocytes, Absolute 0.54 10*3/mm3      Eosinophils, Absolute 0.30 10*3/mm3      Basophils, Absolute 0.04 10*3/mm3      Immature Grans, Absolute 0.03 10*3/mm3      nRBC 0.0 /100 WBC     Eosinophil Smear - Urine, Urine, Clean Catch [[id number]]  (Normal) Collected: 07/09/23 1405    Specimen: Urine, Clean Catch Updated: 07/09/23 1558     Eosinophil Smear 0 % EOS/100 Cells     Protein / Creatinine Ratio, Urine - Urine, Clean Catch [[id number]]  (Abnormal) Collected: 07/09/23 1405    Specimen: Urine, Clean Catch Updated: 07/09/23 1449     Protein/Creatinine Ratio, Urine 215.2 mg/G Crea      Creatinine, Urine 80.4 mg/dL      Total Protein, Urine 17.3 mg/dL     Sodium, Urine, Random - Urine, Clean Catch [[id number]] Collected: 07/09/23 1407    Specimen: Urine, Clean Catch Updated: 07/09/23 1437     Sodium, Urine 148 mmol/L     Narrative:      Reference intervals for random urine have not been established.  Clinical usage is dependent upon physician's interpretation in combination with other laboratory tests.       Chloride, Urine, Random - Urine, Clean Catch [[id number]] Collected: 07/09/23 1407    Specimen: Urine, Clean Catch Updated: 07/09/23 1437     Chloride, Urine 144 mmol/L     Narrative:      Reference intervals for random urine have not been established.  Clinical usage is dependent upon physician's interpretation in combination with other laboratory tests.       Basic Metabolic Panel [[id number]]  (Abnormal) Collected: 07/09/23 0902    Specimen: Blood Updated: 07/09/23 0954     Glucose 127 mg/dL      BUN 19 mg/dL      Creatinine 1.82 mg/dL      Sodium 132 mmol/L      Potassium 3.9 mmol/L      Chloride 102 mmol/L      CO2 21.0 mmol/L      Calcium 8.2 mg/dL      BUN/Creatinine Ratio 10.4     Anion Gap 9.0 mmol/L      eGFR 41.7 mL/min/1.73     Narrative:      GFR Normal >60  Chronic Kidney Disease <60  Kidney Failure <15      CBC &  Differential [[id number]]  (Abnormal) Collected: 07/09/23 0608    Specimen: Blood Updated: 07/09/23 0649    Narrative:      The following orders were created for panel order CBC & Differential.  Procedure                               Abnormality         Status                     ---------                               -----------         ------                     CBC Auto Differential[[id number]]        Abnormal            Final result                 Please view results for these tests on the individual orders.    CBC Auto Differential [[id number]]  (Abnormal) Collected: 07/09/23 0608    Specimen: Blood Updated: 07/09/23 0649     WBC 7.73 10*3/mm3      RBC 3.67 10*6/mm3      Hemoglobin 10.0 g/dL      Hematocrit 30.7 %      MCV 83.7 fL      MCH 27.2 pg      MCHC 32.6 g/dL      RDW 15.8 %      RDW-SD 48.5 fl      MPV 9.1 fL      Platelets 189 10*3/mm3      Neutrophil % 71.0 %      Lymphocyte % 15.3 %      Monocyte % 8.9 %      Eosinophil % 3.8 %      Basophil % 0.6 %      Immature Grans % 0.4 %      Neutrophils, Absolute 5.49 10*3/mm3      Lymphocytes, Absolute 1.18 10*3/mm3      Monocytes, Absolute 0.69 10*3/mm3      Eosinophils, Absolute 0.29 10*3/mm3      Basophils, Absolute 0.05 10*3/mm3      Immature Grans, Absolute 0.03 10*3/mm3      nRBC 0.0 /100 WBC     aPTT [[id number]]  (Abnormal) Collected: 07/09/23 0608    Specimen: Blood Updated: 07/09/23 0645     PTT 74.0 seconds     aPTT [[id number]]  (Abnormal) Collected: 07/08/23 0612    Specimen: Blood Updated: 07/08/23 0703     PTT 93.6 seconds     Comprehensive Metabolic Panel [[id number]]  (Abnormal) Collected: 07/08/23 0612    Specimen: Blood Updated: 07/08/23 0655     Glucose 107 mg/dL      BUN 22 mg/dL      Creatinine 1.68 mg/dL      Sodium 135 mmol/L      Potassium 4.2 mmol/L      Chloride 105 mmol/L      CO2 22.5 mmol/L      Calcium 8.5 mg/dL      Total Protein 7.0 g/dL      Albumin 3.0 g/dL      ALT (SGPT) 12 U/L      AST (SGOT) 14 U/L      Alkaline  Phosphatase 62 U/L      Total Bilirubin 0.6 mg/dL      Globulin 4.0 gm/dL      A/G Ratio 0.8 g/dL      BUN/Creatinine Ratio 13.1     Anion Gap 7.5 mmol/L      eGFR 45.9 mL/min/1.73     Narrative:      GFR Normal >60  Chronic Kidney Disease <60  Kidney Failure <15      CBC & Differential [[id number]]  (Abnormal) Collected: 07/08/23 0612    Specimen: Blood Updated: 07/08/23 0636    Narrative:      The following orders were created for panel order CBC & Differential.  Procedure                               Abnormality         Status                     ---------                               -----------         ------                     CBC Auto Differential[[id number]]        Abnormal            Final result                 Please view results for these tests on the individual orders.    CBC Auto Differential [[id number]]  (Abnormal) Collected: 07/08/23 0612    Specimen: Blood Updated: 07/08/23 0636     WBC 8.02 10*3/mm3      RBC 3.70 10*6/mm3      Hemoglobin 9.9 g/dL      Hematocrit 30.5 %      MCV 82.4 fL      MCH 26.8 pg      MCHC 32.5 g/dL      RDW 15.5 %      RDW-SD 46.2 fl      MPV 8.8 fL      Platelets 181 10*3/mm3      Neutrophil % 73.9 %      Lymphocyte % 14.5 %      Monocyte % 8.4 %      Eosinophil % 2.4 %      Basophil % 0.6 %      Immature Grans % 0.2 %      Neutrophils, Absolute 5.93 10*3/mm3      Lymphocytes, Absolute 1.16 10*3/mm3      Monocytes, Absolute 0.67 10*3/mm3      Eosinophils, Absolute 0.19 10*3/mm3      Basophils, Absolute 0.05 10*3/mm3      Immature Grans, Absolute 0.02 10*3/mm3      nRBC 0.0 /100 WBC     aPTT [[id number]]  (Abnormal) Collected: 07/07/23 2228    Specimen: Blood from Arm, Left Updated: 07/07/23 2326     PTT 44.4 seconds     Protime-INR [[id number]]  (Abnormal) Collected: 07/07/23 1531    Specimen: Blood Updated: 07/07/23 1610     Protime 16.6 Seconds      INR 1.32    aPTT [[id number]]  (Normal) Collected: 07/07/23 1531    Specimen: Blood Updated: 07/07/23 1610     PTT 31.1  seconds     Comprehensive Metabolic Panel [[id number]]  (Abnormal) Collected: 07/07/23 1031    Specimen: Blood Updated: 07/07/23 1113     Glucose 106 mg/dL      BUN 23 mg/dL      Creatinine 1.91 mg/dL      Sodium 133 mmol/L      Potassium 4.4 mmol/L      Comment: Slight hemolysis detected by analyzer. Results may be affected.        Chloride 102 mmol/L      CO2 20.7 mmol/L      Calcium 9.0 mg/dL      Total Protein 7.6 g/dL      Albumin 3.2 g/dL      ALT (SGPT) 14 U/L      AST (SGOT) 13 U/L      Comment: Slight hemolysis detected by analyzer. Results may be affected.        Alkaline Phosphatase 64 U/L      Total Bilirubin 0.4 mg/dL      Globulin 4.4 gm/dL      A/G Ratio 0.7 g/dL      BUN/Creatinine Ratio 12.0     Anion Gap 10.3 mmol/L      eGFR 39.4 mL/min/1.73     Narrative:      GFR Normal >60  Chronic Kidney Disease <60  Kidney Failure <15      CBC & Differential [[id number]]  (Abnormal) Collected: 07/07/23 1031    Specimen: Blood Updated: 07/07/23 1052    Narrative:      The following orders were created for panel order CBC & Differential.  Procedure                               Abnormality         Status                     ---------                               -----------         ------                     CBC Auto Differential[[id number]]        Abnormal            Final result                 Please view results for these tests on the individual orders.    CBC Auto Differential [[id number]]  (Abnormal) Collected: 07/07/23 1031    Specimen: Blood Updated: 07/07/23 1052     WBC 7.44 10*3/mm3      RBC 4.15 10*6/mm3      Hemoglobin 11.3 g/dL      Hematocrit 34.7 %      MCV 83.6 fL      MCH 27.2 pg      MCHC 32.6 g/dL      RDW 15.5 %      RDW-SD 46.3 fl      MPV 8.6 fL      Platelets 177 10*3/mm3      Neutrophil % 75.3 %      Lymphocyte % 12.1 %      Monocyte % 7.4 %      Eosinophil % 3.9 %      Basophil % 0.9 %      Immature Grans % 0.4 %      Neutrophils, Absolute 5.60 10*3/mm3      Lymphocytes, Absolute 0.90  10*3/mm3      Monocytes, Absolute 0.55 10*3/mm3      Eosinophils, Absolute 0.29 10*3/mm3      Basophils, Absolute 0.07 10*3/mm3      Immature Grans, Absolute 0.03 10*3/mm3      nRBC 0.0 /100 WBC             Imaging Results (All)       Procedure Component Value Units Date/Time    FL Retrograde Pyelogram In OR [[id number]] Collected: 07/10/23 2041     Updated: 07/10/23 2045    Narrative:      FL RETROGRADE PYELOGRAM IN OR-     INDICATIONS: Bilateral retrograde pyelography and ureteral stent  placement     TECHNIQUE: FLUOROSCOPIC ASSISTANCE IN THE OPERATING ROOM.     FINDINGS:     7 intraoperative fluoroscopic spot views were obtained and recorded the  PACS for review, revealing     Retrograde pyelography and ureteral stent placement . Please see  operative report for full details.     Fluoroscopy time: 1.0 minute     Radiation exposure: Dose area product: 603.23 uGy x m(2)          Impression:         As described.     This report was finalized on 7/10/2023 8:42 PM by Dr. Alejandro Yu M.D.       US Renal Bilateral [[id number]] Collected: 07/09/23 2021     Updated: 07/10/23 0705    Narrative:      Examination: Renal sonography     HISTORY: 63-year-old male with a history of acute renal sufficiency     FINDINGS: Sonographic evaluation of the kidneys was performed.  Comparison is made to a CT of the abdomen and pelvis without contrast  dated 07/09/2023. The right kidney measures 14.3 x 6.6 x 6.7 cm. The  left kidney measures 13.0 x 7.7 x 5.7 cm. Moderate bilateral renal  pelvocaliectasis is noted. There is a suggestion of an intrapelvic area  of heterogenous echotexture in the right renal pelvis. Also, material of  variable echotexture is seen within the left renal pelvis. Normal renal  cortical thickness and echotexture is seen in both kidneys. The urinary  bladder demonstrates an irregular thickened appearance and measures up  to 1.9 cm in thickness posteriorly.        Impression:      1.There is moderate  bilateral renal pelviectasis with suspected complex  material and/or debris within the bilateral renal pelves. This may be  associated with chronic urinary stasis and/or vesicle ureteral reflux.  2. There is an irregular thickened appearance of the wall of the urinary  bladder. This may be secondary to trabeculation of the wall of the  urinary bladder due to an element of chronic bladder outlet obstruction.  However, cystitis and/or malignancy cannot be completely excluded.     This report was finalized on 7/10/2023 7:02 AM by Dr. Yo Neely M.D.       CT Abdomen Pelvis Without Contrast [[id number]] Collected: 07/09/23 1713     Updated: 07/09/23 1951    Narrative:      Examination: CT of the abdomen and pelvis without contrast     HISTORY: 63-year-old male complaint of left inguinal pain undergoing  evaluation for hydronephrosis     TECHNIQUE: Contiguous axial images were obtained through the abdomen and  pelvis without intravenous administration of nonionic contrast. Oral  contrast  was not administered. Radiation dose reduction techniques were  utilized, including automated exposure control and exposure modulation  based on body size.     COMPARISON: CT of the abdomen and pelvis with contrast, 05/28/2023     FINDINGS: The visualized portion of the lung basesare clear. The  visualized portion of the heart has a normal noncontrasted appearance .  The liver demonstrates a nodular surface contour in the left lobe at the  site of known hepatic lesions. Mild heterogeneity in the right lobe of  the liver that is nonspecific is noted. The gallbladder has a normal  appearance. The spleen measures on the order of 15.9 cm in the anterior  to posterior dimension. On the prior examination the spleen measures  14.4 cm. The pancreas has a normal appearance. The kidneys demonstrate  moderate bilateral renal pelvocaliectasis and ureterectasis that are  symmetric. No evidence for a radiopaque genitourinary calculus  is  appreciated. The urinary bladder is suboptimally distended. The urinary  bladder wall measures on the order 1 cm in thickness. Bulky adenopathy  of the pelvis and inguinal regions is noted. There is a 12.1 x 7.1 cm  conglomerate of lymph nodes in the left pelvic sidewall region that  previously measured on the order of 10.7 x 6.8 cm. There is a 7.0 x 4.9  cm left inguinal lymph node that previously measured 6.5 x 4.8 cm. There  is a 7.8 x 4.0 cm left inguinal lymph node that previously measured 6.8  x 3.9 cm. Thickening of the left inguinal skin has developed in the  interim and the thickening has a nodular appearance with the skin  measuring up to 1.3 cm in thickness. Adenopathy in the pelvis and  retroperitoneum is also reidentified and there is bulky adenopathy in  the right inguinal region. There is a 3.7 x 2.9 cm right inguinal lymph  node that previously measured 1.4 x 1.3 cm. There is a stable lytic  lesion in the right iliac crest. A stable lytic lesion in the left  aspect of the posterior element of L3. No new suspicious osseous lytic  lesions are visualized. The previously noted right inguinal hernia is no  longer appreciated.     . The adrenal glands appear normal. The spleen is enlarged and measures  on the order of 15.9 cm compared to 14.4 cm previously. The bowel has a  normal appearance.          Impression:      1.There has been interval enlargement of bulky inguinal adenopathy and  pelvic adenopathy as described. There has also been interval development  of bilateral symmetric hydronephrosis and hydroureter without a visible  genitourinary calculus. This may be secondary to distal obstruction of  the ureters by the pelvic adenopathy or possibly due to vesicoureteral  reflux.  2. Limited evaluation of the liver, but there may be a new lesion in the  right lobe of the liver. The known liver lesions are poorly visualized  on the current examination.  3. Interval increase in splenomegaly as  described.  4. Stable osseous lytic lesions.     This report was finalized on 7/9/2023 7:47 PM by Dr. Yo Neely M.D.       XR Hip With or Without Pelvis 2 - 3 View Left [[id number]] Collected: 07/07/23 1052     Updated: 07/07/23 1057    Narrative:      XR HIP W OR WO PELVIS 2-3 VIEW LEFT-     INDICATIONS: Pain     TECHNIQUE: Frontal view of the pelvis, 2 views of the left hip     COMPARISON: None available     FINDINGS:     No acute fracture, erosion, or dislocation is identified. Prominent  degenerative changes of the hips are noted, right more than left,  including joint space narrowing, marginal spurring, subcortical  eburnation. Follow-up/further evaluation can be obtained as indications  persist.       Impression:         As described.     This report was finalized on 7/7/2023 10:54 AM by Dr. Alejandro Yu M.D.               Medication Review:   Current Facility-Administered Medications   Medication Dose Route Frequency Provider Last Rate Last Admin    acetaminophen (TYLENOL) tablet 650 mg  650 mg Oral Q4H PRN Vic Montelongo Jr., MD   650 mg at 07/08/23 0951    Or    acetaminophen (TYLENOL) 160 MG/5ML solution 650 mg  650 mg Oral Q4H PRN Vic Montelongo Jr., MD        Or    acetaminophen (TYLENOL) suppository 650 mg  650 mg Rectal Q4H PRN Vic Montelongo Jr., MD        sennosides-docusate (PERICOLACE) 8.6-50 MG per tablet 2 tablet  2 tablet Oral BID Vic Montelongo Jr., MD   1 tablet at 07/12/23 0931    And    polyethylene glycol (MIRALAX) packet 17 g  17 g Oral Daily PRN Vic Montelongo Jr., MD        And    bisacodyl (DULCOLAX) EC tablet 5 mg  5 mg Oral Daily PRN Vic Montelongo Jr., MD        And    bisacodyl (DULCOLAX) suppository 10 mg  10 mg Rectal Daily PRN Vic Montelongo Jr., MD        famotidine (PEPCID) tablet 20 mg  20 mg Oral BID AC Vic Montelongo Jr., MD   20 mg at 07/13/23 1004    folic acid (FOLVITE) tablet 1 mg  1 mg Oral Daily Vic Montelongo  Jaerd Steward MD   1 mg at 07/12/23 0931    heparin (porcine) 5000 UNIT/ML injection 4,100-8,200 Units  40-80 Units/kg Intravenous Q6H PRN Vic Montelongo Jr., MD   8,200 Units at 07/12/23 1605    heparin 88742 units/250 mL (100 units/mL) in 0.45 % NaCl infusion  18 Units/kg/hr Intravenous Titrated Vic Montelongo Jr., MD 26.5 mL/hr at 07/13/23 1501 26 Units/kg/hr at 07/13/23 1501    HYDROcodone-acetaminophen (NORCO) 5-325 MG per tablet 1 tablet  1 tablet Oral Q4H PRN Vic Montelongo Jr., MD        lactated ringers infusion  9 mL/hr Intravenous Continuous Vic Montelongo Jr.,  mL/hr at 07/10/23 1623 Currently Infusing at 07/10/23 1623    ondansetron (ZOFRAN) injection 4 mg  4 mg Intravenous Q6H PRN Vic Montelongo Jr., MD        predniSONE (DELTASONE) tablet 60 mg  60 mg Oral Daily Brodie Rocha MD   60 mg at 07/13/23 1004    sodium chloride 0.9 % flush 10 mL  10 mL Intravenous PRN Vic Montelongo Jr., MD        sodium chloride 0.9 % flush 10 mL  10 mL Intravenous Q12H Vic Montelongo Jr., MD   10 mL at 07/13/23 1006    sodium chloride 0.9 % flush 10 mL  10 mL Intravenous PRN Vic Montelongo Jr., MD        sodium chloride 0.9 % infusion 40 mL  40 mL Intravenous PRN Vic Montelongo Jr., MD        vitamin B-12 (CYANOCOBALAMIN) tablet 1,000 mcg  1,000 mcg Oral Daily Vic Montelongo Jr., MD   1,000 mcg at 07/13/23 1004       Current Facility-Administered Medications:     acetaminophen (TYLENOL) tablet 650 mg, 650 mg, Oral, Q4H PRN, 650 mg at 07/08/23 0951 **OR** acetaminophen (TYLENOL) 160 MG/5ML solution 650 mg, 650 mg, Oral, Q4H PRN **OR** acetaminophen (TYLENOL) suppository 650 mg, 650 mg, Rectal, Q4H PRN, Vic Montelongo Jr., MD    sennosides-docusate (PERICOLACE) 8.6-50 MG per tablet 2 tablet, 2 tablet, Oral, BID, 1 tablet at 07/12/23 0931 **AND** polyethylene glycol (MIRALAX) packet 17 g, 17 g, Oral, Daily PRN **AND** bisacodyl (DULCOLAX) EC  tablet 5 mg, 5 mg, Oral, Daily PRN **AND** bisacodyl (DULCOLAX) suppository 10 mg, 10 mg, Rectal, Daily PRN, Vic Montelongo Jr., MD    famotidine (PEPCID) tablet 20 mg, 20 mg, Oral, BID AC, Vic Montelongo Jr., MD, 20 mg at 07/13/23 1004    folic acid (FOLVITE) tablet 1 mg, 1 mg, Oral, Daily, Vic Montelongo Jr., MD, 1 mg at 07/12/23 0931    heparin (porcine) 5000 UNIT/ML injection 4,100-8,200 Units, 40-80 Units/kg, Intravenous, Q6H PRN, Vic Montelongo Jr., MD, 8,200 Units at 07/12/23 1605    heparin 56221 units/250 mL (100 units/mL) in 0.45 % NaCl infusion, 18 Units/kg/hr, Intravenous, Titrated, Vic Montelongo Jr., MD, Last Rate: 26.5 mL/hr at 07/13/23 1501, 26 Units/kg/hr at 07/13/23 1501    HYDROcodone-acetaminophen (NORCO) 5-325 MG per tablet 1 tablet, 1 tablet, Oral, Q4H PRN, Vic Montelongo Jr., MD    lactated ringers infusion, 9 mL/hr, Intravenous, Continuous, Vic Montelongo Jr., MD, Last Rate: 125 mL/hr at 07/10/23 1623, Currently Infusing at 07/10/23 1623    ondansetron (ZOFRAN) injection 4 mg, 4 mg, Intravenous, Q6H PRN, Vic Montelongo Jr., MD    predniSONE (DELTASONE) tablet 60 mg, 60 mg, Oral, Daily, Brodie Rocha MD, 60 mg at 07/13/23 1004    [COMPLETED] Insert Peripheral IV, , , Once **AND** sodium chloride 0.9 % flush 10 mL, 10 mL, Intravenous, PRN, Vic Montelongo Jr., MD    sodium chloride 0.9 % flush 10 mL, 10 mL, Intravenous, Q12H, Vic Montelongo Jr., MD, 10 mL at 07/13/23 1006    sodium chloride 0.9 % flush 10 mL, 10 mL, Intravenous, PRN, Vic Montelongo Jr., MD    sodium chloride 0.9 % infusion 40 mL, 40 mL, Intravenous, PRN, Vic Montelongo Jr., MD    vitamin B-12 (CYANOCOBALAMIN) tablet 1,000 mcg, 1,000 mcg, Oral, Daily, Vic Montelongo Jr., MD, 1,000 mcg at 07/13/23 1004  Medications Discontinued During This Encounter   Medication Reason    dexamethasone 0.5 MG/5ML solution *Therapy completed    pantoprazole  (PROTONIX) EC tablet 40 mg     sodium chloride 0.9 % flush 3 mL Patient Transfer    sodium chloride 0.9 % flush 3-10 mL Patient Transfer    lidocaine PF 1% (XYLOCAINE) injection 0.5 mL Patient Transfer    midazolam (VERSED) injection 1 mg Patient Transfer    HYDROcodone-acetaminophen (NORCO) 5-325 MG per tablet 1 tablet Patient Transfer    HYDROmorphone (DILAUDID) injection 0.25 mg Patient Transfer    HYDROcodone-acetaminophen (NORCO) 7.5-325 MG per tablet 1 tablet Patient Transfer    fentaNYL citrate (PF) (SUBLIMAZE) injection 25 mcg Patient Transfer    naloxone (NARCAN) injection 0.2 mg Patient Transfer    flumazenil (ROMAZICON) injection 0.2 mg Patient Transfer    ondansetron (ZOFRAN) injection 4 mg Patient Transfer    droperidol (INAPSINE) injection 0.625 mg Patient Transfer    droperidol (INAPSINE) injection 0.625 mg Patient Transfer    promethazine (PHENERGAN) suppository 25 mg Patient Transfer    promethazine (PHENERGAN) tablet 25 mg Patient Transfer    labetalol (NORMODYNE,TRANDATE) injection 5 mg Patient Transfer    hydrALAZINE (APRESOLINE) injection 5 mg Patient Transfer    ePHEDrine injection 5 mg Patient Transfer    diphenhydrAMINE (BENADRYL) injection 12.5 mg Patient Transfer    ipratropium-albuterol (DUO-NEB) nebulizer solution 3 mL Patient Transfer    sodium chloride 0.9 % infusion     predniSONE (DELTASONE) tablet 10 mg     Pharmacy Consult Discontinued by another clinician       Assessment & Plan         Hydroureteronephrosis    Left groin mass    Abdominal lymphadenopathy    Metastatic melanoma    Metastasis to brain    LENIN (acute kidney injury)    Personal history of venous thrombosis and embolism    Inguinal pain, left    Acute pain of left lower extremity    Acute pain of lower extremity, left        Plan   - safe to d/c nathan at any time  - safe to resume anticoagulation, including Eliquis  - will arrange cystoscopy and stent exchange versus removal in 3 months  - will sign off    Vic Coleman  Jr. Montelongo MD  07/13/23  18:31 EDT

## 2023-07-13 NOTE — PROGRESS NOTES
REASON FOR FOLLOWUP/CHIEF COMPLAINT:   Metastatic melanoma  Obstructive uropathy    HISTORY OF PRESENT ILLNESS:   He has no new complaints today  Pain is better.  Continues on Norco    7/10/2023  Pain much improved no rash or diarrhea  Urinating well  Currently on IV heparin pending stents later today  Creatinine slightly lower at 1.7    7/11  Generalized rash today is pruritic involving 80% of his body  No diarrhea shortness of breath  Urine bloody after stent placement with creatinine down to 1.07  We will hold heparin for 6 hours and watch-increase prednisone to 60 mg   to follow    7/12  Rash at least 50% better with steroids  Hematuria improved despite heparin drip  Feels better today  We will plan to switch back to Eliquis  Do not think we can give more immunotherapy may have to switch to BRAF therapy in 4-6 weeks which will not treat the metastasis intracranially and will have radiation see him regarding this    7/13  Rash is resolved  Urine still bloody on heparin therapeutic  CBC stable creatinine 0.8  Leg swelling improved with Jobst stocking  Switch to Eliquis if okay with urology    Past Medical History, Past Surgical History, Social History, Family History have been reviewed and are without significant changes except as mentioned.    Review of Systems   Review of Systems  Review of systems as mentioned in the HPI    Medications:  The current medication list was reviewed in the EMR    ALLERGIES:  No Known Allergies           Vitals:    07/12/23 1542 07/12/23 2059 07/13/23 0435 07/13/23 0738   BP: 124/72 137/84 130/73 147/81   BP Location: Left arm Left arm Left arm Left arm   Patient Position: Lying Lying Lying Lying   Pulse: 78 68 64 71   Resp: 16 16 16 16   Temp: 98.4 °F (36.9 °C) 98.4 °F (36.9 °C) 97.2 °F (36.2 °C)    TempSrc: Oral Oral Oral    SpO2: 99% 97% 98% 98%   Weight:       Height:         Physical Exam    CONSTITUTIONAL:  Vital signs reviewed.  No distress, looks comfortable.  EYES:   Conjunctivae and lids unremarkable.  PERRLA  EARS,NOSE,MOUTH,THROAT:  Ears and nose appear unremarkable.  Lips, teeth, gums appear unremarkable.  RESPIRATORY:  Normal respiratory effort.  Lungs clear to auscultation bilaterally.  CARDIOVASCULAR:  Normal S1, S2.  No murmurs rubs or gallops.    Left lower extremity lymphedema   GASTROINTESTINAL: Abdomen appears unremarkable.  Nontender.  No hepatomegaly.  No splenomegaly.  NEURO: cranial nerves 2-12 grossly intact.  No focal deficits.  Appears to have equal strength all 4 extremities.  MUSCULOSKELETAL:  Unremarkable digits/nails.  No cyanosis or clubbing.  Leg edema on the left improved with fitted stocking   SKIN:  Warm.  Generalized maculopapular rash over 70% of his body much less visible  PSYCHIATRIC:  Normal judgment and insight.  Normal mood and affect.     I have reexamined the patient and the results are consistent with the previously documented exam. Brodie Rocha MD     Risk for Readmission (LACE) Score: 15 (7/13/2023  6:00 AM)         RECENT LABS:  WBC   Date Value Ref Range Status   07/11/2023 8.07 3.40 - 10.80 10*3/mm3 Final     Hemoglobin   Date Value Ref Range Status   07/11/2023 10.3 (L) 13.0 - 17.7 g/dL Final     Platelets   Date Value Ref Range Status   07/11/2023 208 140 - 450 10*3/mm3 Final       Lab Results   Component Value Date    GLUCOSE 91 07/13/2023    BUN 13 07/13/2023    CREATININE 0.82 07/13/2023    EGFR 99.9 07/13/2023    BCR 15.9 07/13/2023    K 3.7 07/13/2023    CO2 22.8 07/13/2023    CALCIUM 8.7 07/13/2023    ALBUMIN 2.7 (L) 07/13/2023    BILITOT 0.2 07/13/2023    AST 10 07/13/2023    ALT 21 07/13/2023        CT ABD  IMPRESSION:  1.There has been interval enlargement of bulky inguinal adenopathy and  pelvic adenopathy as described. There has also been interval development  of bilateral symmetric hydronephrosis and hydroureter without a visible  genitourinary calculus. This may be secondary to distal obstruction of  the ureters  by the pelvic adenopathy or possibly due to vesicoureteral  reflux.  2. Limited evaluation of the liver, but there may be a new lesion in the  right lobe of the liver. The known liver lesions are poorly visualized  on the current examination.  3. Interval increase in splenomegaly as described.  4. Stable osseous lytic lesions.     This report was finalized on 7/9/2023      ASSESSMENT/PLAN:  King JIM George [id number]     *Obstructive uropathy  CT abdomen from 7/5/2023 reviewed and shows hydroureteronephrosis.  Consult urology.  Could be secondary to compression from increase in size of the retroperitoneal lymphadenopathy.  Evaluated by urology and plan to manage conservatively and repeat CT abdomen planned for 7/10/2023  Creatinine stable at 1.82  Creatinine 1.74 stents today  Creatinine down to 1.07 urine bloody on heparin post stents  Hematuria  improved     *Metastatic melanoma BRAF mutated with widespread mets including 1 2 small brain metastasis  He received 1 cycle of ipilimumab and nivolumab.,  This was on 6/14/2023  Subsequently ipilimumab discontinued due to worsening renal function and rash  He received nivolumab only on 7/6/2023  CT of the abdomen from 7/5/2023 shows increase in size of the retroperitoneal lymphadenopathy  Too soon to assess treatment response  Recurrent skin rash which is pruritic and generalized maculopapular we will increase prednisone to 60 mg/day  We will have radiation see regarding SBR because we are unlikely to be able to give immunotherapy and  BRAF treatment is not effective intracranially     *Left lower extremity pain  History of the hip without any evidence of abnormalities  PET/CT 12/20/2023 does not show any focal lesions in the left hip  Could be secondary to significant lymphedema.  Continue norco  Pain has improved.  He is now able to ambulate     *Incidental bilateral PE and thrombus in the left external iliac vein due to compression by the bulky nodes  Continue heparin in  case he needs stents  Resume NOAC at discharge  Hold heparin for 6 hours because of persistent hematuria post stent     *Anemia  Hemoglobin at baseline, at 10.0  Continue to monitor     Recommendations  Management of hydroureteronephrosis per urology  Continue [place] for pain management  Switch to Eliquis if okay with urology  Continue prednisone to  60mg daily  Radiation therapy to see for possible SBR  Fitted stocking to left leg  Ambulate  Follow-up brain MRI to compare to 6/1/2023  I have discussed holding immunotherapy reimaging in 6 weeks and if there is obvious progression start BRAF directed therapy

## 2023-07-13 NOTE — PLAN OF CARE
Goal Outcome Evaluation:     A&OX4, calm and cooperative. Up with stand by assistance. Heparin infusing, await urology input to d/c. Bernstein removed per order, and verbal from Dr. Montelongo. DTV by 1130PM. Jobst stocking removed at 12PM, to be placed back on 12AM. MRI of brain to be completed. VSS, no distress noted currently, will cont to monitor throughout remainder of shift.

## 2023-07-13 NOTE — PROGRESS NOTES
[facility] Progress Note      Patient Name: King George  : 1960  MRN: [id number]  Primary Care Physician:  Mohini Cortes MD  Date of admission: 2023    Subjective     Interval History:     Seen and examined.  Feeling good.  No shortness of air or chest pain.  Had a quiet night.  On heparin drip.    Review of Systems:   As noted above    Objective     Vitals:   Temp:  [96.9 °F (36.1 °C)-98.4 °F (36.9 °C)] 97.2 °F (36.2 °C)  Heart Rate:  [64-78] 64  Resp:  [16] 16  BP: (116-137)/(68-84) 130/73    Intake/Output Summary (Last 24 hours) at 2023 0738  Last data filed at 2023 0435  Gross per 24 hour   Intake --   Output 2550 ml   Net -2550 ml       Physical Exam:    General Appearance: alert, oriented x 3, no acute distress   Skin: warm and dry  HEENT: oral mucosa normal, nonicteric sclera  Neck: supple, no JVD  Lungs: CTA  Heart: RRR, normal S1 and S2  Abdomen: soft, nontender, nondistended  : no palpable bladder  Extremities: no edema, cyanosis or clubbing  Neuro: normal speech and mental status     Scheduled Meds:     famotidine, 20 mg, Oral, BID AC  folic acid, 1 mg, Oral, Daily  predniSONE, 60 mg, Oral, Daily  senna-docusate sodium, 2 tablet, Oral, BID  sodium chloride, 10 mL, Intravenous, Q12H  vitamin B-12, 1,000 mcg, Oral, Daily      IV Meds:   heparin, 18 Units/kg/hr, Last Rate: 26 Units/kg/hr (23 0402)  lactated ringers, 9 mL/hr, Last Rate: 125 mL/hr (07/10/23 1623)        Results Reviewed:   I have personally reviewed the results from the time of this admission to 2023 07:38 EDT     Results from last 7 days   Lab Units 23  0606 23  0653 23  0633   SODIUM mmol/L 137 135* 136   POTASSIUM mmol/L 3.7 3.7 3.9   CHLORIDE mmol/L 107 105 105   CO2 mmol/L 22.8 22.6 22.0   BUN mg/dL 13 14 14   CREATININE mg/dL 0.82 0.95 1.06   CALCIUM mg/dL 8.7 8.6 8.5*   BILIRUBIN mg/dL 0.2 0.3 0.3   ALK PHOS U/L 65 63 63   ALT (SGPT) U/L 21 14 13    AST (SGOT) U/L 10 11 13   GLUCOSE mg/dL 91 107* 132*     Estimated Creatinine Clearance: 116.9 mL/min (by C-G formula based on SCr of 0.82 mg/dL).          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  Eosinophil is negative.  Kidney function is improving with stent placement  Metastatic melanoma S/P  1 cycle of ipilimumab and nivolumab  on 6/14/2023 and second dose of Opdivo on July 6, 2023  Bilateral hydronephrosis that is likely due to obstructive uropathy due to lymphadenopathy      PLAN:    Kidney function has normalized.  Continue current management.  Bernstein catheter per urology  Surveillance labs    Thank you for involving us in the care of King George.  Please feel free to call with any questions.    Zara Petit MD  07/13/23  07:38 EDT    [facility]  [phone]

## 2023-07-13 NOTE — PROGRESS NOTES
"DAILY PROGRESS NOTE  [facility]    Patient Identification:  Name: King George  Age: 63 y.o.  Sex: male  :  1960  MRN: [id number]         Primary Care Physician: Mohini Cortes MD      Subjective  Patient with no new or acute complaints and overall feeling well.    Objective:  General Appearance:  Comfortable, well-appearing, in no acute distress and not in pain.    Vital signs: (most recent): Blood pressure 132/73, pulse 64, temperature 97.5 °F (36.4 °C), temperature source Oral, resp. rate 16, height 182.9 cm (72\"), weight 102 kg (225 lb), SpO2 98 %.    Lungs:  Normal effort and normal respiratory rate.  Breath sounds clear to auscultation.    Heart: Normal rate.  Regular rhythm.    Abdomen: (Bernstein catheter bag with blood-tinged urine.).    Extremities: There is no dependent edema.    Neurological: Patient is alert and oriented to person, place and time.    Skin:  Warm and dry.  (No active rash at present.)              Vital signs in last 24 hours:  Temp:  [96.9 °F (36.1 °C)-98.4 °F (36.9 °C)] 97.5 °F (36.4 °C)  Heart Rate:  [64-74] 64  Resp:  [16] 16  BP: (121-147)/(68-84) 132/73    Intake/Output:    Intake/Output Summary (Last 24 hours) at 2023 1554  Last data filed at 2023 0927  Gross per 24 hour   Intake 30 ml   Output 3550 ml   Net -3520 ml         Results from last 7 days   Lab Units 23  0633 07/10/23  0416 23  0608 23  0612 23  1031   WBC 10*3/mm3 8.07 6.08 7.73 8.02 7.44   HEMOGLOBIN g/dL 10.3* 9.8* 10.0* 9.9* 11.3*   PLATELETS 10*3/mm3 208 172 189 181 177     Results from last 7 days   Lab Units 23  0606 23  0653 23  0633 07/10/23  0416 23  0902 23  0612 23  1031   SODIUM mmol/L 137 135* 136 136 132* 135* 133*   POTASSIUM mmol/L 3.7 3.7 3.9 3.9 3.9 4.2 4.4   CHLORIDE mmol/L 107 105 105 105 102 105 102   CO2 mmol/L 22.8 22.6 22.0 21.1* 21.0* 22.5 20.7*   BUN mg/dL 13 14 14 17 19 22 23   CREATININE mg/dL 0.82 " 0.95 1.06 1.73* 1.82* 1.68* 1.91*   GLUCOSE mg/dL 91 107* 132* 100* 127* 107* 106*   Estimated Creatinine Clearance: 116.9 mL/min (by C-G formula based on SCr of 0.82 mg/dL).  Results from last 7 days   Lab Units 07/13/23  0606 07/12/23  0653 07/11/23  0633 07/10/23  0416 07/09/23  0902 07/08/23  0612 07/07/23  1031   CALCIUM mg/dL 8.7 8.6 8.5* 8.4* 8.2* 8.5* 9.0   ALBUMIN g/dL 2.7* 2.8* 2.8* 2.8*  --  3.0* 3.2*     Results from last 7 days   Lab Units 07/13/23  0606 07/12/23  0653 07/11/23  0633 07/10/23  0416 07/08/23  0612 07/07/23  1031   ALBUMIN g/dL 2.7* 2.8* 2.8* 2.8* 3.0* 3.2*   BILIRUBIN mg/dL 0.2 0.3 0.3 0.4 0.6 0.4   ALK PHOS U/L 65 63 63 61 62 64   AST (SGOT) U/L 10 11 13 9 14 13   ALT (SGPT) U/L 21 14 13 14 12 14       Assessment:  Left inguinal pain: Resolved.  LENIN: Resolved.  Associated bilateral hydronephrosis.  Bilateral hydronephrosis: Status post bilateral nephrostomy stent placement 7/10/2023.  Urology input appreciated  Gross hematuria:  Melanoma-metastatic: Oncology input appreciated  History of DVT/PE: Presently on heparin drip.  Transition back to Eliquis when okay with urology.    All problems new to this examiner.    Plan:  Please see above.  Discussed with patient and mother at bedside.  Discussed with RN.  Transition to Eliquis and discharge if / when okay with urology.    Jayme Cason MD  7/13/2023  15:54 EDT

## 2023-07-14 VITALS
DIASTOLIC BLOOD PRESSURE: 68 MMHG | SYSTOLIC BLOOD PRESSURE: 113 MMHG | WEIGHT: 225 LBS | HEIGHT: 72 IN | HEART RATE: 73 BPM | BODY MASS INDEX: 30.48 KG/M2 | TEMPERATURE: 97.3 F | OXYGEN SATURATION: 98 % | RESPIRATION RATE: 16 BRPM

## 2023-07-14 LAB
ALBUMIN SERPL-MCNC: 2.9 G/DL (ref 3.5–5.2)
ALBUMIN/GLOB SERPL: 0.8 G/DL
ALP SERPL-CCNC: 68 U/L (ref 39–117)
ALT SERPL W P-5'-P-CCNC: 32 U/L (ref 1–41)
ANION GAP SERPL CALCULATED.3IONS-SCNC: 8.3 MMOL/L (ref 5–15)
APTT PPP: 46.3 SECONDS (ref 22.7–35.4)
AST SERPL-CCNC: 15 U/L (ref 1–40)
BILIRUB SERPL-MCNC: 0.2 MG/DL (ref 0–1.2)
BUN SERPL-MCNC: 13 MG/DL (ref 8–23)
BUN/CREAT SERPL: 15.3 (ref 7–25)
CALCIUM SPEC-SCNC: 8.5 MG/DL (ref 8.6–10.5)
CHLORIDE SERPL-SCNC: 105 MMOL/L (ref 98–107)
CO2 SERPL-SCNC: 23.7 MMOL/L (ref 22–29)
CREAT SERPL-MCNC: 0.85 MG/DL (ref 0.76–1.27)
EGFRCR SERPLBLD CKD-EPI 2021: 98.9 ML/MIN/1.73
GLOBULIN UR ELPH-MCNC: 3.7 GM/DL
GLUCOSE SERPL-MCNC: 85 MG/DL (ref 65–99)
POTASSIUM SERPL-SCNC: 3.6 MMOL/L (ref 3.5–5.2)
PROT SERPL-MCNC: 6.6 G/DL (ref 6–8.5)
SODIUM SERPL-SCNC: 137 MMOL/L (ref 136–145)

## 2023-07-14 PROCEDURE — 85730 THROMBOPLASTIN TIME PARTIAL: CPT | Performed by: INTERNAL MEDICINE

## 2023-07-14 PROCEDURE — 99232 SBSQ HOSP IP/OBS MODERATE 35: CPT | Performed by: INTERNAL MEDICINE

## 2023-07-14 PROCEDURE — 25010000002 HEPARIN (PORCINE) 25000-0.45 UT/250ML-% SOLUTION: Performed by: UROLOGY

## 2023-07-14 PROCEDURE — 80053 COMPREHEN METABOLIC PANEL: CPT | Performed by: UROLOGY

## 2023-07-14 PROCEDURE — 25010000002 HEPARIN LOCK FLUSH PER 10 UNITS: Performed by: HOSPITALIST

## 2023-07-14 PROCEDURE — 63710000001 PREDNISONE PER 1 MG: Performed by: INTERNAL MEDICINE

## 2023-07-14 RX ORDER — PREDNISONE 10 MG/1
TABLET ORAL
Qty: 140 TABLET | Refills: 0 | Status: SHIPPED | OUTPATIENT
Start: 2023-07-14 | End: 2023-08-17

## 2023-07-14 RX ORDER — HEPARIN SODIUM (PORCINE) LOCK FLUSH IV SOLN 100 UNIT/ML 100 UNIT/ML
5 SOLUTION INTRAVENOUS AS NEEDED
Status: DISCONTINUED | OUTPATIENT
Start: 2023-07-14 | End: 2023-07-14 | Stop reason: HOSPADM

## 2023-07-14 RX ORDER — SODIUM CHLORIDE 9 MG/ML
40 INJECTION, SOLUTION INTRAVENOUS AS NEEDED
Status: DISCONTINUED | OUTPATIENT
Start: 2023-07-14 | End: 2023-07-14 | Stop reason: HOSPADM

## 2023-07-14 RX ORDER — SODIUM CHLORIDE 0.9 % (FLUSH) 0.9 %
20 SYRINGE (ML) INJECTION AS NEEDED
Status: DISCONTINUED | OUTPATIENT
Start: 2023-07-14 | End: 2023-07-14 | Stop reason: HOSPADM

## 2023-07-14 RX ORDER — SODIUM CHLORIDE 0.9 % (FLUSH) 0.9 %
10 SYRINGE (ML) INJECTION EVERY 12 HOURS SCHEDULED
Status: DISCONTINUED | OUTPATIENT
Start: 2023-07-14 | End: 2023-07-14 | Stop reason: HOSPADM

## 2023-07-14 RX ORDER — FAMOTIDINE 20 MG/1
20 TABLET, FILM COATED ORAL
Qty: 60 TABLET | Refills: 0 | Status: SHIPPED | OUTPATIENT
Start: 2023-07-14

## 2023-07-14 RX ORDER — SODIUM CHLORIDE 0.9 % (FLUSH) 0.9 %
10 SYRINGE (ML) INJECTION AS NEEDED
Status: DISCONTINUED | OUTPATIENT
Start: 2023-07-14 | End: 2023-07-14 | Stop reason: HOSPADM

## 2023-07-14 RX ADMIN — Medication 10 ML: at 10:22

## 2023-07-14 RX ADMIN — Medication 10 ML: at 13:57

## 2023-07-14 RX ADMIN — HEPARIN SODIUM 26 UNITS/KG/HR: 10000 INJECTION, SOLUTION INTRAVENOUS at 01:00

## 2023-07-14 RX ADMIN — FAMOTIDINE 20 MG: 20 TABLET, FILM COATED ORAL at 09:25

## 2023-07-14 RX ADMIN — Medication 1000 MCG: at 10:22

## 2023-07-14 RX ADMIN — APIXABAN 5 MG: 5 TABLET, FILM COATED ORAL at 10:25

## 2023-07-14 RX ADMIN — FOLIC ACID 1 MG: 1 TABLET ORAL at 10:22

## 2023-07-14 RX ADMIN — HEPARIN 500 UNITS: 100 SYRINGE at 13:57

## 2023-07-14 RX ADMIN — PREDNISONE 60 MG: 20 TABLET ORAL at 10:21

## 2023-07-14 RX ADMIN — SENNOSIDES AND DOCUSATE SODIUM 2 TABLET: 50; 8.6 TABLET ORAL at 10:21

## 2023-07-14 NOTE — PAYOR COMM NOTE
"Marilee George (63 y.o. Male)          PATIENT DISCHARGED:  REF#  [id number]      DEPT: [phone],  [phone]    [facility]: NPI [id number]  [facility]# [id number]           Date of Birth   1960    Social Security Number       Address   [address] [place] [address]    Home Phone   [phone]    MRN   [id number]       Jain   Unknown    Marital Status   Unknown                            Admission Date   23    Admission Type   Emergency    Admitting Provider   Farhat Hobbs MD    Attending Provider       Department, Room/Bed   [facility] 3 [place], P399/1       Discharge Date   2023    Discharge Disposition   Home or Self Care    Discharge Destination                                 Attending Provider: (none)   Allergies: No Known Allergies    Isolation: None   Infection: None   Code Status: CPR    Ht: 182.9 cm (72\")   Wt: 102 kg (225 lb)    Admission Cmt: None   Principal Problem: Hydroureteronephrosis [N13.30]                   Active Insurance as of 2023       Primary Coverage       Payor Plan Insurance Group Employer/Plan Group    [facility] [place] [id number]       Payor Plan Address Payor Plan Phone Number Payor Plan Fax Number Effective Dates    PO    3/1/2021 - None Entered    [facility] [id number]         Subscriber Name Subscriber Birth Date Member ID       MARILEE GEORGE 1960 [id number]                     Emergency Contacts        (Rel.) Home Phone Work Phone Mobile Phone    daquan de leon (Friend) -- -- [phone]    Komal George (Mother) -- -- [phone]                 Operative/Procedure Notes (all)        Vic Montelongo Jr., MD at 07/10/23 1639  Version 1 of 1         Operative Report     [facility]    Patient: Marilee George  Age:      63 y.o.  :     1960  Sex:      male    Medical Record:  [id number]    Date of Operation/Procedure:  7/10/2023    Pre-operative " Diagnosis:  Bilateral hydronephrosis    Post-operative Diagnosis:  Same    Surgeon:  Jayda    Name of Operation/Procedure:    Cystoscopy  Bilateral retrograde pyelogram  Bilateral ureteral stent placement    Findings/Complications:  No complications.    Extremely distorted trigone, particularly on the right. It appeared that there was direct invasion of tumor into the bladder lumen.     Bilateral retrograde pyelograms demonstrated moderate bilateral hydronephrosis to the level of the distal ureter. Tortuous ureters bilaterally. No filling defects.    Description of procedure: The patient was taken to the OR and placed under GA in lithotomy position.  Prepped and draped in sterile fashion.  The 21 Fr cystoscope was introduced and pancystoscopy was performed with findings as above.  A Sensor guidewire was passed through the left ureteral orifice into the kidney without difficulty. A Hugheston catheter was inserted, and a retrograde pyelogram was performed with findings as above. A 4.8 Fr x 30 cm JJ stent was passed into the kidney into the proper anatomic position. This procedure was then repeated on the right side. Of note, it was quite difficult to identify the right ureteral orifice.    Estimated Blood Loss: 100ml    Specimens: * No orders in the log *    Fluids/Drains: suspected bilateral ureteral stents, 16 Fr nathan catheter    Vic Montelongo Jr., MD  7/10/2023  17:31 EDT      Electronically signed by Vic Montelongo Jr., MD at 07/10/23 2911          Discharge Summary        Jayme Cason MD at 23 1200                                                                             PHYSICIAN DISCHARGE SUMMARY                                                                        [facility]    Patient Identification:  Name: King George  Age: 63 y.o.  Sex: male  :  1960  MRN: [id number]  Primary Care Physician: Mohini Cortes MD    Admit date: 2023  Discharge date and  time: 7/14/2023     Discharged Condition: good    Discharge Diagnoses:  Left inguinal pain:. Resolved  LENIN: Resolved.  Associated bilateral hydronephrosis.  Bilateral hydronephrosis: Status post bilateral nephrostomy stent placement 7/10/2023.    Gross hematuria:  Melanoma-metastatic:   History of DVT/PE:       Hospital Course:  Pleasant 63-year-old gentleman with metastatic melanoma presents with increasing generalized weakness and also complaints of left groin pain.  Please see H&P for full details.  He has a history of DVTs and has been on Eliquis.  He also had an appointment to see urology.  On presentation he was noted to be in LENIN with bilateral hydronephrosis.  Patient was admitted.  Eliquis put on hold and he did undergo cystoscopy with bilateral ureteral stent placements.  He did develop gross hematuria with this.  With history of DVT metastatic cancer he was cautiously put on a heparin drip.  He actually tolerated this well and hematuria is pretty much run its course.  The groin pain also run its course without any specific intervention.  Bernstein catheter was removed yesterday and he is voiding well on his own today.  He has been switched back on the Eliquis.  He had also had a significant rash prior to admission was on steroids.  He was on immunotherapy and this is presently on hold.  MRI did show evidence of a CNS metastases.  Oncology will continue to follow this very closely as an outpatient.  Renal function has returned to normal with the stent placements.  He is afebrile vital signs stable at this point can be discharged [facility] treatment follow-up as an outpatient.    Consults:     Consults       Date and Time Order Name Status Description    7/11/2023 10:09 AM Inpatient Radiation Oncology Consult      7/9/2023 11:07 AM Inpatient Nephrology Consult      7/7/2023 11:50 AM Hematology & Oncology Inpatient Consult Completed     7/7/2023 11:49 AM Inpatient Urology Consult      7/7/2023 11:16 AM [facility]  (on-call MD unless specified) Details      7/7/2023 10:19 AM Hematology and Oncology (on-call MD unless specified)      6/19/2023  4:27 PM Hematology and Oncology (on-call MD unless specified) Completed               Discharge Exam:  Afebrile vital signs stable.  Well-developed well-nourished male in no apparent distress.  Lungs clear to auscultation good air movement.  Heart regular rate and rhythm.  Extremities with no clubbing cyanosis or edema.  Alert oriented converse cooperative pleasant.     Disposition:  Home    Patient Instructions:      Discharge Medications        New Medications        Instructions Start Date   famotidine 20 MG tablet  Commonly known as: PEPCID   20 mg, Oral, 2 Times Daily Before Meals             Changes to Medications        Instructions Start Date   predniSONE 10 MG tablet  Commonly known as: DELTASONE  What changed:   how much to take  how to take this  when to take this  additional instructions   Take 5 p.o. daily for 4 days then 4 p.o. daily.             Continue These Medications        Instructions Start Date   apixaban 5 MG tablet tablet  Commonly known as: ELIQUIS   5 mg, Oral, Every 12 Hours Scheduled      folic acid 1 MG tablet  Commonly known as: FOLVITE   1 mg, Oral, Daily      ondansetron 8 MG tablet  Commonly known as: ZOFRAN   Oral, Every 8 Hours PRN      vitamin B-12 1000 MCG tablet  Commonly known as: CYANOCOBALAMIN   1,000 mcg, Oral, Daily             Stop These Medications      pantoprazole 40 MG EC tablet  Commonly known as: PROTONIX            Diet Instructions       Diet: Regular/House Diet; Regular Texture (IDDSI 7); Thin (IDDSI 0)      Discharge Diet: Regular/House Diet    Texture: Regular Texture (IDDSI 7)    Fluid Consistency: Thin (IDDSI 0)          Future Appointments   Date Time Provider Department Center   7/19/2023 10:45 AM Amy Berry MD [facility] None   7/24/2023  2:00 PM [facility] MRI 2 [facility] MRI E UCE   7/27/2023  8:45 AM INFU CBC  [facility] PORT CHAIR  INFUS [facility]   7/27/2023  9:00 AM Brodie Rocha MD [facility] [place]   10/5/2023  8:00 AM  [facility] PAT 1  [facility]     Additional Instructions for the Follow-ups that You Need to Schedule       Discharge Follow-up with PCP   As directed       Currently Documented PCP:    Mohini Cortes MD    PCP Phone Number:    [phone]     Follow Up Details: 1 week         Discharge Follow-up with Specialty: Oncology.  Urology.   As directed      Specialty: Oncology.  Urology.                Follow-up Information       Mohini Cortes MD .    Specialty: Internal Medicine  Why: 1 week  Contact information:  [address] [address]  [phone]                           Discharge Order (From admission, onward)       Start     Ordered    07/14/23 1154  Discharge patient  Once        Expected Discharge Date: 07/14/23    Discharge Disposition: Home or Self Care    Physician of Record for Attribution - Please select from Treatment Team: JAYME CASON [[id number]]    Review needed by CMO to determine Physician of Record: No       Question Answer Comment   Physician of Record for Attribution - Please select from Treatment Team JAYME CASON    Review needed by CMO to determine Physician of Record No        07/14/23 1153                      Total time spent discharging patient including evaluation,post hospitalization follow up,  medication and post hospitalization instructions and education total time exceeds 30 minutes.    Signed:  Jayme Cason MD  7/14/2023  12:00 EDT    EMR Dragon/Transcription disclaimer:   Much of this encounter note is an electronic transcription/translation of spoken language to printed text. The electronic translation of spoken language may permit erroneous, or at times, nonsensical words or phrases to be inadvertently transcribed; Although I have reviewed the note for such errors, some may still exist.       Electronically signed by Kamla  Jayme HURD MD at 07/14/23 1208       Discharge Order (From admission, onward)       Start     Ordered    07/14/23 1154  Discharge patient  Once        Expected Discharge Date: 07/14/23    Discharge Disposition: Home or Self Care    Physician of Record for Attribution - Please select from Treatment Team: JAYME ROLAND [[id number]]    Review needed by CMO to determine Physician of Record: No       Question Answer Comment   Physician of Record for Attribution - Please select from Treatment Team JAYME ROLAND    Review needed by CMO to determine Physician of Record No        07/14/23 1554

## 2023-07-14 NOTE — NURSING NOTE
Pt's heparin gtt switched over from an alaris IV pump to an MRI compatible IV pump for his MRI. Heparin gtt infusing at 26.5 cc/hr when arriving in xray triage. Switched heparin gtt to MRI pump infusing at 26.5 cc/hr. Per protocol. SEE MAR.

## 2023-07-14 NOTE — NURSING NOTE
Discharge paperwork including medications and follow-up appointments reviewed with patient. Patient leaving via PV.

## 2023-07-14 NOTE — PROGRESS NOTES
[facility] Progress Note      Patient Name: King George  : 1960  MRN: [id number]  Primary Care Physician:  Mohini Cortes MD  Date of admission: 2023    Subjective     Interval History:     Seen and examined.  Feeling good.  No shortness of air or chest pain.  Had a quiet night.  Bernstein catheter is out.  Able to void.  Review of Systems:   As noted above    Objective     Vitals:   Temp:  [96 °F (35.6 °C)-97.9 °F (36.6 °C)] 97.9 °F (36.6 °C)  Heart Rate:  [64-74] 67  Resp:  [16-18] 18  BP: (121-148)/(68-81) 148/81    Intake/Output Summary (Last 24 hours) at 2023 0734  Last data filed at 2023 0452  Gross per 24 hour   Intake 330 ml   Output 1000 ml   Net -670 ml       Physical Exam:    General Appearance: alert, oriented x 3, no acute distress   Skin: warm and dry  HEENT: oral mucosa normal, nonicteric sclera  Neck: supple, no JVD  Lungs: CTA  Heart: RRR, normal S1 and S2  Abdomen: soft, nontender, nondistended  : no palpable bladder  Extremities: no edema, cyanosis or clubbing  Neuro: normal speech and mental status     Scheduled Meds:     apixaban, 5 mg, Oral, Q12H  famotidine, 20 mg, Oral, BID AC  folic acid, 1 mg, Oral, Daily  predniSONE, 60 mg, Oral, Daily  senna-docusate sodium, 2 tablet, Oral, BID  sodium chloride, 10 mL, Intravenous, Q12H  vitamin B-12, 1,000 mcg, Oral, Daily      IV Meds:   heparin, 18 Units/kg/hr, Last Rate: Stopped (23 0458)  lactated ringers, 9 mL/hr, Last Rate: 125 mL/hr (07/10/23 1623)        Results Reviewed:   I have personally reviewed the results from the time of this admission to 2023 07:34 EDT     Results from last 7 days   Lab Units 23  0559 23  0606 23  0653   SODIUM mmol/L 137 137 135*   POTASSIUM mmol/L 3.6 3.7 3.7   CHLORIDE mmol/L 105 107 105   CO2 mmol/L 23.7 22.8 22.6   BUN mg/dL 13 13 14   CREATININE mg/dL 0.85 0.82 0.95   CALCIUM mg/dL 8.5* 8.7 8.6   BILIRUBIN mg/dL 0.2 0.2 0.3   ALK PHOS  U/L 68 65 63   ALT (SGPT) U/L 32 21 14   AST (SGOT) U/L 15 10 11   GLUCOSE mg/dL 85 91 107*     Estimated Creatinine Clearance: 112.8 mL/min (by C-G formula based on SCr of 0.85 mg/dL).          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  Eosinophil is negative.  Kidney function is improving with stent placement  Metastatic melanoma S/P  1 cycle of ipilimumab and nivolumab  on 6/14/2023 and second dose of Opdivo on July 6, 2023  Bilateral hydronephrosis that is likely due to obstructive uropathy due to lymphadenopathy      PLAN:    Kidney function has normalized.  Bernstein catheter has been removed and patient is able to void with no issues.  Okay to be discharged from kidney standpoint to follow-up with nephrology in 2 weeks  No objection for receiving Opdivo  Surveillance labs    Thank you for involving us in the care of King George.  Please feel free to call with any questions.    Zara Petit MD  07/14/23  07:34 EDT    [facility]  [phone]

## 2023-07-14 NOTE — PLAN OF CARE
Goal Outcome Evaluation:  Plan of Care Reviewed With: patient        Progress: no change       Heprain gtt to be DC at 5AM. Eliquis to be started with morning meds. Port with blood return. Stocking placed at midnight to left leg.

## 2023-07-14 NOTE — PROGRESS NOTES
REASON FOR FOLLOWUP/CHIEF COMPLAINT:   Metastatic melanoma  Obstructive uropathy    HISTORY OF PRESENT ILLNESS:   He has no new complaints today  Pain is better.  Continues on Norco    7/10/2023  Pain much improved no rash or diarrhea  Urinating well  Currently on IV heparin pending stents later today  Creatinine slightly lower at 1.7    7/11  Generalized rash today is pruritic involving 80% of his body  No diarrhea shortness of breath  Urine bloody after stent placement with creatinine down to 1.07  We will hold heparin for 6 hours and watch-increase prednisone to 60 mg   to follow    7/12  Rash at least 50% better with steroids  Hematuria improved despite heparin drip  Feels better today  We will plan to switch back to Eliquis  Do not think we can give more immunotherapy may have to switch to BRAF therapy in 4-6 weeks which will not treat the metastasis intracranially and will have radiation see him regarding this    7/13  Rash is resolved  Urine still bloody on heparin therapeutic  CBC stable creatinine 0.8  Leg swelling improved with Jobst stocking  Switch to Eliquis if okay with urology    Past Medical History, Past Surgical History, Social History, Family History have been reviewed and are without significant changes except as mentioned.    Review of Systems   Review of Systems  Review of systems as mentioned in the HPI    Medications:  The current medication list was reviewed in the EMR    ALLERGIES:  No Known Allergies           Vitals:    07/13/23 1132 07/13/23 1540 07/13/23 1929 07/14/23 0452   BP: 121/68 132/73 125/75 148/81   BP Location: Left arm Left arm Left arm Left arm   Patient Position: Lying Lying Lying Lying   Pulse: 74 64 70 67   Resp: 16 16 18 18   Temp: 96.9 °F (36.1 °C) 97.5 °F (36.4 °C) 96 °F (35.6 °C) 97.9 °F (36.6 °C)   TempSrc: Oral Oral Oral Oral   SpO2: 98% 98% 96% 99%   Weight:       Height:         Physical Exam    CONSTITUTIONAL:  Vital signs reviewed.  No distress, looks  comfortable.  EYES:  Conjunctivae and lids unremarkable.  PERRLA  EARS,NOSE,MOUTH,THROAT:  Ears and nose appear unremarkable.  Lips, teeth, gums appear unremarkable.  RESPIRATORY:  Normal respiratory effort.  Lungs clear to auscultation bilaterally.  CARDIOVASCULAR:  Normal S1, S2.  No murmurs rubs or gallops.    Left lower extremity lymphedema   GASTROINTESTINAL: Abdomen appears unremarkable.  Nontender.  No hepatomegaly.  No splenomegaly.  NEURO: cranial nerves 2-12 grossly intact.  No focal deficits.  Appears to have equal strength all 4 extremities.  MUSCULOSKELETAL:  Unremarkable digits/nails.  No cyanosis or clubbing.  Leg edema on the left improved with fitted stocking   SKIN:  Warm.  Generalized maculopapular rash over 70% of his body much less visible  PSYCHIATRIC:  Normal judgment and insight.  Normal mood and affect.     I have reexamined the patient and the results are consistent with the previously documented exam. Brodie Rocha MD     Risk for Readmission (LACE) Score: 16 (7/14/2023  6:00 AM)         RECENT LABS:  No results found for: WBC, HGB, PLT      Lab Results   Component Value Date    GLUCOSE 85 07/14/2023    BUN 13 07/14/2023    CREATININE 0.85 07/14/2023    EGFR 98.9 07/14/2023    BCR 15.3 07/14/2023    K 3.6 07/14/2023    CO2 23.7 07/14/2023    CALCIUM 8.5 (L) 07/14/2023    ALBUMIN 2.9 (L) 07/14/2023    BILITOT 0.2 07/14/2023    AST 15 07/14/2023    ALT 32 07/14/2023        CT ABD  IMPRESSION:  1.There has been interval enlargement of bulky inguinal adenopathy and  pelvic adenopathy as described. There has also been interval development  of bilateral symmetric hydronephrosis and hydroureter without a visible  genitourinary calculus. This may be secondary to distal obstruction of  the ureters by the pelvic adenopathy or possibly due to vesicoureteral  reflux.  2. Limited evaluation of the liver, but there may be a new lesion in the  right lobe of the liver. The known liver lesions are  poorly visualized  on the current examination.  3. Interval increase in splenomegaly as described.  4. Stable osseous lytic lesions.   This report was finalized on 7/9/2023    MRI brain  MPRESSION:  1. Tiny bilateral metastases again noted.  These do not appear  significantly changed when compared to prior exam. No definite new  metastases are seen.     This report was finalized on 7/14/2023    ASSESSMENT/PLAN:  King JIM George P399/1     *Obstructive uropathy  CT abdomen from 7/5/2023 reviewed and shows hydroureteronephrosis.  Consult urology.  Could be secondary to compression from increase in size of the retroperitoneal lymphadenopathy.  Evaluated by urology and plan to manage conservatively and repeat CT abdomen planned for 7/10/2023  Creatinine stable at 1.82  Creatinine 1.74 stents today  Creatinine down to 1.07 urine bloody on heparin post stents  Hematuria  improved     *Metastatic melanoma BRAF mutated with widespread mets including 1 2 small brain metastasis  He received 1 cycle of ipilimumab and nivolumab.,  This was on 6/14/2023  Subsequently ipilimumab discontinued due to worsening renal function and rash  He received nivolumab only on 7/6/2023  CT of the abdomen from 7/5/2023 shows increase in size of the retroperitoneal lymphadenopathy  Too soon to assess treatment response  Recurrent skin rash which is pruritic and generalized maculopapular we will increase prednisone to 60 mg/day  We will have radiation see regarding SBR because we are unlikely to be able to give immunotherapy and  BRAF treatment is not effective intracranially  Lesions stable can afford to wait for now     *Left lower extremity pain  History of the hip without any evidence of abnormalities  PET/CT 12/20/2023 does not show any focal lesions in the left hip  Could be secondary to significant lymphedema.  Continue norco  Pain has improved.  He is now able to ambulate     *Incidental bilateral PE and thrombus in the left external iliac  vein due to compression by the bulky nodes  Continue heparin in case he needs stents  Resume NOAC at discharge  Hold heparin for 6 hours because of persistent hematuria post stent     *Anemia  Hemoglobin at baseline, at 10.0  Continue to monitor     Recommendations  Home today  Continue prednisone to  50mg daily for 4 days then decrease to 40 g  Radiation therapy to see for possible SBR  Fitted stocking to left leg  I have discussed holding immunotherapy reimaging in 6 weeks and if there is obvious progression start BRAF directed therapy  Follow-up in the office in 2 weeks

## 2023-07-14 NOTE — DISCHARGE SUMMARY
PHYSICIAN DISCHARGE SUMMARY                                                                        [facility]    Patient Identification:  Name: King George  Age: 63 y.o.  Sex: male  :  1960  MRN: [id number]  Primary Care Physician: Mohini Cortes MD    Admit date: 2023  Discharge date and time: 2023     Discharged Condition: good    Discharge Diagnoses:  Left inguinal pain:. Resolved  LENIN: Resolved.  Associated bilateral hydronephrosis.  Bilateral hydronephrosis: Status post bilateral nephrostomy stent placement 7/10/2023.    Gross hematuria:  Melanoma-metastatic:   History of DVT/PE:       Hospital Course:  Pleasant 63-year-old gentleman with metastatic melanoma presents with increasing generalized weakness and also complaints of left groin pain.  Please see H&P for full details.  He has a history of DVTs and has been on Eliquis.  He also had an appointment to see urology.  On presentation he was noted to be in LENIN with bilateral hydronephrosis.  Patient was admitted.  Eliquis put on hold and he did undergo cystoscopy with bilateral ureteral stent placements.  He did develop gross hematuria with this.  With history of DVT metastatic cancer he was cautiously put on a heparin drip.  He actually tolerated this well and hematuria is pretty much run its course.  The groin pain also run its course without any specific intervention.  Bernstein catheter was removed yesterday and he is voiding well on his own today.  He has been switched back on the Eliquis.  He had also had a significant rash prior to admission was on steroids.  He was on immunotherapy and this is presently on hold.  MRI did show evidence of a CNS metastases.  Oncology will continue to follow this very closely as an outpatient.  Renal function has returned to normal with the stent placements.  He is afebrile vital signs stable at this point can be discharged  Dameon treatment follow-up as an outpatient.    Consults:     Consults       Date and Time Order Name Status Description    7/11/2023 10:09 AM Inpatient Radiation Oncology Consult      7/9/2023 11:07 AM Inpatient Nephrology Consult      7/7/2023 11:50 AM Hematology & Oncology Inpatient Consult Completed     7/7/2023 11:49 AM Inpatient Urology Consult      7/7/2023 11:16 AM [facility] (on-call MD unless specified) Details      7/7/2023 10:19 AM Hematology and Oncology (on-call MD unless specified)      6/19/2023  4:27 PM Hematology and Oncology (on-call MD unless specified) Completed               Discharge Exam:  Afebrile vital signs stable.  Well-developed well-nourished male in no apparent distress.  Lungs clear to auscultation good air movement.  Heart regular rate and rhythm.  Extremities with no clubbing cyanosis or edema.  Alert oriented converse cooperative pleasant.     Disposition:  Home    Patient Instructions:      Discharge Medications        New Medications        Instructions Start Date   famotidine 20 MG tablet  Commonly known as: PEPCID   20 mg, Oral, 2 Times Daily Before Meals             Changes to Medications        Instructions Start Date   predniSONE 10 MG tablet  Commonly known as: DELTASONE  What changed:   how much to take  how to take this  when to take this  additional instructions   Take 5 p.o. daily for 4 days then 4 p.o. daily.             Continue These Medications        Instructions Start Date   apixaban 5 MG tablet tablet  Commonly known as: ELIQUIS   5 mg, Oral, Every 12 Hours Scheduled      folic acid 1 MG tablet  Commonly known as: FOLVITE   1 mg, Oral, Daily      ondansetron 8 MG tablet  Commonly known as: ZOFRAN   Oral, Every 8 Hours PRN      vitamin B-12 1000 MCG tablet  Commonly known as: CYANOCOBALAMIN   1,000 mcg, Oral, Daily             Stop These Medications      pantoprazole 40 MG EC tablet  Commonly known as: PROTONIX            Diet Instructions       Diet: Regular/House  Diet; Regular Texture (IDDSI 7); Thin (IDDSI 0)      Discharge Diet: Regular/House Diet    Texture: Regular Texture (IDDSI 7)    Fluid Consistency: Thin (IDDSI 0)          Future Appointments   Date Time Provider Department Center   7/19/2023 10:45 AM Amy Berry MD [facility] RO KRESG None   7/24/2023  2:00 PM KELLE UCE MRI 2 MOBILE BH KELLE MRI E UCE   7/27/2023  8:45 AM INFU CBC KRE PORT CHAIR BH INFUS KRE LAG   7/27/2023  9:00 AM Brodie Rocha MD [facility] CBC [facility]RES [place]   10/5/2023  8:00 AM BH KELLE PAT 1 BH KELLE PAT KELLE     Additional Instructions for the Follow-ups that You Need to Schedule       Discharge Follow-up with PCP   As directed       Currently Documented PCP:    Mohini Cortes MD    PCP Phone Number:    [phone]     Follow Up Details: 1 week         Discharge Follow-up with Specialty: Oncology.  Urology.   As directed      Specialty: Oncology.  Urology.                Follow-up Information       Mohini Cortes MD .    Specialty: Internal Medicine  Why: 1 week  Contact information:  [id number] [address]  [phone]                           Discharge Order (From admission, onward)       Start     Ordered    07/14/23 1154  Discharge patient  Once        Expected Discharge Date: 07/14/23    Discharge Disposition: Home or Self Care    Physician of Record for Attribution - Please select from Treatment Team: JAYME ROLAND [[id number]]    Review needed by CMO to determine Physician of Record: No       Question Answer Comment   Physician of Record for Attribution - Please select from Treatment Team JAYME ROLAND    Review needed by CMO to determine Physician of Record No        07/14/23 1153                      Total time spent discharging patient including evaluation,post hospitalization follow up,  medication and post hospitalization instructions and education total time exceeds 30 minutes.    Signed:  Jayme Roland MD  7/14/2023  12:00 EDT    EMR  Dragon/Transcription disclaimer:   Much of this encounter note is an electronic transcription/translation of spoken language to printed text. The electronic translation of spoken language may permit erroneous, or at times, nonsensical words or phrases to be inadvertently transcribed; Although I have reviewed the note for such errors, some may still exist.

## 2023-07-15 NOTE — CASE MANAGEMENT/SOCIAL WORK
Case Management Discharge Note      Final Note: DC Home    Provided Post Acute Provider List?: N/A  Provided Post Acute Provider Quality & Resource List?: N/A    Selected Continued Care - Discharged on 7/14/2023 Admission date: 7/7/2023 - Discharge disposition: Home or Self Care      Destination    No services have been selected for the patient.                Durable Medical Equipment    No services have been selected for the patient.                Dialysis/Infusion    No services have been selected for the patient.                Home Medical Care    No services have been selected for the patient.                Therapy    No services have been selected for the patient.                Community Resources    No services have been selected for the patient.                Community & DME    No services have been selected for the patient.                         Final Discharge Disposition Code: 01 - home or self-care

## 2023-07-26 ENCOUNTER — TELEPHONE (OUTPATIENT)
Dept: ONCOLOGY | Facility: CLINIC | Age: 63
End: 2023-07-26
Payer: COMMERCIAL

## 2023-07-26 ENCOUNTER — SPECIALTY PHARMACY (OUTPATIENT)
Dept: PHARMACY | Facility: HOSPITAL | Age: 63
End: 2023-07-26
Payer: COMMERCIAL

## 2023-07-26 NOTE — TELEPHONE ENCOUNTER
Called pt re: cancelled appointment. Pt states he saw Dr. Daly at the [facility] and will be continuing his treatment and further care there. He was very appreciative of our care here and expressed gratitude for all Dr. Rocha has done for him. Encouraged pt to call back if he has any further needs we can assist with. He v/u and states he is actually starting his immunotherapy back tomorrow at [facility]. Wished pt well and message sent to scheduling to cancel all future appointments with us as well has his MRI  on 8/31.

## 2023-07-26 NOTE — PROGRESS NOTES
Submitted PA thru [employer] for Braftovi and Mektovi for approval thru [employer].  Waiting on response.    Fiedlia Schwartz  Specialty Pharmacy Technician

## 2023-08-10 RX ORDER — APIXABAN 5 MG/1
TABLET, FILM COATED ORAL
Qty: 60 TABLET | Refills: 0 | Status: SHIPPED | OUTPATIENT
Start: 2023-08-10

## 2023-10-12 ENCOUNTER — PRE-ADMISSION TESTING (OUTPATIENT)
Dept: PREADMISSION TESTING | Facility: HOSPITAL | Age: 63
End: 2023-10-12
Payer: COMMERCIAL

## 2023-10-12 VITALS
HEART RATE: 90 BPM | TEMPERATURE: 97.5 F | SYSTOLIC BLOOD PRESSURE: 114 MMHG | RESPIRATION RATE: 16 BRPM | HEIGHT: 72 IN | BODY MASS INDEX: 30.31 KG/M2 | WEIGHT: 223.8 LBS | DIASTOLIC BLOOD PRESSURE: 72 MMHG | OXYGEN SATURATION: 99 %

## 2023-10-12 PROCEDURE — 93005 ELECTROCARDIOGRAM TRACING: CPT

## 2023-10-12 RX ORDER — PROCHLORPERAZINE MALEATE 10 MG
10 TABLET ORAL EVERY 6 HOURS PRN
COMMUNITY
Start: 2023-09-21

## 2023-10-12 RX ORDER — PREDNISONE 10 MG/1
10 TABLET ORAL DAILY
COMMUNITY
Start: 2023-09-07

## 2023-10-12 RX ORDER — BINIMETINIB 15 MG/1
15 TABLET, FILM COATED ORAL 2 TIMES DAILY
COMMUNITY
Start: 2023-10-02

## 2023-10-12 RX ORDER — HYDROCODONE BITARTRATE AND ACETAMINOPHEN 5; 325 MG/1; MG/1
1 TABLET ORAL EVERY 6 HOURS PRN
COMMUNITY

## 2023-10-12 RX ORDER — ENCORAFENIB 75 MG/1
450 CAPSULE ORAL NIGHTLY
COMMUNITY
Start: 2023-10-02

## 2023-10-12 NOTE — DISCHARGE INSTRUCTIONS
Take the following medications the morning of surgery with a small sip of water: MEKTOVI, PREDNISONE    TIME OF ARRIVAL: 5:15 AM    If you are on prescription narcotic pain medication to control your pain you may also take that medication the morning of surgery.    General Instructions:  Do not eat or drink anything after midnight the night before surgery.  Patients who avoid smoking, chewing tobacco and alcohol for 4 weeks prior to surgery have a reduced risk of post-operative complications.  Quit smoking as many days before surgery as you can.  Do not smoke, use chewing tobacco or drink alcohol the day of surgery.   If applicable bring your C-PAP/ BI-PAP machine in with you to preop day of surgery.  Bring any papers given to you in the doctor’s office.  Wear clean comfortable clothes.  Do not wear contact lenses, false eyelashes or make-up.  Bring a case for your glasses.   Bring crutches or walker if applicable.  Remove all piercings.  Leave jewelry and any other valuables at home.  Hair extensions with metal clips must be removed prior to surgery.  The Pre-Admission Testing nurse will instruct you to bring medications if unable to obtain an accurate list in Pre-Admission Testing.        If you were given a blood bank ID arm band remember to bring it with you the day of surgery.    Preventing a Surgical Site Infection:  For 2 to 3 days before surgery, avoid shaving with a razor because the razor can irritate skin and make it easier to develop an infection.    Any areas of open skin can increase the risk of a post-operative wound infection by allowing bacteria to enter and travel throughout the body.  Notify your surgeon if you have any skin wounds / rashes even if it is not near the expected surgical site.  The area will need assessed to determine if surgery should be delayed until it is healed.  The night prior to surgery shower using a fresh bar of anti-bacterial soap (such as Dial) and clean washcloth.  Sleep  in a clean bed with clean clothing.  Do not allow pets to sleep with you.  Shower on the morning of surgery using a fresh bar of anti-bacterial soap (such as Dial) and clean washcloth.  Dry with a clean towel and dress in clean clothing.  Ask your surgeon if you will be receiving antibiotics prior to surgery.  Make sure you, your family, and all healthcare providers clean their hands with soap and water or an alcohol based hand  before caring for you or your wound.    Day of surgery:  Your arrival time is approximately two hours before your scheduled surgery time.  Upon arrival, a Pre-op nurse and Anesthesiologist will review your health history, obtain vital signs, and answer questions you may have.  The only belongings needed at this time will be your home medications and if applicable your C-PAP/BI-PAP machine.  A Pre-op nurse will start an IV and you may receive medication in preparation for surgery, including something to help you relax.      Please be aware that surgery does come with discomfort.  We want to make every effort to control your discomfort so please discuss any uncontrolled symptoms with your nurse.   Your doctor will most likely have prescribed pain medications.      If you are going home after surgery you will receive individualized written care instructions before being discharged.  A responsible adult must drive you to and from the hospital on the day of your surgery and stay with you for 24 hours.  Discharge prescriptions can be filled by the hospital pharmacy during regular pharmacy hours.  If you are having surgery late in the day/evening your prescription may be e-prescribed to your pharmacy.  Please verify your pharmacy hours or chose a 24 hour pharmacy to avoid not having access to your prescription because your pharmacy has closed for the day.    If you are staying overnight following surgery, you will be transported to your hospital room following the recovery period.  [facility]  [facility] has all private rooms.    If you have any questions please call Pre-Admission Testing at [phone].  Deductibles and co-payments are collected on the day of service. Please be prepared to pay the required co-pay, deductible or deposit on the day of service as defined by your plan.    Call your surgeon immediately if you experience any of the following symptoms:  Sore Throat  Shortness of Breath or difficulty breathing  Cough  Chills  Body soreness or muscle pain  Headache  Fever  New loss of taste or smell  Do not arrive for your surgery ill.  Your procedure will need to be rescheduled to another time.  You will need to call your physician before the day of surgery to avoid any unnecessary exposure to hospital staff as well as other patients.

## 2023-10-13 LAB
QT INTERVAL: 374 MS
QTC INTERVAL: 432 MS

## 2023-10-19 ENCOUNTER — HOSPITAL ENCOUNTER (OUTPATIENT)
Facility: HOSPITAL | Age: 63
Setting detail: HOSPITAL OUTPATIENT SURGERY
Discharge: HOME | End: 2023-10-19
Attending: UROLOGY | Admitting: UROLOGY
Payer: COMMERCIAL

## 2023-10-19 ENCOUNTER — ANESTHESIA EVENT (OUTPATIENT)
Dept: PERIOP | Facility: HOSPITAL | Age: 63
End: 2023-10-19
Payer: COMMERCIAL

## 2023-10-19 ENCOUNTER — APPOINTMENT (OUTPATIENT)
Dept: GENERAL RADIOLOGY | Facility: HOSPITAL | Age: 63
End: 2023-10-19
Payer: COMMERCIAL

## 2023-10-19 ENCOUNTER — ANESTHESIA (OUTPATIENT)
Dept: PERIOP | Facility: HOSPITAL | Age: 63
End: 2023-10-19
Payer: COMMERCIAL

## 2023-10-19 VITALS
BODY MASS INDEX: 30.35 KG/M2 | OXYGEN SATURATION: 100 % | RESPIRATION RATE: 16 BRPM | SYSTOLIC BLOOD PRESSURE: 153 MMHG | HEART RATE: 66 BPM | DIASTOLIC BLOOD PRESSURE: 93 MMHG | HEIGHT: 72 IN | TEMPERATURE: 97.9 F

## 2023-10-19 DIAGNOSIS — N13.30 HYDROURETERONEPHROSIS: Primary | ICD-10-CM

## 2023-10-19 PROCEDURE — C2617 STENT, NON-COR, TEM W/O DEL: HCPCS | Performed by: UROLOGY

## 2023-10-19 PROCEDURE — C1769 GUIDE WIRE: HCPCS | Performed by: UROLOGY

## 2023-10-19 PROCEDURE — 25510000001 IOPAMIDOL 61 % SOLUTION: Performed by: UROLOGY

## 2023-10-19 PROCEDURE — 25010000002 PROPOFOL 10 MG/ML EMULSION: Performed by: NURSE ANESTHETIST, CERTIFIED REGISTERED

## 2023-10-19 PROCEDURE — 25010000002 CEFAZOLIN IN DEXTROSE 2-4 GM/100ML-% SOLUTION: Performed by: UROLOGY

## 2023-10-19 PROCEDURE — C1758 CATHETER, URETERAL: HCPCS | Performed by: UROLOGY

## 2023-10-19 PROCEDURE — 25010000002 ONDANSETRON PER 1 MG: Performed by: NURSE ANESTHETIST, CERTIFIED REGISTERED

## 2023-10-19 PROCEDURE — 25010000002 DEXAMETHASONE SODIUM PHOSPHATE 20 MG/5ML SOLUTION: Performed by: NURSE ANESTHETIST, CERTIFIED REGISTERED

## 2023-10-19 PROCEDURE — 74420 UROGRAPHY RTRGR +-KUB: CPT

## 2023-10-19 PROCEDURE — 25810000003 LACTATED RINGERS PER 1000 ML: Performed by: ANESTHESIOLOGY

## 2023-10-19 DEVICE — IMPLANTABLE DEVICE: Type: IMPLANTABLE DEVICE | Site: URETER | Status: FUNCTIONAL

## 2023-10-19 RX ORDER — LIDOCAINE HYDROCHLORIDE 10 MG/ML
0.5 INJECTION, SOLUTION INFILTRATION; PERINEURAL ONCE AS NEEDED
Status: DISCONTINUED | OUTPATIENT
Start: 2023-10-19 | End: 2023-10-19 | Stop reason: HOSPADM

## 2023-10-19 RX ORDER — HYDROCODONE BITARTRATE AND ACETAMINOPHEN 5; 325 MG/1; MG/1
1 TABLET ORAL ONCE AS NEEDED
Status: DISCONTINUED | OUTPATIENT
Start: 2023-10-19 | End: 2023-10-19 | Stop reason: HOSPADM

## 2023-10-19 RX ORDER — OXYCODONE HYDROCHLORIDE AND ACETAMINOPHEN 5; 325 MG/1; MG/1
1 TABLET ORAL EVERY 6 HOURS PRN
Qty: 12 TABLET | Refills: 0 | Status: SHIPPED | OUTPATIENT
Start: 2023-10-19

## 2023-10-19 RX ORDER — DIPHENHYDRAMINE HYDROCHLORIDE 50 MG/ML
12.5 INJECTION INTRAMUSCULAR; INTRAVENOUS
Status: DISCONTINUED | OUTPATIENT
Start: 2023-10-19 | End: 2023-10-19 | Stop reason: HOSPADM

## 2023-10-19 RX ORDER — EPHEDRINE SULFATE 50 MG/ML
5 INJECTION, SOLUTION INTRAVENOUS ONCE AS NEEDED
Status: DISCONTINUED | OUTPATIENT
Start: 2023-10-19 | End: 2023-10-19 | Stop reason: HOSPADM

## 2023-10-19 RX ORDER — FENTANYL CITRATE 50 UG/ML
50 INJECTION, SOLUTION INTRAMUSCULAR; INTRAVENOUS
Status: DISCONTINUED | OUTPATIENT
Start: 2023-10-19 | End: 2023-10-19 | Stop reason: HOSPADM

## 2023-10-19 RX ORDER — ONDANSETRON 2 MG/ML
4 INJECTION INTRAMUSCULAR; INTRAVENOUS ONCE AS NEEDED
Status: DISCONTINUED | OUTPATIENT
Start: 2023-10-19 | End: 2023-10-19 | Stop reason: HOSPADM

## 2023-10-19 RX ORDER — IPRATROPIUM BROMIDE AND ALBUTEROL SULFATE 2.5; .5 MG/3ML; MG/3ML
3 SOLUTION RESPIRATORY (INHALATION) ONCE AS NEEDED
Status: DISCONTINUED | OUTPATIENT
Start: 2023-10-19 | End: 2023-10-19 | Stop reason: HOSPADM

## 2023-10-19 RX ORDER — PROMETHAZINE HYDROCHLORIDE 25 MG/1
25 TABLET ORAL ONCE AS NEEDED
Status: DISCONTINUED | OUTPATIENT
Start: 2023-10-19 | End: 2023-10-19 | Stop reason: HOSPADM

## 2023-10-19 RX ORDER — NALOXONE HCL 0.4 MG/ML
0.2 VIAL (ML) INJECTION AS NEEDED
Status: DISCONTINUED | OUTPATIENT
Start: 2023-10-19 | End: 2023-10-19 | Stop reason: HOSPADM

## 2023-10-19 RX ORDER — DEXAMETHASONE SODIUM PHOSPHATE 4 MG/ML
INJECTION, SOLUTION INTRA-ARTICULAR; INTRALESIONAL; INTRAMUSCULAR; INTRAVENOUS; SOFT TISSUE AS NEEDED
Status: DISCONTINUED | OUTPATIENT
Start: 2023-10-19 | End: 2023-10-19 | Stop reason: SURG

## 2023-10-19 RX ORDER — FLUMAZENIL 0.1 MG/ML
0.2 INJECTION INTRAVENOUS AS NEEDED
Status: DISCONTINUED | OUTPATIENT
Start: 2023-10-19 | End: 2023-10-19 | Stop reason: HOSPADM

## 2023-10-19 RX ORDER — DROPERIDOL 2.5 MG/ML
0.62 INJECTION, SOLUTION INTRAMUSCULAR; INTRAVENOUS
Status: DISCONTINUED | OUTPATIENT
Start: 2023-10-19 | End: 2023-10-19 | Stop reason: HOSPADM

## 2023-10-19 RX ORDER — HYDRALAZINE HYDROCHLORIDE 20 MG/ML
5 INJECTION INTRAMUSCULAR; INTRAVENOUS
Status: DISCONTINUED | OUTPATIENT
Start: 2023-10-19 | End: 2023-10-19 | Stop reason: HOSPADM

## 2023-10-19 RX ORDER — SULFAMETHOXAZOLE AND TRIMETHOPRIM 800; 160 MG/1; MG/1
1 TABLET ORAL 2 TIMES DAILY
Qty: 10 TABLET | Refills: 0 | Status: SHIPPED | OUTPATIENT
Start: 2023-10-19

## 2023-10-19 RX ORDER — CEFAZOLIN SODIUM 2 G/100ML
2000 INJECTION, SOLUTION INTRAVENOUS ONCE
Status: COMPLETED | OUTPATIENT
Start: 2023-10-19 | End: 2023-10-19

## 2023-10-19 RX ORDER — LABETALOL HYDROCHLORIDE 5 MG/ML
5 INJECTION, SOLUTION INTRAVENOUS
Status: DISCONTINUED | OUTPATIENT
Start: 2023-10-19 | End: 2023-10-19 | Stop reason: HOSPADM

## 2023-10-19 RX ORDER — PROMETHAZINE HYDROCHLORIDE 25 MG/1
25 SUPPOSITORY RECTAL ONCE AS NEEDED
Status: DISCONTINUED | OUTPATIENT
Start: 2023-10-19 | End: 2023-10-19 | Stop reason: HOSPADM

## 2023-10-19 RX ORDER — OXYCODONE AND ACETAMINOPHEN 7.5; 325 MG/1; MG/1
1 TABLET ORAL EVERY 4 HOURS PRN
Status: DISCONTINUED | OUTPATIENT
Start: 2023-10-19 | End: 2023-10-19 | Stop reason: HOSPADM

## 2023-10-19 RX ORDER — SODIUM CHLORIDE 0.9 % (FLUSH) 0.9 %
3 SYRINGE (ML) INJECTION EVERY 12 HOURS SCHEDULED
Status: DISCONTINUED | OUTPATIENT
Start: 2023-10-19 | End: 2023-10-19 | Stop reason: HOSPADM

## 2023-10-19 RX ORDER — SODIUM CHLORIDE 0.9 % (FLUSH) 0.9 %
3-10 SYRINGE (ML) INJECTION AS NEEDED
Status: DISCONTINUED | OUTPATIENT
Start: 2023-10-19 | End: 2023-10-19 | Stop reason: HOSPADM

## 2023-10-19 RX ORDER — HYDROMORPHONE HYDROCHLORIDE 1 MG/ML
0.5 INJECTION, SOLUTION INTRAMUSCULAR; INTRAVENOUS; SUBCUTANEOUS
Status: DISCONTINUED | OUTPATIENT
Start: 2023-10-19 | End: 2023-10-19 | Stop reason: HOSPADM

## 2023-10-19 RX ORDER — FAMOTIDINE 10 MG/ML
20 INJECTION, SOLUTION INTRAVENOUS ONCE
Status: COMPLETED | OUTPATIENT
Start: 2023-10-19 | End: 2023-10-19

## 2023-10-19 RX ORDER — FENTANYL CITRATE 50 UG/ML
50 INJECTION, SOLUTION INTRAMUSCULAR; INTRAVENOUS ONCE AS NEEDED
Status: DISCONTINUED | OUTPATIENT
Start: 2023-10-19 | End: 2023-10-19 | Stop reason: HOSPADM

## 2023-10-19 RX ORDER — ONDANSETRON 2 MG/ML
INJECTION INTRAMUSCULAR; INTRAVENOUS AS NEEDED
Status: DISCONTINUED | OUTPATIENT
Start: 2023-10-19 | End: 2023-10-19 | Stop reason: SURG

## 2023-10-19 RX ORDER — MAGNESIUM HYDROXIDE 1200 MG/15ML
LIQUID ORAL AS NEEDED
Status: DISCONTINUED | OUTPATIENT
Start: 2023-10-19 | End: 2023-10-19 | Stop reason: HOSPADM

## 2023-10-19 RX ORDER — MIDAZOLAM HYDROCHLORIDE 1 MG/ML
1 INJECTION INTRAMUSCULAR; INTRAVENOUS
Status: DISCONTINUED | OUTPATIENT
Start: 2023-10-19 | End: 2023-10-19 | Stop reason: HOSPADM

## 2023-10-19 RX ORDER — PROPOFOL 10 MG/ML
VIAL (ML) INTRAVENOUS AS NEEDED
Status: DISCONTINUED | OUTPATIENT
Start: 2023-10-19 | End: 2023-10-19 | Stop reason: SURG

## 2023-10-19 RX ORDER — SODIUM CHLORIDE, SODIUM LACTATE, POTASSIUM CHLORIDE, CALCIUM CHLORIDE 600; 310; 30; 20 MG/100ML; MG/100ML; MG/100ML; MG/100ML
9 INJECTION, SOLUTION INTRAVENOUS CONTINUOUS
Status: DISCONTINUED | OUTPATIENT
Start: 2023-10-19 | End: 2023-10-19 | Stop reason: HOSPADM

## 2023-10-19 RX ORDER — LIDOCAINE HYDROCHLORIDE 20 MG/ML
INJECTION, SOLUTION INFILTRATION; PERINEURAL AS NEEDED
Status: DISCONTINUED | OUTPATIENT
Start: 2023-10-19 | End: 2023-10-19 | Stop reason: SURG

## 2023-10-19 RX ADMIN — CEFAZOLIN SODIUM 2000 MG: 2 INJECTION, SOLUTION INTRAVENOUS at 06:55

## 2023-10-19 RX ADMIN — PROPOFOL 30 MG: 10 INJECTION, EMULSION INTRAVENOUS at 07:20

## 2023-10-19 RX ADMIN — ONDANSETRON 4 MG: 2 INJECTION INTRAMUSCULAR; INTRAVENOUS at 07:09

## 2023-10-19 RX ADMIN — PROPOFOL 250 MG: 10 INJECTION, EMULSION INTRAVENOUS at 07:03

## 2023-10-19 RX ADMIN — PROPOFOL 20 MG: 10 INJECTION, EMULSION INTRAVENOUS at 07:21

## 2023-10-19 RX ADMIN — OXYCODONE AND ACETAMINOPHEN 1 TABLET: 7.5; 325 TABLET ORAL at 08:12

## 2023-10-19 RX ADMIN — FAMOTIDINE 20 MG: 10 INJECTION INTRAVENOUS at 06:32

## 2023-10-19 RX ADMIN — LIDOCAINE HYDROCHLORIDE 100 MG: 20 INJECTION, SOLUTION INFILTRATION; PERINEURAL at 07:03

## 2023-10-19 RX ADMIN — DEXAMETHASONE SODIUM PHOSPHATE 10 MG: 4 INJECTION, SOLUTION INTRAMUSCULAR; INTRAVENOUS at 07:07

## 2023-10-19 RX ADMIN — SODIUM CHLORIDE, POTASSIUM CHLORIDE, SODIUM LACTATE AND CALCIUM CHLORIDE 9 ML/HR: 600; 310; 30; 20 INJECTION, SOLUTION INTRAVENOUS at 06:10

## 2023-10-19 NOTE — ANESTHESIA POSTPROCEDURE EVALUATION
"Patient: King George    Procedure Summary       Date: 10/19/23 Room / Location: [facility] OR [address] [place] [address] MAIN OR    Anesthesia Start: 0658 Anesthesia Stop: 0751    Procedure: CYSTOSCOPY WITH BILATERAL STENT EXCHANGE Diagnosis:     Surgeons: Vic Montelongo Jr., MD Provider: Evans Ingram MD    Anesthesia Type: general ASA Status: 3            Anesthesia Type: general    Vitals  Vitals Value Taken Time   /93 10/19/23 0817   Temp 36.6 °C (97.9 °F) 10/19/23 0745   Pulse 68 10/19/23 0830   Resp 16 10/19/23 0830   SpO2 100 % 10/19/23 0830   Vitals shown include unfiled device data.        Post Anesthesia Care and Evaluation    Patient location during evaluation: PACU  Patient participation: complete - patient participated  Level of consciousness: awake and alert  Pain management: adequate    Airway patency: patent  Anesthetic complications: No anesthetic complications  PONV Status: controlled  Cardiovascular status: acceptable and hemodynamically stable  Respiratory status: acceptable  Hydration status: acceptable    Comments: /93 (BP Location: Right arm, Patient Position: Lying)   Pulse 68   Temp 36.6 °C (97.9 °F) (Oral)   Resp 16   Ht 182.9 cm (72\")   SpO2 100%   BMI 30.35 kg/m²     "

## 2023-10-19 NOTE — ANESTHESIA PREPROCEDURE EVALUATION
Anesthesia Evaluation     NPO Solid Status: > 8 hours             Airway   Mallampati: III  TM distance: >3 FB  Neck ROM: full  Dental - normal exam     Pulmonary - normal exam   (+) pulmonary embolism (on eliquis, although it was held for cystoscopy.  resolving),  Cardiovascular - normal exam    ECG reviewed    (+) DVT resolved  (-) hypertension, past MI    ROS comment: Echocardiogram recently obtained at [facility] by oncology because he's starting new chemo. However, he reports absolutely no chest pain or shortness of breath    Neuro/Psych  GI/Hepatic/Renal/Endo      Musculoskeletal     Abdominal    Substance History      OB/GYN          Other      history of cancer (metastatic melanoma) active                  Anesthesia Plan    ASA 3     general     (LMA 5 used previously.    I have reviewed the patient's history with the patient and the chart, including all pertinent laboratory results and imaging. I have explained the risks of anesthesia including but not limited to dental damage, sore throat, nausea, corneal abrasion, nerve injury, MI, stroke, and death.  )    Anesthetic plan, risks, benefits, and alternatives have been provided, discussed and informed consent has been obtained with: patient.      CODE STATUS:

## 2023-10-19 NOTE — ANESTHESIA PROCEDURE NOTES
Airway  Urgency: elective    Date/Time: 10/19/2023 7:06 AM  Airway not difficult    General Information and Staff    Patient location during procedure: OR  Anesthesiologist: Evans Ingram MD  CRNA/CAA: Jewels Thurman CRNA    Indications and Patient Condition  Indications for airway management: airway protection    Preoxygenated: yes  MILS not maintained throughout  Mask difficulty assessment: 0 - not attempted    Final Airway Details  Final airway type: supraglottic airway      Successful airway: unique  Size 5     Number of attempts at approach: 1  Assessment: lips, teeth, and gum same as pre-op and atraumatic intubation

## 2023-10-19 NOTE — H&P
FIRST UROLOGY CONSULT      Patient Identification:  NAME:  King George  Age:  63 y.o.   Sex:  male   :  1960   MRN:  [id number]     Chief complaint: Hydronephrosis    History of present illness:      This is a 63 year old man with metastatic melanoma who was previously found to have bilateral hydronephrosis and had stents placed in 2023 who now presents for cystoscopy and bilateral stent exchange. We discussed the risks of the procedure including bleeding, infection, damage to surrounding structures, pain, need to perform additional procedures, risks of anesthesia. He understands these risks and would like to proceed.    Past medical history:  Past Medical History:   Diagnosis Date    Abdominal lymphadenopathy 2023    Acute pulmonary embolism, unspecified pulmonary embolism type, unspecified whether acute cor pulmonale present 2023    ADMITTED TO [facility], RIGHT    LENIN (acute kidney injury)     Anemia 2023    Colon polyps     FOLLOWED BY DR. SHANNA MOTTA    Constipation     Drug rash 2023    DVT (deep venous thrombosis)     LEFT LEG    Hepatic lesion 2023    History of shortness of breath     FOUND BLOOD CLOT ATTACHED TO LUNG    Left leg swelling     FROM BLOOD CLOT 2023    Left lower quadrant abdominal mass 2023    LEFT INGUINAL BX SHOWED METASTATIC MELANOMA    Melanoma     LEFT SIDE GROIN AREA, STAGE 4    Metastasis to brain 2023    Metastatic melanoma 2023    Moderate Malnutrition (HCC) 2023    Personal history of venous thrombosis and embolism 2023       Past surgical history:  Past Surgical History:   Procedure Laterality Date    COLONOSCOPY N/A 2023    40 MM TUBULOVILLOUS ADENOMA POLYP IN DISTAL RECTUM, RESCOPE IN 1 YR, DR. SHANNA MOTTA AT [facility]    COLONOSCOPY W/ POLYPECTOMY N/A 2023    40 MM POLYP IN DISTAL RECTUM, RESECTION AND RETREIVAL COMPLETE, RESCOPE IN 1 YR, DR. SHANNA MOTTA AT [facility]    CYSTOSCOPY W/ URETERAL STENT  PLACEMENT Bilateral 07/10/2023    Procedure: CYSTOSCOPY URETERAL CATHETER/STENT INSERTION;  Surgeon: Vic Montelongo Jr., MD;  Location: [facility] OR;  Service: Urology;  Laterality: Bilateral;    PORTACATH PLACEMENT Right 06/02/2023    DONE AT [facility]    US GUIDED LYMPH NODE BIOPSY  05/30/2023    METASTATIC MELANOMA, DONE AT [facility]    VENOUS ACCESS DEVICE (PORT) INSERTION N/A 06/02/2023    Procedure: INFUSAPORT PLACEMENT;  Surgeon: Vicki Layne MD;  Location: [facility];  Service: General;  Laterality: N/A;       Allergies:  Patient has no known allergies.    Home medications:  Medications Prior to Admission   Medication Sig Dispense Refill Last Dose    Braftovi 75 MG capsule Take 6 capsules by mouth Every Night.   10/18/2023 at 2100    Eliquis 5 MG tablet tablet TAKE 1 TABLET BY MOUTH EVERY 12 (TWELVE) HOURS. INDICATIONS: DVT/PE (ACTIVE THROMBOSIS) (Patient taking differently: Take 1 tablet by mouth Every 12 (Twelve) Hours. FOLLOW MD INSTRUCTIONS FOR STOPPING FOR SURGERY) 60 tablet 0 10/18/2023 at 2100    Mektovi 15 MG tablet Take 15 mg by mouth 2 (Two) Times a Day. TAKES 3 PILLS IN AM AND 3 PILLS IN PM   10/18/2023 at 2100    predniSONE (DELTASONE) 10 MG tablet Take 1 tablet by mouth Daily. TAKES 2 PILLS IN AM   10/18/2023 at 0900    famotidine (PEPCID) 20 MG tablet Take 1 tablet by mouth 2 (Two) Times a Day Before Meals. 60 tablet 0 More than a month    HYDROcodone-acetaminophen (NORCO) 5-325 MG per tablet Take 1 tablet by mouth Every 6 (Six) Hours As Needed. for pain   More than a month    ondansetron (ZOFRAN) 8 MG tablet Take 1 tablet by mouth Every 8 (Eight) Hours As Needed for Nausea or Vomiting. Pt not taking       prochlorperazine (COMPAZINE) 10 MG tablet Take 1 tablet by mouth Every 6 (Six) Hours As Needed. (Patient not taking: Reported on 10/12/2023)           Hospital medications:  ceFAZolin, 2,000 mg, Intravenous, Once  sodium chloride, 3 mL, Intravenous, Q12H      lactated ringers, 9 mL/hr,  Last Rate: 9 mL/hr (10/19/23 0633)        fentanyl    lidocaine    midazolam    sodium chloride    Family history:  Family History   Problem Relation Age of Onset    Breast cancer Mother     Prostate cancer Father     Malig Hyperthermia Neg Hx        Social history:  Social History     Tobacco Use    Smoking status: Never     Passive exposure: Never    Smokeless tobacco: Never   Vaping Use    Vaping Use: Unknown   Substance Use Topics    Alcohol use: Never    Drug use: Never       Review of systems:      Positive for:  nothing  Negative for:  chest pain, cough, sob, o/w neg    Objective:  TMax 24 hours:   Temp (24hrs), Av.2 °F (36.8 °C), Min:98.2 °F (36.8 °C), Max:98.2 °F (36.8 °C)      Vitals Ranges:   Temp:  [98.2 °F (36.8 °C)] 98.2 °F (36.8 °C)  Heart Rate:  [80] 80  Resp:  [16] 16  BP: (126)/(91) 126/91    Intake/Output Last 3 shifts:  No intake/output data recorded.     Physical Exam:    General Appearance:    Alert, cooperative, NAD                   Neuro/Psych:   Orientation intact, mood/affect pleasant       Results review:   I reviewed the patient's new clinical results.    Data review:  Lab Results (last 24 hours)       ** No results found for the last 24 hours. **             Imaging:  Imaging Results (Last 24 Hours)       ** No results found for the last 24 hours. **               Assessment:     Bilateral hydronephrosis    Plan:     Cystoscopy, bilateral ureteral stent exchange    Vic Montelongo Jr., MD  10/19/23  06:44 EDT

## 2023-10-19 NOTE — OP NOTE
Operative Report    [facility]    Patient: King George  Age:      63 y.o.  :     1960  Sex:      male    Medical Record:  [id number]    Date of Operation/Procedure:  10/19/2023    Pre-operative Diagnosis:  Bilateral hydronephrosis    Post-operative Diagnosis:  Same    Surgeon:  Jayda    Name of Operation/Procedure:  Procedure(s) and Anesthesia Type:     * CYSTOSCOPY WITH BILATERAL STENT EXCHANGE VS REMOVAL - General    Findings/Complications:  No complications.    Melanoma deposits at several sites within the bladder lumen. A tumor deposit was seen to obstruct the right ureteral orifice.     Right retrograde pyelogram interpretation: mild right hydronephrosis and hydroureter. No filling defects or extravasation.    Left retrograde pyelogram interpretation: mild hydronephrosis and hydroureter. No filling defects or extravasation.    Description of procedure: The patient was taken to the OR and placed under GA in lithotomy position.  Prepped and draped in sterile fashion.  The 21 Fr cystoscope was introduced and pancystoscopy was performed with findings as above. The previously placed right ureteral stent was grasped and removed. A Sensor guidewire was passed through the right ureteral orifice and into the kidney without difficulty. A Pollack catheter was passed to the midpoint of the right ureter, and dilute contrast was injected with findings as above. A 6 Fr x 28 cm JJ silicone stent was passed into the kidney into the proper anatomic position. The procedure was then repeated for the left side with findings as above.    Estimated Blood Loss: minimal    Specimens: * No orders in the log *    Fluids/Drains: bilateral silicone ureteral stents    Vic Montelongo Jr., MD  10/19/2023  07:39 EDT

## 2023-12-19 ENCOUNTER — HOSPITAL ENCOUNTER (INPATIENT)
Facility: HOSPITAL | Age: 63
LOS: 3 days | Discharge: HOME | End: 2023-12-22
Attending: STUDENT IN AN ORGANIZED HEALTH CARE EDUCATION/TRAINING PROGRAM | Admitting: INTERNAL MEDICINE
Payer: COMMERCIAL

## 2023-12-19 DIAGNOSIS — B95.2 BACTEREMIA DUE TO ENTEROCOCCUS: Primary | ICD-10-CM

## 2023-12-19 DIAGNOSIS — R78.81 BACTEREMIA DUE TO ENTEROCOCCUS: Primary | ICD-10-CM

## 2023-12-19 DIAGNOSIS — N17.9 SEPSIS WITH ACUTE RENAL FAILURE WITHOUT SEPTIC SHOCK, DUE TO UNSPECIFIED ORGANISM, UNSPECIFIED ACUTE RENAL FAILURE TYPE: ICD-10-CM

## 2023-12-19 DIAGNOSIS — A41.9 SEPSIS WITH ACUTE RENAL FAILURE WITHOUT SEPTIC SHOCK, DUE TO UNSPECIFIED ORGANISM, UNSPECIFIED ACUTE RENAL FAILURE TYPE: ICD-10-CM

## 2023-12-19 DIAGNOSIS — R65.20 SEPSIS WITH ACUTE RENAL FAILURE WITHOUT SEPTIC SHOCK, DUE TO UNSPECIFIED ORGANISM, UNSPECIFIED ACUTE RENAL FAILURE TYPE: ICD-10-CM

## 2023-12-19 DIAGNOSIS — B99.9 INFECTION: ICD-10-CM

## 2023-12-19 PROBLEM — N39.0 UTI (URINARY TRACT INFECTION), BACTERIAL: Status: ACTIVE | Noted: 2023-12-19

## 2023-12-19 PROBLEM — A49.9 UTI (URINARY TRACT INFECTION), BACTERIAL: Status: ACTIVE | Noted: 2023-12-19

## 2023-12-19 LAB
ALBUMIN SERPL-MCNC: 3.5 G/DL (ref 3.5–5.2)
ALBUMIN/GLOB SERPL: 0.9 G/DL
ALP SERPL-CCNC: 66 U/L (ref 39–117)
ALT SERPL W P-5'-P-CCNC: 14 U/L (ref 1–41)
ANION GAP SERPL CALCULATED.3IONS-SCNC: 15.2 MMOL/L (ref 5–15)
AST SERPL-CCNC: 51 U/L (ref 1–40)
BASOPHILS # BLD AUTO: 0.04 10*3/MM3 (ref 0–0.2)
BASOPHILS NFR BLD AUTO: 0.3 % (ref 0–1.5)
BILIRUB SERPL-MCNC: 0.6 MG/DL (ref 0–1.2)
BUN SERPL-MCNC: 34 MG/DL (ref 8–23)
BUN/CREAT SERPL: 16.9 (ref 7–25)
CALCIUM SPEC-SCNC: 8.6 MG/DL (ref 8.6–10.5)
CHLORIDE SERPL-SCNC: 102 MMOL/L (ref 98–107)
CO2 SERPL-SCNC: 14.8 MMOL/L (ref 22–29)
CREAT SERPL-MCNC: 2.01 MG/DL (ref 0.76–1.27)
D-LACTATE SERPL-SCNC: 1 MMOL/L (ref 0.5–2)
D-LACTATE SERPL-SCNC: 2.9 MMOL/L (ref 0.5–2)
D-LACTATE SERPL-SCNC: 3 MMOL/L (ref 0.5–2)
DEPRECATED RDW RBC AUTO: 47.9 FL (ref 37–54)
EGFRCR SERPLBLD CKD-EPI 2021: 37 ML/MIN/1.73
EOSINOPHIL # BLD AUTO: 0.03 10*3/MM3 (ref 0–0.4)
EOSINOPHIL NFR BLD AUTO: 0.2 % (ref 0.3–6.2)
ERYTHROCYTE [DISTWIDTH] IN BLOOD BY AUTOMATED COUNT: 15.6 % (ref 12.3–15.4)
GLOBULIN UR ELPH-MCNC: 3.8 GM/DL
GLUCOSE SERPL-MCNC: 106 MG/DL (ref 65–99)
HCT VFR BLD AUTO: 31.1 % (ref 37.5–51)
HGB BLD-MCNC: 10.5 G/DL (ref 13–17.7)
IMM GRANULOCYTES # BLD AUTO: 0.1 10*3/MM3 (ref 0–0.05)
IMM GRANULOCYTES NFR BLD AUTO: 0.7 % (ref 0–0.5)
LYMPHOCYTES # BLD AUTO: 1.02 10*3/MM3 (ref 0.7–3.1)
LYMPHOCYTES NFR BLD AUTO: 7.2 % (ref 19.6–45.3)
MCH RBC QN AUTO: 28.5 PG (ref 26.6–33)
MCHC RBC AUTO-ENTMCNC: 33.8 G/DL (ref 31.5–35.7)
MCV RBC AUTO: 84.5 FL (ref 79–97)
MONOCYTES # BLD AUTO: 0.72 10*3/MM3 (ref 0.1–0.9)
MONOCYTES NFR BLD AUTO: 5.1 % (ref 5–12)
NEUTROPHILS NFR BLD AUTO: 12.34 10*3/MM3 (ref 1.7–7)
NEUTROPHILS NFR BLD AUTO: 86.5 % (ref 42.7–76)
NRBC BLD AUTO-RTO: 0 /100 WBC (ref 0–0.2)
PLATELET # BLD AUTO: 163 10*3/MM3 (ref 140–450)
PMV BLD AUTO: 9.3 FL (ref 6–12)
POTASSIUM SERPL-SCNC: 4.1 MMOL/L (ref 3.5–5.2)
PROT SERPL-MCNC: 7.3 G/DL (ref 6–8.5)
RBC # BLD AUTO: 3.68 10*6/MM3 (ref 4.14–5.8)
SODIUM SERPL-SCNC: 132 MMOL/L (ref 136–145)
WBC NRBC COR # BLD AUTO: 14.25 10*3/MM3 (ref 3.4–10.8)

## 2023-12-19 PROCEDURE — 85025 COMPLETE CBC W/AUTO DIFF WBC: CPT | Performed by: INTERNAL MEDICINE

## 2023-12-19 PROCEDURE — 25810000003 LACTATED RINGERS PER 1000 ML: Performed by: INTERNAL MEDICINE

## 2023-12-19 PROCEDURE — 25010000002 CEFTRIAXONE PER 250 MG: Performed by: INTERNAL MEDICINE

## 2023-12-19 PROCEDURE — 83605 ASSAY OF LACTIC ACID: CPT | Performed by: INTERNAL MEDICINE

## 2023-12-19 PROCEDURE — 25810000003 SODIUM CHLORIDE 0.9 % SOLUTION: Performed by: INTERNAL MEDICINE

## 2023-12-19 PROCEDURE — 87150 DNA/RNA AMPLIFIED PROBE: CPT | Performed by: INTERNAL MEDICINE

## 2023-12-19 PROCEDURE — 87040 BLOOD CULTURE FOR BACTERIA: CPT | Performed by: INTERNAL MEDICINE

## 2023-12-19 PROCEDURE — 87186 SC STD MICRODIL/AGAR DIL: CPT | Performed by: INTERNAL MEDICINE

## 2023-12-19 PROCEDURE — 87077 CULTURE AEROBIC IDENTIFY: CPT | Performed by: INTERNAL MEDICINE

## 2023-12-19 PROCEDURE — 25010000002 ONDANSETRON PER 1 MG: Performed by: INTERNAL MEDICINE

## 2023-12-19 PROCEDURE — 80053 COMPREHEN METABOLIC PANEL: CPT | Performed by: INTERNAL MEDICINE

## 2023-12-19 RX ORDER — ACETAMINOPHEN 325 MG/1
650 TABLET ORAL EVERY 4 HOURS PRN
Status: DISCONTINUED | OUTPATIENT
Start: 2023-12-19 | End: 2023-12-22 | Stop reason: HOSPADM

## 2023-12-19 RX ORDER — NITROGLYCERIN 0.4 MG/1
0.4 TABLET SUBLINGUAL
Status: DISCONTINUED | OUTPATIENT
Start: 2023-12-19 | End: 2023-12-22 | Stop reason: HOSPADM

## 2023-12-19 RX ORDER — SODIUM CHLORIDE, SODIUM LACTATE, POTASSIUM CHLORIDE, CALCIUM CHLORIDE 600; 310; 30; 20 MG/100ML; MG/100ML; MG/100ML; MG/100ML
50 INJECTION, SOLUTION INTRAVENOUS CONTINUOUS
Status: DISCONTINUED | OUTPATIENT
Start: 2023-12-19 | End: 2023-12-21

## 2023-12-19 RX ORDER — NALOXONE HCL 0.4 MG/ML
0.4 VIAL (ML) INJECTION
Status: DISCONTINUED | OUTPATIENT
Start: 2023-12-19 | End: 2023-12-22 | Stop reason: HOSPADM

## 2023-12-19 RX ORDER — DEXAMETHASONE 0.5 MG/1
0.5 TABLET ORAL NIGHTLY
COMMUNITY

## 2023-12-19 RX ORDER — BISACODYL 10 MG
10 SUPPOSITORY, RECTAL RECTAL DAILY PRN
Status: DISCONTINUED | OUTPATIENT
Start: 2023-12-19 | End: 2023-12-22 | Stop reason: HOSPADM

## 2023-12-19 RX ORDER — ENOXAPARIN SODIUM 100 MG/ML
40 INJECTION SUBCUTANEOUS EVERY 24 HOURS
Status: DISCONTINUED | OUTPATIENT
Start: 2023-12-19 | End: 2023-12-19

## 2023-12-19 RX ORDER — DEXAMETHASONE 0.5 MG/1
0.5 TABLET ORAL NIGHTLY
Status: DISCONTINUED | OUTPATIENT
Start: 2023-12-19 | End: 2023-12-22 | Stop reason: HOSPADM

## 2023-12-19 RX ORDER — DEXAMETHASONE 2 MG/1
1 TABLET ORAL
Status: DISCONTINUED | OUTPATIENT
Start: 2023-12-20 | End: 2023-12-22 | Stop reason: HOSPADM

## 2023-12-19 RX ORDER — BISACODYL 5 MG/1
5 TABLET, DELAYED RELEASE ORAL DAILY PRN
Status: DISCONTINUED | OUTPATIENT
Start: 2023-12-19 | End: 2023-12-22 | Stop reason: HOSPADM

## 2023-12-19 RX ORDER — HYDROCODONE BITARTRATE AND ACETAMINOPHEN 5; 325 MG/1; MG/1
1 TABLET ORAL EVERY 4 HOURS PRN
Status: DISCONTINUED | OUTPATIENT
Start: 2023-12-19 | End: 2023-12-22 | Stop reason: HOSPADM

## 2023-12-19 RX ORDER — SODIUM CHLORIDE 0.9 % (FLUSH) 0.9 %
10 SYRINGE (ML) INJECTION AS NEEDED
Status: DISCONTINUED | OUTPATIENT
Start: 2023-12-19 | End: 2023-12-22 | Stop reason: HOSPADM

## 2023-12-19 RX ORDER — POLYETHYLENE GLYCOL 3350 17 G/17G
17 POWDER, FOR SOLUTION ORAL DAILY PRN
Status: DISCONTINUED | OUTPATIENT
Start: 2023-12-19 | End: 2023-12-22 | Stop reason: HOSPADM

## 2023-12-19 RX ORDER — SODIUM CHLORIDE 0.9 % (FLUSH) 0.9 %
10 SYRINGE (ML) INJECTION EVERY 12 HOURS SCHEDULED
Status: DISCONTINUED | OUTPATIENT
Start: 2023-12-19 | End: 2023-12-22 | Stop reason: HOSPADM

## 2023-12-19 RX ORDER — SODIUM CHLORIDE 9 MG/ML
150 INJECTION, SOLUTION INTRAVENOUS CONTINUOUS
Status: DISCONTINUED | OUTPATIENT
Start: 2023-12-19 | End: 2023-12-19

## 2023-12-19 RX ORDER — DEXAMETHASONE 0.5 MG/1
1 TABLET ORAL
COMMUNITY

## 2023-12-19 RX ORDER — SODIUM CHLORIDE 9 MG/ML
40 INJECTION, SOLUTION INTRAVENOUS AS NEEDED
Status: DISCONTINUED | OUTPATIENT
Start: 2023-12-19 | End: 2023-12-22 | Stop reason: HOSPADM

## 2023-12-19 RX ORDER — AMOXICILLIN 250 MG
2 CAPSULE ORAL 2 TIMES DAILY
Status: DISCONTINUED | OUTPATIENT
Start: 2023-12-19 | End: 2023-12-22 | Stop reason: HOSPADM

## 2023-12-19 RX ORDER — ONDANSETRON 2 MG/ML
4 INJECTION INTRAMUSCULAR; INTRAVENOUS EVERY 6 HOURS PRN
Status: DISCONTINUED | OUTPATIENT
Start: 2023-12-19 | End: 2023-12-22 | Stop reason: HOSPADM

## 2023-12-19 RX ORDER — HYDROMORPHONE HYDROCHLORIDE 1 MG/ML
0.5 INJECTION, SOLUTION INTRAMUSCULAR; INTRAVENOUS; SUBCUTANEOUS
Status: DISCONTINUED | OUTPATIENT
Start: 2023-12-19 | End: 2023-12-22 | Stop reason: HOSPADM

## 2023-12-19 RX ADMIN — ACETAMINOPHEN 650 MG: 325 TABLET, FILM COATED ORAL at 16:22

## 2023-12-19 RX ADMIN — DEXAMETHASONE 0.5 MG: 0.5 TABLET ORAL at 21:31

## 2023-12-19 RX ADMIN — SODIUM CHLORIDE 125 ML/HR: 9 INJECTION, SOLUTION INTRAVENOUS at 16:24

## 2023-12-19 RX ADMIN — CEFTRIAXONE SODIUM 1000 MG: 1 INJECTION, POWDER, FOR SOLUTION INTRAMUSCULAR; INTRAVENOUS at 16:22

## 2023-12-19 RX ADMIN — ONDANSETRON 4 MG: 2 INJECTION INTRAMUSCULAR; INTRAVENOUS at 16:22

## 2023-12-19 RX ADMIN — SODIUM CHLORIDE, POTASSIUM CHLORIDE, SODIUM LACTATE AND CALCIUM CHLORIDE 125 ML/HR: 600; 310; 30; 20 INJECTION, SOLUTION INTRAVENOUS at 17:43

## 2023-12-19 RX ADMIN — APIXABAN 5 MG: 5 TABLET, FILM COATED ORAL at 20:37

## 2023-12-19 RX ADMIN — Medication 10 ML: at 20:37

## 2023-12-19 NOTE — H&P
Patient Name:  King George  YOB: 1960  MRN:  [id number]  Admit Date:  12/19/2023  Patient Care Team:  Mohini Cortes MD as PCP - General (Internal Medicine)  Bernardo Azar MD as Referring Physician (Hospitalist)  Brodie oRcha MD as Consulting Physician (Hematology and Oncology)  Amy Berry MD as Consulting Physician (Radiation Oncology)  Perfecto Daly MD as Consulting Physician (Oncology)      Subjective   History Present Illness     No chief complaint on file.    Patient is a 63-year-old male with known history of metastatic melanoma stage IV, DVT/PE initially went to [facility] with complaints of chills and fever.  His temperature was noted to be 102 and blood pressure was 86/60 initially.  Subsequently septic workup was initiated and he was aggressively volume resuscitated with total of 4 L of fluids.  Subsequently CT of the abdomen pelvis was done with evidence of bilateral and inguinal lymphadenopathy and pelvic nodes likely related to his metastatic melanoma.  UA with 4+ bacteria and WBC was 2020.  Chest x-ray with no acute findings.  WBC was 17,800 and creatinine was also on the rise at 2.37 and given the above he is being admitted to the hospital.  Did receive IV Rocephin in the local emergency room.  Of note patient has a known history of hydroureteronephrosis secondary to metastatic melanoma and had bilateral ureteral stents placed and most recent exchange was in October.  CT of the abdomen pelvis done at [facility] did not reveal any evidence of hydronephrosis.  At the time of my evaluation patient still appears ill and is mildly hyperventilating but awake alert and oriented x 3.  Does complain of diarrhea that started today with no blood or mucus in it.  Denies any abdominal pain, nausea, vomiting, chest pain, cough, sputum production, runny nose, sore throat.  Of note tested negative for COVID/flu/RSV.      Review of Systems   A 12 system review has  been performed and they are negative other than mentioned in the H&P    Personal History     Past Medical History:   Diagnosis Date    Abdominal lymphadenopathy 05/29/2023    Acute pulmonary embolism, unspecified pulmonary embolism type, unspecified whether acute cor pulmonale present 05/28/2023    ADMITTED TO [facility], RIGHT    LENIN (acute kidney injury)     Anemia 05/29/2023    Colon polyps     FOLLOWED BY DR. SHANNA MOTTA    Constipation     Drug rash 06/19/2023    DVT (deep venous thrombosis)     LEFT LEG    Hepatic lesion 05/29/2023    History of shortness of breath     FOUND BLOOD CLOT ATTACHED TO LUNG    Left leg swelling     FROM BLOOD CLOT 5/2023    Left lower quadrant abdominal mass 05/28/2023    LEFT INGUINAL BX SHOWED METASTATIC MELANOMA    Melanoma     LEFT SIDE GROIN AREA, STAGE 4    Metastasis to brain 06/02/2023    Metastatic melanoma 05/31/2023    Moderate Malnutrition (HCC) 05/30/2023    Personal history of venous thrombosis and embolism 06/19/2023     Past Surgical History:   Procedure Laterality Date    COLONOSCOPY N/A 06/02/2023    40 MM TUBULOVILLOUS ADENOMA POLYP IN DISTAL RECTUM, RESCOPE IN 1 YR, DR. SHANNA MOTTA AT [facility]    COLONOSCOPY W/ POLYPECTOMY N/A 06/02/2023    40 MM POLYP IN DISTAL RECTUM, RESECTION AND RETREIVAL COMPLETE, RESCOPE IN 1 YR, DR. SHANNA MOTTA AT [facility]    CYSTOSCOPY W/ URETERAL STENT PLACEMENT Bilateral 07/10/2023    Procedure: CYSTOSCOPY URETERAL CATHETER/STENT INSERTION;  Surgeon: Vic Montelongo Jr., MD;  Location: [facility];  Service: Urology;  Laterality: Bilateral;    CYSTOSCOPY W/ URETERAL STENT PLACEMENT N/A 10/19/2023    Procedure: CYSTOSCOPY WITH BILATERAL STENT EXCHANGE;  Surgeon: Vic Montelongo Jr., MD;  Location: [facility];  Service: Urology;  Laterality: N/A;    PORTACATH PLACEMENT Right 06/02/2023    DONE AT [facility]    US GUIDED LYMPH NODE BIOPSY  05/30/2023    METASTATIC MELANOMA, DONE AT [facility]    VENOUS ACCESS DEVICE (PORT) INSERTION N/A 06/02/2023     Procedure: INFUSAPORT PLACEMENT;  Surgeon: Vicki Layne MD;  Location: [facility] OR;  Service: General;  Laterality: N/A;     Family History   Problem Relation Age of Onset    Breast cancer Mother     Prostate cancer Father     Malig Hyperthermia Neg Hx      Social History     Tobacco Use    Smoking status: Never     Passive exposure: Never    Smokeless tobacco: Never   Vaping Use    Vaping Use: Unknown   Substance Use Topics    Alcohol use: Never    Drug use: Never     No current facility-administered medications on file prior to encounter.     Current Outpatient Medications on File Prior to Encounter   Medication Sig Dispense Refill    Braftovi 75 MG capsule Take 6 capsules by mouth Every Night.      Eliquis 5 MG tablet tablet TAKE 1 TABLET BY MOUTH EVERY 12 (TWELVE) HOURS. INDICATIONS: DVT/PE (ACTIVE THROMBOSIS) (Patient taking differently: Take 1 tablet by mouth Every 12 (Twelve) Hours. FOLLOW MD INSTRUCTIONS FOR STOPPING FOR SURGERY) 60 tablet 0    famotidine (PEPCID) 20 MG tablet Take 1 tablet by mouth 2 (Two) Times a Day Before Meals. 60 tablet 0    HYDROcodone-acetaminophen (NORCO) 5-325 MG per tablet Take 1 tablet by mouth Every 6 (Six) Hours As Needed. for pain      Mektovi 15 MG tablet Take 15 mg by mouth 2 (Two) Times a Day. TAKES 3 PILLS IN AM AND 3 PILLS IN PM      ondansetron (ZOFRAN) 8 MG tablet Take 1 tablet by mouth Every 8 (Eight) Hours As Needed for Nausea or Vomiting. Pt not taking      oxyCODONE-acetaminophen (Percocet) 5-325 MG per tablet Take 1 tablet by mouth Every 6 (Six) Hours As Needed for Moderate Pain. 12 tablet 0    predniSONE (DELTASONE) 10 MG tablet Take 1 tablet by mouth Daily. TAKES 2 PILLS IN AM      prochlorperazine (COMPAZINE) 10 MG tablet Take 1 tablet by mouth Every 6 (Six) Hours As Needed. (Patient not taking: Reported on 10/12/2023)      sulfamethoxazole-trimethoprim (Bactrim DS) 800-160 MG per tablet Take 1 tablet by mouth 2 (Two) Times a Day. 10 tablet 0     No  Known Allergies    Objective    Objective     Vital Signs  Temp:  [98.6 °F (37 °C)] 98.6 °F (37 °C)  Heart Rate:  [81] 81  Resp:  [18] 18  BP: (112)/(77) 112/77  SpO2:  [97 %] 97 %  on   ;   Device (Oxygen Therapy): room air  Body mass index is 31.73 kg/m².    Physical Exam  HEENT: PERRLA, extraocular movements intact, Scleras no icterus  Neck: Supple, no JVD  Cardiovascular: Regular rate and rhythm with normal S1 and S2  Respiratory: Fairly clear to auscultation bilaterally with no wheezes  GI: Soft, nontender, bowel sounds present  Extremities: No edema, palpable pedal pulses  Neurologic: Grossly nonfocal, no facial asymmetry    Results Review:  I reviewed the patient's new clinical results.  I reviewed the patient's new imaging results and agree with the interpretation.  I reviewed the patient's other test results and agree with the interpretation  I personally viewed and interpreted the patient's EKG/Telemetry data  Discussed with ED provider.    Lab Results (last 24 hours)       Procedure Component Value Units Date/Time    COVID-19 PCR, "[employer] LABS, NP SWAB IN LEXAR VIRAL TRANSPORT MEDIA/ORAL SWISH 24-30 HR TAT - , [[id number]] Collected: 12/18/23 1644    Specimen: Other Updated: 12/19/23 1119     SARS-CoV-2, BIANCA NEGATIVE     Comment: The 2019-CoV rRT-PCR Assay is only for use under a Food and Drug Administration Emergency Use Authorization. The performance characteristics of the assay were verified by the Clinical Laboratory at [facility]. Results should be used in   conjunction with the patient's clinical symptoms, medical history, and other clinical/laboratory findings to determine an overall clinical diagnosis. Negative results do not preclude infection with SARS-CoV-2 (COVID-19).    Test parameters have not been validated for screening asymptomatic patients.        Influenza A PCR NEGATIVE     Influenza B PCR NEGATIVE     RSV, PCR NEGATIVE     Test Comment: No     COMMENTS No     Symptom Yes      DATE 12/18/2023     COMMENTS No     COMMENTS No     COMMENTS No     Pregnant? No    COMPREHENSIVE METABOLIC PANEL [[id number]]  (Abnormal) Collected: 12/18/23 1803     Updated: 12/19/23 1119    LACTIC ACID, PLASMA [[id number]] Collected: 12/18/23 1803    Specimen: Blood Updated: 12/19/23 1119     Lactate 1.2 mmol/L     TROPONIN I, HIGH SENSITIVE [[id number]]  (Abnormal) Collected: 12/18/23 1803     Updated: 12/19/23 1119    BNP (IN-HOUSE) [[id number]]  (Abnormal) Collected: 12/18/23 1803    Specimen: Blood Updated: 12/19/23 1119      pg/mL     CONV D-DIMER [[id number]]  (Abnormal) Collected: 12/18/23 1803     Updated: 12/19/23 1119    Urinalysis With Culture If Indicated - [[id number]]  (Abnormal) Collected: 12/18/23 1803     Updated: 12/19/23 1119    Urinalysis, Microscopic Only - [[id number]]  (Abnormal) Collected: 12/18/23 1803     Updated: 12/19/23 1119    AUTODIFF [[id number]]  (Abnormal) Collected: 12/18/23 1803    Specimen: Blood Updated: 12/19/23 1119     Neutrophil % 86.0 %      Lymphocyte % 6.1 %      Monocyte % 7.6 %      Eosinophil % 0.0 %      Basophil % 0.3 %      Neutrophils Absolute 15.30 x10(3)/ul      Lymphocytes Absolute 1.1 x10(3)/ul      Monocytes Absolute 1.3 x10(3)/ul      Eosinophils Absolute 0.0 x10(3)/ul      Basophils Absolute 0.1 x10(3)/ul     Narrative:       Ordered by Discern Expert.    CBC AND DIFFERENTIAL [[id number]]  (Abnormal) Collected: 12/18/23 1803     Updated: 12/19/23 1119    BASIC METABOLIC PANEL [[id number]]  (Abnormal) Collected: 12/18/23 2112     Updated: 12/19/23 1119    TROPONIN I, HIGH SENSITIVE [[id number]]  (Abnormal) Collected: 12/19/23 0051     Updated: 12/19/23 1119    CBC & Differential [[id number]]  (Abnormal) Collected: 12/19/23 1607    Specimen: Blood Updated: 12/19/23 6867    Narrative:      The following orders were created for panel order CBC & Differential.  Procedure                               Abnormality         Status                     ---------                                -----------         ------                     CBC Auto Differential[[id number]]        Abnormal            Final result                 Please view results for these tests on the individual orders.    CBC Auto Differential [[id number]]  (Abnormal) Collected: 12/19/23 1607    Specimen: Blood Updated: 12/19/23 1627     WBC 14.25 10*3/mm3      RBC 3.68 10*6/mm3      Hemoglobin 10.5 g/dL      Hematocrit 31.1 %      MCV 84.5 fL      MCH 28.5 pg      MCHC 33.8 g/dL      RDW 15.6 %      RDW-SD 47.9 fl      MPV 9.3 fL      Platelets 163 10*3/mm3      Neutrophil % 86.5 %      Lymphocyte % 7.2 %      Monocyte % 5.1 %      Eosinophil % 0.2 %      Basophil % 0.3 %      Immature Grans % 0.7 %      Neutrophils, Absolute 12.34 10*3/mm3      Lymphocytes, Absolute 1.02 10*3/mm3      Monocytes, Absolute 0.72 10*3/mm3      Eosinophils, Absolute 0.03 10*3/mm3      Basophils, Absolute 0.04 10*3/mm3      Immature Grans, Absolute 0.10 10*3/mm3      nRBC 0.0 /100 WBC     Comprehensive Metabolic Panel [[id number]] Collected: 12/19/23 1608    Specimen: Blood Updated: 12/19/23 1622    Lactic Acid, Plasma [[id number]] Collected: 12/19/23 1608    Specimen: Blood Updated: 12/19/23 1622    Blood Culture - Blood, Arm, Left [[id number]] Collected: 12/19/23 1608    Specimen: Blood from Arm, Left Updated: 12/19/23 1625    Blood Culture - Blood, Arm, Right [[id number]] Collected: 12/19/23 1616    Specimen: Blood from Arm, Right Updated: 12/19/23 1624            Imaging Results (Last 24 Hours)       ** No results found for the last 24 hours. **                No orders to display        Assessment/Plan     Active Hospital Problems    Diagnosis  POA    **Sepsis [A41.9]  Yes    UTI (urinary tract infection), bacterial [N39.0, A49.9]  Unknown    Hydroureteronephrosis [N13.30]  Yes    LENIN (acute kidney injury) [N17.9]  Yes    Metastatic melanoma [C43.9]  Yes    History of pulmonary embolism [Z86.711]  Yes      Resolved Hospital Problems    No resolved problems to display.       1.Sepsis with no clear source however  track is probably culprit.  For the moment Rocephin will be continued and infectious disease consult will also be obtained.  Unclear if he needs any ureteral stent exchange therefore urology consult is also been obtained.  Await further blood culture results.    2.  History of DVT/PE, home dose Eliquis will be resumed.    3.  Metastatic melanoma, home dose Braftovi and Mektovi will be held.    4.  Acute kidney injury, likely related to ATN and is on Bactrim as well which will be   discontinued.  Avoid nephrotoxins.  Nephrology consult will be obtained regarding the same.    5.  DVT prophylaxis, on Eliquis.    6.  CODE STATUS is full code.       Jm Chaney MD  [facility]  12/19/23  16:36 EST

## 2023-12-19 NOTE — CONSULTS
FIRST UROLOGY CONSULT      Patient Identification:  NAME:  King George  Age:  63 y.o.   Sex:  male   :  1960   MRN:  [id number]     Chief complaint: Fever.      History of present illness:      H/o metastatic melanoma with B HN. Jayda Davenport had placed B ureteral stents 10/23 and pt had been doing well. Developed F/C. Was seen at [facility] ER and ct scan showed No HN with stents in good position. + UTI.     In hospital:  -AVSS, good UOP  -WBC - 14.25  -Creat - 2.01  Past medical history:  Past Medical History:   Diagnosis Date    Abdominal lymphadenopathy 2023    Acute pulmonary embolism, unspecified pulmonary embolism type, unspecified whether acute cor pulmonale present 2023    ADMITTED TO [facility], RIGHT    LENIN (acute kidney injury)     Anemia 2023    Colon polyps     FOLLOWED BY DR. SHANNA MOTTA    Constipation     Drug rash 2023    DVT (deep venous thrombosis)     LEFT LEG    Hepatic lesion 2023    History of shortness of breath     FOUND BLOOD CLOT ATTACHED TO LUNG    Left leg swelling     FROM BLOOD CLOT 2023    Left lower quadrant abdominal mass 2023    LEFT INGUINAL BX SHOWED METASTATIC MELANOMA    Melanoma     LEFT SIDE GROIN AREA, STAGE 4    Metastasis to brain 2023    Metastatic melanoma 2023    Moderate Malnutrition (HCC) 2023    Personal history of venous thrombosis and embolism 2023       Past surgical history:  Past Surgical History:   Procedure Laterality Date    COLONOSCOPY N/A 2023    40 MM TUBULOVILLOUS ADENOMA POLYP IN DISTAL RECTUM, RESCOPE IN 1 YR, DR. SHANNA MOTTA AT [facility]    COLONOSCOPY W/ POLYPECTOMY N/A 2023    40 MM POLYP IN DISTAL RECTUM, RESECTION AND RETREIVAL COMPLETE, RESCOPE IN 1 YR, DR. SHANNA MOTTA AT [facility]    CYSTOSCOPY W/ URETERAL STENT PLACEMENT Bilateral 07/10/2023    Procedure: CYSTOSCOPY URETERAL CATHETER/STENT INSERTION;  Surgeon: Vic Montelongo Jr., MD;  Location: [facility];  Service:  Urology;  Laterality: Bilateral;    CYSTOSCOPY W/ URETERAL STENT PLACEMENT N/A 10/19/2023    Procedure: CYSTOSCOPY WITH BILATERAL STENT EXCHANGE;  Surgeon: Vic Montelongo Jr., MD;  Location: [facility] OR;  Service: Urology;  Laterality: N/A;    PORTACATH PLACEMENT Right 06/02/2023    DONE AT [facility]    US GUIDED LYMPH NODE BIOPSY  05/30/2023    METASTATIC MELANOMA, DONE AT [facility]    VENOUS ACCESS DEVICE (PORT) INSERTION N/A 06/02/2023    Procedure: INFUSAPORT PLACEMENT;  Surgeon: Vicki Layne MD;  Location: [facility];  Service: General;  Laterality: N/A;       Allergies:  Patient has no known allergies.    Home medications:  Medications Prior to Admission   Medication Sig Dispense Refill Last Dose    Braftovi 75 MG capsule Take 6 capsules by mouth Every Night.   12/18/2023    Eliquis 5 MG tablet tablet TAKE 1 TABLET BY MOUTH EVERY 12 (TWELVE) HOURS. INDICATIONS: DVT/PE (ACTIVE THROMBOSIS) (Patient taking differently: Take 1 tablet by mouth Every 12 (Twelve) Hours. FOLLOW MD INSTRUCTIONS FOR STOPPING FOR SURGERY) 60 tablet 0 12/18/2023    Mektovi 15 MG tablet Take 15 mg by mouth 2 (Two) Times a Day. TAKES 3 PILLS IN AM AND 3 PILLS IN PM   12/18/2023    ondansetron (ZOFRAN) 8 MG tablet Take 1 tablet by mouth Every 8 (Eight) Hours As Needed for Nausea or Vomiting. Pt not taking       dexAMETHasone (DECADRON) 0.5 MG tablet Take 2 tablets by mouth Daily With Breakfast.       dexAMETHasone (DECADRON) 0.5 MG tablet Take 1 tablet by mouth Every Night.       famotidine (PEPCID) 20 MG tablet Take 1 tablet by mouth 2 (Two) Times a Day Before Meals. 60 tablet 0     HYDROcodone-acetaminophen (NORCO) 5-325 MG per tablet Take 1 tablet by mouth Every 6 (Six) Hours As Needed. for pain       oxyCODONE-acetaminophen (Percocet) 5-325 MG per tablet Take 1 tablet by mouth Every 6 (Six) Hours As Needed for Moderate Pain. 12 tablet 0     predniSONE (DELTASONE) 10 MG tablet Take 1 tablet by mouth Daily. TAKES 2 PILLS IN AM        prochlorperazine (COMPAZINE) 10 MG tablet Take 1 tablet by mouth Every 6 (Six) Hours As Needed.       sulfamethoxazole-trimethoprim (Bactrim DS) 800-160 MG per tablet Take 1 tablet by mouth 2 (Two) Times a Day. 10 tablet 0         Hospital medications:  apixaban, 5 mg, Oral, Q12H  cefTRIAXone, 1,000 mg, Intravenous, Q24H  dexAMETHasone, 0.5 mg, Oral, Nightly  [START ON 2023] dexAMETHasone, 1 mg, Oral, Daily With Breakfast  senna-docusate sodium, 2 tablet, Oral, BID  sodium chloride, 10 mL, Intravenous, Q12H      lactated ringers, 125 mL/hr, Last Rate: 125 mL/hr (23 1743)        acetaminophen    senna-docusate sodium **AND** polyethylene glycol **AND** bisacodyl **AND** bisacodyl    Calcium Replacement - Follow Nurse / BPA Driven Protocol    HYDROcodone-acetaminophen    HYDROmorphone **AND** naloxone    Magnesium Standard Dose Replacement - Follow Nurse / BPA Driven Protocol    nitroglycerin    ondansetron    Phosphorus Replacement - Follow Nurse / BPA Driven Protocol    Potassium Replacement - Follow Nurse / BPA Driven Protocol    sodium chloride    sodium chloride    Family history:  Family History   Problem Relation Age of Onset    Breast cancer Mother     Prostate cancer Father     Malig Hyperthermia Neg Hx        Social history:  Social History     Tobacco Use    Smoking status: Never     Passive exposure: Never    Smokeless tobacco: Never   Vaping Use    Vaping Use: Unknown   Substance Use Topics    Alcohol use: Never    Drug use: Never       Review of systems:      Positive for: Fever.    Negative for:  chest pain, cough, sob, o/w neg    Objective:  TMax 24 hours:   Temp (24hrs), Av.3 °F (37.4 °C), Min:98.6 °F (37 °C), Max:99.9 °F (37.7 °C)      Vitals Ranges:   Temp:  [98.6 °F (37 °C)-99.9 °F (37.7 °C)] 99.9 °F (37.7 °C)  Heart Rate:  [81] 81  Resp:  [18] 18  BP: (112)/(77) 112/77    Intake/Output Last 3 shifts:  No intake/output data recorded.     Physical Exam:    General Appearance:     Alert, cooperative, NAD   Back:     No CVA tenderness   Lungs:     Respirations unlabored, no wheezing    Heart:    RRR, intact peripheral pulses   Abdomen:     Soft, NDNT, no masses, no guarding   Neuro/Psych:   Orientation intact, mood/affect pleasant       Results review:   I reviewed the patient's new clinical results.    Data review:  Lab Results (last 24 hours)       Procedure Component Value Units Date/Time    Comprehensive Metabolic Panel [[id number]]  (Abnormal) Collected: 12/19/23 1608    Specimen: Blood Updated: 12/19/23 1645     Glucose 106 mg/dL      BUN 34 mg/dL      Creatinine 2.01 mg/dL      Sodium 132 mmol/L      Potassium 4.1 mmol/L      Chloride 102 mmol/L      CO2 14.8 mmol/L      Calcium 8.6 mg/dL      Total Protein 7.3 g/dL      Albumin 3.5 g/dL      ALT (SGPT) 14 U/L      AST (SGOT) 51 U/L      Alkaline Phosphatase 66 U/L      Total Bilirubin 0.6 mg/dL      Globulin 3.8 gm/dL      A/G Ratio 0.9 g/dL      BUN/Creatinine Ratio 16.9     Anion Gap 15.2 mmol/L      eGFR 37.0 mL/min/1.73     Narrative:      GFR Normal >60  Chronic Kidney Disease <60  Kidney Failure <15      Lactic Acid, Plasma [[id number]]  (Abnormal) Collected: 12/19/23 1608    Specimen: Blood Updated: 12/19/23 1645     Lactate 3.0 mmol/L     CBC & Differential [[id number]]  (Abnormal) Collected: 12/19/23 1607    Specimen: Blood Updated: 12/19/23 1627    Narrative:      The following orders were created for panel order CBC & Differential.  Procedure                               Abnormality         Status                     ---------                               -----------         ------                     CBC Auto Differential[[id number]]        Abnormal            Final result                 Please view results for these tests on the individual orders.    CBC Auto Differential [[id number]]  (Abnormal) Collected: 12/19/23 1607    Specimen: Blood Updated: 12/19/23 1627     WBC 14.25 10*3/mm3      RBC 3.68 10*6/mm3      Hemoglobin  10.5 g/dL      Hematocrit 31.1 %      MCV 84.5 fL      MCH 28.5 pg      MCHC 33.8 g/dL      RDW 15.6 %      RDW-SD 47.9 fl      MPV 9.3 fL      Platelets 163 10*3/mm3      Neutrophil % 86.5 %      Lymphocyte % 7.2 %      Monocyte % 5.1 %      Eosinophil % 0.2 %      Basophil % 0.3 %      Immature Grans % 0.7 %      Neutrophils, Absolute 12.34 10*3/mm3      Lymphocytes, Absolute 1.02 10*3/mm3      Monocytes, Absolute 0.72 10*3/mm3      Eosinophils, Absolute 0.03 10*3/mm3      Basophils, Absolute 0.04 10*3/mm3      Immature Grans, Absolute 0.10 10*3/mm3      nRBC 0.0 /100 WBC     Blood Culture - Blood, Arm, Left [[id number]] Collected: 12/19/23 1608    Specimen: Blood from Arm, Left Updated: 12/19/23 1625    Blood Culture - Blood, Arm, Right [[id number]] Collected: 12/19/23 1616    Specimen: Blood from Arm, Right Updated: 12/19/23 1624             Imaging:  Imaging Results (Last 24 Hours)       ** No results found for the last 24 hours. **               Assessment:     B HN s/p B ureteral stents.  UTI.      Plan:     No evidence of HN on ct scan. Stents in good position.  No need to change stents at this point.  Await ucx.  Will arrange f/u Dr. Montelongo.      Aryan Reynolds MD  12/19/23  18:10 EST

## 2023-12-19 NOTE — PLAN OF CARE
Pt. With elevated temp at times, some relief with PO Tylenol. Pt. A&O x4. Pt. Up with assist x1 to bathroom. Pt. With loose stools x2 today, adequate UOP. Pt. Receiving IVFs and IV abx. Pt. On Eliquis. Pt. With nausea at times, some relief with IV Zofran. Pt. Resting comfortably at present, will continue to monitor closely for the remainder of this RN's shift. Report called to 6S RN.    Problem: Adult Inpatient Plan of Care  Goal: Plan of Care Review  Outcome: Ongoing, Progressing  Flowsheets (Taken 12/19/2023 1812)  Progress: no change  Plan of Care Reviewed With: patient

## 2023-12-20 LAB
ANION GAP SERPL CALCULATED.3IONS-SCNC: 12.3 MMOL/L (ref 5–15)
BACTERIA BLD CULT: ABNORMAL
BACTERIA UR QL AUTO: ABNORMAL /HPF
BASOPHILS # BLD AUTO: 0.03 10*3/MM3 (ref 0–0.2)
BASOPHILS NFR BLD AUTO: 0.3 % (ref 0–1.5)
BILIRUB UR QL STRIP: NEGATIVE
BOTTLE TYPE: ABNORMAL
BUN SERPL-MCNC: 36 MG/DL (ref 8–23)
BUN/CREAT SERPL: 20 (ref 7–25)
CALCIUM SPEC-SCNC: 8.3 MG/DL (ref 8.6–10.5)
CHLORIDE SERPL-SCNC: 105 MMOL/L (ref 98–107)
CLARITY UR: ABNORMAL
CO2 SERPL-SCNC: 14.7 MMOL/L (ref 22–29)
COLOR UR: YELLOW
CREAT SERPL-MCNC: 1.8 MG/DL (ref 0.76–1.27)
CREAT UR-MCNC: 101.8 MG/DL
DEPRECATED RDW RBC AUTO: 46.4 FL (ref 37–54)
EGFRCR SERPLBLD CKD-EPI 2021: 42.3 ML/MIN/1.73
EOSINOPHIL # BLD AUTO: 0.03 10*3/MM3 (ref 0–0.4)
EOSINOPHIL NFR BLD AUTO: 0.3 % (ref 0.3–6.2)
ERYTHROCYTE [DISTWIDTH] IN BLOOD BY AUTOMATED COUNT: 15.6 % (ref 12.3–15.4)
GLUCOSE SERPL-MCNC: 120 MG/DL (ref 65–99)
GLUCOSE UR STRIP-MCNC: NEGATIVE MG/DL
HCT VFR BLD AUTO: 27.9 % (ref 37.5–51)
HGB BLD-MCNC: 9.4 G/DL (ref 13–17.7)
HGB UR QL STRIP.AUTO: ABNORMAL
HYALINE CASTS UR QL AUTO: ABNORMAL /LPF
IMM GRANULOCYTES # BLD AUTO: 0.05 10*3/MM3 (ref 0–0.05)
IMM GRANULOCYTES NFR BLD AUTO: 0.4 % (ref 0–0.5)
KETONES UR QL STRIP: NEGATIVE
LEUKOCYTE ESTERASE UR QL STRIP.AUTO: ABNORMAL
LYMPHOCYTES # BLD AUTO: 0.68 10*3/MM3 (ref 0.7–3.1)
LYMPHOCYTES NFR BLD AUTO: 5.9 % (ref 19.6–45.3)
MCH RBC QN AUTO: 28.3 PG (ref 26.6–33)
MCHC RBC AUTO-ENTMCNC: 33.7 G/DL (ref 31.5–35.7)
MCV RBC AUTO: 84 FL (ref 79–97)
MONOCYTES # BLD AUTO: 0.82 10*3/MM3 (ref 0.1–0.9)
MONOCYTES NFR BLD AUTO: 7.1 % (ref 5–12)
NEUTROPHILS NFR BLD AUTO: 86 % (ref 42.7–76)
NEUTROPHILS NFR BLD AUTO: 9.86 10*3/MM3 (ref 1.7–7)
NITRITE UR QL STRIP: NEGATIVE
NRBC BLD AUTO-RTO: 0 /100 WBC (ref 0–0.2)
PH UR STRIP.AUTO: 5.5 [PH] (ref 5–8)
PLATELET # BLD AUTO: 154 10*3/MM3 (ref 140–450)
PMV BLD AUTO: 9.5 FL (ref 6–12)
POTASSIUM SERPL-SCNC: 3.8 MMOL/L (ref 3.5–5.2)
PROT UR QL STRIP: ABNORMAL
RBC # BLD AUTO: 3.32 10*6/MM3 (ref 4.14–5.8)
RBC # UR STRIP: ABNORMAL /HPF
REF LAB TEST METHOD: ABNORMAL
SODIUM SERPL-SCNC: 132 MMOL/L (ref 136–145)
SODIUM UR-SCNC: 80 MMOL/L
SP GR UR STRIP: 1.02 (ref 1–1.03)
SQUAMOUS #/AREA URNS HPF: ABNORMAL /HPF
UROBILINOGEN UR QL STRIP: ABNORMAL
WBC # UR STRIP: ABNORMAL /HPF
WBC NRBC COR # BLD AUTO: 11.47 10*3/MM3 (ref 3.4–10.8)

## 2023-12-20 PROCEDURE — 99255 IP/OBS CONSLTJ NEW/EST HI 80: CPT | Performed by: INTERNAL MEDICINE

## 2023-12-20 PROCEDURE — 85025 COMPLETE CBC W/AUTO DIFF WBC: CPT | Performed by: INTERNAL MEDICINE

## 2023-12-20 PROCEDURE — 87186 SC STD MICRODIL/AGAR DIL: CPT | Performed by: HOSPITALIST

## 2023-12-20 PROCEDURE — 80048 BASIC METABOLIC PNL TOTAL CA: CPT | Performed by: INTERNAL MEDICINE

## 2023-12-20 PROCEDURE — 25810000003 LACTATED RINGERS PER 1000 ML: Performed by: INTERNAL MEDICINE

## 2023-12-20 PROCEDURE — 25010000002 CEFTRIAXONE PER 250 MG: Performed by: INTERNAL MEDICINE

## 2023-12-20 PROCEDURE — 87086 URINE CULTURE/COLONY COUNT: CPT | Performed by: HOSPITALIST

## 2023-12-20 PROCEDURE — 84300 ASSAY OF URINE SODIUM: CPT | Performed by: INTERNAL MEDICINE

## 2023-12-20 PROCEDURE — 82570 ASSAY OF URINE CREATININE: CPT | Performed by: INTERNAL MEDICINE

## 2023-12-20 PROCEDURE — 25010000002 AMPICILLIN PER 500 MG: Performed by: INTERNAL MEDICINE

## 2023-12-20 PROCEDURE — 81001 URINALYSIS AUTO W/SCOPE: CPT | Performed by: HOSPITALIST

## 2023-12-20 PROCEDURE — 87077 CULTURE AEROBIC IDENTIFY: CPT | Performed by: HOSPITALIST

## 2023-12-20 RX ORDER — SODIUM BICARBONATE 650 MG/1
650 TABLET ORAL 3 TIMES DAILY
Status: DISCONTINUED | OUTPATIENT
Start: 2023-12-20 | End: 2023-12-22 | Stop reason: HOSPADM

## 2023-12-20 RX ADMIN — AMPICILLIN SODIUM 2 G: 2 INJECTION, POWDER, FOR SOLUTION INTRAVENOUS at 16:51

## 2023-12-20 RX ADMIN — CEFTRIAXONE SODIUM 1000 MG: 1 INJECTION, POWDER, FOR SOLUTION INTRAMUSCULAR; INTRAVENOUS at 12:41

## 2023-12-20 RX ADMIN — APIXABAN 5 MG: 5 TABLET, FILM COATED ORAL at 08:14

## 2023-12-20 RX ADMIN — DEXAMETHASONE 0.5 MG: 0.5 TABLET ORAL at 20:43

## 2023-12-20 RX ADMIN — Medication 10 ML: at 08:15

## 2023-12-20 RX ADMIN — ACETAMINOPHEN 650 MG: 325 TABLET, FILM COATED ORAL at 08:14

## 2023-12-20 RX ADMIN — SODIUM BICARBONATE 650 MG: 650 TABLET ORAL at 12:41

## 2023-12-20 RX ADMIN — SODIUM BICARBONATE 650 MG: 650 TABLET ORAL at 20:43

## 2023-12-20 RX ADMIN — DEXAMETHASONE 1 MG: 2 TABLET ORAL at 08:14

## 2023-12-20 RX ADMIN — SODIUM BICARBONATE 650 MG: 650 TABLET ORAL at 16:51

## 2023-12-20 RX ADMIN — AMPICILLIN SODIUM 2 G: 2 INJECTION, POWDER, FOR SOLUTION INTRAVENOUS at 23:40

## 2023-12-20 RX ADMIN — SODIUM CHLORIDE, POTASSIUM CHLORIDE, SODIUM LACTATE AND CALCIUM CHLORIDE 125 ML/HR: 600; 310; 30; 20 INJECTION, SOLUTION INTRAVENOUS at 10:14

## 2023-12-20 RX ADMIN — Medication 10 ML: at 20:42

## 2023-12-20 RX ADMIN — APIXABAN 5 MG: 5 TABLET, FILM COATED ORAL at 20:43

## 2023-12-20 RX ADMIN — SODIUM CHLORIDE, POTASSIUM CHLORIDE, SODIUM LACTATE AND CALCIUM CHLORIDE 125 ML/HR: 600; 310; 30; 20 INJECTION, SOLUTION INTRAVENOUS at 01:33

## 2023-12-20 NOTE — PROGRESS NOTES
Discharge Planning Assessment  [facility]     Patient Name: King George  MRN: [id number]  Today's Date: 12/20/2023    Admit Date: 12/19/2023    Plan: Home   Discharge Needs Assessment       Row Name 12/20/23 150       Living Environment    People in Home alone    Current Living Arrangements home    Potentially Unsafe Housing Conditions none    In the past 12 months has the electric, gas, oil, or water company threatened to shut off services in your home? No    Primary Care Provided by self    Provides Primary Care For no one    Family Caregiver if Needed friend(s);other relative(s)    Quality of Family Relationships helpful;involved;supportive    Able to Return to Prior Arrangements yes       Resource/Environmental Concerns    Resource/Environmental Concerns none       Food Insecurity    Within the past 12 months, you worried that your food would run out before you got the money to buy more. Never true    Within the past 12 months, the food you bought just didn't last and you didn't have money to get more. Never true       Transition Planning    Patient/Family Anticipates Transition to home    Patient/Family Anticipated Services at Transition none    Transportation Anticipated family or friend will provide       Discharge Needs Assessment    Readmission Within the Last 30 Days no previous admission in last 30 days    Concerns to be Addressed adjustment to diagnosis/illness    Equipment Needed After Discharge none    Provided Post Acute Provider List? N/A    Provided Post Acute Provider Quality & Resource List? N/A                   Discharge Plan       Row Name 12/20/23 1504       Plan    Plan Home    Patient/Family in Agreement with Plan yes    Plan Comments Met with patient at bedside, verified facesheet. Patient lives alone, he is IADL. He does not use and DME. PCP is Dr. Cortes and preferred pharmacy is [facility] [place]. Patient denies dc needs at this time, family or friend charanjit transport home.                   Continued Care and Services - Admitted Since 12/19/2023    Coordination has not been started for this encounter.       Expected Discharge Date and Time       Expected Discharge Date Expected Discharge Time    Dec 23, 2023            Demographic Summary    No documentation.                  Functional Status       Row Name 12/20/23 1504       Functional Status    Usual Activity Tolerance good       Assessment of Health Literacy    Health Literacy Good       Functional Status, IADL    Medications independent    Meal Preparation independent    Housekeeping independent    Laundry independent    Shopping independent       Mental Status    General Appearance WDL WDL       Mental Status Summary    Recent Changes in Mental Status/Cognitive Functioning no changes                   Psychosocial    No documentation.                  Abuse/Neglect    No documentation.                  Legal    No documentation.                  Substance Abuse    No documentation.                  Patient Forms    No documentation.                     Tati Hua RN

## 2023-12-20 NOTE — CONSULTS
[facility] Consult Note      Patient Name: King George  : 1960  MRN: [id number]  Primary Care Physician:  Mohini Cortes MD  Referring Physician: Maldonado Uribe MD  Date of admission: 2023    Subjective     Reason for Consult:  LENIN    HPI:   King George is a 63 y.o. male with well-preserved renal function at baseline (SCR less than 1 mg/dL in ), metastatic melanoma, prior DVT and PE on chronic AC, and chronic indwelling bilateral ureteral stents due to previous obstructive uropathy related to malignancy, admitted yesterday for further evaluation of nausea, vomiting, diarrhea, and fever.  Full PMH outlined below.  SCR on arrival 2.0, with value 1.8 today. CT scan with IV contrast  negative for hydronephrosis, though markedly enlarged bilateral inguinal and left pelvic sidewall lymph nodes noted.  No urinalysis yet; blood cultures NGTD; negative respiratory viral panel.    N/V/D for the past few days; states that stool was black in color yesterday  Describes orthostatic symptoms as well  Has had hip pain for the past day, but had recently worked out in gym  Has OBRIEN, but no shortness of breath at rest; no chest pain  Chronic but stable left leg swelling (present ever since his DVT there earlier this year)  No urinary complaints  No pain overlying port by right clavicle    Review of Systems:   14 point review of systems is otherwise negative except for mentioned above on HPI    Personal History     Past Medical History:   Diagnosis Date    Abdominal lymphadenopathy 2023    Acute pulmonary embolism, unspecified pulmonary embolism type, unspecified whether acute cor pulmonale present 2023    ADMITTED TO [facility], RIGHT    LENIN (acute kidney injury)     Anemia 2023    Colon polyps     FOLLOWED BY DR. SHANNA MOTTA    Constipation     Drug rash 2023    DVT (deep venous thrombosis)     LEFT LEG    Hepatic lesion 2023    History of shortness  of breath     FOUND BLOOD CLOT ATTACHED TO LUNG    Left leg swelling     FROM BLOOD CLOT 5/2023    Left lower quadrant abdominal mass 05/28/2023    LEFT INGUINAL BX SHOWED METASTATIC MELANOMA    Melanoma     LEFT SIDE GROIN AREA, STAGE 4    Metastasis to brain 06/02/2023    Metastatic melanoma 05/31/2023    Moderate Malnutrition (HCC) 05/30/2023    Personal history of venous thrombosis and embolism 06/19/2023       Past Surgical History:   Procedure Laterality Date    COLONOSCOPY N/A 06/02/2023    40 MM TUBULOVILLOUS ADENOMA POLYP IN DISTAL RECTUM, RESCOPE IN 1 YR, DR. SHANNA MOTTA AT [facility]    COLONOSCOPY W/ POLYPECTOMY N/A 06/02/2023    40 MM POLYP IN DISTAL RECTUM, RESECTION AND RETREIVAL COMPLETE, RESCOPE IN 1 YR, DR. SHANNA MOTTA AT [facility]    CYSTOSCOPY W/ URETERAL STENT PLACEMENT Bilateral 07/10/2023    Procedure: CYSTOSCOPY URETERAL CATHETER/STENT INSERTION;  Surgeon: Vic Montelongo Jr., MD;  Location: [facility];  Service: Urology;  Laterality: Bilateral;    CYSTOSCOPY W/ URETERAL STENT PLACEMENT N/A 10/19/2023    Procedure: CYSTOSCOPY WITH BILATERAL STENT EXCHANGE;  Surgeon: Vic Montelongo Jr., MD;  Location: [facility];  Service: Urology;  Laterality: N/A;    PORTACATH PLACEMENT Right 06/02/2023    DONE AT [facility]    US GUIDED LYMPH NODE BIOPSY  05/30/2023    METASTATIC MELANOMA, DONE AT [facility]    VENOUS ACCESS DEVICE (PORT) INSERTION N/A 06/02/2023    Procedure: INFUSAPORT PLACEMENT;  Surgeon: Vicki Layne MD;  Location: [facility];  Service: General;  Laterality: N/A;       Family History: family history includes Breast cancer in his mother; Prostate cancer in his father.    Social History:  reports that he has never smoked. He has never been exposed to tobacco smoke. He has never used smokeless tobacco. He reports that he does not drink alcohol and does not use drugs.    Home Medications:  Prior to Admission medications    Medication Sig Start Date End Date Taking? Authorizing Provider    Braftovi 75 MG capsule Take 6 capsules by mouth Every Night. 10/2/23  Yes Andre Montoya MD   dexAMETHasone (DECADRON) 0.5 MG tablet Take 2 tablets by mouth Daily With Breakfast.   Yes Andre Montoya MD   dexAMETHasone (DECADRON) 0.5 MG tablet Take 1 tablet by mouth Every Night.   Yes Andre Montoya MD   Eliquis 5 MG tablet tablet TAKE 1 TABLET BY MOUTH EVERY 12 (TWELVE) HOURS. INDICATIONS: DVT/PE (ACTIVE THROMBOSIS)  Patient taking differently: Take 1 tablet by mouth Every 12 (Twelve) Hours. FOLLOW MD INSTRUCTIONS FOR STOPPING FOR SURGERY 8/10/23  Yes Delroy Loaiza MD   HYDROcodone-acetaminophen (NORCO) 5-325 MG per tablet Take 1 tablet by mouth Every 6 (Six) Hours As Needed. for pain   Yes Andre Montoya MD   Mektovi 15 MG tablet Take 15 mg by mouth 2 (Two) Times a Day. TAKES 3 PILLS IN AM AND 3 PILLS IN PM 10/2/23  Yes Andre Montoya MD   ondansetron (ZOFRAN) 8 MG tablet Take 1 tablet by mouth Every 8 (Eight) Hours As Needed for Nausea or Vomiting. Pt not taking   Yes Andre Montoya MD   famotidine (PEPCID) 20 MG tablet Take 1 tablet by mouth 2 (Two) Times a Day Before Meals. 7/14/23   Jayme Cason MD   oxyCODONE-acetaminophen (Percocet) 5-325 MG per tablet Take 1 tablet by mouth Every 6 (Six) Hours As Needed for Moderate Pain. 10/19/23   Vic Montelongo Jr., MD   predniSONE (DELTASONE) 10 MG tablet Take 1 tablet by mouth Daily. TAKES 2 PILLS IN AM 9/7/23   Andre Montoya MD   prochlorperazine (COMPAZINE) 10 MG tablet Take 1 tablet by mouth Every 6 (Six) Hours As Needed. 9/21/23   Andre Montoya MD   sulfamethoxazole-trimethoprim (Bactrim DS) 800-160 MG per tablet Take 1 tablet by mouth 2 (Two) Times a Day. 10/19/23   Vic Montelongo Jr., MD       Allergies:  No Known Allergies    Objective     Vitals:   Temp:  [98.1 °F (36.7 °C)-100.6 °F (38.1 °C)] 100.6 °F (38.1 °C)  Heart Rate:  [81-88] 88  Resp:  [18-20] 18  BP:  (104-123)/(55-84) 123/84  No intake or output data in the 24 hours ending 12/20/23 0940    Physical Exam:   Constitutional: Awake, alert, NAD, overweight; nauseated and febrile  HEENT: Sclera anicteric, no conjunctival injection  Neck: Supple, no carotid bruit, trachea at midline, no JVD  Respiratory: Clear to auscultation bilaterally, nonlabored on room air  Cardiovascular: RRR, no rub  Gastrointestinal: BS +, soft, nontender and nondistended  : No palpable bladder; easily palpable inguinal lymphadenopathy, left more than right; multiple areas of hyperpigmentation and pedunculated masses left groin  Musculoskeletal: +2 edema left leg, trace right leg, no clubbing or cyanosis  Psychiatric: Appropriate affect, cooperative, oriented  Neurologic: No asterixis, moving all extremities, normal speech   Skin: Warm and dry       Scheduled Meds:     apixaban, 5 mg, Oral, Q12H  cefTRIAXone, 1,000 mg, Intravenous, Once  cefTRIAXone, 2,000 mg, Intravenous, Q24H  dexAMETHasone, 0.5 mg, Oral, Nightly  dexAMETHasone, 1 mg, Oral, Daily With Breakfast  senna-docusate sodium, 2 tablet, Oral, BID  sodium chloride, 10 mL, Intravenous, Q12H      IV Meds:   lactated ringers, 125 mL/hr, Last Rate: 125 mL/hr (12/20/23 0133)        Results Reviewed:   I have personally reviewed the results from the time of this admission to 12/20/2023 09:40 EST     Lab Results   Component Value Date    GLUCOSE 120 (H) 12/20/2023    CALCIUM 8.3 (L) 12/20/2023     (L) 12/20/2023    K 3.8 12/20/2023    CO2 14.7 (L) 12/20/2023     12/20/2023    BUN 36 (H) 12/20/2023    CREATININE 1.80 (H) 12/20/2023    BCR 20.0 12/20/2023    ANIONGAP 12.3 12/20/2023      Lab Results   Component Value Date    MG 2.1 06/21/2023    ALBUMIN 3.5 12/19/2023           Assessment / Plan       Sepsis    History of pulmonary embolism    Metastatic melanoma    LENIN (acute kidney injury)    Hydroureteronephrosis    UTI (urinary tract infection), bacterial      ASSESSMENT:  1.   LENIN, with UOP not yet quantified, stabilizing, multifactorial from contrast exposure (JANET), hypovolemia, hypotension, and sepsis syndrome.  Still looks dry; hypovolemic hyponatremia; NAGMA, with normalized lactate now.  No urine studies yet  2.  N/V/D  3.  Fever: urine vs chemotherapy port vs malignancy vs other  4.  Metastatic melanoma  5.  Chronic obstructive uropathy requiring indwelling ureteral stents; CT scan 12/18 without hydronephrosis; urology has evaluated and stents in good position  6.  Prior DVT and PE on chronic AC  7.  Anemia    PLAN:  1.  UA, FENa, and urine culture  2.  Continue IVF  3.  Begin oral sodium bicarbonate  4.  Follow-up blood cultures; ID evaluation underway      Thank you for involving us in the care of King George.  Please feel free to call with any questions.    Bernardo Guillory MD  12/20/23  09:40 [facility]    [facility]  [phone]      Please note that portions of this note were completed with a voice recognition program.

## 2023-12-20 NOTE — PLAN OF CARE
Goal Outcome Evaluation:         Pt is a new admit tonight from the ER. Pt was admitted with symptoms of nausea/vomiting/lack of appetite/elevated lactic and WBC. Pt has received IV hydration/antibiotics. Pt had his first meal tonight after 2 days w/o an appetite. Pt is alert/oriented and responding well to food and IV fluids.

## 2023-12-20 NOTE — PROGRESS NOTES
Dedicated to Hospital Care    [phone]   LOS: 1 day     Name: King George  Age/Sex: 63 y.o. male  :  1960        PCP: Mohini Cortes MD  No chief complaint on file.     Subjective   He feels okay today still fatigued and not feeling 100%.  No fevers this morning.  Did have a fever on presentation last night.  Denies abdominal pain or discomfort denies chest pain shortness of breath or other symptoms.  General: No Fever or Chills, Cardiac: No Chest Pain or Palpitations, Resp: No Cough or SOA, GI: No Nausea, Vomiting, or Diarrhea, and Other: No bleeding    apixaban, 5 mg, Oral, Q12H  cefTRIAXone, 2,000 mg, Intravenous, Q24H  dexAMETHasone, 0.5 mg, Oral, Nightly  dexAMETHasone, 1 mg, Oral, Daily With Breakfast  senna-docusate sodium, 2 tablet, Oral, BID  sodium bicarbonate, 650 mg, Oral, TID  sodium chloride, 10 mL, Intravenous, Q12H      lactated ringers, 50 mL/hr, Last Rate: 50 mL/hr (23 1247)        Objective   Vital Signs  Temp:  [98.1 °F (36.7 °C)-100.6 °F (38.1 °C)] 100.6 °F (38.1 °C)  Heart Rate:  [81-88] 88  Resp:  [18-20] 18  BP: (104-123)/(55-84) 123/84  Body mass index is 31.73 kg/m².    Intake/Output Summary (Last 24 hours) at 2023 1326  Last data filed at 2023 1032  Gross per 24 hour   Intake 240 ml   Output --   Net 240 ml       Physical Exam  Constitutional:       Appearance: He is obese. He is ill-appearing.   Cardiovascular:      Rate and Rhythm: Normal rate and regular rhythm.   Pulmonary:      Effort: No respiratory distress.      Breath sounds: Normal breath sounds.   Abdominal:      General: Bowel sounds are normal. There is no distension.      Palpations: Abdomen is soft.   Neurological:      General: No focal deficit present.      Mental Status: He is alert. Mental status is at baseline.           Results Review:       I reviewed the patient's new clinical results.  Results from last 7 days   Lab Units 23  0717 12/19/23  1607   WBC 10*3/mm3 11.47* 14.25*    HEMOGLOBIN g/dL 9.4* 10.5*   PLATELETS 10*3/mm3 154 163     Results from last 7 days   Lab Units 12/20/23  0717 12/19/23  1608   SODIUM mmol/L 132* 132*   POTASSIUM mmol/L 3.8 4.1   CHLORIDE mmol/L 105 102   CO2 mmol/L 14.7* 14.8*   BUN mg/dL 36* 34*   CREATININE mg/dL 1.80* 2.01*   CALCIUM mg/dL 8.3* 8.6   Estimated Creatinine Clearance: 52.7 mL/min (A) (by C-G formula based on SCr of 1.8 mg/dL (H)).      Assessment & Plan   Active Hospital Problems    Diagnosis  POA    **Sepsis [A41.9]  Yes    UTI (urinary tract infection), bacterial [N39.0, A49.9]  Unknown    Hydroureteronephrosis [N13.30]  Yes    LENIN (acute kidney injury) [N17.9]  Yes    Metastatic melanoma [C43.9]  Yes    History of pulmonary embolism [Z86.711]  Yes      Resolved Hospital Problems   No resolved problems to display.       PLAN  This is a 63-year-old gentleman with a history of urinary retention and obstructive uropathy with stents placed in October who presents to the hospital with fevers chills and sepsis and is found to have urinary tract infection  -Sepsis present on admission with noted acidosis acute renal failure and hyponatremia.  -White blood cell count is improving with current antibiotics.  Appreciate infectious disease input Rocephin increased to 2 g today.  -Nephrology following, renal function deterioration felt to be multifactorial.  Awaiting urine studies here.  -Urinalysis and urine culture were collected at outlying emergency room.  Blood cultures also collected there.  Repeat blood cultures collected here and I have ordered a urinalysis and urine culture as well.  -Appreciate urology's input, in the absence of hydronephrosis and noted stents in good position on imaging there is no need for stent exchange or manipulation during this hospitalization.  He can follow-up with Dr. Montelongo in the outpatient setting as previously scheduled.  -None anion gap metabolic acidosis likely multifactorial related to sepsis and acute renal  failure.  -He does have underlying metastatic melanoma and is on treatment for this in the outpatient setting and normally follows with the [facility].  -Home Eliquis covers for DVT prophylaxis  -Full code      Disposition  Expected discharge date/ time has not been documented.       Price Rossi MD  [facility]  12/20/23  13:26 EST

## 2023-12-20 NOTE — SIGNIFICANT NOTE
12/20/23 8042   OTHER   Discipline physical therapist   Rehab Time/Intention   Session Not Performed other (see comments)  (Pt up ad antonio - up independently in the bathroom on PT arrival. Pt denies any acute PT needs, frequents the gym and has a family member who is a PT. Will sign-off, please re-consult if mobility status changes.)   Therapy Assessment/Plan (PT)   Criteria for Skilled Interventions Met (PT) no;no problems identified which require skilled intervention

## 2023-12-20 NOTE — CONSULTS
Referring Provider: Maldonado Uribe MD      Subjective   History of present illness: Very nice 63-year-old with history of metastatic melanoma followed by the [facility] who also has a history of obstructive uropathy status post bilateral stent placement in October 2023.  He said he was doing well up until about 2 days ago when he developed fever as high as 102 and chills.  He also had some bilateral acute on chronic worsening hip pain and 1 diarrheal episode which is now all improved.  He denies any cardiopulmonary or URI symptoms.  No GI or  symptoms (apart from the 1 loose bowel movement).  He presented to an urgent care center and was referred to an outlying ER for hypotension.  He underwent CT chest abdomen pelvis which were all negative for nidus of infection.   COVID, influenza and RSV PCR negative.  Labs were notable for creatinine of 2.4 and a white count of 17.8 he thinks blood cultures were drawn and he was given antibiotics.  He was subsequently transferred to [facility].  Here he has had Tmax of 100.6.      Physical Exam:   Vital Signs   Temp:  [98.1 °F (36.7 °C)-100.6 °F (38.1 °C)] 100.6 °F (38.1 °C)  Heart Rate:  [81-88] 88  Resp:  [18-20] 18  BP: (104-123)/(55-84) 123/84    GENERAL: Awake and alert, sickly  HEENT: Oropharynx is clear. Hearing is grossly normal.   EYES: . No conjunctival injection. No lid lag.   LUNGS:normal respiratory effort.   SKIN: no cutaneous eruptions in exposed areas  PSYCHIATRIC: Appropriate mood, affect, insight, and judgment.     Results Review:  White count now 11.47, platelets 154, hemoglobin 9.4  Creatinine 1.8  12/19 blood cultures pending  COVID/influenza/RSV PCR negative  Radiology as per HPI    A/p  1.  Sepsis with acute kidney injury  2.  Acute kidney injury, antibiotics dosed appropriately  3.  Metastatic melanoma, on chemotherapy    Agree with Rocephin.  We will give 1 g now and start 2 g this evening when he is scheduled for his next dose.   Follow-up blood culture.  No clear nidus of infection on CT chest abdomen pelvis and concern is for occult bacteremia.  He does have a port in place but this is nontender and has not been accessed in a month.  CBC and BMP in the morning.  We will make any dose adjustments on antibiotics     Thank you for this consult.  We will continue to follow along and tailor antibiotics as the patient's clinical course evolves.

## 2023-12-20 NOTE — PAYOR COMM NOTE
"Marilee George W \"Min\" (63 y.o. Male)     PLEASE SEE ATTACHED FOR INPT AUTH     PT ID  [id number]    PLEASE CALL UNIQUE REVELES RN/ DEPT @ [phone]  OR FAX  DEPARTMENT @  [phone]    THANK YOU   UNIQUE REVELES RN  [facility]          Date of Birth   1960    Social Security Number       Address   [address] [place] [address]    Home Phone   [phone]    MRN   [id number]       Protestant   Unknown    Marital Status   Unknown                            Admission Date   12/19/23    Admission Type   Urgent    Admitting Provider   Jm Chaney MD    Attending Provider   Price Rossi MD    Department, Room/Bed   [address], S610/1       Discharge Date       Discharge Disposition       Discharge Destination                                 Attending Provider: Price Rossi MD    Allergies: No Known Allergies    Isolation: None   Infection: None   Code Status: CPR    Ht: 180.3 cm (71\")   Wt: 103 kg (227 lb 8 oz)    Admission Cmt: None   Principal Problem: Sepsis [A41.9]                   Active Insurance as of 12/19/2023       Primary Coverage       Payor Plan Insurance Group Employer/Plan Group    "[employer] [facility] [place] [id number]       Payor Plan Address Payor Plan Phone Number Payor Plan Fax Number Effective Dates    PO    3/1/2021 - None Entered    [facility] [id number]         Subscriber Name Subscriber Birth Date Member ID       MARILEE GEORGE 1960 [id number]                     Emergency Contacts        (Rel.) Home Phone Work Phone Mobile Phone    daquan de leon (Friend) -- -- [phone]    Komal George (Mother) -- -- [phone]              [place]: NPI [id number]  Tax ID [id number]     History & Physical        Jm Chaney MD at 12/19/23 1631              Patient Name:  Marilee George  YOB: 1960  MRN:  [id number]  Admit Date:  12/19/2023  Patient Care " Team:  Mohini Cortes MD as PCP - General (Internal Medicine)  Bernardo Azar MD as Referring Physician (Hospitalist)  Brodie Rocha MD as Consulting Physician (Hematology and Oncology)  Amy Berry MD as Consulting Physician (Radiation Oncology)  Perfecto Daly MD as Consulting Physician (Oncology)      Subjective  History Present Illness     No chief complaint on file.    Patient is a 63-year-old male with known history of metastatic melanoma stage IV, DVT/PE initially went to [facility] with complaints of chills and fever.  His temperature was noted to be 102 and blood pressure was 86/60 initially.  Subsequently septic workup was initiated and he was aggressively volume resuscitated with total of 4 L of fluids.  Subsequently CT of the abdomen pelvis was done with evidence of bilateral and inguinal lymphadenopathy and pelvic nodes likely related to his metastatic melanoma.  UA with 4+ bacteria and WBC was 2020.  Chest x-ray with no acute findings.  WBC was 17,800 and creatinine was also on the rise at 2.37 and given the above he is being admitted to the hospital.  Did receive IV Rocephin in the local emergency room.  Of note patient has a known history of hydroureteronephrosis secondary to metastatic melanoma and had bilateral ureteral stents placed and most recent exchange was in October.  CT of the abdomen pelvis done at [facility] did not reveal any evidence of hydronephrosis.  At the time of my evaluation patient still appears ill and is mildly hyperventilating but awake alert and oriented x 3.  Does complain of diarrhea that started today with no blood or mucus in it.  Denies any abdominal pain, nausea, vomiting, chest pain, cough, sputum production, runny nose, sore throat.  Of note tested negative for COVID/flu/RSV.      Review of Systems   A 12 system review has been performed and they are negative other than mentioned in the H&P    Personal History     Past Medical History:    Diagnosis Date    Abdominal lymphadenopathy 05/29/2023    Acute pulmonary embolism, unspecified pulmonary embolism type, unspecified whether acute cor pulmonale present 05/28/2023    ADMITTED TO [facility], RIGHT    LENIN (acute kidney injury)     Anemia 05/29/2023    Colon polyps     FOLLOWED BY DR. SHANNA MOTTA    Constipation     Drug rash 06/19/2023    DVT (deep venous thrombosis)     LEFT LEG    Hepatic lesion 05/29/2023    History of shortness of breath     FOUND BLOOD CLOT ATTACHED TO LUNG    Left leg swelling     FROM BLOOD CLOT 5/2023    Left lower quadrant abdominal mass 05/28/2023    LEFT INGUINAL BX SHOWED METASTATIC MELANOMA    Melanoma     LEFT SIDE GROIN AREA, STAGE 4    Metastasis to brain 06/02/2023    Metastatic melanoma 05/31/2023    Moderate Malnutrition (HCC) 05/30/2023    Personal history of venous thrombosis and embolism 06/19/2023     Past Surgical History:   Procedure Laterality Date    COLONOSCOPY N/A 06/02/2023    40 MM TUBULOVILLOUS ADENOMA POLYP IN DISTAL RECTUM, RESCOPE IN 1 YR, DR. SHANNA MOTTA AT [facility]    COLONOSCOPY W/ POLYPECTOMY N/A 06/02/2023    40 MM POLYP IN DISTAL RECTUM, RESECTION AND RETREIVAL COMPLETE, RESCOPE IN 1 YR, DR. SHANNA MOTTA AT [facility]    CYSTOSCOPY W/ URETERAL STENT PLACEMENT Bilateral 07/10/2023    Procedure: CYSTOSCOPY URETERAL CATHETER/STENT INSERTION;  Surgeon: Vic Montelongo Jr., MD;  Location: [facility];  Service: Urology;  Laterality: Bilateral;    CYSTOSCOPY W/ URETERAL STENT PLACEMENT N/A 10/19/2023    Procedure: CYSTOSCOPY WITH BILATERAL STENT EXCHANGE;  Surgeon: Vic Montelongo Jr., MD;  Location: [facility] OR;  Service: Urology;  Laterality: N/A;    PORTACATH PLACEMENT Right 06/02/2023    DONE AT [facility]    US GUIDED LYMPH NODE BIOPSY  05/30/2023    METASTATIC MELANOMA, DONE AT [facility]    VENOUS ACCESS DEVICE (PORT) INSERTION N/A 06/02/2023    Procedure: INFUSAPORT PLACEMENT;  Surgeon: Vicki Layne MD;  Location: [facility];  Service: General;   Laterality: N/A;     Family History   Problem Relation Age of Onset    Breast cancer Mother     Prostate cancer Father     Malig Hyperthermia Neg Hx      Social History     Tobacco Use    Smoking status: Never     Passive exposure: Never    Smokeless tobacco: Never   Vaping Use    Vaping Use: Unknown   Substance Use Topics    Alcohol use: Never    Drug use: Never     No current facility-administered medications on file prior to encounter.     Current Outpatient Medications on File Prior to Encounter   Medication Sig Dispense Refill    Braftovi 75 MG capsule Take 6 capsules by mouth Every Night.      Eliquis 5 MG tablet tablet TAKE 1 TABLET BY MOUTH EVERY 12 (TWELVE) HOURS. INDICATIONS: DVT/PE (ACTIVE THROMBOSIS) (Patient taking differently: Take 1 tablet by mouth Every 12 (Twelve) Hours. FOLLOW MD INSTRUCTIONS FOR STOPPING FOR SURGERY) 60 tablet 0    famotidine (PEPCID) 20 MG tablet Take 1 tablet by mouth 2 (Two) Times a Day Before Meals. 60 tablet 0    HYDROcodone-acetaminophen (NORCO) 5-325 MG per tablet Take 1 tablet by mouth Every 6 (Six) Hours As Needed. for pain      Mektovi 15 MG tablet Take 15 mg by mouth 2 (Two) Times a Day. TAKES 3 PILLS IN AM AND 3 PILLS IN PM      ondansetron (ZOFRAN) 8 MG tablet Take 1 tablet by mouth Every 8 (Eight) Hours As Needed for Nausea or Vomiting. Pt not taking      oxyCODONE-acetaminophen (Percocet) 5-325 MG per tablet Take 1 tablet by mouth Every 6 (Six) Hours As Needed for Moderate Pain. 12 tablet 0    predniSONE (DELTASONE) 10 MG tablet Take 1 tablet by mouth Daily. TAKES 2 PILLS IN AM      prochlorperazine (COMPAZINE) 10 MG tablet Take 1 tablet by mouth Every 6 (Six) Hours As Needed. (Patient not taking: Reported on 10/12/2023)      sulfamethoxazole-trimethoprim (Bactrim DS) 800-160 MG per tablet Take 1 tablet by mouth 2 (Two) Times a Day. 10 tablet 0     No Known Allergies    Objective   Objective     Vital Signs  Temp:  [98.6 °F (37 °C)] 98.6 °F (37 °C)  Heart Rate:   [81] 81  Resp:  [18] 18  BP: (112)/(77) 112/77  SpO2:  [97 %] 97 %  on   ;   Device (Oxygen Therapy): room air  Body mass index is 31.73 kg/m².    Physical Exam  HEENT: PERRLA, extraocular movements intact, Scleras no icterus  Neck: Supple, no JVD  Cardiovascular: Regular rate and rhythm with normal S1 and S2  Respiratory: Fairly clear to auscultation bilaterally with no wheezes  GI: Soft, nontender, bowel sounds present  Extremities: No edema, palpable pedal pulses  Neurologic: Grossly nonfocal, no facial asymmetry    Results Review:  I reviewed the patient's new clinical results.  I reviewed the patient's new imaging results and agree with the interpretation.  I reviewed the patient's other test results and agree with the interpretation  I personally viewed and interpreted the patient's EKG/Telemetry data  Discussed with ED provider.    Lab Results (last 24 hours)       Procedure Component Value Units Date/Time    COVID-19 PCR, [facility], NP SWAB IN LEXAR VIRAL TRANSPORT MEDIA/ORAL SWISH 24-30 HR TAT - , [[id number]] Collected: 12/18/23 1644    Specimen: Other Updated: 12/19/23 1119     SARS-CoV-2, BIANCA NEGATIVE     Comment: The 2019-CoV rRT-PCR Assay is only for use under a Food and Drug Administration Emergency Use Authorization. The performance characteristics of the assay were verified by the Clinical Laboratory at [facility]. Results should be used in   conjunction with the patient's clinical symptoms, medical history, and other clinical/laboratory findings to determine an overall clinical diagnosis. Negative results do not preclude infection with SARS-CoV-2 (COVID-19).    Test parameters have not been validated for screening asymptomatic patients.        Influenza A PCR NEGATIVE     Influenza B PCR NEGATIVE     RSV, PCR NEGATIVE     Test Comment: No     COMMENTS No     Symptom Yes     DATE 12/18/2023     COMMENTS No     COMMENTS No     COMMENTS No     Pregnant? No    COMPREHENSIVE METABOLIC PANEL  [[id number]]  (Abnormal) Collected: 12/18/23 1803     Updated: 12/19/23 1119    LACTIC ACID, PLASMA [[id number]] Collected: 12/18/23 1803    Specimen: Blood Updated: 12/19/23 1119     Lactate 1.2 mmol/L     TROPONIN I, HIGH SENSITIVE [[id number]]  (Abnormal) Collected: 12/18/23 1803     Updated: 12/19/23 1119    BNP (IN-HOUSE) [[id number]]  (Abnormal) Collected: 12/18/23 1803    Specimen: Blood Updated: 12/19/23 1119      pg/mL     CONV D-DIMER [[id number]]  (Abnormal) Collected: 12/18/23 1803     Updated: 12/19/23 1119    Urinalysis With Culture If Indicated - [[id number]]  (Abnormal) Collected: 12/18/23 1803     Updated: 12/19/23 1119    Urinalysis, Microscopic Only - [[id number]]  (Abnormal) Collected: 12/18/23 1803     Updated: 12/19/23 1119    AUTODIFF [[id number]]  (Abnormal) Collected: 12/18/23 1803    Specimen: Blood Updated: 12/19/23 1119     Neutrophil % 86.0 %      Lymphocyte % 6.1 %      Monocyte % 7.6 %      Eosinophil % 0.0 %      Basophil % 0.3 %      Neutrophils Absolute 15.30 x10(3)/ul      Lymphocytes Absolute 1.1 x10(3)/ul      Monocytes Absolute 1.3 x10(3)/ul      Eosinophils Absolute 0.0 x10(3)/ul      Basophils Absolute 0.1 x10(3)/ul     Narrative:       Ordered by Discern Expert.    CBC AND DIFFERENTIAL [[id number]]  (Abnormal) Collected: 12/18/23 1803     Updated: 12/19/23 1119    BASIC METABOLIC PANEL [[id number]]  (Abnormal) Collected: 12/18/23 2112     Updated: 12/19/23 1119    TROPONIN I, HIGH SENSITIVE [[id number]]  (Abnormal) Collected: 12/19/23 0051     Updated: 12/19/23 1119    CBC & Differential [[id number]]  (Abnormal) Collected: 12/19/23 1607    Specimen: Blood Updated: 12/19/23 2661    Narrative:      The following orders were created for panel order CBC & Differential.  Procedure                               Abnormality         Status                     ---------                               -----------         ------                     CBC Auto Differential[[id number]]         Abnormal            Final result                 Please view results for these tests on the individual orders.    CBC Auto Differential [[id number]]  (Abnormal) Collected: 12/19/23 1607    Specimen: Blood Updated: 12/19/23 1627     WBC 14.25 10*3/mm3      RBC 3.68 10*6/mm3      Hemoglobin 10.5 g/dL      Hematocrit 31.1 %      MCV 84.5 fL      MCH 28.5 pg      MCHC 33.8 g/dL      RDW 15.6 %      RDW-SD 47.9 fl      MPV 9.3 fL      Platelets 163 10*3/mm3      Neutrophil % 86.5 %      Lymphocyte % 7.2 %      Monocyte % 5.1 %      Eosinophil % 0.2 %      Basophil % 0.3 %      Immature Grans % 0.7 %      Neutrophils, Absolute 12.34 10*3/mm3      Lymphocytes, Absolute 1.02 10*3/mm3      Monocytes, Absolute 0.72 10*3/mm3      Eosinophils, Absolute 0.03 10*3/mm3      Basophils, Absolute 0.04 10*3/mm3      Immature Grans, Absolute 0.10 10*3/mm3      nRBC 0.0 /100 WBC     Comprehensive Metabolic Panel [[id number]] Collected: 12/19/23 1608    Specimen: Blood Updated: 12/19/23 1622    Lactic Acid, Plasma [[id number]] Collected: 12/19/23 1608    Specimen: Blood Updated: 12/19/23 1622    Blood Culture - Blood, Arm, Left [[id number]] Collected: 12/19/23 1608    Specimen: Blood from Arm, Left Updated: 12/19/23 1625    Blood Culture - Blood, Arm, Right [[id number]] Collected: 12/19/23 1616    Specimen: Blood from Arm, Right Updated: 12/19/23 1624            Imaging Results (Last 24 Hours)       ** No results found for the last 24 hours. **                No orders to display        Assessment/Plan     Active Hospital Problems    Diagnosis  POA    **Sepsis [A41.9]  Yes    UTI (urinary tract infection), bacterial [N39.0, A49.9]  Unknown    Hydroureteronephrosis [N13.30]  Yes    LENIN (acute kidney injury) [N17.9]  Yes    Metastatic melanoma [C43.9]  Yes    History of pulmonary embolism [Z86.711]  Yes      Resolved Hospital Problems   No resolved problems to display.       1.Sepsis with no clear source however  track is probably culprit.   For the moment Rocephin will be continued and infectious disease consult will also be obtained.  Unclear if he needs any ureteral stent exchange therefore urology consult is also been obtained.  Await further blood culture results.    2.  History of DVT/PE, home dose Eliquis will be resumed.    3.  Metastatic melanoma, home dose Braftovi and Mektovi will be held.    4.  Acute kidney injury, likely related to ATN and is on Bactrim as well which will be   discontinued.  Avoid nephrotoxins.  Nephrology consult will be obtained regarding the same.    5.  DVT prophylaxis, on Eliquis.    6.  CODE STATUS is full code.       Jm Chaney MD  [facility]  23  16:36 EST     Electronically signed by Jm Chaney MD at 23 1643       Emergency Department Notes    No notes of this type exist for this encounter.          Physician Progress Notes (last 48 hours)        Price Rossi MD at 23 1326            Dedicated to Hospital Care    [phone]   LOS: 1 day     Name: King George  Age/Sex: 63 y.o. male  :  1960        PCP: Mohini Cortes MD  No chief complaint on file.     Subjective   He feels okay today still fatigued and not feeling 100%.  No fevers this morning.  Did have a fever on presentation last night.  Denies abdominal pain or discomfort denies chest pain shortness of breath or other symptoms.  General: No Fever or Chills, Cardiac: No Chest Pain or Palpitations, Resp: No Cough or SOA, GI: No Nausea, Vomiting, or Diarrhea, and Other: No bleeding    apixaban, 5 mg, Oral, Q12H  cefTRIAXone, 2,000 mg, Intravenous, Q24H  dexAMETHasone, 0.5 mg, Oral, Nightly  dexAMETHasone, 1 mg, Oral, Daily With Breakfast  senna-docusate sodium, 2 tablet, Oral, BID  sodium bicarbonate, 650 mg, Oral, TID  sodium chloride, 10 mL, Intravenous, Q12H      lactated ringers, 50 mL/hr, Last Rate: 50 mL/hr (23 1247)        Objective   Vital Signs  Temp:  [98.1 °F (36.7  °C)-100.6 °F (38.1 °C)] 100.6 °F (38.1 °C)  Heart Rate:  [81-88] 88  Resp:  [18-20] 18  BP: (104-123)/(55-84) 123/84  Body mass index is 31.73 kg/m².    Intake/Output Summary (Last 24 hours) at 12/20/2023 1326  Last data filed at 12/20/2023 1032  Gross per 24 hour   Intake 240 ml   Output --   Net 240 ml       Physical Exam  Constitutional:       Appearance: He is obese. He is ill-appearing.   Cardiovascular:      Rate and Rhythm: Normal rate and regular rhythm.   Pulmonary:      Effort: No respiratory distress.      Breath sounds: Normal breath sounds.   Abdominal:      General: Bowel sounds are normal. There is no distension.      Palpations: Abdomen is soft.   Neurological:      General: No focal deficit present.      Mental Status: He is alert. Mental status is at baseline.           Results Review:       I reviewed the patient's new clinical results.  Results from last 7 days   Lab Units 12/20/23  0717 12/19/23  1607   WBC 10*3/mm3 11.47* 14.25*   HEMOGLOBIN g/dL 9.4* 10.5*   PLATELETS 10*3/mm3 154 163     Results from last 7 days   Lab Units 12/20/23  0717 12/19/23  1608   SODIUM mmol/L 132* 132*   POTASSIUM mmol/L 3.8 4.1   CHLORIDE mmol/L 105 102   CO2 mmol/L 14.7* 14.8*   BUN mg/dL 36* 34*   CREATININE mg/dL 1.80* 2.01*   CALCIUM mg/dL 8.3* 8.6   Estimated Creatinine Clearance: 52.7 mL/min (A) (by C-G formula based on SCr of 1.8 mg/dL (H)).      Assessment & Plan   Active Hospital Problems    Diagnosis  POA    **Sepsis [A41.9]  Yes    UTI (urinary tract infection), bacterial [N39.0, A49.9]  Unknown    Hydroureteronephrosis [N13.30]  Yes    LENIN (acute kidney injury) [N17.9]  Yes    Metastatic melanoma [C43.9]  Yes    History of pulmonary embolism [Z86.711]  Yes      Resolved Hospital Problems   No resolved problems to display.       PLAN  This is a 63-year-old gentleman with a history of urinary retention and obstructive uropathy with stents placed in October who presents to the hospital with fevers chills  and sepsis and is found to have urinary tract infection  -Sepsis present on admission with noted acidosis acute renal failure and hyponatremia.  -White blood cell count is improving with current antibiotics.  Appreciate infectious disease input Rocephin increased to 2 g today.  -Nephrology following, renal function deterioration felt to be multifactorial.  Awaiting urine studies here.  -Urinalysis and urine culture were collected at outlying emergency room.  Blood cultures also collected there.  Repeat blood cultures collected here and I have ordered a urinalysis and urine culture as well.  -Appreciate urology's input, in the absence of hydronephrosis and noted stents in good position on imaging there is no need for stent exchange or manipulation during this hospitalization.  He can follow-up with Dr. Montelongo in the outpatient setting as previously scheduled.  -None anion gap metabolic acidosis likely multifactorial related to sepsis and acute renal failure.  -He does have underlying metastatic melanoma and is on treatment for this in the outpatient setting and normally follows with the [facility].  -Home Eliquis covers for DVT prophylaxis  -Full code      Disposition  Expected discharge date/ time has not been documented.       Price Rossi MD  [facility]  23  13:26 EST            Electronically signed by Price Rosis MD at 23 1334          Consult Notes (last 48 hours)        Bernardo Guillory MD at 23 0940            [facility] Consult Note      Patient Name: King George  : 1960  MRN: [id number]  Primary Care Physician:  Mohini Cortes MD  Referring Physician: Maldonado Uribe MD  Date of admission: 2023    Subjective     Reason for Consult:  LENIN    HPI:   King George is a 63 y.o. male with well-preserved renal function at baseline (SCR less than 1 mg/dL in ), metastatic melanoma, prior DVT and  PE on chronic AC, and chronic indwelling bilateral ureteral stents due to previous obstructive uropathy related to malignancy, admitted yesterday for further evaluation of nausea, vomiting, diarrhea, and fever.  Full PMH outlined below.  SCR on arrival 2.0, with value 1.8 today. CT scan with IV contrast 12/18 negative for hydronephrosis, though markedly enlarged bilateral inguinal and left pelvic sidewall lymph nodes noted.  No urinalysis yet; blood cultures NGTD; negative respiratory viral panel.    N/V/D for the past few days; states that stool was black in color yesterday  Describes orthostatic symptoms as well  Has had hip pain for the past day, but had recently worked out in gym  Has OBRIEN, but no shortness of breath at rest; no chest pain  Chronic but stable left leg swelling (present ever since his DVT there earlier this year)  No urinary complaints  No pain overlying port by right clavicle    Review of Systems:   14 point review of systems is otherwise negative except for mentioned above on HPI    Personal History     Past Medical History:   Diagnosis Date    Abdominal lymphadenopathy 05/29/2023    Acute pulmonary embolism, unspecified pulmonary embolism type, unspecified whether acute cor pulmonale present 05/28/2023    ADMITTED TO [facility], RIGHT    LENIN (acute kidney injury)     Anemia 05/29/2023    Colon polyps     FOLLOWED BY DR. SHANNA MOTTA    Constipation     Drug rash 06/19/2023    DVT (deep venous thrombosis)     LEFT LEG    Hepatic lesion 05/29/2023    History of shortness of breath     FOUND BLOOD CLOT ATTACHED TO LUNG    Left leg swelling     FROM BLOOD CLOT 5/2023    Left lower quadrant abdominal mass 05/28/2023    LEFT INGUINAL BX SHOWED METASTATIC MELANOMA    Melanoma     LEFT SIDE GROIN AREA, STAGE 4    Metastasis to brain 06/02/2023    Metastatic melanoma 05/31/2023    Moderate Malnutrition (HCC) 05/30/2023    Personal history of venous thrombosis and embolism 06/19/2023       Past Surgical  History:   Procedure Laterality Date    COLONOSCOPY N/A 06/02/2023    40 MM TUBULOVILLOUS ADENOMA POLYP IN DISTAL RECTUM, RESCOPE IN 1 YR, DR. SHANNA MOTTA AT [facility]    COLONOSCOPY W/ POLYPECTOMY N/A 06/02/2023    40 MM POLYP IN DISTAL RECTUM, RESECTION AND RETREIVAL COMPLETE, RESCOPE IN 1 YR, DR. SHANNA MOTTA AT [facility]    CYSTOSCOPY W/ URETERAL STENT PLACEMENT Bilateral 07/10/2023    Procedure: CYSTOSCOPY URETERAL CATHETER/STENT INSERTION;  Surgeon: Vic Montelongo Jr., MD;  Location: [facility];  Service: Urology;  Laterality: Bilateral;    CYSTOSCOPY W/ URETERAL STENT PLACEMENT N/A 10/19/2023    Procedure: CYSTOSCOPY WITH BILATERAL STENT EXCHANGE;  Surgeon: Vic Montelongo Jr., MD;  Location: [facility];  Service: Urology;  Laterality: N/A;    PORTACATH PLACEMENT Right 06/02/2023    DONE AT [facility]    US GUIDED LYMPH NODE BIOPSY  05/30/2023    METASTATIC MELANOMA, DONE AT [facility]    VENOUS ACCESS DEVICE (PORT) INSERTION N/A 06/02/2023    Procedure: INFUSAPORT PLACEMENT;  Surgeon: Vicki Layne MD;  Location: [facility];  Service: General;  Laterality: N/A;       Family History: family history includes Breast cancer in his mother; Prostate cancer in his father.    Social History:  reports that he has never smoked. He has never been exposed to tobacco smoke. He has never used smokeless tobacco. He reports that he does not drink alcohol and does not use drugs.    Home Medications:  Prior to Admission medications    Medication Sig Start Date End Date Taking? Authorizing Provider   Braftovi 75 MG capsule Take 6 capsules by mouth Every Night. 10/2/23  Yes Andre Montoya MD   dexAMETHasone (DECADRON) 0.5 MG tablet Take 2 tablets by mouth Daily With Breakfast.   Yes Andre Montoya MD   dexAMETHasone (DECADRON) 0.5 MG tablet Take 1 tablet by mouth Every Night.   Yes Andre Montoya MD   Eliquis 5 MG tablet tablet TAKE 1 TABLET BY MOUTH EVERY 12 (TWELVE) HOURS. INDICATIONS: DVT/PE (ACTIVE  THROMBOSIS)  Patient taking differently: Take 1 tablet by mouth Every 12 (Twelve) Hours. FOLLOW MD INSTRUCTIONS FOR STOPPING FOR SURGERY 8/10/23  Yes Delroy Loaiza MD   HYDROcodone-acetaminophen (NORCO) 5-325 MG per tablet Take 1 tablet by mouth Every 6 (Six) Hours As Needed. for pain   Yes Andre Montoya MD   Mektovi 15 MG tablet Take 15 mg by mouth 2 (Two) Times a Day. TAKES 3 PILLS IN AM AND 3 PILLS IN PM 10/2/23  Yes Andre Montoya MD   ondansetron (ZOFRAN) 8 MG tablet Take 1 tablet by mouth Every 8 (Eight) Hours As Needed for Nausea or Vomiting. Pt not taking   Yes Andre Montoya MD   famotidine (PEPCID) 20 MG tablet Take 1 tablet by mouth 2 (Two) Times a Day Before Meals. 7/14/23   Jayme Cason MD   oxyCODONE-acetaminophen (Percocet) 5-325 MG per tablet Take 1 tablet by mouth Every 6 (Six) Hours As Needed for Moderate Pain. 10/19/23   Vic Montelongo Jr., MD   predniSONE (DELTASONE) 10 MG tablet Take 1 tablet by mouth Daily. TAKES 2 PILLS IN AM 9/7/23   Andre Montoya MD   prochlorperazine (COMPAZINE) 10 MG tablet Take 1 tablet by mouth Every 6 (Six) Hours As Needed. 9/21/23   Andre Montoya MD   sulfamethoxazole-trimethoprim (Bactrim DS) 800-160 MG per tablet Take 1 tablet by mouth 2 (Two) Times a Day. 10/19/23   Vic Montelongo Jr., MD       Allergies:  No Known Allergies    Objective     Vitals:   Temp:  [98.1 °F (36.7 °C)-100.6 °F (38.1 °C)] 100.6 °F (38.1 °C)  Heart Rate:  [81-88] 88  Resp:  [18-20] 18  BP: (104-123)/(55-84) 123/84  No intake or output data in the 24 hours ending 12/20/23 0940    Physical Exam:   Constitutional: Awake, alert, NAD, overweight; nauseated and febrile  HEENT: Sclera anicteric, no conjunctival injection  Neck: Supple, no carotid bruit, trachea at midline, no JVD  Respiratory: Clear to auscultation bilaterally, nonlabored on room air  Cardiovascular: RRR, no rub  Gastrointestinal: BS +, soft, nontender and  nondistended  : No palpable bladder; easily palpable inguinal lymphadenopathy, left more than right; multiple areas of hyperpigmentation and pedunculated masses left groin  Musculoskeletal: +2 edema left leg, trace right leg, no clubbing or cyanosis  Psychiatric: Appropriate affect, cooperative, oriented  Neurologic: No asterixis, moving all extremities, normal speech   Skin: Warm and dry       Scheduled Meds:     apixaban, 5 mg, Oral, Q12H  cefTRIAXone, 1,000 mg, Intravenous, Once  cefTRIAXone, 2,000 mg, Intravenous, Q24H  dexAMETHasone, 0.5 mg, Oral, Nightly  dexAMETHasone, 1 mg, Oral, Daily With Breakfast  senna-docusate sodium, 2 tablet, Oral, BID  sodium chloride, 10 mL, Intravenous, Q12H      IV Meds:   lactated ringers, 125 mL/hr, Last Rate: 125 mL/hr (12/20/23 0133)        Results Reviewed:   I have personally reviewed the results from the time of this admission to 12/20/2023 09:40 EST     Lab Results   Component Value Date    GLUCOSE 120 (H) 12/20/2023    CALCIUM 8.3 (L) 12/20/2023     (L) 12/20/2023    K 3.8 12/20/2023    CO2 14.7 (L) 12/20/2023     12/20/2023    BUN 36 (H) 12/20/2023    CREATININE 1.80 (H) 12/20/2023    BCR 20.0 12/20/2023    ANIONGAP 12.3 12/20/2023      Lab Results   Component Value Date    MG 2.1 06/21/2023    ALBUMIN 3.5 12/19/2023           Assessment / Plan       Sepsis    History of pulmonary embolism    Metastatic melanoma    LENIN (acute kidney injury)    Hydroureteronephrosis    UTI (urinary tract infection), bacterial      ASSESSMENT:  1.  LENIN, with UOP not yet quantified, stabilizing, multifactorial from contrast exposure (JANET), hypovolemia, hypotension, and sepsis syndrome.  Still looks dry; hypovolemic hyponatremia; NAGMA, with normalized lactate now.  No urine studies yet  2.  N/V/D  3.  Fever: urine vs chemotherapy port vs malignancy vs other  4.  Metastatic melanoma  5.  Chronic obstructive uropathy requiring indwelling ureteral stents; CT scan 12/18 without  hydronephrosis; urology has evaluated and stents in good position  6.  Prior DVT and PE on chronic AC  7.  Anemia    PLAN:  1.  UA, FENa, and urine culture  2.  Continue IVF  3.  Begin oral sodium bicarb and  4.  Follow-up blood cultures; ID evaluation underway      Thank you for involving us in the care of King George.  Please feel free to call with any questions.    Bernardo Guillory MD  12/20/23  09:40 EST    [facility]  [phone]      Please note that portions of this note were completed with a voice recognition program.    Electronically signed by Bernardo Guillory MD at 12/20/23 1912       Prakash Mccormick MD at 12/20/23 1675        Consult Orders    1. Inpatient Infectious Diseases Consult [[id number]] ordered by Jm Chaney MD at 12/19/23 1156                 Referring Provider: Maldonado Uribe MD      Subjective   History of present illness: Very nice 63-year-old with history of metastatic melanoma followed by the [facility] who also has a history of obstructive uropathy status post bilateral stent placement in October 2023.  He said he was doing well up until about 2 days ago when he developed fever as high as 102 and chills.  He also had some bilateral acute on chronic worsening hip pain and 1 diarrheal episode which is now all improved.  He denies any cardiopulmonary or URI symptoms.  No GI or  symptoms (apart from the 1 loose bowel movement).  He presented to an urgent care center and was referred to an outlying ER for hypotension.  He underwent CT chest abdomen pelvis which were all negative for nidus of infection.   COVID, influenza and RSV PCR negative.  Labs were notable for creatinine of 2.4 and a white count of 17.8 he thinks blood cultures were drawn and he was given antibiotics.  He was subsequently transferred to [facility].  Here he has had Tmax of 100.6.      Physical Exam:   Vital Signs   Temp:  [98.1 °F  (36.7 °C)-100.6 °F (38.1 °C)] 100.6 °F (38.1 °C)  Heart Rate:  [81-88] 88  Resp:  [18-20] 18  BP: (104-123)/(55-84) 123/84    GENERAL: Awake and alert, sickly  HEENT: Oropharynx is clear. Hearing is grossly normal.   EYES: . No conjunctival injection. No lid lag.   LUNGS:normal respiratory effort.   SKIN: no cutaneous eruptions in exposed areas  PSYCHIATRIC: Appropriate mood, affect, insight, and judgment.     Results Review:  White count now 11.47, platelets 154, hemoglobin 9.4  Creatinine 1.8   blood cultures pending  COVID/influenza/RSV PCR negative  Radiology as per HPI    A/p  1.  Sepsis with acute kidney injury  2.  Acute kidney injury, antibiotics dosed appropriately  3.  Metastatic melanoma, on chemotherapy    Agree with Rocephin.  We will give 1 g now and start 2 g this evening when he is scheduled for his next dose.  Follow-up blood culture.  No clear nidus of infection on CT chest abdomen pelvis and concern is for occult bacteremia.  He does have a port in place but this is nontender and has not been accessed in a month.  CBC and BMP in the morning.  We will make any dose adjustments on antibiotics     Thank you for this consult.  We will continue to follow along and tailor antibiotics as the patient's clinical course evolves.              Electronically signed by Prakash Mccormick MD at 23 0921       Aryan Reynolds MD at 23 1810               FIRST UROLOGY CONSULT      Patient Identification:  NAME:  King George  Age:  63 y.o.   Sex:  male   :  1960   MRN:  [id number]     Chief complaint: Fever.      History of present illness:      H/o metastatic melanoma with B HN. Jayda Davenport had placed B ureteral stents 10/23 and pt had been doing well. Developed F/C. Was seen at [facility] ER and ct scan showed No HN with stents in good position. + UTI.     In hospital:  -AVSS, good UOP  -WBC - 14.25  -Creat - 2.01  Past medical history:  Past Medical History:   Diagnosis  Date    Abdominal lymphadenopathy 05/29/2023    Acute pulmonary embolism, unspecified pulmonary embolism type, unspecified whether acute cor pulmonale present 05/28/2023    ADMITTED TO [facility], RIGHT    LENIN (acute kidney injury)     Anemia 05/29/2023    Colon polyps     FOLLOWED BY DR. SHANNA MOTTA    Constipation     Drug rash 06/19/2023    DVT (deep venous thrombosis)     LEFT LEG    Hepatic lesion 05/29/2023    History of shortness of breath     FOUND BLOOD CLOT ATTACHED TO LUNG    Left leg swelling     FROM BLOOD CLOT 5/2023    Left lower quadrant abdominal mass 05/28/2023    LEFT INGUINAL BX SHOWED METASTATIC MELANOMA    Melanoma     LEFT SIDE GROIN AREA, STAGE 4    Metastasis to brain 06/02/2023    Metastatic melanoma 05/31/2023    Moderate Malnutrition (HCC) 05/30/2023    Personal history of venous thrombosis and embolism 06/19/2023       Past surgical history:  Past Surgical History:   Procedure Laterality Date    COLONOSCOPY N/A 06/02/2023    40 MM TUBULOVILLOUS ADENOMA POLYP IN DISTAL RECTUM, RESCOPE IN 1 YR, DR. SHANNA MOTTA AT [facility]    COLONOSCOPY W/ POLYPECTOMY N/A 06/02/2023    40 MM POLYP IN DISTAL RECTUM, RESECTION AND RETREIVAL COMPLETE, RESCOPE IN 1 YR, DR. SHANNA MOTTA AT [facility]    CYSTOSCOPY W/ URETERAL STENT PLACEMENT Bilateral 07/10/2023    Procedure: CYSTOSCOPY URETERAL CATHETER/STENT INSERTION;  Surgeon: Vic Montelongo Jr., MD;  Location: [facility];  Service: Urology;  Laterality: Bilateral;    CYSTOSCOPY W/ URETERAL STENT PLACEMENT N/A 10/19/2023    Procedure: CYSTOSCOPY WITH BILATERAL STENT EXCHANGE;  Surgeon: Vic Montelongo Jr., MD;  Location: [facility];  Service: Urology;  Laterality: N/A;    PORTACATH PLACEMENT Right 06/02/2023    DONE AT [facility]    US GUIDED LYMPH NODE BIOPSY  05/30/2023    METASTATIC MELANOMA, DONE AT [facility]    VENOUS ACCESS DEVICE (PORT) INSERTION N/A 06/02/2023    Procedure: INFUSAPORT PLACEMENT;  Surgeon: Vicki Layne MD;  Location: [facility];   Service: General;  Laterality: N/A;       Allergies:  Patient has no known allergies.    Home medications:  Medications Prior to Admission   Medication Sig Dispense Refill Last Dose    Braftovi 75 MG capsule Take 6 capsules by mouth Every Night.   12/18/2023    Eliquis 5 MG tablet tablet TAKE 1 TABLET BY MOUTH EVERY 12 (TWELVE) HOURS. INDICATIONS: DVT/PE (ACTIVE THROMBOSIS) (Patient taking differently: Take 1 tablet by mouth Every 12 (Twelve) Hours. FOLLOW MD INSTRUCTIONS FOR STOPPING FOR SURGERY) 60 tablet 0 12/18/2023    Mektovi 15 MG tablet Take 15 mg by mouth 2 (Two) Times a Day. TAKES 3 PILLS IN AM AND 3 PILLS IN PM   12/18/2023    ondansetron (ZOFRAN) 8 MG tablet Take 1 tablet by mouth Every 8 (Eight) Hours As Needed for Nausea or Vomiting. Pt not taking       dexAMETHasone (DECADRON) 0.5 MG tablet Take 2 tablets by mouth Daily With Breakfast.       dexAMETHasone (DECADRON) 0.5 MG tablet Take 1 tablet by mouth Every Night.       famotidine (PEPCID) 20 MG tablet Take 1 tablet by mouth 2 (Two) Times a Day Before Meals. 60 tablet 0     HYDROcodone-acetaminophen (NORCO) 5-325 MG per tablet Take 1 tablet by mouth Every 6 (Six) Hours As Needed. for pain       oxyCODONE-acetaminophen (Percocet) 5-325 MG per tablet Take 1 tablet by mouth Every 6 (Six) Hours As Needed for Moderate Pain. 12 tablet 0     predniSONE (DELTASONE) 10 MG tablet Take 1 tablet by mouth Daily. TAKES 2 PILLS IN AM       prochlorperazine (COMPAZINE) 10 MG tablet Take 1 tablet by mouth Every 6 (Six) Hours As Needed.       sulfamethoxazole-trimethoprim (Bactrim DS) 800-160 MG per tablet Take 1 tablet by mouth 2 (Two) Times a Day. 10 tablet 0         Hospital medications:  apixaban, 5 mg, Oral, Q12H  cefTRIAXone, 1,000 mg, Intravenous, Q24H  dexAMETHasone, 0.5 mg, Oral, Nightly  [START ON 12/20/2023] dexAMETHasone, 1 mg, Oral, Daily With Breakfast  senna-docusate sodium, 2 tablet, Oral, BID  sodium chloride, 10 mL, Intravenous, Q12H      lactated  ringers, 125 mL/hr, Last Rate: 125 mL/hr (23 1743)        acetaminophen    senna-docusate sodium **AND** polyethylene glycol **AND** bisacodyl **AND** bisacodyl    Calcium Replacement - Follow Nurse / BPA Driven Protocol    HYDROcodone-acetaminophen    HYDROmorphone **AND** naloxone    Magnesium Standard Dose Replacement - Follow Nurse / BPA Driven Protocol    nitroglycerin    ondansetron    Phosphorus Replacement - Follow Nurse / BPA Driven Protocol    Potassium Replacement - Follow Nurse / BPA Driven Protocol    sodium chloride    sodium chloride    Family history:  Family History   Problem Relation Age of Onset    Breast cancer Mother     Prostate cancer Father     Malig Hyperthermia Neg Hx        Social history:  Social History     Tobacco Use    Smoking status: Never     Passive exposure: Never    Smokeless tobacco: Never   Vaping Use    Vaping Use: Unknown   Substance Use Topics    Alcohol use: Never    Drug use: Never       Review of systems:      Positive for: Fever.    Negative for:  chest pain, cough, sob, o/w neg    Objective:  TMax 24 hours:   Temp (24hrs), Av.3 °F (37.4 °C), Min:98.6 °F (37 °C), Max:99.9 °F (37.7 °C)      Vitals Ranges:   Temp:  [98.6 °F (37 °C)-99.9 °F (37.7 °C)] 99.9 °F (37.7 °C)  Heart Rate:  [81] 81  Resp:  [18] 18  BP: (112)/(77) 112/77    Intake/Output Last 3 shifts:  No intake/output data recorded.     Physical Exam:    General Appearance:    Alert, cooperative, NAD   Back:     No CVA tenderness   Lungs:     Respirations unlabored, no wheezing    Heart:    RRR, intact peripheral pulses   Abdomen:     Soft, NDNT, no masses, no guarding   Neuro/Psych:   Orientation intact, mood/affect pleasant       Results review:   I reviewed the patient's new clinical results.    Data review:  Lab Results (last 24 hours)       Procedure Component Value Units Date/Time    Comprehensive Metabolic Panel [[id number]]  (Abnormal) Collected: 23 160    Specimen: Blood Updated: 23  1645     Glucose 106 mg/dL      BUN 34 mg/dL      Creatinine 2.01 mg/dL      Sodium 132 mmol/L      Potassium 4.1 mmol/L      Chloride 102 mmol/L      CO2 14.8 mmol/L      Calcium 8.6 mg/dL      Total Protein 7.3 g/dL      Albumin 3.5 g/dL      ALT (SGPT) 14 U/L      AST (SGOT) 51 U/L      Alkaline Phosphatase 66 U/L      Total Bilirubin 0.6 mg/dL      Globulin 3.8 gm/dL      A/G Ratio 0.9 g/dL      BUN/Creatinine Ratio 16.9     Anion Gap 15.2 mmol/L      eGFR 37.0 mL/min/1.73     Narrative:      GFR Normal >60  Chronic Kidney Disease <60  Kidney Failure <15      Lactic Acid, Plasma [[id number]]  (Abnormal) Collected: 12/19/23 1608    Specimen: Blood Updated: 12/19/23 1645     Lactate 3.0 mmol/L     CBC & Differential [[id number]]  (Abnormal) Collected: 12/19/23 1607    Specimen: Blood Updated: 12/19/23 1627    Narrative:      The following orders were created for panel order CBC & Differential.  Procedure                               Abnormality         Status                     ---------                               -----------         ------                     CBC Auto Differential[[id number]]        Abnormal            Final result                 Please view results for these tests on the individual orders.    CBC Auto Differential [[id number]]  (Abnormal) Collected: 12/19/23 1607    Specimen: Blood Updated: 12/19/23 1627     WBC 14.25 10*3/mm3      RBC 3.68 10*6/mm3      Hemoglobin 10.5 g/dL      Hematocrit 31.1 %      MCV 84.5 fL      MCH 28.5 pg      MCHC 33.8 g/dL      RDW 15.6 %      RDW-SD 47.9 fl      MPV 9.3 fL      Platelets 163 10*3/mm3      Neutrophil % 86.5 %      Lymphocyte % 7.2 %      Monocyte % 5.1 %      Eosinophil % 0.2 %      Basophil % 0.3 %      Immature Grans % 0.7 %      Neutrophils, Absolute 12.34 10*3/mm3      Lymphocytes, Absolute 1.02 10*3/mm3      Monocytes, Absolute 0.72 10*3/mm3      Eosinophils, Absolute 0.03 10*3/mm3      Basophils, Absolute 0.04 10*3/mm3      Immature Grans,  Absolute 0.10 10*3/mm3      nRBC 0.0 /100 WBC     Blood Culture - Blood, Arm, Left [[id number]] Collected: 12/19/23 1608    Specimen: Blood from Arm, Left Updated: 12/19/23 1625    Blood Culture - Blood, Arm, Right [[id number]] Collected: 12/19/23 1616    Specimen: Blood from Arm, Right Updated: 12/19/23 1624             Imaging:  Imaging Results (Last 24 Hours)       ** No results found for the last 24 hours. **               Assessment:     B HN s/p B ureteral stents.  UTI.      Plan:     No evidence of HN on ct scan. Stents in good position.  No need to change stents at this point.  Await ucx.  Will arrange f/u Dr. Montelongo.      Aryan Reynolds MD  12/19/23  18:10 EST              Electronically signed by Aryan Reynolds MD at 12/19/23 9656

## 2023-12-21 ENCOUNTER — APPOINTMENT (OUTPATIENT)
Dept: CARDIOLOGY | Facility: HOSPITAL | Age: 63
End: 2023-12-21
Payer: COMMERCIAL

## 2023-12-21 PROBLEM — R78.81 BACTEREMIA DUE TO ENTEROCOCCUS: Status: ACTIVE | Noted: 2023-12-21

## 2023-12-21 PROBLEM — Z79.01 CHRONIC ANTICOAGULATION: Status: ACTIVE | Noted: 2023-12-21

## 2023-12-21 PROBLEM — B95.2 BACTEREMIA DUE TO ENTEROCOCCUS: Status: ACTIVE | Noted: 2023-12-21

## 2023-12-21 LAB
ALBUMIN SERPL-MCNC: 2.9 G/DL (ref 3.5–5.2)
ANION GAP SERPL CALCULATED.3IONS-SCNC: 9.5 MMOL/L (ref 5–15)
AORTIC ARCH: 3.3 CM
AORTIC DIMENSIONLESS INDEX: 0.9 (DI)
ASCENDING AORTA: 3.6 CM
BASOPHILS # BLD AUTO: 0.02 10*3/MM3 (ref 0–0.2)
BASOPHILS NFR BLD AUTO: 0.3 % (ref 0–1.5)
BH CV ECHO MEAS - ACS: 2.7 CM
BH CV ECHO MEAS - AO MAX PG: 8.1 MMHG
BH CV ECHO MEAS - AO MEAN PG: 4 MMHG
BH CV ECHO MEAS - AO ROOT DIAM: 3.9 CM
BH CV ECHO MEAS - AO V2 MAX: 142 CM/SEC
BH CV ECHO MEAS - AO V2 VTI: 28.9 CM
BH CV ECHO MEAS - AVA(I,D): 2.6 CM2
BH CV ECHO MEAS - EDV(CUBED): 193.2 ML
BH CV ECHO MEAS - EDV(MOD-SP2): 113 ML
BH CV ECHO MEAS - EDV(MOD-SP4): 126 ML
BH CV ECHO MEAS - EF(MOD-BP): 58.3 %
BH CV ECHO MEAS - EF(MOD-SP2): 59.3 %
BH CV ECHO MEAS - EF(MOD-SP4): 57.1 %
BH CV ECHO MEAS - ESV(CUBED): 51.2 ML
BH CV ECHO MEAS - ESV(MOD-SP2): 46 ML
BH CV ECHO MEAS - ESV(MOD-SP4): 54 ML
BH CV ECHO MEAS - FS: 35.8 %
BH CV ECHO MEAS - IVS/LVPW: 0.97 CM
BH CV ECHO MEAS - IVSD: 0.77 CM
BH CV ECHO MEAS - LAT PEAK E' VEL: 11.5 CM/SEC
BH CV ECHO MEAS - LV MASS(C)D: 169.5 GRAMS
BH CV ECHO MEAS - LV MAX PG: 6.5 MMHG
BH CV ECHO MEAS - LV MEAN PG: 3 MMHG
BH CV ECHO MEAS - LV V1 MAX: 127 CM/SEC
BH CV ECHO MEAS - LV V1 VTI: 25.3 CM
BH CV ECHO MEAS - LVIDD: 5.8 CM
BH CV ECHO MEAS - LVIDS: 3.7 CM
BH CV ECHO MEAS - LVOT AREA: 3 CM2
BH CV ECHO MEAS - LVOT DIAM: 1.95 CM
BH CV ECHO MEAS - LVPWD: 0.79 CM
BH CV ECHO MEAS - MED PEAK E' VEL: 8.4 CM/SEC
BH CV ECHO MEAS - MR MAX PG: 84.8 MMHG
BH CV ECHO MEAS - MR MAX VEL: 460.4 CM/SEC
BH CV ECHO MEAS - MV A DUR: 0.12 SEC
BH CV ECHO MEAS - MV A MAX VEL: 77.1 CM/SEC
BH CV ECHO MEAS - MV DEC SLOPE: 396.2 CM/SEC2
BH CV ECHO MEAS - MV DEC TIME: 0.28 SEC
BH CV ECHO MEAS - MV E MAX VEL: 92.2 CM/SEC
BH CV ECHO MEAS - MV E/A: 1.2
BH CV ECHO MEAS - MV MAX PG: 3.9 MMHG
BH CV ECHO MEAS - MV MEAN PG: 1.76 MMHG
BH CV ECHO MEAS - MV P1/2T: 71.4 MSEC
BH CV ECHO MEAS - MV V2 VTI: 30.1 CM
BH CV ECHO MEAS - MVA(P1/2T): 3.1 CM2
BH CV ECHO MEAS - MVA(VTI): 2.5 CM2
BH CV ECHO MEAS - PA ACC TIME: 0.16 SEC
BH CV ECHO MEAS - PA V2 MAX: 105.7 CM/SEC
BH CV ECHO MEAS - RAP SYSTOLE: 3 MMHG
BH CV ECHO MEAS - RV MAX PG: 3.3 MMHG
BH CV ECHO MEAS - RV V1 MAX: 91.4 CM/SEC
BH CV ECHO MEAS - RV V1 VTI: 20.8 CM
BH CV ECHO MEAS - RVSP: 30.4 MMHG
BH CV ECHO MEAS - SUP REN AO DIAM: 1.8 CM
BH CV ECHO MEAS - SV(LVOT): 75.7 ML
BH CV ECHO MEAS - SV(MOD-SP2): 67 ML
BH CV ECHO MEAS - SV(MOD-SP4): 72 ML
BH CV ECHO MEAS - TAPSE (>1.6): 2.29 CM
BH CV ECHO MEAS - TR MAX PG: 27.4 MMHG
BH CV ECHO MEAS - TR MAX VEL: 261.8 CM/SEC
BH CV ECHO MEASUREMENTS AVERAGE E/E' RATIO: 9.27
BH CV XLRA - TDI S': 13.3 CM/SEC
BUN SERPL-MCNC: 29 MG/DL (ref 8–23)
BUN/CREAT SERPL: 20.9 (ref 7–25)
CALCIUM SPEC-SCNC: 8.4 MG/DL (ref 8.6–10.5)
CHLORIDE SERPL-SCNC: 105 MMOL/L (ref 98–107)
CO2 SERPL-SCNC: 17.5 MMOL/L (ref 22–29)
CREAT SERPL-MCNC: 1.39 MG/DL (ref 0.76–1.27)
DEPRECATED RDW RBC AUTO: 46.6 FL (ref 37–54)
EGFRCR SERPLBLD CKD-EPI 2021: 57.7 ML/MIN/1.73
EOSINOPHIL # BLD AUTO: 0.18 10*3/MM3 (ref 0–0.4)
EOSINOPHIL NFR BLD AUTO: 2.3 % (ref 0.3–6.2)
ERYTHROCYTE [DISTWIDTH] IN BLOOD BY AUTOMATED COUNT: 15.6 % (ref 12.3–15.4)
GLUCOSE SERPL-MCNC: 104 MG/DL (ref 65–99)
HCT VFR BLD AUTO: 27.3 % (ref 37.5–51)
HGB BLD-MCNC: 8.9 G/DL (ref 13–17.7)
IMM GRANULOCYTES # BLD AUTO: 0.04 10*3/MM3 (ref 0–0.05)
IMM GRANULOCYTES NFR BLD AUTO: 0.5 % (ref 0–0.5)
LEFT ATRIUM VOLUME INDEX: 23 ML/M2
LYMPHOCYTES # BLD AUTO: 0.91 10*3/MM3 (ref 0.7–3.1)
LYMPHOCYTES NFR BLD AUTO: 11.4 % (ref 19.6–45.3)
MAGNESIUM SERPL-MCNC: 2.3 MG/DL (ref 1.6–2.4)
MCH RBC QN AUTO: 27.1 PG (ref 26.6–33)
MCHC RBC AUTO-ENTMCNC: 32.6 G/DL (ref 31.5–35.7)
MCV RBC AUTO: 83 FL (ref 79–97)
MONOCYTES # BLD AUTO: 0.81 10*3/MM3 (ref 0.1–0.9)
MONOCYTES NFR BLD AUTO: 10.2 % (ref 5–12)
NEUTROPHILS NFR BLD AUTO: 6.02 10*3/MM3 (ref 1.7–7)
NEUTROPHILS NFR BLD AUTO: 75.3 % (ref 42.7–76)
NRBC BLD AUTO-RTO: 0 /100 WBC (ref 0–0.2)
PHOSPHATE SERPL-MCNC: 3.3 MG/DL (ref 2.5–4.5)
PLATELET # BLD AUTO: 170 10*3/MM3 (ref 140–450)
PMV BLD AUTO: 9.8 FL (ref 6–12)
POTASSIUM SERPL-SCNC: 3.6 MMOL/L (ref 3.5–5.2)
POTASSIUM SERPL-SCNC: 3.9 MMOL/L (ref 3.5–5.2)
RBC # BLD AUTO: 3.29 10*6/MM3 (ref 4.14–5.8)
SINUS: 3.3 CM
SODIUM SERPL-SCNC: 132 MMOL/L (ref 136–145)
STJ: 3.3 CM
URATE SERPL-MCNC: 4.5 MG/DL (ref 3.4–7)
WBC NRBC COR # BLD AUTO: 7.98 10*3/MM3 (ref 3.4–10.8)

## 2023-12-21 PROCEDURE — 80069 RENAL FUNCTION PANEL: CPT | Performed by: INTERNAL MEDICINE

## 2023-12-21 PROCEDURE — 93306 TTE W/DOPPLER COMPLETE: CPT

## 2023-12-21 PROCEDURE — 85025 COMPLETE CBC W/AUTO DIFF WBC: CPT | Performed by: INTERNAL MEDICINE

## 2023-12-21 PROCEDURE — 83735 ASSAY OF MAGNESIUM: CPT | Performed by: INTERNAL MEDICINE

## 2023-12-21 PROCEDURE — 84550 ASSAY OF BLOOD/URIC ACID: CPT | Performed by: INTERNAL MEDICINE

## 2023-12-21 PROCEDURE — 87040 BLOOD CULTURE FOR BACTERIA: CPT | Performed by: INTERNAL MEDICINE

## 2023-12-21 PROCEDURE — 93306 TTE W/DOPPLER COMPLETE: CPT | Performed by: INTERNAL MEDICINE

## 2023-12-21 PROCEDURE — 99233 SBSQ HOSP IP/OBS HIGH 50: CPT | Performed by: INTERNAL MEDICINE

## 2023-12-21 PROCEDURE — 84132 ASSAY OF SERUM POTASSIUM: CPT | Performed by: HOSPITALIST

## 2023-12-21 PROCEDURE — 25010000002 AMPICILLIN PER 500 MG: Performed by: INTERNAL MEDICINE

## 2023-12-21 RX ORDER — POTASSIUM CHLORIDE 750 MG/1
40 TABLET, FILM COATED, EXTENDED RELEASE ORAL EVERY 4 HOURS
Status: DISPENSED | OUTPATIENT
Start: 2023-12-21 | End: 2023-12-21

## 2023-12-21 RX ORDER — FAMOTIDINE 10 MG/ML
20 INJECTION, SOLUTION INTRAVENOUS EVERY 12 HOURS SCHEDULED
Status: DISCONTINUED | OUTPATIENT
Start: 2023-12-21 | End: 2023-12-22

## 2023-12-21 RX ADMIN — DEXAMETHASONE 0.5 MG: 0.5 TABLET ORAL at 21:58

## 2023-12-21 RX ADMIN — AMPICILLIN SODIUM 2 G: 2 INJECTION, POWDER, FOR SOLUTION INTRAVENOUS at 14:18

## 2023-12-21 RX ADMIN — AMPICILLIN SODIUM 2 G: 2 INJECTION, POWDER, FOR SOLUTION INTRAVENOUS at 05:55

## 2023-12-21 RX ADMIN — SODIUM BICARBONATE 650 MG: 650 TABLET ORAL at 08:18

## 2023-12-21 RX ADMIN — SODIUM BICARBONATE 650 MG: 650 TABLET ORAL at 23:29

## 2023-12-21 RX ADMIN — AMPICILLIN SODIUM 2 G: 2 INJECTION, POWDER, FOR SOLUTION INTRAVENOUS at 18:43

## 2023-12-21 RX ADMIN — POTASSIUM CHLORIDE 40 MEQ: 750 TABLET, EXTENDED RELEASE ORAL at 14:18

## 2023-12-21 RX ADMIN — AMPICILLIN SODIUM 2 G: 2 INJECTION, POWDER, FOR SOLUTION INTRAVENOUS at 23:30

## 2023-12-21 RX ADMIN — Medication 10 ML: at 08:19

## 2023-12-21 RX ADMIN — DEXAMETHASONE 1 MG: 2 TABLET ORAL at 08:18

## 2023-12-21 RX ADMIN — APIXABAN 5 MG: 5 TABLET, FILM COATED ORAL at 08:18

## 2023-12-21 RX ADMIN — SODIUM BICARBONATE 650 MG: 650 TABLET ORAL at 18:43

## 2023-12-21 RX ADMIN — FAMOTIDINE 20 MG: 10 INJECTION INTRAVENOUS at 22:01

## 2023-12-21 RX ADMIN — Medication 10 ML: at 21:58

## 2023-12-21 NOTE — PLAN OF CARE
Goal Outcome Evaluation:  Plan of Care Reviewed With: patient        Progress: improving  Outcome Evaluation: pt with positive blood cultures. ID suspects implanted port. Pt WBC now WNL, IVF stopped for now, per communication, restart in AM if general surgery takes patient to remove implanted port. Potassium replaced. continue IV antibiotics.

## 2023-12-21 NOTE — PROGRESS NOTES
Name: King George ADMIT: 2023   : 1960  PCP: Mohini Cortes MD    MRN: [id number] LOS: 2 days   AGE/SEX: 63 y.o. male  ROOM: [facility]     Subjective   Subjective   Feeling better each day. No N/V/D/abd pain. Tolerating po. Voiding well. No F/C/NS. No SOA or CP.        Objective   Objective   Vital Signs  Temp:  [97.9 °F (36.6 °C)-99.3 °F (37.4 °C)] 97.9 °F (36.6 °C)  Heart Rate:  [70-89] 70  Resp:  [18-19] 18  BP: (111-155)/(58-85) 127/72  SpO2:  [96 %-99 %] 96 %  on   ;   Device (Oxygen Therapy): room air  Body mass index is 31.66 kg/m².  Physical Exam  Vitals and nursing note reviewed. Exam conducted with a chaperone present (Mother).   Constitutional:       General: He is not in acute distress.     Appearance: He is ill-appearing (chronically). He is not toxic-appearing or diaphoretic.   HENT:      Head: Normocephalic.      Nose: Nose normal.      Mouth/Throat:      Mouth: Mucous membranes are moist.      Pharynx: Oropharynx is clear.   Eyes:      General: No scleral icterus.        Right eye: No discharge.         Left eye: No discharge.      Extraocular Movements: Extraocular movements intact.      Conjunctiva/sclera: Conjunctivae normal.   Cardiovascular:      Rate and Rhythm: Normal rate and regular rhythm.      Pulses: Normal pulses.   Pulmonary:      Effort: Pulmonary effort is normal. No respiratory distress.      Breath sounds: Normal breath sounds. No wheezing or rales.   Abdominal:      General: Bowel sounds are normal. There is no distension.      Palpations: Abdomen is soft.      Tenderness: There is no abdominal tenderness.   Musculoskeletal:         General: Swelling (chronic edema in LLE) present. Normal range of motion.      Cervical back: Neck supple. No rigidity.   Skin:     General: Skin is warm and dry.      Capillary Refill: Capillary refill takes less than 2 seconds.      Coloration: Skin is not jaundiced.   Neurological:      General: No focal deficit present.      Mental  "Status: He is alert and oriented to person, place, and time. Mental status is at baseline.      Cranial Nerves: No cranial nerve deficit.      Coordination: Coordination normal.   Psychiatric:         Mood and Affect: Mood normal.         Behavior: Behavior normal.       Results Review     I reviewed the patient's new clinical results.  Results from last 7 days   Lab Units 12/21/23  0536 12/20/23  0717 12/19/23  1607   WBC 10*3/mm3 7.98 11.47* 14.25*   HEMOGLOBIN g/dL 8.9* 9.4* 10.5*   PLATELETS 10*3/mm3 170 154 163     Results from last 7 days   Lab Units 12/21/23  0536 12/20/23  0717 12/19/23  1608   SODIUM mmol/L 132* 132* 132*   POTASSIUM mmol/L 3.6 3.8 4.1   CHLORIDE mmol/L 105 105 102   CO2 mmol/L 17.5* 14.7* 14.8*   BUN mg/dL 29* 36* 34*   CREATININE mg/dL 1.39* 1.80* 2.01*   GLUCOSE mg/dL 104* 120* 106*   EGFR mL/min/1.73 57.7* 42.3* 37.0*     Results from last 7 days   Lab Units 12/21/23  0536 12/19/23  1608   ALBUMIN g/dL 2.9* 3.5   BILIRUBIN mg/dL  --  0.6   ALK PHOS U/L  --  66   AST (SGOT) U/L  --  51*   ALT (SGPT) U/L  --  14     Results from last 7 days   Lab Units 12/21/23  0536 12/20/23  0717 12/19/23  1608   CALCIUM mg/dL 8.4* 8.3* 8.6   ALBUMIN g/dL 2.9*  --  3.5   MAGNESIUM mg/dL 2.3  --   --    PHOSPHORUS mg/dL 3.3  --   --      Results from last 7 days   Lab Units 12/19/23  2134 12/19/23  1854 12/19/23  1608 12/18/23  1803   LACTATE mmol/L 1.0 2.9* 3.0* 1.2     No results found for: \"HGBA1C\", \"POCGLU\"    No radiology results for the last day    I have personally reviewed all medications:  Scheduled Medications  ampicillin, 2 g, Intravenous, Q4H  apixaban, 5 mg, Oral, Q12H  dexAMETHasone, 0.5 mg, Oral, Nightly  dexAMETHasone, 1 mg, Oral, Daily With Breakfast  potassium chloride ER, 40 mEq, Oral, Q4H  senna-docusate sodium, 2 tablet, Oral, BID  sodium bicarbonate, 650 mg, Oral, TID  sodium chloride, 10 mL, Intravenous, Q12H    Infusions  lactated ringers, 50 mL/hr, Last Rate: 50 mL/hr (12/20/23 " 1247)    Diet  Diet: Regular/House Diet; Texture: Regular Texture (IDDSI 7); Fluid Consistency: Thin (IDDSI 0)    I have personally reviewed:  [x]  Laboratory   [x]  Microbiology   [x]  Radiology   [x]  EKG/Telemetry  [x]  Cardiology/Vascular   []  Pathology    [x]  Records       Assessment/Plan     Active Hospital Problems    Diagnosis  POA    **Sepsis [A41.9]  Yes    Bacteremia due to Enterococcus [R78.81, B95.2]  Yes    Chronic anticoagulation [Z79.01]  Not Applicable    UTI (urinary tract infection), bacterial [N39.0, A49.9]  Yes    LENIN (acute kidney injury) [N17.9]  Yes    Metastatic melanoma [C43.9]  Yes    Abdominal lymphadenopathy [R59.0]  Yes    History of pulmonary embolism [Z86.711]  Yes      Resolved Hospital Problems   No resolved problems to display.       63-year-old gentleman with a history of urinary retention and obstructive uropathy with stents placed in October, who presented to the hospital with fever, chills, and sepsis and was found to have UTI, enterococcal bactermia, and LENIN.    Sepsis--present on admission with noted acidosis, acute renal failure, and hyponatremia. Felt to be due to UTI most likely.  -Blood cultures growing Enterococcus.  -White blood cell count has normalized with current antibiotics and fever curve improved.  Appreciate ID input, continue IV Ampicillin  -Repeat blood cultures sent  -Echo reassuring  -Plan port removal    LENIN--Appreciate Renal attention to pt, renal function deterioration felt to be multifactorial (JANET, hypovolemia, hypotension, sepsis, etc)  -Continue IVFs. Cr falling steadily.  -Continue sodium bicarb--serum CO2 improving     Chronic obstructive uropathy--Appreciate Urology's input, in the absence of hydronephrosis and stents in good position on imaging there is no need for stent exchange or manipulation during this hospitalization. He can follow-up with Dr. Montelongo in the outpatient setting as previously scheduled.    Metastatic melanoma--on treatment  for this in the outpatient setting--normally follows with the [facility].    Anemia NOS--Hgb falling. No bleeding evident. Continue to monitor and transfuse as needed. Suspect dilutional, probably has some anemia due to malignancy at baseline.      Eliquis (home med) for DVT prophylaxis.  Full code.  Discussed with patient, nursing staff, CCP, and care team on multidisciplinary rounds. D/w mother at bedside.  Anticipate discharge home, timing yet to be determined.      Vic Teague MD  [facility]  12/21/23  14:03 EST

## 2023-12-21 NOTE — PROGRESS NOTES
ID note for enterococcal septicemia  Subjective: He is feeling quite a bit better today.  Tolerating antibiotics.  Afebrile.    Physical Exam:   Vital Signs   Temp:  [97.5 °F (36.4 °C)-99.3 °F (37.4 °C)] 97.9 °F (36.6 °C)  Heart Rate:  [72-89] 78  Resp:  [18-19] 18  BP: (111-155)/(58-85) 112/58    GENERAL: Awake and alert, sickly  HEENT: Oropharynx is clear. Hearing is grossly normal.   EYES: . No conjunctival injection. No lid lag.   LUNGS:normal respiratory effort.   SKIN: no cutaneous eruptions in exposed areas  PSYCHIATRIC: Appropriate mood, affect, insight, and judgment.     Results Review:  White count now 7.98  Creatinine 1.39  12/19 blood cultures Enterococcus faecalis  COVID/influenza/RSV PCR negative      A/p  1.  Enterococcal septicemia with acute kidney injury  2.  Acute kidney injury, antibiotics dosed appropriately  3.  Metastatic melanoma, on chemotherapy    Cultures with Enterococcus faecalis.  Kidney function improved.  I had put him on renally dosed ampicillin and we will increase this to 2 g IV every 4 hours.  Source of infection thought to be other UTI.  A port infection would be another possibility.  He does not really use the port other than for blood draws and therefore I think it be reasonable to go ahead and discontinue it given some ambiguity about the source of infection.  He is agreeable to this and we will ask surgery to evaluate.  Repeat blood cultures and we will also check an echo.

## 2023-12-21 NOTE — PLAN OF CARE
Goal Outcome Evaluation:      VSS, IV fluids and abx continued, no complaints of pain, home meds sent to pharmacy for storage.

## 2023-12-21 NOTE — PROGRESS NOTES
[facility] Progress Note      Patient Name: King George  : 1960  MRN: [id number]  Primary Care Physician:  Mohini Cortes MD  Date of admission: 2023    Subjective     Interval History:   Follow-up acute kidney injury.  Urine output and IV fluid not recorded.  Blood pressure stable.  Tmax yesterday 100.6.  Feeling much better.  Eating and drinking.  No diarrhea.  Good urine output.  Echo without valvular vegetations.  Review of Systems:   As noted above    Objective     Vitals:   Temp:  [97.9 °F (36.6 °C)-99.3 °F (37.4 °C)] 97.9 °F (36.6 °C)  Heart Rate:  [70-99] 99  Resp:  [18-19] 18  BP: (111-155)/(58-85) 127/72    Intake/Output Summary (Last 24 hours) at 2023 1530  Last data filed at 2023 1330  Gross per 24 hour   Intake 960 ml   Output --   Net 960 ml       Physical Exam:    General Appearance: alert, oriented x 3, no acute distress   Skin: warm and dry  HEENT: oral mucosa normal, nonicteric sclera  Right chest port.  Lungs: clear to auscultation bilaterally.  Unlabored on room air.  Heart: RRR, normal S1 and S2.  Abdomen: soft, nontender, nondistended. +bs  : no palpable bladder  Extremities: Lower extremity edema (chronic), trace right lower extremity edema.  Neuro: normal speech and mental status     Scheduled Meds:     ampicillin, 2 g, Intravenous, Q4H  apixaban, 5 mg, Oral, Q12H  dexAMETHasone, 0.5 mg, Oral, Nightly  dexAMETHasone, 1 mg, Oral, Daily With Breakfast  potassium chloride ER, 40 mEq, Oral, Q4H  senna-docusate sodium, 2 tablet, Oral, BID  sodium bicarbonate, 650 mg, Oral, TID  sodium chloride, 10 mL, Intravenous, Q12H      IV Meds:   lactated ringers, 50 mL/hr, Last Rate: 50 mL/hr (23 1247)        Results Reviewed:   I have personally reviewed the results from the time of this admission to 2023 15:30 EST     Results from last 7 days   Lab Units 23  0536 23  0717 23  1608   SODIUM mmol/L 132* 132* 132*    POTASSIUM mmol/L 3.6 3.8 4.1   CHLORIDE mmol/L 105 105 102   CO2 mmol/L 17.5* 14.7* 14.8*   BUN mg/dL 29* 36* 34*   CREATININE mg/dL 1.39* 1.80* 2.01*   CALCIUM mg/dL 8.4* 8.3* 8.6   BILIRUBIN mg/dL  --   --  0.6   ALK PHOS U/L  --   --  66   ALT (SGPT) U/L  --   --  14   AST (SGOT) U/L  --   --  51*   GLUCOSE mg/dL 104* 120* 106*       Estimated Creatinine Clearance: 68.2 mL/min (A) (by C-G formula based on SCr of 1.39 mg/dL (H)).    Results from last 7 days   Lab Units 12/21/23  0536   MAGNESIUM mg/dL 2.3   PHOSPHORUS mg/dL 3.3       Results from last 7 days   Lab Units 12/21/23  0536   URIC ACID mg/dL 4.5       Results from last 7 days   Lab Units 12/21/23  0536 12/20/23  0717 12/19/23  1607   WBC 10*3/mm3 7.98 11.47* 14.25*   HEMOGLOBIN g/dL 8.9* 9.4* 10.5*   PLATELETS 10*3/mm3 170 154 163             Assessment / Plan     ASSESSMENT:  Acute kidney injury.  Likely due to contrast exposure, hypovolemia, hypotension and sepsis.  Improving with IV fluid.  Obstructive uropathy, lateral hydronephrosis now with indwelling ureteral stents 10/23 in good position.  Enterococcal bacteremia.  Potential sources include his urinary tract as well as his indwelling port.  On IV ampicillin.  WBC count improving.   Metastatic melanoma.  Anemia.  Prior left lower extremity DVT and pulmonary embolus on chronic anticoagulation.    PLAN:  DC IV fluids.  If he does go to surgery tomorrow and is n.p.o. would resume IV fluids after midnight.    Thank you for involving us in the care of King George.  Please feel free to call with any questions.    Madeline Mcqueen MD  12/21/23  15:30 [facility]    [facility]  [phone]    Please note that portions of this note were completed with a voice recognition program.

## 2023-12-22 ENCOUNTER — ANESTHESIA (OUTPATIENT)
Dept: PERIOP | Facility: HOSPITAL | Age: 63
End: 2023-12-22
Payer: COMMERCIAL

## 2023-12-22 ENCOUNTER — ANESTHESIA EVENT (OUTPATIENT)
Dept: PERIOP | Facility: HOSPITAL | Age: 63
End: 2023-12-22
Payer: COMMERCIAL

## 2023-12-22 ENCOUNTER — READMISSION MANAGEMENT (OUTPATIENT)
Dept: CALL CENTER | Facility: HOSPITAL | Age: 63
End: 2023-12-22
Payer: COMMERCIAL

## 2023-12-22 VITALS
WEIGHT: 235.6 LBS | RESPIRATION RATE: 24 BRPM | HEIGHT: 71 IN | TEMPERATURE: 97.5 F | DIASTOLIC BLOOD PRESSURE: 68 MMHG | SYSTOLIC BLOOD PRESSURE: 125 MMHG | BODY MASS INDEX: 32.98 KG/M2 | HEART RATE: 81 BPM | OXYGEN SATURATION: 99 %

## 2023-12-22 PROBLEM — A41.81 ENTEROCOCCAL SEPTICEMIA: Status: ACTIVE | Noted: 2023-12-22

## 2023-12-22 LAB
ALBUMIN SERPL-MCNC: 3.1 G/DL (ref 3.5–5.2)
ALBUMIN/GLOB SERPL: 0.9 G/DL
ALP SERPL-CCNC: 54 U/L (ref 39–117)
ALT SERPL W P-5'-P-CCNC: 17 U/L (ref 1–41)
ANION GAP SERPL CALCULATED.3IONS-SCNC: 12.7 MMOL/L (ref 5–15)
AST SERPL-CCNC: 25 U/L (ref 1–40)
BACTERIA SPEC AEROBE CULT: ABNORMAL
BACTERIA SPEC AEROBE CULT: ABNORMAL
BASOPHILS # BLD AUTO: 0.03 10*3/MM3 (ref 0–0.2)
BASOPHILS NFR BLD AUTO: 0.4 % (ref 0–1.5)
BILIRUB SERPL-MCNC: 0.3 MG/DL (ref 0–1.2)
BUN SERPL-MCNC: 17 MG/DL (ref 8–23)
BUN/CREAT SERPL: 12.2 (ref 7–25)
CALCIUM SPEC-SCNC: 8.5 MG/DL (ref 8.6–10.5)
CHLORIDE SERPL-SCNC: 104 MMOL/L (ref 98–107)
CO2 SERPL-SCNC: 18.3 MMOL/L (ref 22–29)
CREAT SERPL-MCNC: 1.39 MG/DL (ref 0.76–1.27)
DEPRECATED RDW RBC AUTO: 45 FL (ref 37–54)
EGFRCR SERPLBLD CKD-EPI 2021: 57.7 ML/MIN/1.73
EOSINOPHIL # BLD AUTO: 0.33 10*3/MM3 (ref 0–0.4)
EOSINOPHIL NFR BLD AUTO: 4 % (ref 0.3–6.2)
ERYTHROCYTE [DISTWIDTH] IN BLOOD BY AUTOMATED COUNT: 15.1 % (ref 12.3–15.4)
GLOBULIN UR ELPH-MCNC: 3.5 GM/DL
GLUCOSE SERPL-MCNC: 96 MG/DL (ref 65–99)
GRAM STN SPEC: ABNORMAL
HCT VFR BLD AUTO: 26.9 % (ref 37.5–51)
HGB BLD-MCNC: 8.7 G/DL (ref 13–17.7)
IMM GRANULOCYTES # BLD AUTO: 0.05 10*3/MM3 (ref 0–0.05)
IMM GRANULOCYTES NFR BLD AUTO: 0.6 % (ref 0–0.5)
ISOLATED FROM: ABNORMAL
ISOLATED FROM: ABNORMAL
LYMPHOCYTES # BLD AUTO: 0.71 10*3/MM3 (ref 0.7–3.1)
LYMPHOCYTES NFR BLD AUTO: 8.6 % (ref 19.6–45.3)
MAGNESIUM SERPL-MCNC: 2 MG/DL (ref 1.6–2.4)
MCH RBC QN AUTO: 26.9 PG (ref 26.6–33)
MCHC RBC AUTO-ENTMCNC: 32.3 G/DL (ref 31.5–35.7)
MCV RBC AUTO: 83 FL (ref 79–97)
MONOCYTES # BLD AUTO: 0.81 10*3/MM3 (ref 0.1–0.9)
MONOCYTES NFR BLD AUTO: 9.8 % (ref 5–12)
NEUTROPHILS NFR BLD AUTO: 6.35 10*3/MM3 (ref 1.7–7)
NEUTROPHILS NFR BLD AUTO: 76.6 % (ref 42.7–76)
NRBC BLD AUTO-RTO: 0 /100 WBC (ref 0–0.2)
PHOSPHATE SERPL-MCNC: 2.6 MG/DL (ref 2.5–4.5)
PLATELET # BLD AUTO: 198 10*3/MM3 (ref 140–450)
PMV BLD AUTO: 9.9 FL (ref 6–12)
POTASSIUM SERPL-SCNC: 3.5 MMOL/L (ref 3.5–5.2)
PROT SERPL-MCNC: 6.6 G/DL (ref 6–8.5)
RBC # BLD AUTO: 3.24 10*6/MM3 (ref 4.14–5.8)
SODIUM SERPL-SCNC: 135 MMOL/L (ref 136–145)
WBC NRBC COR # BLD AUTO: 8.28 10*3/MM3 (ref 3.4–10.8)

## 2023-12-22 PROCEDURE — 83735 ASSAY OF MAGNESIUM: CPT | Performed by: HOSPITALIST

## 2023-12-22 PROCEDURE — 25010000002 AMPICILLIN PER 500 MG: Performed by: INTERNAL MEDICINE

## 2023-12-22 PROCEDURE — 87070 CULTURE OTHR SPECIMN AEROBIC: CPT | Performed by: SURGERY

## 2023-12-22 PROCEDURE — 80053 COMPREHEN METABOLIC PANEL: CPT | Performed by: HOSPITALIST

## 2023-12-22 PROCEDURE — 25010000002 MIDAZOLAM PER 1 MG: Performed by: ANESTHESIOLOGY

## 2023-12-22 PROCEDURE — 25010000002 PROPOFOL 200 MG/20ML EMULSION: Performed by: NURSE ANESTHETIST, CERTIFIED REGISTERED

## 2023-12-22 PROCEDURE — 36590 REMOVAL TUNNELED CV CATH: CPT | Performed by: SURGERY

## 2023-12-22 PROCEDURE — 84100 ASSAY OF PHOSPHORUS: CPT | Performed by: HOSPITALIST

## 2023-12-22 PROCEDURE — 0JPT0WZ REMOVAL OF TOTALLY IMPLANTABLE VASCULAR ACCESS DEVICE FROM TRUNK SUBCUTANEOUS TISSUE AND FASCIA, OPEN APPROACH: ICD-10-PCS | Performed by: SURGERY

## 2023-12-22 PROCEDURE — 85025 COMPLETE CBC W/AUTO DIFF WBC: CPT | Performed by: INTERNAL MEDICINE

## 2023-12-22 PROCEDURE — 99221 1ST HOSP IP/OBS SF/LOW 40: CPT | Performed by: SURGERY

## 2023-12-22 PROCEDURE — 99232 SBSQ HOSP IP/OBS MODERATE 35: CPT | Performed by: INTERNAL MEDICINE

## 2023-12-22 PROCEDURE — 25810000003 LACTATED RINGERS PER 1000 ML: Performed by: ANESTHESIOLOGY

## 2023-12-22 PROCEDURE — 25010000002 PROPOFOL 10 MG/ML EMULSION: Performed by: NURSE ANESTHETIST, CERTIFIED REGISTERED

## 2023-12-22 RX ORDER — SODIUM CHLORIDE 0.9 % (FLUSH) 0.9 %
3 SYRINGE (ML) INJECTION EVERY 12 HOURS SCHEDULED
Status: DISCONTINUED | OUTPATIENT
Start: 2023-12-22 | End: 2023-12-22 | Stop reason: HOSPADM

## 2023-12-22 RX ORDER — SODIUM BICARBONATE 650 MG/1
650 TABLET ORAL 3 TIMES DAILY
Qty: 90 TABLET | Refills: 0 | Status: SHIPPED | OUTPATIENT
Start: 2023-12-22

## 2023-12-22 RX ORDER — HYDROCODONE BITARTRATE AND ACETAMINOPHEN 7.5; 325 MG/1; MG/1
1 TABLET ORAL EVERY 4 HOURS PRN
Status: DISCONTINUED | OUTPATIENT
Start: 2023-12-22 | End: 2023-12-22

## 2023-12-22 RX ORDER — MAGNESIUM HYDROXIDE 1200 MG/15ML
LIQUID ORAL AS NEEDED
Status: DISCONTINUED | OUTPATIENT
Start: 2023-12-22 | End: 2023-12-22 | Stop reason: HOSPADM

## 2023-12-22 RX ORDER — MIDAZOLAM HYDROCHLORIDE 1 MG/ML
1 INJECTION INTRAMUSCULAR; INTRAVENOUS
Status: DISCONTINUED | OUTPATIENT
Start: 2023-12-22 | End: 2023-12-22 | Stop reason: HOSPADM

## 2023-12-22 RX ORDER — HYDROCODONE BITARTRATE AND ACETAMINOPHEN 5; 325 MG/1; MG/1
1 TABLET ORAL ONCE AS NEEDED
Status: DISCONTINUED | OUTPATIENT
Start: 2023-12-22 | End: 2023-12-22

## 2023-12-22 RX ORDER — HYDRALAZINE HYDROCHLORIDE 20 MG/ML
5 INJECTION INTRAMUSCULAR; INTRAVENOUS
Status: DISCONTINUED | OUTPATIENT
Start: 2023-12-22 | End: 2023-12-22

## 2023-12-22 RX ORDER — FAMOTIDINE 10 MG/ML
20 INJECTION, SOLUTION INTRAVENOUS ONCE
Status: COMPLETED | OUTPATIENT
Start: 2023-12-22 | End: 2023-12-22

## 2023-12-22 RX ORDER — LABETALOL HYDROCHLORIDE 5 MG/ML
5 INJECTION, SOLUTION INTRAVENOUS
Status: DISCONTINUED | OUTPATIENT
Start: 2023-12-22 | End: 2023-12-22

## 2023-12-22 RX ORDER — NALOXONE HCL 0.4 MG/ML
0.2 VIAL (ML) INJECTION AS NEEDED
Status: DISCONTINUED | OUTPATIENT
Start: 2023-12-22 | End: 2023-12-22

## 2023-12-22 RX ORDER — LIDOCAINE HYDROCHLORIDE 20 MG/ML
INJECTION, SOLUTION INFILTRATION; PERINEURAL AS NEEDED
Status: DISCONTINUED | OUTPATIENT
Start: 2023-12-22 | End: 2023-12-22 | Stop reason: SURG

## 2023-12-22 RX ORDER — PROMETHAZINE HYDROCHLORIDE 25 MG/1
25 SUPPOSITORY RECTAL ONCE AS NEEDED
Status: DISCONTINUED | OUTPATIENT
Start: 2023-12-22 | End: 2023-12-22

## 2023-12-22 RX ORDER — HYDROMORPHONE HYDROCHLORIDE 1 MG/ML
0.25 INJECTION, SOLUTION INTRAMUSCULAR; INTRAVENOUS; SUBCUTANEOUS
Status: DISCONTINUED | OUTPATIENT
Start: 2023-12-22 | End: 2023-12-22

## 2023-12-22 RX ORDER — IPRATROPIUM BROMIDE AND ALBUTEROL SULFATE 2.5; .5 MG/3ML; MG/3ML
3 SOLUTION RESPIRATORY (INHALATION) ONCE AS NEEDED
Status: DISCONTINUED | OUTPATIENT
Start: 2023-12-22 | End: 2023-12-22

## 2023-12-22 RX ORDER — DROPERIDOL 2.5 MG/ML
0.62 INJECTION, SOLUTION INTRAMUSCULAR; INTRAVENOUS
Status: DISCONTINUED | OUTPATIENT
Start: 2023-12-22 | End: 2023-12-22

## 2023-12-22 RX ORDER — PROPOFOL 10 MG/ML
INJECTION, EMULSION INTRAVENOUS AS NEEDED
Status: DISCONTINUED | OUTPATIENT
Start: 2023-12-22 | End: 2023-12-22 | Stop reason: SURG

## 2023-12-22 RX ORDER — LIDOCAINE HYDROCHLORIDE 10 MG/ML
0.5 INJECTION, SOLUTION INFILTRATION; PERINEURAL ONCE AS NEEDED
Status: DISCONTINUED | OUTPATIENT
Start: 2023-12-22 | End: 2023-12-22 | Stop reason: HOSPADM

## 2023-12-22 RX ORDER — PROMETHAZINE HYDROCHLORIDE 25 MG/1
25 TABLET ORAL ONCE AS NEEDED
Status: DISCONTINUED | OUTPATIENT
Start: 2023-12-22 | End: 2023-12-22

## 2023-12-22 RX ORDER — SODIUM CHLORIDE 9 MG/ML
75 INJECTION, SOLUTION INTRAVENOUS CONTINUOUS
Status: DISCONTINUED | OUTPATIENT
Start: 2023-12-22 | End: 2023-12-22 | Stop reason: HOSPADM

## 2023-12-22 RX ORDER — FENTANYL CITRATE 50 UG/ML
25 INJECTION, SOLUTION INTRAMUSCULAR; INTRAVENOUS
Status: DISCONTINUED | OUTPATIENT
Start: 2023-12-22 | End: 2023-12-22

## 2023-12-22 RX ORDER — SODIUM CHLORIDE, SODIUM LACTATE, POTASSIUM CHLORIDE, CALCIUM CHLORIDE 600; 310; 30; 20 MG/100ML; MG/100ML; MG/100ML; MG/100ML
9 INJECTION, SOLUTION INTRAVENOUS CONTINUOUS
Status: DISCONTINUED | OUTPATIENT
Start: 2023-12-22 | End: 2023-12-22

## 2023-12-22 RX ORDER — EPHEDRINE SULFATE 50 MG/ML
5 INJECTION, SOLUTION INTRAVENOUS ONCE AS NEEDED
Status: DISCONTINUED | OUTPATIENT
Start: 2023-12-22 | End: 2023-12-22

## 2023-12-22 RX ORDER — ONDANSETRON 2 MG/ML
4 INJECTION INTRAMUSCULAR; INTRAVENOUS ONCE AS NEEDED
Status: DISCONTINUED | OUTPATIENT
Start: 2023-12-22 | End: 2023-12-22

## 2023-12-22 RX ORDER — FLUMAZENIL 0.1 MG/ML
0.2 INJECTION INTRAVENOUS AS NEEDED
Status: DISCONTINUED | OUTPATIENT
Start: 2023-12-22 | End: 2023-12-22

## 2023-12-22 RX ORDER — LINEZOLID 600 MG/1
600 TABLET, FILM COATED ORAL 2 TIMES DAILY
Qty: 14 TABLET | Refills: 0 | Status: SHIPPED | OUTPATIENT
Start: 2023-12-22 | End: 2023-12-29 | Stop reason: HOSPADM

## 2023-12-22 RX ORDER — FENTANYL CITRATE 50 UG/ML
50 INJECTION, SOLUTION INTRAMUSCULAR; INTRAVENOUS ONCE AS NEEDED
Status: DISCONTINUED | OUTPATIENT
Start: 2023-12-22 | End: 2023-12-22 | Stop reason: HOSPADM

## 2023-12-22 RX ORDER — FAMOTIDINE 20 MG/1
20 TABLET, FILM COATED ORAL
Status: DISCONTINUED | OUTPATIENT
Start: 2023-12-22 | End: 2023-12-22 | Stop reason: HOSPADM

## 2023-12-22 RX ORDER — LIDOCAINE HYDROCHLORIDE 10 MG/ML
INJECTION, SOLUTION EPIDURAL; INFILTRATION; INTRACAUDAL; PERINEURAL AS NEEDED
Status: DISCONTINUED | OUTPATIENT
Start: 2023-12-22 | End: 2023-12-22 | Stop reason: HOSPADM

## 2023-12-22 RX ORDER — DIPHENHYDRAMINE HYDROCHLORIDE 50 MG/ML
12.5 INJECTION INTRAMUSCULAR; INTRAVENOUS
Status: DISCONTINUED | OUTPATIENT
Start: 2023-12-22 | End: 2023-12-22

## 2023-12-22 RX ORDER — SODIUM CHLORIDE 0.9 % (FLUSH) 0.9 %
3-10 SYRINGE (ML) INJECTION AS NEEDED
Status: DISCONTINUED | OUTPATIENT
Start: 2023-12-22 | End: 2023-12-22 | Stop reason: HOSPADM

## 2023-12-22 RX ADMIN — SODIUM CHLORIDE 75 ML/HR: 9 INJECTION, SOLUTION INTRAVENOUS at 09:55

## 2023-12-22 RX ADMIN — DEXAMETHASONE 1 MG: 2 TABLET ORAL at 13:50

## 2023-12-22 RX ADMIN — AMPICILLIN SODIUM 2 G: 2 INJECTION, POWDER, FOR SOLUTION INTRAVENOUS at 10:59

## 2023-12-22 RX ADMIN — FAMOTIDINE 20 MG: 20 TABLET, FILM COATED ORAL at 13:51

## 2023-12-22 RX ADMIN — PROPOFOL 100 MG: 10 INJECTION, EMULSION INTRAVENOUS at 11:28

## 2023-12-22 RX ADMIN — AMPICILLIN SODIUM 2 G: 2 INJECTION, POWDER, FOR SOLUTION INTRAVENOUS at 03:43

## 2023-12-22 RX ADMIN — PROPOFOL 100 MCG/KG/MIN: 10 INJECTION, EMULSION INTRAVENOUS at 11:23

## 2023-12-22 RX ADMIN — PROPOFOL 100 MG: 10 INJECTION, EMULSION INTRAVENOUS at 11:23

## 2023-12-22 RX ADMIN — AMPICILLIN SODIUM 2 G: 2 INJECTION, POWDER, FOR SOLUTION INTRAVENOUS at 08:49

## 2023-12-22 RX ADMIN — MIDAZOLAM HYDROCHLORIDE 1 MG: 2 INJECTION, SOLUTION INTRAMUSCULAR; INTRAVENOUS at 10:53

## 2023-12-22 RX ADMIN — SODIUM CHLORIDE, POTASSIUM CHLORIDE, SODIUM LACTATE AND CALCIUM CHLORIDE 9 ML/HR: 600; 310; 30; 20 INJECTION, SOLUTION INTRAVENOUS at 11:01

## 2023-12-22 RX ADMIN — LIDOCAINE HYDROCHLORIDE 100 MG: 20 INJECTION, SOLUTION INFILTRATION; PERINEURAL at 11:23

## 2023-12-22 RX ADMIN — SODIUM BICARBONATE 650 MG: 650 TABLET ORAL at 13:51

## 2023-12-22 RX ADMIN — FAMOTIDINE 20 MG: 10 INJECTION INTRAVENOUS at 10:53

## 2023-12-22 NOTE — PLAN OF CARE
Goal Outcome Evaluation:      VSS, IV abx continued, no complaints of pain, ambulates to bathroom, NPO since midnight.

## 2023-12-22 NOTE — PROGRESS NOTES
Consult received. I will place patient on schedule for port removal tomorrow in OR. I will discuss this with him in the AM. NPO after midnight.

## 2023-12-22 NOTE — PLAN OF CARE
Goal Outcome Evaluation:  Plan of Care Reviewed With: patient        Progress: no change  Outcome Evaluation: Port removed during am shift.

## 2023-12-22 NOTE — ANESTHESIA POSTPROCEDURE EVALUATION
"Patient: King George    Procedure Summary       Date: 12/22/23 Room / Location: [facility] OR 06 / [facility] MAIN OR    Anesthesia Start: 1114 Anesthesia Stop: 1204    Procedure: REMOVAL VENOUS ACCESS DEVICE Diagnosis:     Surgeons: Jose A Allen Jr., MD Provider: Evans Ingram MD    Anesthesia Type: MAC ASA Status: 3            Anesthesia Type: MAC    Vitals  Vitals Value Taken Time   /56 12/22/23 1205   Temp     Pulse 79 12/22/23 1215   Resp     SpO2 100 % 12/22/23 1215   Vitals shown include unfiled device data.        Post Anesthesia Care and Evaluation    Patient location during evaluation: bedside  Patient participation: complete - patient participated  Level of consciousness: awake and alert  Pain management: adequate    Airway patency: patent  Anesthetic complications: No anesthetic complications  PONV Status: controlled  Cardiovascular status: acceptable and hemodynamically stable  Respiratory status: acceptable, spontaneous ventilation and nonlabored ventilation  Hydration status: acceptable    Comments: /73 (BP Location: Right arm, Patient Position: Lying)   Pulse 89   Temp 37.3 °C (99.1 °F) (Oral)   Resp 16   Ht 180.3 cm (71\")   Wt 107 kg (235 lb 9.6 oz)   SpO2 98%   BMI 32.86 kg/m²       "

## 2023-12-22 NOTE — OP NOTE
Surgeon: Jose A Allen Jr., M.D.    Pre-Operative Diagnosis:     Possible infected Mediport    Post-Operative Diagnosis:    Possible infected Mediport    Procedure Performed:     Mediport removal    Indications:     The patient is a pleasant 63-year-old male who has a Mediport in place for metastatic melanoma.  He was admitted with Enterococcus septicemia and concern was raised over Mediport infection.  He presents for Mediport removal.  The patient understands the indications, alternatives, risks, and benefits of the procedure and wishes to proceed.     Procedure:     The patient was identified and taken to the operating room where he was placed in the supine position on the operating table.  Monitors were placed and he underwent a Mac anesthesia and was appropriately monitored throughout the case by the anesthesia personnel.  The right chest and shoulder were prepped and draped in the standard surgical fashion.  The area of the previous Mediport was infiltrated with 1% lidocaine without epinephrine.  The previous Mediport incision was opened with a scalpel carried through the skin into the subcutaneous tissue.  The subcutaneous tissue was divided using Bovie electrocautery down to the Mediport which was freed from the pocket.  The Mediport was completely removed and noted to be intact.  The tract to the subclavian vein was oversewn with 2-0 Vicryl suture.  Hemostasis was noted to be adequate.  The skin was closed with a 4-0 Monocryl in a subcuticular fashion followed by benzoin and Steri-Strips.  The sponge, needle, and instrument counts were correct at the end the case.  The patient tolerated the procedure well and was transferred to the recovery area in stable condition.     Estimated Blood Loss:      minimal    Specimens:     Order Name Source Comment Collection Info Order Time   CATHETER TIP CULTURE Chest, Right  Collected By: Jose A Allen Jr., MD 12/22/2023 11:39 AM     Catheter Type   PVC           Release to patient   Routine Release            Jose A Allen Jr., M.D.

## 2023-12-22 NOTE — PROGRESS NOTES
ID note for enterococcal septicemia  Subjective: He is feeling quite a bit better today.  Tolerating antibiotics.  Afebrile.    Physical Exam:   Vital Signs   Temp:  [97.9 °F (36.6 °C)-98.4 °F (36.9 °C)] 98.4 °F (36.9 °C)  Heart Rate:  [70-99] 87  Resp:  [16-19] 16  BP: (114-128)/(56-81) 122/56    GENERAL: Awake and alert, sickly  HEENT: Oropharynx is clear. Hearing is grossly normal.   EYES: . No conjunctival injection. No lid lag.   LUNGS:normal respiratory effort.   SKIN: no cutaneous eruptions in exposed areas  PSYCHIATRIC: Appropriate mood, affect, insight, and judgment.     Results Review:  White count now 8.3  Creatinine 1.39  12/19 blood cultures Enterococcus faecalis  12/20 Ucx pending  12/21 BCx ngtd  COVID/influenza/RSV PCR negative      A/p  1.  Enterococcal septicemia with acute kidney injury  2.  Acute kidney injury, antibiotics dosed appropriately  3.  Metastatic melanoma, on chemotherapy    Plan for port removal today, appreciate surgery help  He is doing well  Repeat bcx ngtd  We will plan ampicillin 2g IV q4 while inpatient  At [place] can change to po zyvox 600mg x7d  Changed pepcid to po at his request  With abx plan in place we will not plan to see daily. Please call [phone] with questions or concerns

## 2023-12-22 NOTE — CONSULTS
[facility]   Consult Note    Patient Name: King George  : 1960  MRN: [id number]  Primary Care Physician:  Mohini Cortes MD  Referring Physician: Maldonado Uribe MD  Date of admission: 2023    Inpatient General Surgery Consult  Consult performed by: Jose A Allen Jr., MD  Consult ordered by: Prakash Mccormick MD        Subjective   Subjective     Reason for Consult/ Chief Complaint: Possible infected Mediport    History of Present Illness  King George is a pleasant 63 y.o. male who has a right upper chest Mediport for stage IV melanoma.  Currently he is on oral management.  The Mediport is only used for blood draws.  He was admitted to the hospital on 2023 with fevers and chills.  He has been noted to have enterococcal septicemia and there was concern that the Mediport has become infected.    Review of Systems   Constitutional:  Negative for fatigue and fever.   Respiratory:  Negative for chest tightness and shortness of breath.    Cardiovascular:  Negative for chest pain and palpitations.   Gastrointestinal:  Negative for abdominal pain, blood in stool, constipation, diarrhea, nausea and vomiting.        Personal History     Past Medical History:   Diagnosis Date    Abdominal lymphadenopathy 2023    Acute pulmonary embolism, unspecified pulmonary embolism type, unspecified whether acute cor pulmonale present 2023    ADMITTED TO [facility], RIGHT    LENIN (acute kidney injury)     Anemia 2023    Colon polyps     FOLLOWED BY DR. SHANNA MOTTA    Constipation     Drug rash 2023    DVT (deep venous thrombosis)     LEFT LEG    Hepatic lesion 2023    History of shortness of breath     FOUND BLOOD CLOT ATTACHED TO LUNG    Left leg swelling     FROM BLOOD CLOT 2023    Left lower quadrant abdominal mass 2023    LEFT INGUINAL BX SHOWED METASTATIC MELANOMA    Melanoma     LEFT SIDE GROIN AREA, STAGE 4    Metastasis to brain 2023    Metastatic melanoma  05/31/2023    Moderate Malnutrition (HCC) 05/30/2023    Personal history of venous thrombosis and embolism 06/19/2023       Past Surgical History:   Procedure Laterality Date    COLONOSCOPY N/A 06/02/2023    40 MM TUBULOVILLOUS ADENOMA POLYP IN DISTAL RECTUM, RESCOPE IN 1 YR, DR. SHANNA MOTTA AT [facility]    COLONOSCOPY W/ POLYPECTOMY N/A 06/02/2023    40 MM POLYP IN DISTAL RECTUM, RESECTION AND RETREIVAL COMPLETE, RESCOPE IN 1 YR, DR. SHANNA MOTTA AT [facility]    CYSTOSCOPY W/ URETERAL STENT PLACEMENT Bilateral 07/10/2023    Procedure: CYSTOSCOPY URETERAL CATHETER/STENT INSERTION;  Surgeon: Vic Montelongo Jr., MD;  Location: [facility];  Service: Urology;  Laterality: Bilateral;    CYSTOSCOPY W/ URETERAL STENT PLACEMENT N/A 10/19/2023    Procedure: CYSTOSCOPY WITH BILATERAL STENT EXCHANGE;  Surgeon: Vic Montelongo Jr., MD;  Location: [facility];  Service: Urology;  Laterality: N/A;    PORTACATH PLACEMENT Right 06/02/2023    DONE AT [facility]    US GUIDED LYMPH NODE BIOPSY  05/30/2023    METASTATIC MELANOMA, DONE AT [facility]    VENOUS ACCESS DEVICE (PORT) INSERTION N/A 06/02/2023    Procedure: INFUSAPORT PLACEMENT;  Surgeon: Vicki Layne MD;  Location: [facility];  Service: General;  Laterality: N/A;       Family History: family history includes Breast cancer in his mother; Prostate cancer in his father. Otherwise pertinent FHx was reviewed and not pertinent to current issue.    Social History:  reports that he has never smoked. He has never been exposed to tobacco smoke. He has never used smokeless tobacco. He reports that he does not drink alcohol and does not use drugs.    Home Medications:   Binimetinib, HYDROcodone-acetaminophen, apixaban, dexAMETHasone, encorafenib, famotidine, ondansetron, oxyCODONE-acetaminophen, predniSONE, prochlorperazine, and sulfamethoxazole-trimethoprim    Allergies:  No Known Allergies    Objective    Objective     Vitals:  Temp:  [97.9 °F (36.6 °C)-98.4 °F (36.9 °C)] 98.4 °F  (36.9 °C)  Heart Rate:  [70-99] 87  Resp:  [16-19] 16  BP: (114-128)/(56-81) 122/56    Physical Exam  Constitutional:       Appearance: He is not ill-appearing or toxic-appearing.   Chest:      Comments: Right upper chest Mediport with no complicating features.  Neurological:      Mental Status: He is alert.   Psychiatric:         Behavior: Behavior is cooperative.         Result Review    Result Review:  I have personally reviewed the results from the time of this admission to 12/22/2023 09:29 EST and agree with these findings:  [x]  Laboratory list / accordion  []  Microbiology  [x]  Radiology  []  EKG/Telemetry   []  Cardiology/Vascular   []  Pathology  [x]  Old records  []  Other:      Assessment & Plan   Assessment / Plan     Brief Patient Summary with assessment and plan:  King George is a 63 y.o. male who has Enterococcus septicemia and a Mediport that may be infected.    1.  Possible Mediport infection: We will plan to proceed with Mediport removal.  The patient understands the indications, alternatives, risks, and benefits of the procedure and wishes to proceed.         Jose A Allen Jr., MD

## 2023-12-22 NOTE — ANESTHESIA PREPROCEDURE EVALUATION
Anesthesia Evaluation     NPO Solid Status: > 8 hours  NPO Liquid Status: > 2 hours           Airway   Mallampati: II  TM distance: >3 FB  Neck ROM: full  Dental - normal exam     Pulmonary - normal exam   (+) pulmonary embolism,  Cardiovascular - normal exam    (+) DVT  (-) past MI    ROS comment: Resolved septic shock.  BP 130s    Neuro/Psych  GI/Hepatic/Renal/Endo    (+) renal disease- ARF    Musculoskeletal     Abdominal    Substance History      OB/GYN          Other      history of cancer                Anesthesia Plan    ASA 3     MAC       Anesthetic plan, risks, benefits, and alternatives have been provided, discussed and informed consent has been obtained with: patient.    CODE STATUS:    Level Of Support Discussed With: Patient  Code Status (Patient has no pulse and is not breathing): CPR (Attempt to Resuscitate)  Medical Interventions (Patient has pulse or is breathing): Full Support

## 2023-12-22 NOTE — DISCHARGE SUMMARY
Patient Name: King George  : 1960  MRN: [id number]    Date of Admission: 2023  Date of Discharge:  2023  Primary Care Physician: Mohini Cortes MD      Chief Complaint:   No chief complaint on file.      Discharge Diagnoses     Active Hospital Problems    Diagnosis  POA    **Enterococcal septicemia [A41.81]  Yes    Chronic anticoagulation [Z79.01]  Not Applicable    UTI (urinary tract infection), bacterial [N39.0, A49.9]  Yes    LENIN (acute kidney injury) [N17.9]  Yes    Metastatic melanoma [C43.9]  Yes    Abdominal lymphadenopathy [R59.0]  Yes    History of pulmonary embolism [Z86.711]  Yes      Resolved Hospital Problems   No resolved problems to display.        Hospital Course     Very pleasant 63-year-old gentleman with a history of urinary retention and obstructive uropathy with stents placed in October, who presented to the hospital with fever, chills, and sepsis and was found to have UTI, enterococcal bactermia, and LENIN. Please see below for details of admission:     Sepsis--present on admission with noted acidosis, acute renal failure, and hyponatremia. Felt to be due to UTI most likely.  -Blood cultures growing Enterococcus.  -White blood cell count has normalized with current antibiotics and fever curve improved.  Appreciate ID input, treated with IV Ampicillin  -Repeat blood cultures NGTD  -Echo reassuring  -Port removed today, can resume Eliquis tomorrow per Dr. Alejandro Sharp for dc home on a week of oral Zyvox per ID     LENIN--Appreciate Renal attention to pt, renal function deterioration felt to be multifactorial (JANET, hypovolemia, hypotension, sepsis, etc)  -Treated with IVFs. Cr falling steadily. Shannan for dc home today per Renal.  -Continue sodium bicarb--serum CO2 improving steadily  -F/u with PCP in a week and Renal in 2 weeks to have Cr and lytes followed     Chronic obstructive uropathy--Appreciate Urology's input, in the absence of hydronephrosis and stents in good  position on imaging there is no need for stent exchange or manipulation during this hospitalization. He can follow-up with Dr. Montelongo in the outpatient setting as previously scheduled.     Metastatic melanoma--on treatment for this in the outpatient setting--normally follows with the [facility]. Okay to resume Baftovi and Mektovi at ID.     Anemia NOS--Hgb stable. No bleeding evident. Suspect dilutional, probably has some anemia due to malignancy at baseline. Can f/u with his Heme/Onc.        Eliquis (home med) sufficed for DVT prophylaxis.  Full code confirmed.  Discussed with patient, nursing staff, CCP, and care team on multidisciplinary rounds. D/w Dr. Guillory and Dr. Allen.  Discharge home today.    Day of Discharge     Subjective:  Feeling better each day. No N/V/D/abd pain. Tolerating po. Voiding well. No F/C/NS. No SOA or CP.  Very eager to go home.    Physical Exam:  Temp:  [97.5 °F (36.4 °C)-99.1 °F (37.3 °C)] 97.5 °F (36.4 °C)  Heart Rate:  [79-89] 81  Resp:  [16-24] 24  BP: (101-131)/(56-81) 125/68  Body mass index is 32.86 kg/m².  Physical Exam  Vitals and nursing note reviewed.   Constitutional:       General: He is not in acute distress.     Appearance: He is ill-appearing (chronically). He is not toxic-appearing or diaphoretic.   Cardiovascular:      Rate and Rhythm: Normal rate and regular rhythm.      Pulses: Normal pulses.   Pulmonary:      Effort: Pulmonary effort is normal. No respiratory distress.      Breath sounds: Normal breath sounds. No wheezing or rales.   Abdominal:      General: Bowel sounds are normal. There is no distension.      Palpations: Abdomen is soft.      Tenderness: There is no abdominal tenderness.   Musculoskeletal:         General: Swelling (chronic edema in LLE) present. Normal range of motion.      Cervical back: Neck supple. No rigidity.   Skin:     General: Skin is warm and dry.      Capillary Refill: Capillary refill takes less than 2 seconds.       Coloration: Skin is not jaundiced.   Neurological:      General: No focal deficit present.      Mental Status: He is alert and oriented to person, place, and time. Mental status is at baseline.      Cranial Nerves: No cranial nerve deficit.      Coordination: Coordination normal.   Psychiatric:         Mood and Affect: Mood normal.         Behavior: Behavior normal.         Consultants     Consult Orders (all) (From admission, onward)       Start     Ordered    12/21/23 1107  Inpatient General Surgery Consult  Once        Provider:  Vicki Layne MD    12/21/23 1107    12/19/23 1758  Inpatient Infectious Diseases Consult  Once        Specialty:  Infectious Diseases  Provider:  Darin Kwok MD    12/19/23 1757    12/19/23 1550  Inpatient Infectious Diseases Consult  Once,   Status:  Canceled        Specialty:  Infectious Diseases  Provider:  Luis Mckeon MD    12/19/23 1550    12/19/23 1549  Inpatient Nephrology Consult  Once        Specialty:  Nephrology  Provider:  Mathieu Fierro MD    12/19/23 1550    12/19/23 1549  Inpatient Urology Consult  Once        Specialty:  Urology  Provider:  Bc George MD    12/19/23 1550                  Procedures     REMOVAL VENOUS ACCESS DEVICE    Imaging Results (All)       None            Results for orders placed during the hospital encounter of 12/19/23    Adult Transthoracic Echo Complete W/ Cont if Necessary Per Protocol    Interpretation Summary    Left ventricular systolic function is normal. Calculated left ventricular EF = 58.3%    Left ventricular diastolic function was normal.    Mild dilation of the aortic root is present. The aortic root measures 3.9 cm.    There is a trivial pericardial effusion.    No definite valvular vegetations appreciated on transthoracic echocardiogram.    Pertinent Labs     Results from last 7 days   Lab Units 12/22/23  0718 12/21/23  0536 12/20/23  0717 12/19/23  1607   WBC 10*3/mm3 8.28 7.98 11.47* 14.25*  "  HEMOGLOBIN g/dL 8.7* 8.9* 9.4* 10.5*   PLATELETS 10*3/mm3 198 170 154 163     Results from last 7 days   Lab Units 12/22/23 0718 12/21/23 2009 12/21/23 0536 12/20/23 0717 12/19/23  1608   SODIUM mmol/L 135*  --  132* 132* 132*   POTASSIUM mmol/L 3.5 3.9 3.6 3.8 4.1   CHLORIDE mmol/L 104  --  105 105 102   CO2 mmol/L 18.3*  --  17.5* 14.7* 14.8*   BUN mg/dL 17  --  29* 36* 34*   CREATININE mg/dL 1.39*  --  1.39* 1.80* 2.01*   GLUCOSE mg/dL 96  --  104* 120* 106*   EGFR mL/min/1.73 57.7*  --  57.7* 42.3* 37.0*     Results from last 7 days   Lab Units 12/22/23 0718 12/21/23 0536 12/19/23  1608   ALBUMIN g/dL 3.1* 2.9* 3.5   BILIRUBIN mg/dL 0.3  --  0.6   ALK PHOS U/L 54  --  66   AST (SGOT) U/L 25  --  51*   ALT (SGPT) U/L 17  --  14     Results from last 7 days   Lab Units 12/22/23 0718 12/21/23 0536 12/20/23 0717 12/19/23  1608   CALCIUM mg/dL 8.5* 8.4* 8.3* 8.6   ALBUMIN g/dL 3.1* 2.9*  --  3.5   MAGNESIUM mg/dL 2.0 2.3  --   --    PHOSPHORUS mg/dL 2.6 3.3  --   --          Results from last 7 days   Lab Units 12/21/23 0536 12/20/23  1334   SODIUM UR mmol/L  --  80   CREATININE UR mg/dL  --  101.8   URIC ACID mg/dL 4.5  --          Invalid input(s): \"LDLCALC\"  Results from last 7 days   Lab Units 12/21/23  1207 12/21/23  1200 12/20/23  1334 12/19/23  1616 12/19/23  1608   BLOODCX  No growth at 24 hours No growth at 24 hours  --  Enterococcus faecalis* Enterococcus faecalis*   URINECX   --   --  Growth present, too young to evaluate  --   --    BCIDPCR   --   --   --  Enterococcus faecalis. Jake/B (vancomycin resistance gene) not detected. Identification by BCID2 PCR.*  --      Results from last 7 days   Lab Units 12/18/23  1644   COVID19  NEGATIVE       Test Results Pending at Discharge     Pending Labs       Order Current Status    Catheter Culture - Cath Tip, Chest, Right In process    Blood Culture - Blood, Hand, Left Preliminary result    Blood Culture - Blood, Hand, Right Preliminary result    Urine " Culture - Urine, Urine, Clean Catch Preliminary result            Discharge Details        Discharge Medications        New Medications        Instructions Start Date   linezolid 600 MG tablet  Commonly known as: Zyvox   600 mg, Oral, 2 Times Daily      sodium bicarbonate 650 MG tablet   650 mg, Oral, 3 Times Daily             Changes to Medications        Instructions Start Date   Eliquis 5 MG tablet tablet  Generic drug: apixaban  What changed: See the new instructions.   TAKE 1 TABLET BY MOUTH EVERY 12 (TWELVE) HOURS. INDICATIONS: DVT/PE (ACTIVE THROMBOSIS)             Continue These Medications        Instructions Start Date   Braftovi 75 MG capsule  Generic drug: encorafenib   450 mg, Oral, Nightly      dexAMETHasone 0.5 MG tablet  Commonly known as: DECADRON   1 mg, Oral, Daily With Breakfast      dexAMETHasone 0.5 MG tablet  Commonly known as: DECADRON   0.5 mg, Oral, Nightly      famotidine 20 MG tablet  Commonly known as: PEPCID   20 mg, Oral, 2 Times Daily Before Meals      Mektovi 15 MG tablet  Generic drug: Binimetinib   15 mg, Oral, 2 Times Daily, TAKES 3 PILLS IN AM AND 3 PILLS IN PM      ondansetron 8 MG tablet  Commonly known as: ZOFRAN   8 mg, Oral, Every 8 Hours PRN, Pt not taking      prochlorperazine 10 MG tablet  Commonly known as: COMPAZINE   10 mg, Oral, Every 6 Hours PRN             Stop These Medications      HYDROcodone-acetaminophen 5-325 MG per tablet  Commonly known as: NORCO     oxyCODONE-acetaminophen 5-325 MG per tablet  Commonly known as: Percocet     predniSONE 10 MG tablet  Commonly known as: DELTASONE     sulfamethoxazole-trimethoprim 800-160 MG per tablet  Commonly known as: Bactrim DS              No Known Allergies    Discharge Disposition:  Home or Self Care      Discharge Diet:  Diet Order   Procedures    Diet: Regular/House Diet; Texture: Regular Texture (IDDSI 7); Fluid Consistency: Thin (IDDSI 0)       Discharge Activity:   As tolerated    CODE STATUS:    Code Status and  Medical Interventions:   Ordered at: 12/19/23 1524     Level Of Support Discussed With:    Patient     Code Status (Patient has no pulse and is not breathing):    CPR (Attempt to Resuscitate)     Medical Interventions (Patient has pulse or is breathing):    Full Support       No future appointments.  Additional Instructions for the Follow-ups that You Need to Schedule       Discharge Follow-up with PCP   As directed       Currently Documented PCP:    Mohini Cortes MD    PCP Phone Number:    [phone]     Follow Up Details: Dr. Cortes (PCP) in 1 week        Discharge Follow-up with Specified Provider: Dr. Montelongo (AIDE); 2 Weeks   As directed      To: Dr. Montelongo (AIDE)   Follow Up: 2 Weeks        Discharge Follow-up with Specified Provider: Dr. Guillory (Renal); 2 Weeks   As directed      To: Dr. Guillory (Renal)   Follow Up: 2 Weeks               Follow-up Information       Mohini Cortes MD .    Specialty: Internal Medicine  Why: Dr. Cortes (PCP) in 1 week  Contact information:  [address] [address]  [phone]                             Additional Instructions for the Follow-ups that You Need to Schedule       Discharge Follow-up with PCP   As directed       Currently Documented PCP:    Mohini Cortes MD    PCP Phone Number:    [phone]     Follow Up Details: Dr. Cortes (PCP) in 1 week        Discharge Follow-up with Specified Provider: Dr. Montelongo (AIDE); 2 Weeks   As directed      To: Dr. Montelongo (AIDE)   Follow Up: 2 Weeks        Discharge Follow-up with Specified Provider: Dr. Guillory (Renal); 2 Weeks   As directed      To: Dr. Guillory (Renal)   Follow Up: 2 Weeks            Time Spent on Discharge:  Greater than 30 minutes      Vic Teague MD  [facility]  12/22/23  14:19 EST

## 2023-12-22 NOTE — PROGRESS NOTES
[facility] Progress Note      Patient Name: King George  : 1960  MRN: [id number]  Primary Care Physician:  Mohini Cortes MD  Date of admission: 2023    Subjective     Interval History:   Feeling better; appetite improving  No shortness of breath on room air  Mediport removal planned later this morning    Review of Systems:   As noted above    Objective     Vitals:   Temp:  [97.9 °F (36.6 °C)-98.4 °F (36.9 °C)] 98.4 °F (36.9 °C)  Heart Rate:  [70-99] 87  Resp:  [16-19] 16  BP: (114-128)/(56-81) 122/56    Intake/Output Summary (Last 24 hours) at 2023 0807  Last data filed at 2023 1330  Gross per 24 hour   Intake 720 ml   Output --   Net 720 ml       Physical Exam:    General Appearance: alert, oriented x 3, NAD  Skin: warm and dry  HEENT: oral mucosa normal, nonicteric sclera  Right chest port.  Lungs: clear to auscultation bilaterally.  Unlabored on room air.  Heart: RRR, normal S1 and S2.  Abdomen: soft, nontender, nondistended. +bs  : no palpable bladder; easily palpable inguinal lymphadenopathy, left more than right; multiple areas of hyperpigmentation and pedunculated masses left groin   Extremities: Lower extremity edema, +1-2 on the left and trace-+1 on the right Neuro: normal speech and mental status     Scheduled Meds:     ampicillin, 2 g, Intravenous, Q4H  [Held by provider] apixaban, 5 mg, Oral, Q12H  dexAMETHasone, 0.5 mg, Oral, Nightly  dexAMETHasone, 1 mg, Oral, Daily With Breakfast  famotidine, 20 mg, Intravenous, Q12H  senna-docusate sodium, 2 tablet, Oral, BID  sodium bicarbonate, 650 mg, Oral, TID  sodium chloride, 10 mL, Intravenous, Q12H      IV Meds:          Results Reviewed:   I have personally reviewed the results from the time of this admission to 2023 08:07 EST     Results from last 7 days   Lab Units 23  0536 23  0717 23  1608   SODIUM mmol/L  --  132* 132* 132*   POTASSIUM mmol/L 3.9 3.6 3.8  4.1   CHLORIDE mmol/L  --  105 105 102   CO2 mmol/L  --  17.5* 14.7* 14.8*   BUN mg/dL  --  29* 36* 34*   CREATININE mg/dL  --  1.39* 1.80* 2.01*   CALCIUM mg/dL  --  8.4* 8.3* 8.6   BILIRUBIN mg/dL  --   --   --  0.6   ALK PHOS U/L  --   --   --  66   ALT (SGPT) U/L  --   --   --  14   AST (SGOT) U/L  --   --   --  51*   GLUCOSE mg/dL  --  104* 120* 106*       Estimated Creatinine Clearance: 69.5 mL/min (A) (by C-G formula based on SCr of 1.39 mg/dL (H)).    Results from last 7 days   Lab Units 12/21/23  0536   MAGNESIUM mg/dL 2.3   PHOSPHORUS mg/dL 3.3       Results from last 7 days   Lab Units 12/21/23  0536   URIC ACID mg/dL 4.5       Results from last 7 days   Lab Units 12/21/23  0536 12/20/23  0717 12/19/23  1607   WBC 10*3/mm3 7.98 11.47* 14.25*   HEMOGLOBIN g/dL 8.9* 9.4* 10.5*   PLATELETS 10*3/mm3 170 154 163             Assessment / Plan     ASSESSMENT:  Acute kidney injury, improving.  Likely due to contrast exposure, hypovolemia, hypotension and sepsis.  SCR unchanged today.  Obstructive uropathy, with bilateral hydronephrosis, now with indwelling ureteral stents 10/23 in good position.  Enterococcal bacteremia.  Potential sources include his urinary tract as well as his indwelling port.  Mediport to be removed today  Metastatic melanoma.  Anemia.  Prior left lower extremity DVT and pulmonary embolus on chronic anticoagulation.    PLAN:  IVF while NPO  Port removal today  Home soon    Thank you for involving us in the care of King JIM George.  Please feel free to call with any questions.    Bernardo Guillory MD  12/22/23  08:07 [facility]    [facility]  [phone]    Please note that portions of this note were completed with a voice recognition program.

## 2023-12-23 LAB — BACTERIA SPEC AEROBE CULT: ABNORMAL

## 2023-12-23 NOTE — CASE MANAGEMENT/SOCIAL WORK
Case Management Discharge Note      Final Note: Pt discharged home on 12/22.    laila Herrera RN    Provided Post Acute Provider List?: N/A  Provided Post Acute Provider Quality & Resource List?: N/A    Selected Continued Care - Discharged on 12/22/2023 Admission date: 12/19/2023 - Discharge disposition: Home or Self Care      Destination    No services have been selected for the patient.                Durable Medical Equipment    No services have been selected for the patient.                Dialysis/Infusion    No services have been selected for the patient.                Home Medical Care    No services have been selected for the patient.                Therapy    No services have been selected for the patient.                Community Resources    No services have been selected for the patient.                Community & DME    No services have been selected for the patient.                    Transportation Services  Private: Car    Final Discharge Disposition Code: 01 - home or self-care  
No

## 2023-12-23 NOTE — OUTREACH NOTE
Prep Survey      Flowsheet Row Responses   [facility] patient discharged from? [place]   Is LACE score < 7 ? No   Eligibility Readm Mgmt   Discharge diagnosis Enterococcal septicemia, sepsis   Does the patient have one of the following disease processes/diagnoses(primary or secondary)? Sepsis   Does the patient have Home health ordered? No   Is there a DME ordered? No   Prep survey completed? Yes            Fidelia SU - Registered Nurse

## 2023-12-24 LAB — CATHETER CULTURE: NORMAL

## 2023-12-25 ENCOUNTER — HOSPITAL ENCOUNTER (INPATIENT)
Facility: HOSPITAL | Age: 63
LOS: 2 days | Discharge: HOME | DRG: 871 | End: 2023-12-29
Attending: EMERGENCY MEDICINE | Admitting: HOSPITALIST
Payer: COMMERCIAL

## 2023-12-25 ENCOUNTER — APPOINTMENT (OUTPATIENT)
Dept: GENERAL RADIOLOGY | Facility: HOSPITAL | Age: 63
DRG: 871 | End: 2023-12-25
Payer: COMMERCIAL

## 2023-12-25 DIAGNOSIS — N28.9 ACUTE RENAL INSUFFICIENCY: Primary | ICD-10-CM

## 2023-12-25 DIAGNOSIS — D84.9 IMMUNOCOMPROMISED PATIENT: ICD-10-CM

## 2023-12-25 DIAGNOSIS — Z86.19 HISTORY OF SEPSIS: ICD-10-CM

## 2023-12-25 DIAGNOSIS — B34.2 CORONAVIRUS INFECTION: ICD-10-CM

## 2023-12-25 DIAGNOSIS — R50.9 FEVER AND CHILLS: ICD-10-CM

## 2023-12-25 LAB
ALBUMIN SERPL-MCNC: 3.4 G/DL (ref 3.5–5.2)
ALBUMIN/GLOB SERPL: 0.9 G/DL
ALP SERPL-CCNC: 94 U/L (ref 39–117)
ALT SERPL W P-5'-P-CCNC: 15 U/L (ref 1–41)
ANION GAP SERPL CALCULATED.3IONS-SCNC: 14 MMOL/L (ref 5–15)
AST SERPL-CCNC: 17 U/L (ref 1–40)
BACTERIA UR QL AUTO: ABNORMAL /HPF
BASOPHILS # BLD AUTO: 0.04 10*3/MM3 (ref 0–0.2)
BASOPHILS NFR BLD AUTO: 0.3 % (ref 0–1.5)
BILIRUB SERPL-MCNC: 0.3 MG/DL (ref 0–1.2)
BILIRUB UR QL STRIP: NEGATIVE
BUN SERPL-MCNC: 17 MG/DL (ref 8–23)
BUN/CREAT SERPL: 10.1 (ref 7–25)
CALCIUM SPEC-SCNC: 8.1 MG/DL (ref 8.6–10.5)
CHLORIDE SERPL-SCNC: 97 MMOL/L (ref 98–107)
CLARITY UR: ABNORMAL
CO2 SERPL-SCNC: 19 MMOL/L (ref 22–29)
COLOR UR: ABNORMAL
CREAT SERPL-MCNC: 1.69 MG/DL (ref 0.76–1.27)
CRP SERPL-MCNC: 19.63 MG/DL (ref 0–0.5)
D-LACTATE SERPL-SCNC: 1.5 MMOL/L (ref 0.5–2)
DEPRECATED RDW RBC AUTO: 45.2 FL (ref 37–54)
EGFRCR SERPLBLD CKD-EPI 2021: 45.6 ML/MIN/1.73
EOSINOPHIL # BLD AUTO: 0.15 10*3/MM3 (ref 0–0.4)
EOSINOPHIL NFR BLD AUTO: 1.1 % (ref 0.3–6.2)
ERYTHROCYTE [DISTWIDTH] IN BLOOD BY AUTOMATED COUNT: 14.9 % (ref 12.3–15.4)
ERYTHROCYTE [SEDIMENTATION RATE] IN BLOOD: 49 MM/HR (ref 0–20)
GLOBULIN UR ELPH-MCNC: 4 GM/DL
GLUCOSE SERPL-MCNC: 141 MG/DL (ref 65–99)
GLUCOSE UR STRIP-MCNC: NEGATIVE MG/DL
HCT VFR BLD AUTO: 26.8 % (ref 37.5–51)
HGB BLD-MCNC: 8.6 G/DL (ref 13–17.7)
HGB UR QL STRIP.AUTO: ABNORMAL
HOLD SPECIMEN: NORMAL
HOLD SPECIMEN: NORMAL
HYALINE CASTS UR QL AUTO: ABNORMAL /LPF
IMM GRANULOCYTES # BLD AUTO: 0.11 10*3/MM3 (ref 0–0.05)
IMM GRANULOCYTES NFR BLD AUTO: 0.8 % (ref 0–0.5)
KETONES UR QL STRIP: NEGATIVE
LEUKOCYTE ESTERASE UR QL STRIP.AUTO: ABNORMAL
LIPASE SERPL-CCNC: 66 U/L (ref 13–60)
LYMPHOCYTES # BLD AUTO: 1.7 10*3/MM3 (ref 0.7–3.1)
LYMPHOCYTES NFR BLD AUTO: 12.5 % (ref 19.6–45.3)
MCH RBC QN AUTO: 26.8 PG (ref 26.6–33)
MCHC RBC AUTO-ENTMCNC: 32.1 G/DL (ref 31.5–35.7)
MCV RBC AUTO: 83.5 FL (ref 79–97)
MONOCYTES # BLD AUTO: 1.2 10*3/MM3 (ref 0.1–0.9)
MONOCYTES NFR BLD AUTO: 8.8 % (ref 5–12)
NEUTROPHILS NFR BLD AUTO: 10.36 10*3/MM3 (ref 1.7–7)
NEUTROPHILS NFR BLD AUTO: 76.5 % (ref 42.7–76)
NITRITE UR QL STRIP: NEGATIVE
NRBC BLD AUTO-RTO: 0 /100 WBC (ref 0–0.2)
PH UR STRIP.AUTO: 6.5 [PH] (ref 5–8)
PLATELET # BLD AUTO: 395 10*3/MM3 (ref 140–450)
PMV BLD AUTO: 9 FL (ref 6–12)
POTASSIUM SERPL-SCNC: 3.6 MMOL/L (ref 3.5–5.2)
PROT SERPL-MCNC: 7.4 G/DL (ref 6–8.5)
PROT UR QL STRIP: ABNORMAL
RBC # BLD AUTO: 3.21 10*6/MM3 (ref 4.14–5.8)
RBC # UR STRIP: ABNORMAL /HPF
REF LAB TEST METHOD: ABNORMAL
RENAL EPI CELLS #/AREA URNS HPF: ABNORMAL /HPF
SODIUM SERPL-SCNC: 130 MMOL/L (ref 136–145)
SP GR UR STRIP: 1.02 (ref 1–1.03)
SQUAMOUS #/AREA URNS HPF: ABNORMAL /HPF
UROBILINOGEN UR QL STRIP: ABNORMAL
WBC # UR STRIP: ABNORMAL /HPF
WBC NRBC COR # BLD AUTO: 13.56 10*3/MM3 (ref 3.4–10.8)
WHOLE BLOOD HOLD COAG: NORMAL
WHOLE BLOOD HOLD SPECIMEN: NORMAL

## 2023-12-25 PROCEDURE — 85025 COMPLETE CBC W/AUTO DIFF WBC: CPT

## 2023-12-25 PROCEDURE — 80053 COMPREHEN METABOLIC PANEL: CPT

## 2023-12-25 PROCEDURE — 83605 ASSAY OF LACTIC ACID: CPT

## 2023-12-25 PROCEDURE — 85652 RBC SED RATE AUTOMATED: CPT | Performed by: PHYSICIAN ASSISTANT

## 2023-12-25 PROCEDURE — 81001 URINALYSIS AUTO W/SCOPE: CPT

## 2023-12-25 PROCEDURE — 25810000003 SODIUM CHLORIDE 0.9 % SOLUTION: Performed by: PHYSICIAN ASSISTANT

## 2023-12-25 PROCEDURE — 99285 EMERGENCY DEPT VISIT HI MDM: CPT

## 2023-12-25 PROCEDURE — 83690 ASSAY OF LIPASE: CPT

## 2023-12-25 PROCEDURE — 25010000002 AMPICILLIN PER 500 MG: Performed by: PHYSICIAN ASSISTANT

## 2023-12-25 PROCEDURE — 71045 X-RAY EXAM CHEST 1 VIEW: CPT

## 2023-12-25 PROCEDURE — 36415 COLL VENOUS BLD VENIPUNCTURE: CPT

## 2023-12-25 PROCEDURE — 86140 C-REACTIVE PROTEIN: CPT | Performed by: PHYSICIAN ASSISTANT

## 2023-12-25 PROCEDURE — 0202U NFCT DS 22 TRGT SARS-COV-2: CPT | Performed by: PHYSICIAN ASSISTANT

## 2023-12-25 PROCEDURE — 87040 BLOOD CULTURE FOR BACTERIA: CPT

## 2023-12-25 RX ORDER — ACETAMINOPHEN 500 MG
1000 TABLET ORAL ONCE
Status: COMPLETED | OUTPATIENT
Start: 2023-12-25 | End: 2023-12-25

## 2023-12-25 RX ORDER — SODIUM CHLORIDE 0.9 % (FLUSH) 0.9 %
10 SYRINGE (ML) INJECTION AS NEEDED
Status: DISCONTINUED | OUTPATIENT
Start: 2023-12-25 | End: 2023-12-29 | Stop reason: HOSPADM

## 2023-12-25 RX ADMIN — SODIUM CHLORIDE 1000 ML: 9 INJECTION, SOLUTION INTRAVENOUS at 22:38

## 2023-12-25 RX ADMIN — AMPICILLIN SODIUM 2000 MG: 2 INJECTION, POWDER, FOR SOLUTION INTRAMUSCULAR; INTRAVENOUS at 22:39

## 2023-12-25 RX ADMIN — ACETAMINOPHEN 1000 MG: 500 TABLET ORAL at 23:03

## 2023-12-26 ENCOUNTER — APPOINTMENT (OUTPATIENT)
Dept: ULTRASOUND IMAGING | Facility: HOSPITAL | Age: 63
DRG: 871 | End: 2023-12-26
Payer: COMMERCIAL

## 2023-12-26 ENCOUNTER — READMISSION MANAGEMENT (OUTPATIENT)
Dept: CALL CENTER | Facility: HOSPITAL | Age: 63
End: 2023-12-26
Payer: COMMERCIAL

## 2023-12-26 PROBLEM — A41.89 VIRAL SEPSIS: Status: ACTIVE | Noted: 2023-12-19

## 2023-12-26 PROBLEM — B97.89 VIRAL SEPSIS: Status: ACTIVE | Noted: 2023-12-19

## 2023-12-26 PROBLEM — N28.9 ACUTE RENAL INSUFFICIENCY: Status: ACTIVE | Noted: 2023-12-26

## 2023-12-26 PROBLEM — R50.9 FEVER: Status: ACTIVE | Noted: 2023-12-26

## 2023-12-26 PROBLEM — B34.2 CORONAVIRUS INFECTION: Status: ACTIVE | Noted: 2023-12-26

## 2023-12-26 LAB
ANION GAP SERPL CALCULATED.3IONS-SCNC: 11.9 MMOL/L (ref 5–15)
B PARAPERT DNA SPEC QL NAA+PROBE: NOT DETECTED
B PERT DNA SPEC QL NAA+PROBE: NOT DETECTED
BACTERIA SPEC AEROBE CULT: NORMAL
BACTERIA SPEC AEROBE CULT: NORMAL
BASOPHILS # BLD AUTO: 0.02 10*3/MM3 (ref 0–0.2)
BASOPHILS NFR BLD AUTO: 0.2 % (ref 0–1.5)
BUN SERPL-MCNC: 16 MG/DL (ref 8–23)
BUN/CREAT SERPL: 10.4 (ref 7–25)
C PNEUM DNA NPH QL NAA+NON-PROBE: NOT DETECTED
CALCIUM SPEC-SCNC: 7.9 MG/DL (ref 8.6–10.5)
CHLORIDE SERPL-SCNC: 101 MMOL/L (ref 98–107)
CO2 SERPL-SCNC: 20.1 MMOL/L (ref 22–29)
CREAT SERPL-MCNC: 1.54 MG/DL (ref 0.76–1.27)
DEPRECATED RDW RBC AUTO: 45.2 FL (ref 37–54)
EGFRCR SERPLBLD CKD-EPI 2021: 51 ML/MIN/1.73
EOSINOPHIL # BLD AUTO: 0.21 10*3/MM3 (ref 0–0.4)
EOSINOPHIL NFR BLD AUTO: 1.9 % (ref 0.3–6.2)
ERYTHROCYTE [DISTWIDTH] IN BLOOD BY AUTOMATED COUNT: 14.9 % (ref 12.3–15.4)
FERRITIN SERPL-MCNC: 264 NG/ML (ref 30–400)
FLUAV SUBTYP SPEC NAA+PROBE: NOT DETECTED
FLUBV RNA ISLT QL NAA+PROBE: NOT DETECTED
FOLATE SERPL-MCNC: 9.51 NG/ML (ref 4.78–24.2)
GLUCOSE SERPL-MCNC: 124 MG/DL (ref 65–99)
HADV DNA SPEC NAA+PROBE: NOT DETECTED
HCOV 229E RNA SPEC QL NAA+PROBE: DETECTED
HCOV HKU1 RNA SPEC QL NAA+PROBE: NOT DETECTED
HCOV NL63 RNA SPEC QL NAA+PROBE: NOT DETECTED
HCOV OC43 RNA SPEC QL NAA+PROBE: NOT DETECTED
HCT VFR BLD AUTO: 24.7 % (ref 37.5–51)
HGB BLD-MCNC: 7.9 G/DL (ref 13–17.7)
HMPV RNA NPH QL NAA+NON-PROBE: NOT DETECTED
HOLD SPECIMEN: NORMAL
HPIV1 RNA ISLT QL NAA+PROBE: NOT DETECTED
HPIV2 RNA SPEC QL NAA+PROBE: NOT DETECTED
HPIV3 RNA NPH QL NAA+PROBE: NOT DETECTED
HPIV4 P GENE NPH QL NAA+PROBE: NOT DETECTED
IMM GRANULOCYTES # BLD AUTO: 0.05 10*3/MM3 (ref 0–0.05)
IMM GRANULOCYTES NFR BLD AUTO: 0.5 % (ref 0–0.5)
IRON 24H UR-MRATE: 15 MCG/DL (ref 59–158)
IRON SATN MFR SERPL: 6 % (ref 20–50)
LYMPHOCYTES # BLD AUTO: 1.19 10*3/MM3 (ref 0.7–3.1)
LYMPHOCYTES NFR BLD AUTO: 10.9 % (ref 19.6–45.3)
M PNEUMO IGG SER IA-ACNC: NOT DETECTED
MCH RBC QN AUTO: 26.9 PG (ref 26.6–33)
MCHC RBC AUTO-ENTMCNC: 32 G/DL (ref 31.5–35.7)
MCV RBC AUTO: 84 FL (ref 79–97)
MONOCYTES # BLD AUTO: 0.97 10*3/MM3 (ref 0.1–0.9)
MONOCYTES NFR BLD AUTO: 8.9 % (ref 5–12)
NEUTROPHILS NFR BLD AUTO: 77.6 % (ref 42.7–76)
NEUTROPHILS NFR BLD AUTO: 8.47 10*3/MM3 (ref 1.7–7)
NRBC BLD AUTO-RTO: 0 /100 WBC (ref 0–0.2)
PLATELET # BLD AUTO: 340 10*3/MM3 (ref 140–450)
PMV BLD AUTO: 9.4 FL (ref 6–12)
POTASSIUM SERPL-SCNC: 3.4 MMOL/L (ref 3.5–5.2)
RBC # BLD AUTO: 2.94 10*6/MM3 (ref 4.14–5.8)
RHINOVIRUS RNA SPEC NAA+PROBE: NOT DETECTED
RSV RNA NPH QL NAA+NON-PROBE: NOT DETECTED
SARS-COV-2 RNA NPH QL NAA+NON-PROBE: NOT DETECTED
SODIUM SERPL-SCNC: 133 MMOL/L (ref 136–145)
TIBC SERPL-MCNC: 249 MCG/DL (ref 298–536)
TRANSFERRIN SERPL-MCNC: 167 MG/DL (ref 200–360)
VIT B12 BLD-MCNC: 573 PG/ML (ref 211–946)
WBC NRBC COR # BLD AUTO: 10.91 10*3/MM3 (ref 3.4–10.8)

## 2023-12-26 PROCEDURE — 85025 COMPLETE CBC W/AUTO DIFF WBC: CPT | Performed by: NURSE PRACTITIONER

## 2023-12-26 PROCEDURE — 25010000002 AMPICILLIN-SULBACTAM PER 1.5 G: Performed by: NURSE PRACTITIONER

## 2023-12-26 PROCEDURE — 94799 UNLISTED PULMONARY SVC/PX: CPT

## 2023-12-26 PROCEDURE — 25810000003 SODIUM CHLORIDE 0.9 % SOLUTION: Performed by: NURSE PRACTITIONER

## 2023-12-26 PROCEDURE — 25010000002 AMPICILLIN PER 500 MG: Performed by: INTERNAL MEDICINE

## 2023-12-26 PROCEDURE — 99222 1ST HOSP IP/OBS MODERATE 55: CPT | Performed by: INTERNAL MEDICINE

## 2023-12-26 PROCEDURE — 82728 ASSAY OF FERRITIN: CPT | Performed by: INTERNAL MEDICINE

## 2023-12-26 PROCEDURE — 94761 N-INVAS EAR/PLS OXIMETRY MLT: CPT

## 2023-12-26 PROCEDURE — 84466 ASSAY OF TRANSFERRIN: CPT | Performed by: INTERNAL MEDICINE

## 2023-12-26 PROCEDURE — 82746 ASSAY OF FOLIC ACID SERUM: CPT | Performed by: INTERNAL MEDICINE

## 2023-12-26 PROCEDURE — 94640 AIRWAY INHALATION TREATMENT: CPT

## 2023-12-26 PROCEDURE — 80048 BASIC METABOLIC PNL TOTAL CA: CPT | Performed by: NURSE PRACTITIONER

## 2023-12-26 PROCEDURE — 83540 ASSAY OF IRON: CPT | Performed by: INTERNAL MEDICINE

## 2023-12-26 PROCEDURE — 99255 IP/OBS CONSLTJ NEW/EST HI 80: CPT | Performed by: INTERNAL MEDICINE

## 2023-12-26 PROCEDURE — 82607 VITAMIN B-12: CPT | Performed by: INTERNAL MEDICINE

## 2023-12-26 PROCEDURE — G0378 HOSPITAL OBSERVATION PER HR: HCPCS

## 2023-12-26 PROCEDURE — 25810000003 SODIUM CHLORIDE 0.9 % SOLUTION: Performed by: PHYSICIAN ASSISTANT

## 2023-12-26 PROCEDURE — 76775 US EXAM ABDO BACK WALL LIM: CPT

## 2023-12-26 PROCEDURE — 25010000002 ONDANSETRON PER 1 MG: Performed by: NURSE PRACTITIONER

## 2023-12-26 PROCEDURE — 94760 N-INVAS EAR/PLS OXIMETRY 1: CPT

## 2023-12-26 RX ORDER — ACETAMINOPHEN 160 MG/5ML
650 SOLUTION ORAL EVERY 4 HOURS PRN
Status: DISCONTINUED | OUTPATIENT
Start: 2023-12-26 | End: 2023-12-29 | Stop reason: HOSPADM

## 2023-12-26 RX ORDER — POLYETHYLENE GLYCOL 3350 17 G/17G
17 POWDER, FOR SOLUTION ORAL DAILY PRN
Status: DISCONTINUED | OUTPATIENT
Start: 2023-12-26 | End: 2023-12-29 | Stop reason: HOSPADM

## 2023-12-26 RX ORDER — ACETAMINOPHEN 650 MG/1
650 SUPPOSITORY RECTAL EVERY 4 HOURS PRN
Status: DISCONTINUED | OUTPATIENT
Start: 2023-12-26 | End: 2023-12-29 | Stop reason: HOSPADM

## 2023-12-26 RX ORDER — SODIUM CHLORIDE 0.9 % (FLUSH) 0.9 %
10 SYRINGE (ML) INJECTION AS NEEDED
Status: DISCONTINUED | OUTPATIENT
Start: 2023-12-26 | End: 2023-12-29 | Stop reason: HOSPADM

## 2023-12-26 RX ORDER — PROCHLORPERAZINE MALEATE 10 MG
10 TABLET ORAL EVERY 6 HOURS PRN
Status: DISCONTINUED | OUTPATIENT
Start: 2023-12-26 | End: 2023-12-29 | Stop reason: HOSPADM

## 2023-12-26 RX ORDER — SODIUM CHLORIDE 9 MG/ML
125 INJECTION, SOLUTION INTRAVENOUS CONTINUOUS
Status: DISCONTINUED | OUTPATIENT
Start: 2023-12-26 | End: 2023-12-29 | Stop reason: HOSPADM

## 2023-12-26 RX ORDER — ONDANSETRON 4 MG/1
8 TABLET, FILM COATED ORAL EVERY 8 HOURS PRN
Status: DISCONTINUED | OUTPATIENT
Start: 2023-12-26 | End: 2023-12-29 | Stop reason: HOSPADM

## 2023-12-26 RX ORDER — NITROGLYCERIN 0.4 MG/1
0.4 TABLET SUBLINGUAL
Status: DISCONTINUED | OUTPATIENT
Start: 2023-12-26 | End: 2023-12-28

## 2023-12-26 RX ORDER — IPRATROPIUM BROMIDE AND ALBUTEROL SULFATE 2.5; .5 MG/3ML; MG/3ML
3 SOLUTION RESPIRATORY (INHALATION) EVERY 4 HOURS PRN
Status: DISCONTINUED | OUTPATIENT
Start: 2023-12-26 | End: 2023-12-29 | Stop reason: HOSPADM

## 2023-12-26 RX ORDER — SODIUM CHLORIDE 0.9 % (FLUSH) 0.9 %
10 SYRINGE (ML) INJECTION EVERY 12 HOURS SCHEDULED
Status: DISCONTINUED | OUTPATIENT
Start: 2023-12-26 | End: 2023-12-29 | Stop reason: HOSPADM

## 2023-12-26 RX ORDER — SCOLOPAMINE TRANSDERMAL SYSTEM 1 MG/1
1 PATCH, EXTENDED RELEASE TRANSDERMAL
Status: DISCONTINUED | OUTPATIENT
Start: 2023-12-26 | End: 2023-12-29 | Stop reason: HOSPADM

## 2023-12-26 RX ORDER — SODIUM CHLORIDE 9 MG/ML
40 INJECTION, SOLUTION INTRAVENOUS AS NEEDED
Status: DISCONTINUED | OUTPATIENT
Start: 2023-12-26 | End: 2023-12-29 | Stop reason: HOSPADM

## 2023-12-26 RX ORDER — IPRATROPIUM BROMIDE AND ALBUTEROL SULFATE 2.5; .5 MG/3ML; MG/3ML
3 SOLUTION RESPIRATORY (INHALATION)
Status: DISCONTINUED | OUTPATIENT
Start: 2023-12-26 | End: 2023-12-26

## 2023-12-26 RX ORDER — ACETAMINOPHEN 325 MG/1
650 TABLET ORAL EVERY 4 HOURS PRN
Status: DISCONTINUED | OUTPATIENT
Start: 2023-12-26 | End: 2023-12-29 | Stop reason: HOSPADM

## 2023-12-26 RX ORDER — AMOXICILLIN 250 MG
2 CAPSULE ORAL 2 TIMES DAILY
Status: DISCONTINUED | OUTPATIENT
Start: 2023-12-26 | End: 2023-12-29 | Stop reason: HOSPADM

## 2023-12-26 RX ORDER — FAMOTIDINE 20 MG/1
20 TABLET, FILM COATED ORAL
Status: DISCONTINUED | OUTPATIENT
Start: 2023-12-26 | End: 2023-12-29 | Stop reason: HOSPADM

## 2023-12-26 RX ORDER — SODIUM BICARBONATE 650 MG/1
650 TABLET ORAL 3 TIMES DAILY
Status: DISCONTINUED | OUTPATIENT
Start: 2023-12-26 | End: 2023-12-29 | Stop reason: HOSPADM

## 2023-12-26 RX ORDER — ONDANSETRON 2 MG/ML
4 INJECTION INTRAMUSCULAR; INTRAVENOUS EVERY 6 HOURS PRN
Status: DISCONTINUED | OUTPATIENT
Start: 2023-12-26 | End: 2023-12-29 | Stop reason: HOSPADM

## 2023-12-26 RX ORDER — BISACODYL 10 MG
10 SUPPOSITORY, RECTAL RECTAL DAILY PRN
Status: DISCONTINUED | OUTPATIENT
Start: 2023-12-26 | End: 2023-12-29 | Stop reason: HOSPADM

## 2023-12-26 RX ORDER — BISACODYL 5 MG/1
5 TABLET, DELAYED RELEASE ORAL DAILY PRN
Status: DISCONTINUED | OUTPATIENT
Start: 2023-12-26 | End: 2023-12-29 | Stop reason: HOSPADM

## 2023-12-26 RX ADMIN — SODIUM BICARBONATE 650 MG: 650 TABLET ORAL at 20:43

## 2023-12-26 RX ADMIN — AMPICILLIN SODIUM AND SULBACTAM SODIUM 1.5 G: 1; .5 INJECTION, POWDER, FOR SOLUTION INTRAMUSCULAR; INTRAVENOUS at 05:30

## 2023-12-26 RX ADMIN — APIXABAN 5 MG: 5 TABLET, FILM COATED ORAL at 13:50

## 2023-12-26 RX ADMIN — AMPICILLIN SODIUM 2 G: 2 INJECTION, POWDER, FOR SOLUTION INTRAMUSCULAR; INTRAVENOUS at 20:44

## 2023-12-26 RX ADMIN — ACETAMINOPHEN 650 MG: 325 TABLET, FILM COATED ORAL at 23:17

## 2023-12-26 RX ADMIN — ACETAMINOPHEN 650 MG: 325 TABLET, FILM COATED ORAL at 19:13

## 2023-12-26 RX ADMIN — SODIUM CHLORIDE 100 ML/HR: 9 INJECTION, SOLUTION INTRAVENOUS at 05:30

## 2023-12-26 RX ADMIN — SODIUM BICARBONATE 650 MG: 650 TABLET ORAL at 16:14

## 2023-12-26 RX ADMIN — Medication 10 ML: at 09:29

## 2023-12-26 RX ADMIN — SODIUM CHLORIDE 1000 ML: 9 INJECTION, SOLUTION INTRAVENOUS at 00:14

## 2023-12-26 RX ADMIN — AMPICILLIN SODIUM 2 G: 2 INJECTION, POWDER, FOR SOLUTION INTRAMUSCULAR; INTRAVENOUS at 13:48

## 2023-12-26 RX ADMIN — AMPICILLIN SODIUM 2 G: 2 INJECTION, POWDER, FOR SOLUTION INTRAMUSCULAR; INTRAVENOUS at 17:34

## 2023-12-26 RX ADMIN — APIXABAN 5 MG: 5 TABLET, FILM COATED ORAL at 20:43

## 2023-12-26 RX ADMIN — IPRATROPIUM BROMIDE AND ALBUTEROL SULFATE 3 ML: 2.5; .5 SOLUTION RESPIRATORY (INHALATION) at 08:11

## 2023-12-26 RX ADMIN — FAMOTIDINE 20 MG: 20 TABLET, FILM COATED ORAL at 17:35

## 2023-12-26 RX ADMIN — IPRATROPIUM BROMIDE AND ALBUTEROL SULFATE 3 ML: 2.5; .5 SOLUTION RESPIRATORY (INHALATION) at 11:27

## 2023-12-26 RX ADMIN — AMPICILLIN SODIUM AND SULBACTAM SODIUM 3 G: 2; 1 INJECTION, POWDER, FOR SOLUTION INTRAMUSCULAR; INTRAVENOUS at 09:26

## 2023-12-26 RX ADMIN — SODIUM CHLORIDE 100 ML/HR: 9 INJECTION, SOLUTION INTRAVENOUS at 17:29

## 2023-12-26 RX ADMIN — Medication 10 ML: at 20:44

## 2023-12-26 RX ADMIN — SCOPALAMINE 1 PATCH: 1 PATCH, EXTENDED RELEASE TRANSDERMAL at 13:50

## 2023-12-26 RX ADMIN — ACETAMINOPHEN 650 MG: 325 TABLET, FILM COATED ORAL at 12:48

## 2023-12-26 RX ADMIN — ONDANSETRON HYDROCHLORIDE 4 MG: 2 INJECTION, SOLUTION INTRAMUSCULAR; INTRAVENOUS at 09:29

## 2023-12-26 NOTE — OUTREACH NOTE
Sepsis Week 1 Survey      Flowsheet Row Responses   [facility] patient discharged from? [place]   Does the patient have one of the following disease processes/diagnoses(primary or secondary)? Sepsis   Week 1 attempt successful? No   Unsuccessful attempts Attempt 1   Revoke Readmitted            Amy DAMICO - Registered Nurse

## 2023-12-26 NOTE — ED NOTES
Nursing report ED to floor  [place]  63 y.o.  male    HPI (triage note):   Chief Complaint   Patient presents with    Dysuria    Fever       Admitting doctor:   Miguel Bennett DO    Admitting diagnosis:   The primary encounter diagnosis was Acute renal insufficiency. Diagnoses of Immunocompromised patient, Fever and chills, Coronavirus infection, and History of sepsis-Enterococcus were also pertinent to this visit.    Code status:   Current Code Status       Date Active Code Status Order ID Comments User Context       Prior            Allergies:   Patient has no known allergies.    Past Medical History:  Past Medical History:   Diagnosis Date    Abdominal lymphadenopathy 05/29/2023    Acute pulmonary embolism, unspecified pulmonary embolism type, unspecified whether acute cor pulmonale present 05/28/2023    ADMITTED TO [facility], RIGHT    LENIN (acute kidney injury)     Anemia 05/29/2023    Colon polyps     FOLLOWED BY DR. SHANNA MOTTA    Constipation     Drug rash 06/19/2023    DVT (deep venous thrombosis)     LEFT LEG    Hepatic lesion 05/29/2023    History of shortness of breath     FOUND BLOOD CLOT ATTACHED TO LUNG    Left leg swelling     FROM BLOOD CLOT 5/2023    Left lower quadrant abdominal mass 05/28/2023    LEFT INGUINAL BX SHOWED METASTATIC MELANOMA    Melanoma     LEFT SIDE GROIN AREA, STAGE 4    Metastasis to brain 06/02/2023    Metastatic melanoma 05/31/2023    Moderate Malnutrition (HCC) 05/30/2023    Personal history of venous thrombosis and embolism 06/19/2023        Weight:       12/25/23 2053   Weight: 107 kg (235 lb)       Most recent vitals:   Vitals:    12/25/23 2207 12/25/23 2301 12/26/23 0001 12/26/23 0101   BP:  109/69 119/69 139/87   BP Location:       Patient Position:       Pulse: 88 72 71 74   Resp:       Temp:       TempSrc:       SpO2: 98% 98% 99% 100%   Weight:       Height:           Active LDAs/IV Access:   Lines, Drains & Airways       Active LDAs       Name Placement date Placement  time Site Days    Peripheral IV 12/25/23 2210 Right Antecubital 12/25/23 2210  Antecubital  less than 1                    Labs (abnormal labs have a star):   Labs Reviewed   RESPIRATORY PANEL PCR W/ COVID-19 (SARS-COV-2), NP SWAB IN UTM/VTP, 2 HR TAT - Abnormal; Notable for the following components:       Result Value    Coronavirus 229E Detected (*)     All other components within normal limits    Narrative:     In the setting of a positive respiratory panel with a viral infection PLUS a negative procalcitonin without other underlying concern for bacterial infection, consider observing off antibiotics or discontinuation of antibiotics and continue supportive care. If the respiratory panel is positive for atypical bacterial infection (Bordetella pertussis, Chlamydophila pneumoniae, or Mycoplasma pneumoniae), consider antibiotic de-escalation to target atypical bacterial infection.   COMPREHENSIVE METABOLIC PANEL - Abnormal; Notable for the following components:    Glucose 141 (*)     Creatinine 1.69 (*)     Sodium 130 (*)     Chloride 97 (*)     CO2 19.0 (*)     Calcium 8.1 (*)     Albumin 3.4 (*)     eGFR 45.6 (*)     All other components within normal limits    Narrative:     GFR Normal >60  Chronic Kidney Disease <60  Kidney Failure <15     LIPASE - Abnormal; Notable for the following components:    Lipase 66 (*)     All other components within normal limits   URINALYSIS W/ MICROSCOPIC IF INDICATED (NO CULTURE) - Abnormal; Notable for the following components:    Color, UA Dark Yellow (*)     Appearance, UA Cloudy (*)     Blood, UA Large (3+) (*)     Protein,  mg/dL (2+) (*)     Leuk Esterase, UA Trace (*)     All other components within normal limits   CBC WITH AUTO DIFFERENTIAL - Abnormal; Notable for the following components:    WBC 13.56 (*)     RBC 3.21 (*)     Hemoglobin 8.6 (*)     Hematocrit 26.8 (*)     Neutrophil % 76.5 (*)     Lymphocyte % 12.5 (*)     Immature Grans % 0.8 (*)     Neutrophils,  Absolute 10.36 (*)     Monocytes, Absolute 1.20 (*)     Immature Grans, Absolute 0.11 (*)     All other components within normal limits   URINALYSIS, MICROSCOPIC ONLY - Abnormal; Notable for the following components:    RBC, UA 21-50 (*)     WBC, UA 3-5 (*)     Bacteria, UA Trace (*)     All other components within normal limits   SEDIMENTATION RATE - Abnormal; Notable for the following components:    Sed Rate 49 (*)     All other components within normal limits   C-REACTIVE PROTEIN - Abnormal; Notable for the following components:    C-Reactive Protein 19.63 (*)     All other components within normal limits   LACTIC ACID, PLASMA - Normal   BLOOD CULTURE   BLOOD CULTURE   RAINBOW DRAW    Narrative:     The following orders were created for panel order [place] Draw.  Procedure                               Abnormality         Status                     ---------                               -----------         ------                     Green Top (Gel)[[id number]]                                  Final result               Lavender Top[[id number]]                                     Final result               Gold Top - SST[[id number]]                                   Final result               Cardoza Top[[id number]]                                         Final result               Light Blue Top[[id number]]                                   Final result                 Please view results for these tests on the individual orders.   CBC AND DIFFERENTIAL    Narrative:     The following orders were created for panel order CBC & Differential.  Procedure                               Abnormality         Status                     ---------                               -----------         ------                     CBC Auto Differential[[id number]]        Abnormal            Final result                 Please view results for these tests on the individual orders.   GREEN TOP   LAVENDER TOP   GOLD TOP - SST   GRAY TOP   LIGHT  BLUE TOP       EKG:   No orders to display       Meds given in ED:   Medications   sodium chloride 0.9 % flush 10 mL (has no administration in time range)   ampicillin 2000 mg IVPB in 100 ml NS (VTB) (0 mg Intravenous Stopped 12/25/23 2341)   sodium chloride 0.9 % bolus 1,000 mL (0 mL Intravenous Stopped 12/26/23 0014)   acetaminophen (TYLENOL) tablet 1,000 mg (1,000 mg Oral Given 12/25/23 2303)   sodium chloride 0.9 % bolus 1,000 mL (1,000 mL Intravenous New Bag 12/26/23 0014)       Imaging results:  XR Chest 1 View    Result Date: 12/25/2023  No acute findings.  This report was finalized on 12/25/2023 11:09 PM by Dr. Harika Seo M.D on Workstation: [id number]       Ambulatory status:   - ad antonio    Social issues:   Social History     Socioeconomic History    Marital status: Unknown   Tobacco Use    Smoking status: Never     Passive exposure: Never    Smokeless tobacco: Never   Vaping Use    Vaping Use: Unknown   Substance and Sexual Activity    Alcohol use: Never    Drug use: Never    Sexual activity: Defer          NIH Stroke Scale:         Delon Sarabia RN  12/26/23 02:00 EST

## 2023-12-26 NOTE — PLAN OF CARE
Goal Outcome Evaluation:   Vital signs stable.  Up independently.  On room air throughout shift.  Still on IV fluid.  IV antibiotic adjusted this shift.  On eliquis.  Estimated discharge date: 2 days.

## 2023-12-26 NOTE — PAYOR COMM NOTE
"Marilee George \"Min\" (63 y.o. Male)     PLEASE SEE ATTACHED FOR DC NOTICE    REF #  [id number]     THANK YOU  UNIQUE REVELES RN/UM DEPT  [facility]   [phone]  [phone]        Date of Birth   1960    Social Security Number       Address   [address] [place] [address]    Home Phone   [phone]    MRN   [id number]       Advent   Unknown    Marital Status   Unknown                            Admission Date   23    Admission Type   Urgent    Admitting Provider   Jm Chaney MD    Attending Provider       Department, Room/Bed   [facility], S610/       Discharge Date   2023    Discharge Disposition   Home or Self Care    Discharge Destination                                 Attending Provider: (none)   Allergies: No Known Allergies    Isolation: None   Infection: None   Code Status: CPR    Ht: 180.3 cm (71\")   Wt: 107 kg (235 lb 9.6 oz)    Admission Cmt: None   Principal Problem: Enterococcal septicemia [A41.81]                   Active Insurance as of 2023       Primary Coverage       Payor Plan Insurance Group Employer/Plan Group    [facility] [place] [id number]       Payor Plan Address Payor Plan Phone Number Payor Plan Fax Number Effective Dates    PO    3/1/2021 - None Entered    [facility] [id number]         Subscriber Name Subscriber Birth Date Member ID       MARILEE GEORGE 1960 [id number]                     Emergency Contacts        (Rel.) Home Phone Work Phone Mobile Phone    daquan de leon (Friend) -- -- [phone]    Komal George (Mother) -- -- [phone]              [place]: [facility] [id number]  Tax ID [id number]     Discharge Summary        Vic Teague MD at 23 1419              Patient Name: Marilee George  : 1960  MRN: [id number]    Date of Admission: 2023  Date of Discharge:  2023  Primary Care Physician: Mohini Cortes MD      Chief " Complaint:   No chief complaint on file.      Discharge Diagnoses     Active Hospital Problems    Diagnosis  POA    **Enterococcal septicemia [A41.81]  Yes    Chronic anticoagulation [Z79.01]  Not Applicable    UTI (urinary tract infection), bacterial [N39.0, A49.9]  Yes    LENIN (acute kidney injury) [N17.9]  Yes    Metastatic melanoma [C43.9]  Yes    Abdominal lymphadenopathy [R59.0]  Yes    History of pulmonary embolism [Z86.711]  Yes      Resolved Hospital Problems   No resolved problems to display.        Hospital Course     Very pleasant 63-year-old gentleman with a history of urinary retention and obstructive uropathy with stents placed in October, who presented to the hospital with fever, chills, and sepsis and was found to have UTI, enterococcal bactermia, and LENIN. Please see below for details of admission:     Sepsis--present on admission with noted acidosis, acute renal failure, and hyponatremia. Felt to be due to UTI most likely.  -Blood cultures growing Enterococcus.  -White blood cell count has normalized with current antibiotics and fever curve improved.  Appreciate ID input, treated with IV Ampicillin  -Repeat blood cultures NGTD  -Echo reassuring  -Port removed today, can resume Eliquis tomorrow per Dr. Alejandro Sharp for dc home on a week of oral Zyvox per ID     LENIN--Appreciate Renal attention to pt, renal function deterioration felt to be multifactorial (JANET, hypovolemia, hypotension, sepsis, etc)  -Treated with IVFs. Cr falling steadily. Shannan for dc home today per Renal.  -Continue sodium bicarb--serum CO2 improving steadily  -F/u with PCP in a week and Renal in 2 weeks to have Cr and lytes followed     Chronic obstructive uropathy--Appreciate Urology's input, in the absence of hydronephrosis and stents in good position on imaging there is no need for stent exchange or manipulation during this hospitalization. He can follow-up with Dr. Montelongo in the outpatient setting as previously scheduled.      Metastatic melanoma--on treatment for this in the outpatient setting--normally follows with the [facility]. Okay to resume Baftovi and Mektovi at [place].     Anemia NOS--Hgb stable. No bleeding evident. Suspect dilutional, probably has some anemia due to malignancy at baseline. Can f/u with his Heme/Onc.        Eliquis (home med) sufficed for DVT prophylaxis.  Full code confirmed.  Discussed with patient, nursing staff, CCP, and care team on multidisciplinary rounds. D/w Dr. Guillory and Dr. Allen.  Discharge home today.    Day of Discharge     Subjective:  Feeling better each day. No N/V/D/abd pain. Tolerating po. Voiding well. No F/C/NS. No SOA or CP.  Very eager to go home.    Physical Exam:  Temp:  [97.5 °F (36.4 °C)-99.1 °F (37.3 °C)] 97.5 °F (36.4 °C)  Heart Rate:  [79-89] 81  Resp:  [16-24] 24  BP: (101-131)/(56-81) 125/68  Body mass index is 32.86 kg/m².  Physical Exam  Vitals and nursing note reviewed.   Constitutional:       General: He is not in acute distress.     Appearance: He is ill-appearing (chronically). He is not toxic-appearing or diaphoretic.   Cardiovascular:      Rate and Rhythm: Normal rate and regular rhythm.      Pulses: Normal pulses.   Pulmonary:      Effort: Pulmonary effort is normal. No respiratory distress.      Breath sounds: Normal breath sounds. No wheezing or rales.   Abdominal:      General: Bowel sounds are normal. There is no distension.      Palpations: Abdomen is soft.      Tenderness: There is no abdominal tenderness.   Musculoskeletal:         General: Swelling (chronic edema in LLE) present. Normal range of motion.      Cervical back: Neck supple. No rigidity.   Skin:     General: Skin is warm and dry.      Capillary Refill: Capillary refill takes less than 2 seconds.      Coloration: Skin is not jaundiced.   Neurological:      General: No focal deficit present.      Mental Status: He is alert and oriented to person, place, and time. Mental status is at baseline.       Cranial Nerves: No cranial nerve deficit.      Coordination: Coordination normal.   Psychiatric:         Mood and Affect: Mood normal.         Behavior: Behavior normal.         Consultants     Consult Orders (all) (From admission, onward)       Start     Ordered    12/21/23 1107  Inpatient General Surgery Consult  Once        Provider:  Vicki Layne MD    12/21/23 1107    12/19/23 1758  Inpatient Infectious Diseases Consult  Once        Specialty:  Infectious Diseases  Provider:  Darin Kwok MD    12/19/23 1757    12/19/23 1550  Inpatient Infectious Diseases Consult  Once,   Status:  Canceled        Specialty:  Infectious Diseases  Provider:  Luis Mckeon MD    12/19/23 1550    12/19/23 1549  Inpatient Nephrology Consult  Once        Specialty:  Nephrology  Provider:  Mathieu Fierro MD    12/19/23 1550    12/19/23 1549  Inpatient Urology Consult  Once        Specialty:  Urology  Provider:  Bc George MD    12/19/23 1550                  Procedures     REMOVAL VENOUS ACCESS DEVICE    Imaging Results (All)       None            Results for orders placed during the hospital encounter of 12/19/23    Adult Transthoracic Echo Complete W/ Cont if Necessary Per Protocol    Interpretation Summary    Left ventricular systolic function is normal. Calculated left ventricular EF = 58.3%    Left ventricular diastolic function was normal.    Mild dilation of the aortic root is present. The aortic root measures 3.9 cm.    There is a trivial pericardial effusion.    No definite valvular vegetations appreciated on transthoracic echocardiogram.    Pertinent Labs     Results from last 7 days   Lab Units 12/22/23  0718 12/21/23  0536 12/20/23  0717 12/19/23  1607   WBC 10*3/mm3 8.28 7.98 11.47* 14.25*   HEMOGLOBIN g/dL 8.7* 8.9* 9.4* 10.5*   PLATELETS 10*3/mm3 198 170 154 163     Results from last 7 days   Lab Units 12/22/23  0718 12/21/23  2009 12/21/23  0536 12/20/23  0717 12/19/23  1608   SODIUM  "mmol/L 135*  --  132* 132* 132*   POTASSIUM mmol/L 3.5 3.9 3.6 3.8 4.1   CHLORIDE mmol/L 104  --  105 105 102   CO2 mmol/L 18.3*  --  17.5* 14.7* 14.8*   BUN mg/dL 17  --  29* 36* 34*   CREATININE mg/dL 1.39*  --  1.39* 1.80* 2.01*   GLUCOSE mg/dL 96  --  104* 120* 106*   EGFR mL/min/1.73 57.7*  --  57.7* 42.3* 37.0*     Results from last 7 days   Lab Units 12/22/23  0718 12/21/23  0536 12/19/23  1608   ALBUMIN g/dL 3.1* 2.9* 3.5   BILIRUBIN mg/dL 0.3  --  0.6   ALK PHOS U/L 54  --  66   AST (SGOT) U/L 25  --  51*   ALT (SGPT) U/L 17  --  14     Results from last 7 days   Lab Units 12/22/23  0718 12/21/23  0536 12/20/23  0717 12/19/23  1608   CALCIUM mg/dL 8.5* 8.4* 8.3* 8.6   ALBUMIN g/dL 3.1* 2.9*  --  3.5   MAGNESIUM mg/dL 2.0 2.3  --   --    PHOSPHORUS mg/dL 2.6 3.3  --   --          Results from last 7 days   Lab Units 12/21/23  0536 12/20/23  1334   SODIUM UR mmol/L  --  80   CREATININE UR mg/dL  --  101.8   URIC ACID mg/dL 4.5  --          Invalid input(s): \"LDLCALC\"  Results from last 7 days   Lab Units 12/21/23  1207 12/21/23  1200 12/20/23  1334 12/19/23  1616 12/19/23  1608   BLOODCX  No growth at 24 hours No growth at 24 hours  --  Enterococcus faecalis* Enterococcus faecalis*   URINECX   --   --  Growth present, too young to evaluate  --   --    BCIDPCR   --   --   --  Enterococcus faecalis. Jake/B (vancomycin resistance gene) not detected. Identification by BCID2 PCR.*  --      Results from last 7 days   Lab Units 12/18/23  1644   COVID19  NEGATIVE       Test Results Pending at Discharge     Pending Labs       Order Current Status    Catheter Culture - Cath Tip, Chest, Right In process    Blood Culture - Blood, Hand, Left Preliminary result    Blood Culture - Blood, Hand, Right Preliminary result    Urine Culture - Urine, Urine, Clean Catch Preliminary result            Discharge Details        Discharge Medications        New Medications        Instructions Start Date   linezolid 600 MG " tablet  Commonly known as: Zyvox   600 mg, Oral, 2 Times Daily      sodium bicarbonate 650 MG tablet   650 mg, Oral, 3 Times Daily             Changes to Medications        Instructions Start Date   Eliquis 5 MG tablet tablet  Generic drug: apixaban  What changed: See the new instructions.   TAKE 1 TABLET BY MOUTH EVERY 12 (TWELVE) HOURS. INDICATIONS: DVT/PE (ACTIVE THROMBOSIS)             Continue These Medications        Instructions Start Date   Braftovi 75 MG capsule  Generic drug: encorafenib   450 mg, Oral, Nightly      dexAMETHasone 0.5 MG tablet  Commonly known as: DECADRON   1 mg, Oral, Daily With Breakfast      dexAMETHasone 0.5 MG tablet  Commonly known as: DECADRON   0.5 mg, Oral, Nightly      famotidine 20 MG tablet  Commonly known as: PEPCID   20 mg, Oral, 2 Times Daily Before Meals      Mektovi 15 MG tablet  Generic drug: Binimetinib   15 mg, Oral, 2 Times Daily, TAKES 3 PILLS IN AM AND 3 PILLS IN PM      ondansetron 8 MG tablet  Commonly known as: ZOFRAN   8 mg, Oral, Every 8 Hours PRN, Pt not taking      prochlorperazine 10 MG tablet  Commonly known as: COMPAZINE   10 mg, Oral, Every 6 Hours PRN             Stop These Medications      HYDROcodone-acetaminophen 5-325 MG per tablet  Commonly known as: NORCO     oxyCODONE-acetaminophen 5-325 MG per tablet  Commonly known as: Percocet     predniSONE 10 MG tablet  Commonly known as: DELTASONE     sulfamethoxazole-trimethoprim 800-160 MG per tablet  Commonly known as: Bactrim DS              No Known Allergies    Discharge Disposition:  Home or Self Care      Discharge Diet:  Diet Order   Procedures    Diet: Regular/House Diet; Texture: Regular Texture (IDDSI 7); Fluid Consistency: Thin (IDDSI 0)       Discharge Activity:   As tolerated    CODE STATUS:    Code Status and Medical Interventions:   Ordered at: 12/19/23 9132     Level Of Support Discussed With:    Patient     Code Status (Patient has no pulse and is not breathing):    CPR (Attempt to  Resuscitate)     Medical Interventions (Patient has pulse or is breathing):    Full Support       No future appointments.  Additional Instructions for the Follow-ups that You Need to Schedule       Discharge Follow-up with PCP   As directed       Currently Documented PCP:    Mohini Cortes MD    PCP Phone Number:    [phone]     Follow Up Details: Dr. Cortes (PCP) in 1 week        Discharge Follow-up with Specified Provider: Dr. Montelongo (); 2 Weeks   As directed      To: Dr. Montelongo ()   Follow Up: 2 Weeks        Discharge Follow-up with Specified Provider: Dr. Guillory (Renal); 2 Weeks   As directed      To: Dr. Guillory (Renal)   Follow Up: 2 Weeks               Follow-up Information       Mohini Cortes MD .    Specialty: Internal Medicine  Why: Dr. Cortes (PCP) in 1 week  Contact information:  [address] [address]  [phone]                             Additional Instructions for the Follow-ups that You Need to Schedule       Discharge Follow-up with PCP   As directed       Currently Documented PCP:    Mohini Cortes MD    PCP Phone Number:    [phone]     Follow Up Details: Dr. Cortes (PCP) in 1 week        Discharge Follow-up with Specified Provider: Dr. Montelongo (AIDE); 2 Weeks   As directed      To: Dr. Montelongo ()   Follow Up: 2 Weeks        Discharge Follow-up with Specified Provider: Dr. Guillory (Renal); 2 Weeks   As directed      To: Dr. Guillory (Renal)   Follow Up: 2 Weeks            Time Spent on Discharge:  Greater than 30 minutes      Vic Teague MD  [facility]  12/22/23  14:19 EST                Electronically signed by Vic Teague MD at 12/22/23 1423       Discharge Order (From admission, onward)       Start     Ordered    12/22/23 1415  Discharge patient  Once        Expected Discharge Date: 12/22/23   Expected Discharge Time: Afternoon   Discharge Disposition: Home or Self Care   Physician of Record for Attribution - Please select from Treatment  Team: TAMARA MCGREGOR [[id number]]   Review needed by CMO to determine Physician of Record: No      Question Answer Comment   Physician of Record for Attribution - Please select from Treatment Team TAMARA MCGREGOR    Review needed by CMO to determine Physician of Record No        12/22/23 4062

## 2023-12-26 NOTE — CASE MANAGEMENT/SOCIAL WORK
Discharge Planning Assessment  [facility]     Patient Name: King George  MRN: [id number]  Today's Date: 12/26/2023    Admit Date: 12/25/2023    Plan: Home, family to transport   Discharge Needs Assessment       Row Name 12/26/23 1328       Living Environment    People in Home alone    Current Living Arrangements home    Potentially Unsafe Housing Conditions none    In the past 12 months has the electric, gas, oil, or water company threatened to shut off services in your home? No    Primary Care Provided by self    Provides Primary Care For no one    Family Caregiver if Needed parent(s)    Family Caregiver Names Komal George, mother    Quality of Family Relationships supportive    Able to Return to Prior Arrangements yes       Resource/Environmental Concerns    Resource/Environmental Concerns none    Transportation Concerns none       Food Insecurity    Within the past 12 months, you worried that your food would run out before you got the money to buy more. Never true    Within the past 12 months, the food you bought just didn't last and you didn't have money to get more. Never true       Transition Planning    Patient/Family Anticipates Transition to home    Patient/Family Anticipated Services at Transition none    Transportation Anticipated family or friend will provide       Discharge Needs Assessment    Equipment Currently Used at Home none    Concerns to be Addressed no discharge needs identified;denies needs/concerns at this time    Anticipated Changes Related to Illness none    Equipment Needed After Discharge none                   Discharge Plan       Row Name 12/26/23 3771       Plan    Plan Home, family to transport    Patient/Family in Agreement with Plan yes    Provided Post Acute Provider List? N/A    Provided Post Acute Provider Quality & Resource List? N/A    Plan Comments Met with pt at bedside. Permission obtained to speak to pt in front of family. Introduced self and explained role of case  manager. Face sheet verified, pt does not have a PCP and does not want any assistance obtaining a PCP at this time. Pt denies any diffculty paying for medications, and he obtains his medications from [employer]. Pt lives alone, stated that his mother could assist with any care needs that may arise. Pt stated that he has several steps that he has to maneuver in his home, but he does so without difficutly. Pt is independent in ADL's, does not use any assistive devices, nor require any. Pt continues to work full time, drives, etc. Pt has never had home health or been to rehab, and does not anticipate requiring those services upon discharge. Upon discharge, pt stated that his family will transport home. Explained that CCP would follow to assess for discharge needs.                  Continued Care and Services - Admitted Since 12/25/2023    Coordination has not been started for this encounter.       Expected Discharge Date and Time       Expected Discharge Date Expected Discharge Time    Dec 28, 2023            Demographic Summary    No documentation.                  Functional Status    No documentation.                  Psychosocial    No documentation.                  Abuse/Neglect    No documentation.                  Legal    No documentation.                  Substance Abuse    No documentation.                  Patient Forms    No documentation.                     Marce Zhou, RN

## 2023-12-26 NOTE — ED PROVIDER NOTES
EMERGENCY DEPARTMENT ENCOUNTER    Room Number:  N435/1  PCP: System, Provider Not In      HPI:  Chief Complaint: Fever, recent history of sepsis  A complete HPI/ROS/PMH/PSH/SH/FH are unobtainable due to: Nothing  Context: King George is a very pleasant 63 y.o. male with a significant past medical history of stage IV melanoma, PE, UTI, and recent history of enterococcal septicemia who presents to the ED c/o patient states he recently spent 5 days in the hospital due to Enterococcus septicemia and acute renal injury.  Patient states while in the hospital he was treated with IV ampicillin and IV fluids and responded well.  He was discharged on the 22 December 2023 on oral Zyvox.  Over the past day his fever has returned.  He denies cough, URI symptoms, dysuria, abdominal pain, chest pain, palpitations associated with the symptoms.  Patient is anticoagulated with Eliquis.      Reviewing the patient's chart, while in the hospital there was concern that his Mediport could be the source of sepsis and this was removed by Dr. Jose A Allen on 12/22/2023.  Patient was followed by infectious disease/Dr. Dunn.  He was also seen by urology while in the hospital as his metastatic disease had caused ureteral obstruction and ureteral stents had to be placed bilateral on 10/23/2023 by Dr. Montelongo.  Patient's metastatic melanoma is currently being followed by the [facility] in [facility].          PAST MEDICAL HISTORY  Active Ambulatory Problems     Diagnosis Date Noted    History of pulmonary embolism 05/28/2023    Anemia 05/29/2023    Left groin mass 05/29/2023    Hepatic lesion 05/29/2023    Abdominal lymphadenopathy 05/29/2023    Moderate malnutrition 05/30/2023    Left lower quadrant abdominal mass 05/28/2023    Metastatic melanoma 05/31/2023    Rectal polyp 06/02/2023    Metastasis to brain 06/02/2023    LENIN (acute kidney injury) 06/19/2023    Drug rash 06/19/2023    Personal history of venous  thrombosis and embolism 06/19/2023    Adverse effect of antineoplastic drug 06/20/2023    Encounter for management of implanted device 07/06/2023    Inguinal pain, left 07/07/2023    Hydroureteronephrosis 07/07/2023    Acute pain of left lower extremity 07/10/2023    Acute pain of lower extremity, left 07/12/2023    Sepsis 12/19/2023    UTI (urinary tract infection), bacterial 12/19/2023    Bacteremia due to Enterococcus 12/21/2023    Chronic anticoagulation 12/21/2023    Enterococcal septicemia 12/22/2023     Resolved Ambulatory Problems     Diagnosis Date Noted    Odynophagia 06/20/2023     Past Medical History:   Diagnosis Date    Acute pulmonary embolism, unspecified pulmonary embolism type, unspecified whether acute cor pulmonale present 05/28/2023    Colon polyps     Constipation     DVT (deep venous thrombosis)     History of shortness of breath     Left leg swelling     Melanoma          PAST SURGICAL HISTORY  Past Surgical History:   Procedure Laterality Date    COLONOSCOPY N/A 06/02/2023    40 MM TUBULOVILLOUS ADENOMA POLYP IN DISTAL RECTUM, RESCOPE IN 1 YR, DR. SHANNA MOTTA AT [facility]    COLONOSCOPY W/ POLYPECTOMY N/A 06/02/2023    40 MM POLYP IN DISTAL RECTUM, RESECTION AND RETREIVAL COMPLETE, RESCOPE IN 1 YR, DR. SHANNA MOTTA AT [facility]    CYSTOSCOPY W/ URETERAL STENT PLACEMENT Bilateral 07/10/2023    Procedure: CYSTOSCOPY URETERAL CATHETER/STENT INSERTION;  Surgeon: Vic Montelongo Jr., MD;  Location: [facility];  Service: Urology;  Laterality: Bilateral;    CYSTOSCOPY W/ URETERAL STENT PLACEMENT N/A 10/19/2023    Procedure: CYSTOSCOPY WITH BILATERAL STENT EXCHANGE;  Surgeon: Vic Montelongo Jr., MD;  Location: [facility];  Service: Urology;  Laterality: N/A;    PORTACATH PLACEMENT Right 06/02/2023    DONE AT [facility]    US GUIDED LYMPH NODE BIOPSY  05/30/2023    METASTATIC MELANOMA, DONE AT [facility]    VENOUS ACCESS DEVICE (PORT) INSERTION N/A 06/02/2023    Procedure: INFUSAPORT PLACEMENT;  Surgeon:  Vicki Layne MD;  Location: [facility]IN OR;  Service: General;  Laterality: N/A;         FAMILY HISTORY  Family History   Problem Relation Age of Onset    Breast cancer Mother     Prostate cancer Father     Malig Hyperthermia Neg Hx          SOCIAL HISTORY  Social History     Socioeconomic History    Marital status: Unknown   Tobacco Use    Smoking status: Never     Passive exposure: Never    Smokeless tobacco: Never   Vaping Use    Vaping Use: Unknown   Substance and Sexual Activity    Alcohol use: Never    Drug use: Never    Sexual activity: Defer         ALLERGIES  Patient has no known allergies.        REVIEW OF SYSTEMS  Review of Systems     All systems reviewed and negative except for those discussed in HPI.       PHYSICAL EXAM  ED Triage Vitals   Temp Heart Rate Resp BP SpO2   12/25/23 2053 12/25/23 2053 12/25/23 2053 12/25/23 2056 12/25/23 2053   (!) 101.1 °F (38.4 °C) 108 20 99/58 91 %      Temp src Heart Rate Source Patient Position BP Location FiO2 (%)   12/25/23 2053 -- 12/25/23 2056 12/25/23 2056 --   Tympanic  Lying Left arm        Physical Exam      GENERAL: Very pleasant, no acute distress  HENT: nares patent  EYES: no scleral icterus  CV: regular rhythm, normal rate, normal S1-S2  RESPIRATORY: normal effort, lungs CTA B  ABDOMEN: soft  MUSCULOSKELETAL: no deformity  NEURO: alert, moves all extremities, follows commands  PSYCH:  calm, cooperative  SKIN: warm, dry    Vital signs and nursing notes reviewed.          LAB RESULTS  Recent Results (from the past 24 hour(s))   Comprehensive Metabolic Panel    Collection Time: 12/25/23  9:11 PM    Specimen: Arm, Left; Blood   Result Value Ref Range    Glucose 141 (H) 65 - 99 mg/dL    BUN 17 8 - 23 mg/dL    Creatinine 1.69 (H) 0.76 - 1.27 mg/dL    Sodium 130 (L) 136 - 145 mmol/L    Potassium 3.6 3.5 - 5.2 mmol/L    Chloride 97 (L) 98 - 107 mmol/L    CO2 19.0 (L) 22.0 - 29.0 mmol/L    Calcium 8.1 (L) 8.6 - 10.5 mg/dL    Total Protein 7.4 6.0 - 8.5 g/dL     Albumin 3.4 (L) 3.5 - 5.2 g/dL    ALT (SGPT) 15 1 - 41 U/L    AST (SGOT) 17 1 - 40 U/L    Alkaline Phosphatase 94 39 - 117 U/L    Total Bilirubin 0.3 0.0 - 1.2 mg/dL    Globulin 4.0 gm/dL    A/G Ratio 0.9 g/dL    BUN/Creatinine Ratio 10.1 7.0 - 25.0    Anion Gap 14.0 5.0 - 15.0 mmol/L    eGFR 45.6 (L) >60.0 mL/min/1.73   Lipase    Collection Time: 12/25/23  9:11 PM    Specimen: Arm, Left; Blood   Result Value Ref Range    Lipase 66 (H) 13 - 60 U/L   Lactic Acid, Plasma    Collection Time: 12/25/23  9:11 PM    Specimen: Arm, Left; Blood   Result Value Ref Range    Lactate 1.5 0.5 - 2.0 mmol/L   Green Top (Gel)    Collection Time: 12/25/23  9:11 PM   Result Value Ref Range    Extra Tube Hold for add-ons.    Lavender Top    Collection Time: 12/25/23  9:11 PM   Result Value Ref Range    Extra Tube hold for add-on    Gold Top - SST    Collection Time: 12/25/23  9:11 PM   Result Value Ref Range    Extra Tube Hold for add-ons.    Gray Top    Collection Time: 12/25/23  9:11 PM   Result Value Ref Range    Extra Tube Hold for add-ons.    Light Blue Top    Collection Time: 12/25/23  9:11 PM   Result Value Ref Range    Extra Tube Hold for add-ons.    CBC Auto Differential    Collection Time: 12/25/23  9:11 PM    Specimen: Arm, Left; Blood   Result Value Ref Range    WBC 13.56 (H) 3.40 - 10.80 10*3/mm3    RBC 3.21 (L) 4.14 - 5.80 10*6/mm3    Hemoglobin 8.6 (L) 13.0 - 17.7 g/dL    Hematocrit 26.8 (L) 37.5 - 51.0 %    MCV 83.5 79.0 - 97.0 fL    MCH 26.8 26.6 - 33.0 pg    MCHC 32.1 31.5 - 35.7 g/dL    RDW 14.9 12.3 - 15.4 %    RDW-SD 45.2 37.0 - 54.0 fl    MPV 9.0 6.0 - 12.0 fL    Platelets 395 140 - 450 10*3/mm3    Neutrophil % 76.5 (H) 42.7 - 76.0 %    Lymphocyte % 12.5 (L) 19.6 - 45.3 %    Monocyte % 8.8 5.0 - 12.0 %    Eosinophil % 1.1 0.3 - 6.2 %    Basophil % 0.3 0.0 - 1.5 %    Immature Grans % 0.8 (H) 0.0 - 0.5 %    Neutrophils, Absolute 10.36 (H) 1.70 - 7.00 10*3/mm3    Lymphocytes, Absolute 1.70 0.70 - 3.10 10*3/mm3     Monocytes, Absolute 1.20 (H) 0.10 - 0.90 10*3/mm3    Eosinophils, Absolute 0.15 0.00 - 0.40 10*3/mm3    Basophils, Absolute 0.04 0.00 - 0.20 10*3/mm3    Immature Grans, Absolute 0.11 (H) 0.00 - 0.05 10*3/mm3    nRBC 0.0 0.0 - 0.2 /100 WBC   Sedimentation Rate    Collection Time: 12/25/23  9:11 PM    Specimen: Arm, Left; Blood   Result Value Ref Range    Sed Rate 49 (H) 0 - 20 mm/hr   C-reactive Protein    Collection Time: 12/25/23  9:11 PM    Specimen: Arm, Left; Blood   Result Value Ref Range    C-Reactive Protein 19.63 (H) 0.00 - 0.50 mg/dL   Urinalysis With Microscopic If Indicated (No Culture) - Urine, Clean Catch    Collection Time: 12/25/23  9:16 PM    Specimen: Urine, Clean Catch   Result Value Ref Range    Color, UA Dark Yellow (A) Yellow, Straw    Appearance, UA Cloudy (A) Clear    pH, UA 6.5 5.0 - 8.0    Specific Gravity, UA 1.017 1.005 - 1.030    Glucose, UA Negative Negative    Ketones, UA Negative Negative    Bilirubin, UA Negative Negative    Blood, UA Large (3+) (A) Negative    Protein,  mg/dL (2+) (A) Negative    Leuk Esterase, UA Trace (A) Negative    Nitrite, UA Negative Negative    Urobilinogen, UA 1.0 E.U./dL 0.2 - 1.0 E.U./dL   Urinalysis, Microscopic Only - Urine, Clean Catch    Collection Time: 12/25/23  9:16 PM    Specimen: Urine, Clean Catch   Result Value Ref Range    RBC, UA 21-50 (A) None Seen, 0-2 /HPF    WBC, UA 3-5 (A) None Seen, 0-2 /HPF    Bacteria, UA Trace (A) None Seen /HPF    Squamous Epithelial Cells, UA 0-2 None Seen, 0-2 /HPF    Renal Epithelial Cells, UA 0-2 0 - 2 /HPF    Hyaline Casts, UA None Seen None Seen /LPF    Methodology Manual Light Microscopy    Respiratory Panel PCR w/COVID-19(SARS-CoV-2) KELLE/MAYDA/SULLY/PAD/COR/DOUG In-House, NP Swab in [place], 2 HR TAT - Swab, Nasopharynx    Collection Time: 12/25/23 11:04 PM    Specimen: Nasopharynx; Swab   Result Value Ref Range    ADENOVIRUS, PCR Not Detected Not Detected    Coronavirus 229E Detected (A) Not Detected     Coronavirus HKU1 Not Detected Not Detected    Coronavirus NL63 Not Detected Not Detected    Coronavirus OC43 Not Detected Not Detected    COVID19 Not Detected Not Detected - Ref. Range    Human Metapneumovirus Not Detected Not Detected    Human Rhinovirus/Enterovirus Not Detected Not Detected    Influenza A PCR Not Detected Not Detected    Influenza B PCR Not Detected Not Detected    Parainfluenza Virus 1 Not Detected Not Detected    Parainfluenza Virus 2 Not Detected Not Detected    Parainfluenza Virus 3 Not Detected Not Detected    Parainfluenza Virus 4 Not Detected Not Detected    RSV, PCR Not Detected Not Detected    Bordetella pertussis pcr Not Detected Not Detected    Bordetella parapertussis PCR Not Detected Not Detected    Chlamydophila pneumoniae PCR Not Detected Not Detected    Mycoplasma pneumo by PCR Not Detected Not Detected       Ordered the above labs and reviewed the results.        RADIOLOGY  XR Chest 1 View    Result Date: 12/25/2023  SINGLE VIEW OF THE CHEST  HISTORY: Fever  COMPARISON: 2/19/2023  FINDINGS: There is cardiomegaly. There is no vascular congestion. There is some chronic scarring at the left lung base. No pneumothorax, pleural effusion, or acute infiltrate is seen. Right internal jugular vein Mediport has been removed.      No acute findings.  This report was finalized on 12/25/2023 11:09 PM by Dr. Harika Seo M.D on Workstation: [id number]       Ordered the above noted radiological studies. Reviewed by me in PACS.          PROCEDURES  Procedures          MEDICATIONS GIVEN IN ER  Medications   sodium chloride 0.9 % flush 10 mL (has no administration in time range)   ampicillin 2000 mg IVPB in 100 ml NS (VTB) (0 mg Intravenous Stopped 12/25/23 2341)   sodium chloride 0.9 % bolus 1,000 mL (0 mL Intravenous Stopped 12/26/23 0014)   acetaminophen (TYLENOL) tablet 1,000 mg (1,000 mg Oral Given 12/25/23 2303)   sodium chloride 0.9 % bolus 1,000 mL (0 mL Intravenous Stopped 12/26/23  5680)         MEDICAL DECISION MAKING, PROGRESS, and CONSULTS    Discussion below represents my analysis of pertinent findings related to patient's condition, differential diagnosis, treatment plan and final disposition.      Orders placed during this visit:  Orders Placed This Encounter   Procedures    Blood Culture - Blood,    Blood Culture - Blood,    Respiratory Panel PCR w/COVID-19(SARS-CoV-2) KELLE/MAYDA/SULLY/PAD/COR/DOUG In-House, NP Swab in UTM/VTM, 2 HR TAT - Swab, Nasopharynx    XR Chest 1 View    Opp Draw    Comprehensive Metabolic Panel    Lipase    Urinalysis With Microscopic If Indicated (No Culture) - Urine, Clean Catch    Lactic Acid, Plasma    CBC Auto Differential    Urinalysis, Microscopic Only - Urine, Clean Catch    Sedimentation Rate    C-reactive Protein    NPO Diet NPO Type: Strict NPO    Undress & Gown    LHA (on-call MD unless specified) Details    Insert Peripheral IV    Initiate Observation Status    CBC & Differential    Green Top (Gel)    Lavender Top    Gold Top - SST    Gray Top    Light Blue Top         Additional sources:  - Discussed/obtained information from independent historians: None available  Additional information was obtained to confirm the patient's history.    - External (non-ED) record review:       Adult Transthoracic Echo Complete W/ Cont if Necessary Per Protocol     Interpretation Summary    Left ventricular systolic function is normal. Calculated left ventricular EF = 58.3%    Left ventricular diastolic function was normal.    Mild dilation of the aortic root is present. The aortic root measures 3.9 cm.    There is a trivial pericardial effusion.    No definite valvular vegetations appreciated on transthoracic echocardiogram.      - Chronic or social conditions impacting care: None        Differential diagnosis:        Independent interpretation of labs, radiology studies, and discussions with consultants:  ED Course as of 12/26/23 0244   Mon Dec 25, 2023   3054  Hemoglobin(!): 8.6  This is stable from the last 4 days and down 0.8 from 5 days ago. [RC]   2242 Sodium(!): 130 [RC]   2242 Creatinine(!): 1.69  This was 1.39 x 3 and 4 days ago [RC]   2242 Lactate: 1.5 [RC]   2242 Lipase(!): 66 [RC]   2242 Sed Rate(!): 49 [RC]   2243 Bacteria, UA(!): Trace [RC]   2243 WBC, UA(!): 3-5 [RC]   2243 RBC, UA(!): 21-50 [RC]      ED Course User Index  [RC] Anselmo Flores III, PA               DIAGNOSIS  Final diagnoses:   Acute renal insufficiency   Immunocompromised patient   Fever and chills   Coronavirus infection   History of sepsis-Enterococcus         DISPOSITION  Admission      Latest Documented Vital Signs:  As of 02:44 EST  BP- 121/78 HR- 62 Temp- (!) 101.1 °F (38.4 °C) (Tympanic) O2 sat- 100%      --    Please note that portions of this were completed with a voice recognition program.       Note Disclaimer: At [facility], we believe that sharing information builds trust and better relationships. You are receiving this note because you are receiving care at [facility] or recently visited. It is possible you will see health information before a provider has talked with you about it. This kind of information can be easy to misunderstand. To help you fully understand what it means for your health, we urge you to discuss this note with your provider.         Anselmo Flores III, PA  12/26/23 2334

## 2023-12-26 NOTE — ED PROVIDER NOTES
MD ATTESTATION NOTE    The NOEMÍ and I have discussed this patient's history, physical exam, and treatment plan.  I have reviewed the documentation and personally had a face to face interaction with the patient. I affirm the documentation and agree with the treatment and plan.  The attached note describes my personal findings.      I provided a substantive portion of the care of the patient.  I personally performed the physical exam in its entirety, and below are my findings.        Brief HPI: This patient is a 63-year-old male currently being treated for stage IV melanoma presenting to the emergency room today due to fever/chills.  He was just recently admitted to the hospital for Enterococcus septicemia and is currently on oral Zyvox treatment.  Currently, he denies cough/congestion, abdominal pain, chest pain, or shortness of breath.      PHYSICAL EXAM  ED Triage Vitals   Temp Heart Rate Resp BP SpO2   12/25/23 2053 12/25/23 2053 12/25/23 2053 12/25/23 2056 12/25/23 2053   (!) 101.1 °F (38.4 °C) 108 20 99/58 91 %      Temp src Heart Rate Source Patient Position BP Location FiO2 (%)   12/25/23 2053 -- 12/25/23 2056 12/25/23 2056 --   Tympanic  Lying Left arm          GENERAL: Resting comfortably and in no acute distress, nontoxic in appearance  HENT: nares patent  EYES: no scleral icterus  CV: regular rhythm, normal rate, no M/R/G  RESPIRATORY: normal effort, lungs clear bilaterally  ABDOMEN: soft, nontender, no rebound or guarding  MUSCULOSKELETAL: no deformity, no edema  NEURO: alert, moves all extremities, follows commands  PSYCH:  calm, cooperative  SKIN: warm, dry    Vital signs and nursing notes reviewed.        Plan: We will obtain labs, urinalysis, as well as a chest x-ray in the ED.  We will begin the patient on IV fluids as well as IV ampicillin for antibiotic coverage.  Ultimately, he will be admitted to the hospital for further management and treatment.       Rowdy Helms MD  12/25/23 3912

## 2023-12-26 NOTE — CONSULTS
Referring Provider: Miguel Bennett DO      Subjective   History of present illness: This very nice gentleman with a history of metastatic melanoma followed at [facility] recently discharged on 12/22 with enterococcal septicemia.  Please see my notes from that admission for full details.    He did well initially at home and was taking oral linezolid but developed fever, chills and nausea and vomiting prompting readmission.  He denies any known sick contacts or respiratory symptoms.  He tested positive for coronavirus 229E    Physical Exam:   Vital Signs   Temp:  [97.9 °F (36.6 °C)-101.1 °F (38.4 °C)] 99.5 °F (37.5 °C)  Heart Rate:  [] 83  Resp:  [18-20] 18  BP: ()/(58-87) 154/79    GENERAL: Awake and alert, sickly  HEENT: Oropharynx is clear. Hearing is grossly normal.   EYES: . No conjunctival injection. No lid lag.   LUNGS:normal respiratory effort.   SKIN: no cutaneous eruptions in exposed areas  PSYCHIATRIC: Appropriate mood, affect, insight, and judgment.     Results Review:  Wbc 10.9, hgb 7.9, plt 340  Cr 1.54     CXR, independently interpreted: no acute pna  Microbiology:  COVID/influenza/RSV PCR negative  12/19 blood cultures Enterococcus faecalis  12/20 Ucx >100K enterococcus  12/21 BCx ngtd  12/22 port cx ngtd  12/25 bcx pending  12/25 rpp: coronavirus 229e        A/p  Sepsis  Grupo  coronavirus 229e  Metastatic melanoma, follows at [facility]      Suspect majority of his illness related to virus.  Continue supportive care for non-COVID coronavirus infection  Only 3-5 white blood cells on urinalysis, but we will check a renal ultrasound to make sure there is no hydronephrosis given his stenting and acute kidney injury.  Was completing course of oral Linezolid for enterococcal septicemia.  Now back on IV antibiotics.  Discontinue Unasyn.  Start ampicillin.      Thank you for this consult.  We will continue to follow along and tailor antibiotics as the patient's clinical course  evolves.

## 2023-12-26 NOTE — H&P
Patient Name:  King George  YOB: 1960  MRN:  [id number]  Admit Date:  12/25/2023  Patient Care Team:  System, Provider Not In as PCP - General  Bernardo Azar MD as Referring Physician (Hospitalist)  Brodie Rocha MD as Consulting Physician (Hematology and Oncology)  Amy Berry MD as Consulting Physician (Radiation Oncology)  Perfecot Daly MD as Consulting Physician (Oncology)      Subjective   History Present Illness     Chief Complaint   Patient presents with    Dysuria    Fever       Mr. George is a 63 y.o. a history of stage 4 static melanoma, atrial obstruction with 2 ureteral stents in October , DVT/PE that presents with fever. Patient was admitted to this facility and discharged on 12/22, following sepsis with enterococcal bacteremia, LENIN and UTI treated with ampicillin and discharged home on Zyvox.  He is on anticoagulation with Eliquis.  He had a Mediport that was removed by Dr. Allen on 12/22 as a possible source of his sepsis.  He is followed by Dr. Mccormick with ID.  Since discharge he has had nausea, diarrhea, dry cough, malaise, and fever Tmax of 103 at home.  No obvious alleviating or exacerbating features to his symptoms.  He did test positive corona virus 229E.        History of Present Illness    Review of Systems   Constitutional:  Positive for activity change, appetite change, chills, fatigue and fever.   HENT:  Negative for trouble swallowing.    Respiratory:  Negative for choking.    Cardiovascular:  Positive for leg swelling. Negative for chest pain.   Gastrointestinal:  Positive for diarrhea and nausea. Negative for abdominal distention, abdominal pain, blood in stool, constipation and vomiting.   Genitourinary:  Negative for difficulty urinating and dysuria.   Musculoskeletal:  Negative for back pain.   Skin:  Negative for pallor.   Neurological:  Negative for dizziness and light-headedness.   Hematological:  Does not bruise/bleed easily.    Psychiatric/Behavioral:  Negative for confusion.         Personal History     Past Medical History:   Diagnosis Date    Abdominal lymphadenopathy 05/29/2023    Acute pulmonary embolism, unspecified pulmonary embolism type, unspecified whether acute cor pulmonale present 05/28/2023    [facility]    LENIN (acute kidney injury)     Anemia 05/29/2023    Colon polyps     FOLLOWED BY DR. SHANNA MOTTA    Constipation     Drug rash 06/19/2023    DVT (deep venous thrombosis)     LEFT LEG    Hepatic lesion 05/29/2023    History of shortness of breath     FOUND BLOOD CLOT ATTACHED TO LUNG    Left leg swelling     FROM BLOOD CLOT 5/2023    Left lower quadrant abdominal mass 05/28/2023    LEFT INGUINAL BX SHOWED METASTATIC MELANOMA    Melanoma     LEFT SIDE GROIN AREA, STAGE 4    Metastasis to brain 06/02/2023    Metastatic melanoma 05/31/2023    Moderate Malnutrition (HCC) 05/30/2023    Personal history of venous thrombosis and embolism 06/19/2023     Past Surgical History:   Procedure Laterality Date    COLONOSCOPY N/A 06/02/2023    40 MM TUBULOVILLOUS ADENOMA POLYP IN DISTAL RECTUM, RESCOPE IN 1 YR, DR[facility] SHAN[facility]    COLONOSCOPY W/ POLYPECTOMY N/A 06/02/2023    40 MM POLYP IN DISTAL RECTUM, RESECTION AND RETREIVAL COMPLETE, RESCOPE IN 1 YR, DR. SHANNA [facility]    CYSTOSCOPY W/ URETERAL STENT PLACEMENT Bilateral 07/10/2023    Procedure: CYSTOSCOPY URETERAL CATHETER/STENT INSERTION;  Surgeon: Vic Montelongo Jr., MD;  Location: [facility];  Service: Urology;  Laterality: Bilateral;    CYSTOSCOPY W/ URETERAL STENT PLACEMENT N/A 10/19/2023    Procedure: CYSTOSCOPY WITH BILATERAL STENT EXCHANGE;  Surgeon: Vic Montelongo Jr., MD;  Location: [facility];  Service: Urology;  Laterality: N/A;    PORTACATH PLACEMENT Right 06/02/2023    [facility]    US GUIDED LYMPH NODE BIOPSY  05/30/2023    METASTATIC MELANOMA, DONE [facility]    VENOUS ACCESS DEVICE (PORT) INSERTION N/A 06/02/2023    Procedure:  INFUSAPORT PLACEMENT;  Surgeon: Vicki Layne MD;  Location: [facility] MAIN OR;  Service: General;  Laterality: N/A;    VENOUS ACCESS DEVICE (PORT) REMOVAL N/A 12/22/2023    Procedure: REMOVAL VENOUS ACCESS DEVICE;  Surgeon: Jose A Allen Jr., MD;  Location: [facility] MAIN OR;  Service: General;  Laterality: N/A;     Family History   Problem Relation Age of Onset    Breast cancer Mother     Prostate cancer Father     Malig Hyperthermia Neg Hx      Social History     Tobacco Use    Smoking status: Never     Passive exposure: Never    Smokeless tobacco: Never   Vaping Use    Vaping Use: Unknown   Substance Use Topics    Alcohol use: Never    Drug use: Never     No current facility-administered medications on file prior to encounter.     Current Outpatient Medications on File Prior to Encounter   Medication Sig Dispense Refill    Braftovi 75 MG capsule Take 6 capsules by mouth Every Night.      dexAMETHasone (DECADRON) 0.5 MG tablet Take 2 tablets by mouth Daily With Breakfast.      dexAMETHasone (DECADRON) 0.5 MG tablet Take 1 tablet by mouth Every Night.      Eliquis 5 MG tablet tablet TAKE 1 TABLET BY MOUTH EVERY 12 (TWELVE) HOURS. INDICATIONS: DVT/PE (ACTIVE THROMBOSIS) (Patient taking differently: Take 1 tablet by mouth Every 12 (Twelve) Hours. FOLLOW MD INSTRUCTIONS FOR STOPPING FOR SURGERY) 60 tablet 0    famotidine (PEPCID) 20 MG tablet Take 1 tablet by mouth 2 (Two) Times a Day Before Meals. 60 tablet 0    linezolid (Zyvox) 600 MG tablet Take 1 tablet by mouth 2 (Two) Times a Day for 7 days. 14 tablet 0    Mektovi 15 MG tablet Take 15 mg by mouth 2 (Two) Times a Day. TAKES 3 PILLS IN AM AND 3 PILLS IN PM      ondansetron (ZOFRAN) 8 MG tablet Take 1 tablet by mouth Every 8 (Eight) Hours As Needed for Nausea or Vomiting. Pt not taking      prochlorperazine (COMPAZINE) 10 MG tablet Take 1 tablet by mouth Every 6 (Six) Hours As Needed.      sodium bicarbonate 650 MG tablet Take 1 tablet by mouth 3 (Three) Times a  Day. 90 tablet 0     No Known Allergies    Objective    Objective     Vital Signs  Temp:  [97.9 °F (36.6 °C)-101.1 °F (38.4 °C)] 99.5 °F (37.5 °C)  Heart Rate:  [] 83  Resp:  [18-20] 18  BP: ()/(58-87) 154/79  SpO2:  [91 %-100 %] 100 %  on   ;   Device (Oxygen Therapy): room air  Body mass index is 32.78 kg/m².    Physical Exam  Vitals and nursing note reviewed.   Constitutional:       General: He is not in acute distress.     Appearance: He is well-developed. He is ill-appearing, toxic-appearing and diaphoretic.      Comments: Ill-appearing fever 102.8   HENT:      Head: Normocephalic and atraumatic.   Eyes:      Conjunctiva/sclera: Conjunctivae normal.   Neck:      Trachea: No tracheal deviation.   Cardiovascular:      Rate and Rhythm: Normal rate and regular rhythm.   Pulmonary:      Effort: Pulmonary effort is normal. No respiratory distress.      Breath sounds: Normal breath sounds.   Abdominal:      General: Bowel sounds are normal. There is no distension.      Palpations: Abdomen is soft. There is no mass.      Tenderness: There is no abdominal tenderness. There is no guarding or rebound.   Musculoskeletal:         General: Normal range of motion.      Cervical back: Normal range of motion.      Right lower leg: Edema present.      Left lower leg: Edema present.   Skin:     General: Skin is warm and dry.   Neurological:      General: No focal deficit present.      Mental Status: He is alert and oriented to person, place, and time. Mental status is at baseline.   Psychiatric:         Mood and Affect: Mood normal.         Behavior: Behavior normal.         Thought Content: Thought content normal.         Results Review:  I reviewed the patient's new clinical results.  I reviewed the patient's new imaging results and agree with the interpretation.  I reviewed the patient's other test results and agree with the interpretation  I personally viewed and interpreted the patient's EKG/Telemetry  data  Discussed with ED provider.    Lab Results (last 24 hours)       Procedure Component Value Units Date/Time    CBC & Differential [[id number]]  (Abnormal) Collected: 12/25/23 2111    Specimen: Blood from Arm, Left Updated: 12/25/23 2126    Narrative:      The following orders were created for panel order CBC & Differential.  Procedure                               Abnormality         Status                     ---------                               -----------         ------                     CBC Auto Differential[[id number]]        Abnormal            Final result                 Please view results for these tests on the individual orders.    Comprehensive Metabolic Panel [[id number]]  (Abnormal) Collected: 12/25/23 2111    Specimen: Blood from Arm, Left Updated: 12/25/23 2146     Glucose 141 mg/dL      BUN 17 mg/dL      Creatinine 1.69 mg/dL      Sodium 130 mmol/L      Potassium 3.6 mmol/L      Chloride 97 mmol/L      CO2 19.0 mmol/L      Calcium 8.1 mg/dL      Total Protein 7.4 g/dL      Albumin 3.4 g/dL      ALT (SGPT) 15 U/L      AST (SGOT) 17 U/L      Alkaline Phosphatase 94 U/L      Total Bilirubin 0.3 mg/dL      Globulin 4.0 gm/dL      A/G Ratio 0.9 g/dL      BUN/Creatinine Ratio 10.1     Anion Gap 14.0 mmol/L      eGFR 45.6 mL/min/1.73     Narrative:      GFR Normal >60  Chronic Kidney Disease <60  Kidney Failure <15      Lipase [[id number]]  (Abnormal) Collected: 12/25/23 2111    Specimen: Blood from Arm, Left Updated: 12/25/23 2145     Lipase 66 U/L     Lactic Acid, Plasma [[id number]]  (Normal) Collected: 12/25/23 2111    Specimen: Blood from Arm, Left Updated: 12/25/23 2139     Lactate 1.5 mmol/L     CBC Auto Differential [[id number]]  (Abnormal) Collected: 12/25/23 2111    Specimen: Blood from Arm, Left Updated: 12/25/23 2126     WBC 13.56 10*3/mm3      RBC 3.21 10*6/mm3      Hemoglobin 8.6 g/dL      Hematocrit 26.8 %      MCV 83.5 fL      MCH 26.8 pg      MCHC 32.1 g/dL      RDW 14.9 %      RDW-SD  45.2 fl      MPV 9.0 fL      Platelets 395 10*3/mm3      Neutrophil % 76.5 %      Lymphocyte % 12.5 %      Monocyte % 8.8 %      Eosinophil % 1.1 %      Basophil % 0.3 %      Immature Grans % 0.8 %      Neutrophils, Absolute 10.36 10*3/mm3      Lymphocytes, Absolute 1.70 10*3/mm3      Monocytes, Absolute 1.20 10*3/mm3      Eosinophils, Absolute 0.15 10*3/mm3      Basophils, Absolute 0.04 10*3/mm3      Immature Grans, Absolute 0.11 10*3/mm3      nRBC 0.0 /100 WBC     Blood Culture - Blood, Arm, Left [[id number]] Collected: 12/25/23 2111    Specimen: Blood from Arm, Left Updated: 12/25/23 2118    Sedimentation Rate [[id number]]  (Abnormal) Collected: 12/25/23 2111    Specimen: Blood from Arm, Left Updated: 12/25/23 2225     Sed Rate 49 mm/hr     C-reactive Protein [[id number]]  (Abnormal) Collected: 12/25/23 2111    Specimen: Blood from Arm, Left Updated: 12/25/23 2241     C-Reactive Protein 19.63 mg/dL     Urinalysis With Microscopic If Indicated (No Culture) - Urine, Clean Catch [[id number]]  (Abnormal) Collected: 12/25/23 2116    Specimen: Urine, Clean Catch Updated: 12/25/23 2146     Color, UA Dark Yellow     Appearance, UA Cloudy     pH, UA 6.5     Specific Gravity, UA 1.017     Glucose, UA Negative     Ketones, UA Negative     Bilirubin, UA Negative     Blood, UA Large (3+)     Protein,  mg/dL (2+)     Leuk Esterase, UA Trace     Nitrite, UA Negative     Urobilinogen, UA 1.0 E.U./dL    Urinalysis, Microscopic Only - Urine, Clean Catch [[id number]]  (Abnormal) Collected: 12/25/23 2116    Specimen: Urine, Clean Catch Updated: 12/25/23 2203     RBC, UA 21-50 /HPF      WBC, UA 3-5 /HPF      Bacteria, UA Trace /HPF      Squamous Epithelial Cells, UA 0-2 /HPF      Renal Epithelial Cells, UA 0-2 /HPF      Hyaline Casts, UA None Seen /LPF      Methodology Manual Light Microscopy    Blood Culture - Blood, Arm, Right [[id number]] Collected: 12/25/23 2210    Specimen: Blood from Arm, Right Updated: 12/25/23 2219     Respiratory Panel PCR w/COVID-19(SARS-CoV-2) KELLE/MAYDA/SULLY/PAD/COR/DOUG In-House, NP Swab in UTM/VTM, 2 HR TAT - Swab, Nasopharynx [[id number]]  (Abnormal) Collected: 12/25/23 2304    Specimen: Swab from Nasopharynx Updated: 12/26/23 0000     ADENOVIRUS, PCR Not Detected     Coronavirus 229E Detected     Coronavirus HKU1 Not Detected     Coronavirus NL63 Not Detected     Coronavirus OC43 Not Detected     COVID19 Not Detected     Human Metapneumovirus Not Detected     Human Rhinovirus/Enterovirus Not Detected     Influenza A PCR Not Detected     Influenza B PCR Not Detected     Parainfluenza Virus 1 Not Detected     Parainfluenza Virus 2 Not Detected     Parainfluenza Virus 3 Not Detected     Parainfluenza Virus 4 Not Detected     RSV, PCR Not Detected     Bordetella pertussis pcr Not Detected     Bordetella parapertussis PCR Not Detected     Chlamydophila pneumoniae PCR Not Detected     Mycoplasma pneumo by PCR Not Detected    Narrative:      In the setting of a positive respiratory panel with a viral infection PLUS a negative procalcitonin without other underlying concern for bacterial infection, consider observing off antibiotics or discontinuation of antibiotics and continue supportive care. If the respiratory panel is positive for atypical bacterial infection (Bordetella pertussis, Chlamydophila pneumoniae, or Mycoplasma pneumoniae), consider antibiotic de-escalation to target atypical bacterial infection.    Basic Metabolic Panel [[id number]]  (Abnormal) Collected: 12/26/23 0541    Specimen: Blood Updated: 12/26/23 0647     Glucose 124 mg/dL      BUN 16 mg/dL      Creatinine 1.54 mg/dL      Sodium 133 mmol/L      Potassium 3.4 mmol/L      Chloride 101 mmol/L      CO2 20.1 mmol/L      Calcium 7.9 mg/dL      BUN/Creatinine Ratio 10.4     Anion Gap 11.9 mmol/L      eGFR 51.0 mL/min/1.73     Narrative:      GFR Normal >60  Chronic Kidney Disease <60  Kidney Failure <15      CBC Auto Differential [[id number]]  (Abnormal)  Collected: 12/26/23 0541    Specimen: Blood Updated: 12/26/23 0632     WBC 10.91 10*3/mm3      RBC 2.94 10*6/mm3      Hemoglobin 7.9 g/dL      Hematocrit 24.7 %      MCV 84.0 fL      MCH 26.9 pg      MCHC 32.0 g/dL      RDW 14.9 %      RDW-SD 45.2 fl      MPV 9.4 fL      Platelets 340 10*3/mm3      Neutrophil % 77.6 %      Lymphocyte % 10.9 %      Monocyte % 8.9 %      Eosinophil % 1.9 %      Basophil % 0.2 %      Immature Grans % 0.5 %      Neutrophils, Absolute 8.47 10*3/mm3      Lymphocytes, Absolute 1.19 10*3/mm3      Monocytes, Absolute 0.97 10*3/mm3      Eosinophils, Absolute 0.21 10*3/mm3      Basophils, Absolute 0.02 10*3/mm3      Immature Grans, Absolute 0.05 10*3/mm3      nRBC 0.0 /100 WBC             Imaging Results (Last 24 Hours)       Procedure Component Value Units Date/Time    XR Chest 1 View [[id number]] Collected: 12/25/23 2308     Updated: 12/25/23 2312    Narrative:      SINGLE VIEW OF THE CHEST     HISTORY: Fever     COMPARISON: 2/19/2023     FINDINGS:  There is cardiomegaly. There is no vascular congestion. There is some  chronic scarring at the left lung base. No pneumothorax, pleural  effusion, or acute infiltrate is seen. Right internal jugular vein  Mediport has been removed.       Impression:      No acute findings.     This report was finalized on 12/25/2023 11:09 PM by Dr. Harika Seo M.D on Workstation: [id number]               Results for orders placed during the hospital encounter of 12/19/23    Adult Transthoracic Echo Complete W/ Cont if Necessary Per Protocol    Interpretation Summary    Left ventricular systolic function is normal. Calculated left ventricular EF = 58.3%    Left ventricular diastolic function was normal.    Mild dilation of the aortic root is present. The aortic root measures 3.9 cm.    There is a trivial pericardial effusion.    No definite valvular vegetations appreciated on transthoracic echocardiogram.      SCANNED - TELEMETRY     Final Result       SCANNED - TELEMETRY     Final Result           Assessment/Plan     Active Hospital Problems    Diagnosis  POA    **Coronavirus infection [B34.2]  Yes    Acute renal insufficiency [N28.9]  Yes    Fever [R50.9]  Yes    Bacteremia due to Enterococcus [R78.81, B95.2]  Yes    Chronic anticoagulation [Z79.01]  Not Applicable    Viral sepsis [A41.89, B97.89]  Yes    Hydroureteronephrosis [N13.30]  Yes    Metastatic melanoma [C43.9]  Yes    Anemia [D64.9]  Yes      Resolved Hospital Problems   No resolved problems to display.       Mr. George is a 63 y.o. admitted coronavirus/viral sepsis in the setting of recent enterococcal bacteremia    Viral sepsis, Coronavirus 229E  Febrile on my exam, nurse notified to treat with antipyretics. WBC normal. Elevated CRP and sed rate. Supportive care for viral illness.  Distant nausea, add scopolamine patch.  No PNA or other acute process on chest Xray.  Blood cx pending.  Continue home Braftovi and Mektovi if okay with ID    History of sespis with enteroccocal bacteremia   Port removed 12/22.  Discharged on 12/22 on oral Zyvox 600 mg x 7 days.  ER initiated back on ampicillin IV.  ID consulted.    LENIN  Likely due to viral illness, dehydration. nephrology, ELVIS,  followed last admission.creatinine baseline closer to 0.8 compared to 2.0 on 12/19 during admission with creatinine of 1.39 at time of discharge.  On admission it is 1.69 trending down to 1.54 with IVF. Continue IVF and monitor. Na improving with fluids but if worsens will consult nephrology    Chronic obstructive uropathy/metastatic melanoma  Monitor for urinary retention, hydronephrosis.  Recent evaluation of stents demonstrated good position on imaging without need for stent exchange or manipulation per urology.  He is followed at [facility]   on  Braftovi and Mektovi     PE/DVT- continue on Eliquis  Chronic [id number] with oncology.  No overt bleeding.      I discussed the patient's findings and my recommendations  with patient, family, and nursing staff.    VTE Prophylaxis - Eliquis (home med).  Code Status - Full code.       TANYA Latif  [facility]  12/26/23  12:22 EST

## 2023-12-26 NOTE — CONSULTS
Subjective     REASON FOR CONSULTATION:  Provide an opinion on any further workup or treatment on:    Metastatic melanoma                       REQUESTING PHYSICIAN: Miguel Bennett DO    HISTORY OF PRESENT ILLNESS:      [place] is a 63 y.o. patient who was admitted on 12/25/2023.  He has metastatic melanoma and is currently followed at the [facility].  He was recently discharged on 12/22/2023 after being admitted for enterococcal septicemia.  He was discharged home on linezolid.  However, he developed fever or chills nausea and vomiting.  He presented to the ER and respiratory viral panel revealed coronavirus 229E.  He was subsequently admitted.    Patient was initially started on immunotherapy with Yervoy and Opdivo.  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  Immunotherapy was discontinued.  Treatment was switched to Braftovi and Mektovi.  Patient had subjective and objective evidence of response to treatment.     Past Medical History:   Diagnosis Date    Abdominal lymphadenopathy 05/29/2023    Acute pulmonary embolism, unspecified pulmonary embolism type, unspecified whether acute cor pulmonale present 05/28/2023    ADMITTED TO [facility], RIGHT    LENIN (acute kidney injury)     Anemia 05/29/2023    Colon polyps     FOLLOWED BY DR. SHANNA MOTTA    Constipation     Drug rash 06/19/2023    DVT (deep venous thrombosis)     LEFT LEG    Hepatic lesion 05/29/2023    History of shortness of breath     FOUND BLOOD CLOT ATTACHED TO LUNG    Left leg swelling     FROM BLOOD CLOT 5/2023    Left lower quadrant abdominal mass 05/28/2023    LEFT INGUINAL BX SHOWED METASTATIC MELANOMA    Melanoma     LEFT SIDE GROIN AREA, STAGE 4    Metastasis to brain 06/02/2023    Metastatic melanoma 05/31/2023    Moderate Malnutrition (HCC) 05/30/2023    Personal history of venous thrombosis and embolism 06/19/2023       Past Surgical History:   Procedure Laterality Date    COLONOSCOPY N/A 06/02/2023     40 MM TUBULOVILLOUS ADENOMA POLYP IN DISTAL RECTUM, RESCOPE IN 1 YR, DR. SHANNA MOTTA AT [facility]    COLONOSCOPY W/ POLYPECTOMY N/A 06/02/2023    40 MM POLYP IN DISTAL RECTUM, RESECTION AND RETREIVAL COMPLETE, RESCOPE IN 1 YR, DR. SHANNA MOTTA AT [facility]    CYSTOSCOPY W/ URETERAL STENT PLACEMENT Bilateral 07/10/2023    Procedure: CYSTOSCOPY URETERAL CATHETER/STENT INSERTION;  Surgeon: Vic Montelongo Jr., MD;  Location: [facility] OR;  Service: Urology;  Laterality: Bilateral;    CYSTOSCOPY W/ URETERAL STENT PLACEMENT N/A 10/19/2023    Procedure: CYSTOSCOPY WITH BILATERAL STENT EXCHANGE;  Surgeon: Vic Montelongo Jr., MD;  Location: [facility] OR;  Service: Urology;  Laterality: N/A;    PORTACATH PLACEMENT Right 06/02/2023    DONE AT [facility]    US GUIDED LYMPH NODE BIOPSY  05/30/2023    METASTATIC MELANOMA, DONE AT [facility]    VENOUS ACCESS DEVICE (PORT) INSERTION N/A 06/02/2023    Procedure: INFUSAPORT PLACEMENT;  Surgeon: Vicki Layne MD;  Location: [facility] OR;  Service: General;  Laterality: N/A;    VENOUS ACCESS DEVICE (PORT) REMOVAL N/A 12/22/2023    Procedure: REMOVAL VENOUS ACCESS DEVICE;  Surgeon: Jose A Allen Jr., MD;  Location: [facility];  Service: General;  Laterality: N/A;     SCHEDULED MEDS:  ampicillin, 2 g, Intravenous, Q4H  apixaban, 5 mg, Oral, Q12H  famotidine, 20 mg, Oral, BID AC  Scopolamine, 1 patch, Transdermal, Q72H  senna-docusate sodium, 2 tablet, Oral, BID  sodium bicarbonate, 650 mg, Oral, TID  sodium chloride, 10 mL, Intravenous, Q12H      INFUSIONS:  sodium chloride, 100 mL/hr, Last Rate: 100 mL/hr (12/26/23 0530)      ALLERGIES:  No Known Allergies     Social History     Socioeconomic History    Marital status: Unknown   Tobacco Use    Smoking status: Never     Passive exposure: Never    Smokeless tobacco: Never   Vaping Use    Vaping Use: Unknown   Substance and Sexual Activity    Alcohol use: Never    Drug use: Never    Sexual activity: Defer     Family History   Problem  Relation Age of Onset    Breast cancer Mother     Prostate cancer Father     Malig Hyperthermia Neg Hx       REVIEW OF SYSTEMS:   GENERAL: Fever and chills.   SKIN: Negative.  HEME/LYMPH: Enlarged left inguinal lymph node secondary to metastatic cancer, improved.  RESPIRATORY:  Negative.   CVS: Left lower extremity swelling secondary to DVT, improving.   GI: Nausea and vomiting.   :  Negative.   MUSCULOSKELETAL:  Negative.  NEUROLOGICAL:  Negative.    Objective   VITAL SIGNS:  Temp:  [97.9 °F (36.6 °C)-102.9 °F (39.4 °C)] 101.5 °F (38.6 °C)  Heart Rate:  [] 97  Resp:  [18-20] 20  BP: ()/(58-87) 140/70     Wt Readings from Last 3 Encounters:   12/26/23 107 kg (235 lb)   12/22/23 107 kg (235 lb 9.6 oz)   10/12/23 102 kg (223 lb 12.8 oz)       PHYSICAL EXAMINATION:   GENERAL:  The patient appears in fair general condition, not in acute distress.  SKIN: No ecchymosis.  Hyperpigmented skin lesions over the left inguinal area.  HEAD:  Normocephalic.  EYES:  No Jaundice. Pallor.   NECK:  Supple. No Masses.  LYMPHATICS: Multiple enlarged lymph nodes in the left inguinal area.  CHEST: Normal respiratory effort. Lungs clear to auscultation.   CARDIAC:  Normal S1 & S2. No murmur.   ABDOMEN:  Soft. No tenderness. No Hepatomegaly. No Splenomegaly. No masses.  EXTREMITIES: Left leg is larger than the right.  No calf tenderness.  NEUROLOGICAL:  No Focal neurological deficits.     RESULT REVIEW:   Results from last 7 days   Lab Units 12/26/23  0541 12/25/23  2111 12/22/23  0718 12/21/23  0536 12/20/23  0717   WBC 10*3/mm3 10.91* 13.56* 8.28 7.98 11.47*   NEUTROS ABS 10*3/mm3 8.47* 10.36* 6.35 6.02 9.86*   HEMOGLOBIN g/dL 7.9* 8.6* 8.7* 8.9* 9.4*   HEMATOCRIT % 24.7* 26.8* 26.9* 27.3* 27.9*   PLATELETS 10*3/mm3 340 395 198 170 154     Results from last 7 days   Lab Units 12/26/23  0541 12/25/23  2111 12/22/23  0718 12/21/23 2009 12/21/23  0536 12/20/23  0717   SODIUM mmol/L 133* 130* 135*  --  132* 132*   POTASSIUM  mmol/L 3.4* 3.6 3.5   < > 3.6 3.8   CHLORIDE mmol/L 101 97* 104  --  105 105   CO2 mmol/L 20.1* 19.0* 18.3*  --  17.5* 14.7*   BUN mg/dL 16 17 17  --  29* 36*   CREATININE mg/dL 1.54* 1.69* 1.39*  --  1.39* 1.80*   CALCIUM mg/dL 7.9* 8.1* 8.5*  --  8.4* 8.3*   ALBUMIN g/dL  --  3.4* 3.1*  --  2.9*  --    BILIRUBIN mg/dL  --  0.3 0.3  --   --   --    ALK PHOS U/L  --  94 54  --   --   --    ALT (SGPT) U/L  --  15 17  --   --   --    AST (SGOT) U/L  --  17 25  --   --   --    MAGNESIUM mg/dL  --   --  2.0  --  2.3  --     < > = values in this interval not displayed.     Results from last 7 days   Lab Units 12/25/23  2111   CRP mg/dL 19.63*     Chest x-ray on 12/25/2023:  There is cardiomegaly. There is no vascular congestion. There is some  chronic scarring at the left lung base. No pneumothorax, pleural  effusion, or acute infiltrate is seen. Right internal jugular vein  Mediport has been removed.     IMPRESSION:  No acute findings.    Assessment & Plan   *Metastatic melanoma, BRAF mutated.  Patient currently follows with the [facility].  Patient presented with brain metastasis.  He was initially treated with Yervoy and Opdivo on 6/14/2023.  He developed generalized skin rash-Duarte-Adam syndrome.  He also had worsening renal function.  Yervoy was discontinued.  He received 1 dose of Opdivo on 7/6/2023.  Treatment was switched to Braftovi and Mektovi.  He had subjective and objective evidence of response.     *Incidental bilateral pulmonary embolism and left lower extremity deep vein thrombosis.  The left lower extremity DVT was due to compression by the bulky lymphadenopathy.  Patient is on Eliquis 5 mg twice daily.     *Coronavirus 229E infection.  Chest x-ray revealed no evidence of pneumonia.  There is chronic scarring at the left lung base.     *Anemia, multifactorial.  12/22/2023: Hemoglobin 8.7.  12/26/2023: Hemoglobin decreased to 7.9.  The anemia is attributed to infection and possible effects  from his treatment.    PLAN:    1.  I recommended keeping Braftovi and Mektovi on hold for now.  I recommended tentatively starting back on 12/28/2023 to allow the immune system to fight infection.  2.  Obtain anemia workup.  3.  Continue Eliquis 5 mg twice daily.        Kiera Alfonso MD  12/26/23

## 2023-12-27 LAB
ANION GAP SERPL CALCULATED.3IONS-SCNC: 12 MMOL/L (ref 5–15)
BASOPHILS # BLD AUTO: 0.04 10*3/MM3 (ref 0–0.2)
BASOPHILS NFR BLD AUTO: 0.4 % (ref 0–1.5)
BUN SERPL-MCNC: 8 MG/DL (ref 8–23)
BUN/CREAT SERPL: 5.5 (ref 7–25)
CALCIUM SPEC-SCNC: 8.1 MG/DL (ref 8.6–10.5)
CHLORIDE SERPL-SCNC: 101 MMOL/L (ref 98–107)
CO2 SERPL-SCNC: 19 MMOL/L (ref 22–29)
CREAT SERPL-MCNC: 1.45 MG/DL (ref 0.76–1.27)
DEPRECATED RDW RBC AUTO: 47.2 FL (ref 37–54)
EGFRCR SERPLBLD CKD-EPI 2021: 54.8 ML/MIN/1.73
EOSINOPHIL # BLD AUTO: 0.14 10*3/MM3 (ref 0–0.4)
EOSINOPHIL NFR BLD AUTO: 1.5 % (ref 0.3–6.2)
ERYTHROCYTE [DISTWIDTH] IN BLOOD BY AUTOMATED COUNT: 15.1 % (ref 12.3–15.4)
GLUCOSE SERPL-MCNC: 109 MG/DL (ref 65–99)
HCT VFR BLD AUTO: 26.6 % (ref 37.5–51)
HGB BLD-MCNC: 8.8 G/DL (ref 13–17.7)
IMM GRANULOCYTES # BLD AUTO: 0.1 10*3/MM3 (ref 0–0.05)
IMM GRANULOCYTES NFR BLD AUTO: 1.1 % (ref 0–0.5)
LYMPHOCYTES # BLD AUTO: 1 10*3/MM3 (ref 0.7–3.1)
LYMPHOCYTES NFR BLD AUTO: 10.7 % (ref 19.6–45.3)
MCH RBC QN AUTO: 28.3 PG (ref 26.6–33)
MCHC RBC AUTO-ENTMCNC: 33.1 G/DL (ref 31.5–35.7)
MCV RBC AUTO: 85.5 FL (ref 79–97)
MONOCYTES # BLD AUTO: 0.59 10*3/MM3 (ref 0.1–0.9)
MONOCYTES NFR BLD AUTO: 6.3 % (ref 5–12)
NEUTROPHILS NFR BLD AUTO: 7.5 10*3/MM3 (ref 1.7–7)
NEUTROPHILS NFR BLD AUTO: 80 % (ref 42.7–76)
NRBC BLD AUTO-RTO: 0 /100 WBC (ref 0–0.2)
PLATELET # BLD AUTO: 372 10*3/MM3 (ref 140–450)
PMV BLD AUTO: 9.1 FL (ref 6–12)
POTASSIUM SERPL-SCNC: 3.8 MMOL/L (ref 3.5–5.2)
RBC # BLD AUTO: 3.11 10*6/MM3 (ref 4.14–5.8)
SODIUM SERPL-SCNC: 132 MMOL/L (ref 136–145)
WBC NRBC COR # BLD AUTO: 9.37 10*3/MM3 (ref 3.4–10.8)

## 2023-12-27 PROCEDURE — 99233 SBSQ HOSP IP/OBS HIGH 50: CPT | Performed by: INTERNAL MEDICINE

## 2023-12-27 PROCEDURE — 80048 BASIC METABOLIC PNL TOTAL CA: CPT | Performed by: HOSPITALIST

## 2023-12-27 PROCEDURE — 25810000003 SODIUM CHLORIDE 0.9 % SOLUTION: Performed by: NURSE PRACTITIONER

## 2023-12-27 PROCEDURE — 25810000003 SODIUM CHLORIDE 0.9 % SOLUTION: Performed by: HOSPITALIST

## 2023-12-27 PROCEDURE — 85025 COMPLETE CBC W/AUTO DIFF WBC: CPT | Performed by: INTERNAL MEDICINE

## 2023-12-27 PROCEDURE — 99232 SBSQ HOSP IP/OBS MODERATE 35: CPT | Performed by: INTERNAL MEDICINE

## 2023-12-27 PROCEDURE — 25010000002 AMPICILLIN PER 500 MG: Performed by: INTERNAL MEDICINE

## 2023-12-27 RX ADMIN — SODIUM BICARBONATE 650 MG: 650 TABLET ORAL at 16:03

## 2023-12-27 RX ADMIN — AMPICILLIN SODIUM 2 G: 2 INJECTION, POWDER, FOR SOLUTION INTRAMUSCULAR; INTRAVENOUS at 19:49

## 2023-12-27 RX ADMIN — SODIUM BICARBONATE 650 MG: 650 TABLET ORAL at 10:15

## 2023-12-27 RX ADMIN — FAMOTIDINE 20 MG: 20 TABLET, FILM COATED ORAL at 05:56

## 2023-12-27 RX ADMIN — AMPICILLIN SODIUM 2 G: 2 INJECTION, POWDER, FOR SOLUTION INTRAMUSCULAR; INTRAVENOUS at 01:11

## 2023-12-27 RX ADMIN — APIXABAN 5 MG: 5 TABLET, FILM COATED ORAL at 10:15

## 2023-12-27 RX ADMIN — AMPICILLIN SODIUM 2 G: 2 INJECTION, POWDER, FOR SOLUTION INTRAMUSCULAR; INTRAVENOUS at 16:02

## 2023-12-27 RX ADMIN — AMPICILLIN SODIUM 2 G: 2 INJECTION, POWDER, FOR SOLUTION INTRAMUSCULAR; INTRAVENOUS at 05:56

## 2023-12-27 RX ADMIN — ACETAMINOPHEN 650 MG: 325 TABLET, FILM COATED ORAL at 13:31

## 2023-12-27 RX ADMIN — SODIUM CHLORIDE 125 ML/HR: 9 INJECTION, SOLUTION INTRAVENOUS at 17:03

## 2023-12-27 RX ADMIN — AMPICILLIN SODIUM 2 G: 2 INJECTION, POWDER, FOR SOLUTION INTRAMUSCULAR; INTRAVENOUS at 11:47

## 2023-12-27 RX ADMIN — Medication 10 ML: at 20:25

## 2023-12-27 RX ADMIN — Medication 10 ML: at 10:13

## 2023-12-27 RX ADMIN — ACETAMINOPHEN 650 MG: 325 TABLET, FILM COATED ORAL at 05:56

## 2023-12-27 RX ADMIN — APIXABAN 5 MG: 5 TABLET, FILM COATED ORAL at 20:25

## 2023-12-27 RX ADMIN — SODIUM CHLORIDE 100 ML/HR: 9 INJECTION, SOLUTION INTRAVENOUS at 05:56

## 2023-12-27 RX ADMIN — SODIUM BICARBONATE 650 MG: 650 TABLET ORAL at 20:24

## 2023-12-27 RX ADMIN — FAMOTIDINE 20 MG: 20 TABLET, FILM COATED ORAL at 17:04

## 2023-12-27 RX ADMIN — ACETAMINOPHEN 650 MG: 325 TABLET, FILM COATED ORAL at 20:25

## 2023-12-27 NOTE — PAYOR COMM NOTE
"Min Georgeley W \"Min\" (63 y.o. Male)        PLEASE SEE ATTACHED FOR INPT AUTH.  PT CAME IN ON 12/25/23 AS OBS AND CONVERTED TO INPT ON 12/27/23.    DX: A41.9    B34.2   B95.2    R78.81       [facility] -- NPI [id number]  TAX ID [id number]  [address] [place], [place],  [address]    PRICE LOREECHRISTOFER- NPI  [id number]   TAX ID [id number]   [address] [facility], [address], [facility] [address]    PLEASE CALL   OR  [phone] WITH INPT AUTH.      THANK YOU    RATNA TORRES LPN CCP     Date of Birth   1960    Social Security Number       Address   [address] [place] [address]    Home Phone   [phone]    MRN   [id number]       Gnosticism   Unknown    Marital Status   Unknown                            Admission Date   12/25/23  OBS      12/27/23- INPT  Admission Type   Emergency    Admitting Provider   Price Banuelos MD    Attending Provider   Price Banuelos MD    Department, Room/Bed   [address], [address]/1       Discharge Date       Discharge Disposition       Discharge Destination                                 Attending Provider: Price Banuelos MD    Allergies: No Known Allergies    Isolation: None   Infection: None   Code Status: CPR    Ht: 180.3 cm (71\")   Wt: 107 kg (235 lb)    Admission Cmt: None   Principal Problem: Coronavirus infection [B34.2]                   Active Insurance as of 12/25/2023       Primary Coverage       Payor Plan Insurance Group Employer/Plan Group    [facility] [id number]       Payor Plan Address Payor Plan Phone Number Payor Plan Fax Number Effective Dates    PO    3/1/2021 - None Entered    [place] [employer] [id number]         Subscriber Name Subscriber Birth Date Member ID       MARILEE GEORGE 1960 [id number]                     Emergency Contacts        (Rel.) Home Phone Work Phone Mobile Phone    daquan de leon (Friend) -- -- [phone]    Komal George (Mother) -- " -- [phone]              [place]: NPI [id number]  Tax ID [id number]     History & Physical        KaroKevinTANYA Subramanian at 12/26/23 0909       Attestation signed by Price Banuelos MD at 12/26/23 1842    I have reviewed the history and plan as obtained by the APRN. I have independently examined the patient and I personally performed >50% of the management in this split shared patient. My exam confirms her physical findings and I agree with the plan as listed with exceptions listed below.      62 yo hx metastic melanoma p/w fevers, found to have non covid 19 coronavirus    Gen: aa  Heart rr no m  Lungs clear b  Abd soft nt nd  Ext no c/c/e    Viral sepsis  -d/w Dr Olvera, hold on melanoma treatment                    Patient Name:  King George  YOB: 1960  MRN:  [id number]  Admit Date:  12/25/2023  Patient Care Team:  System, Provider Not In as PCP - General  Bernarod Azar MD as Referring Physician (Hospitalist)  Brodie Rocha MD as Consulting Physician (Hematology and Oncology)  Amy Berry MD as Consulting Physician (Radiation Oncology)  Perfecto Daly MD as Consulting Physician (Oncology)      Subjective  History Present Illness     Chief Complaint   Patient presents with    Dysuria    Fever       Mr. George is a 63 y.o. a history of stage 4 static melanoma, atrial obstruction with 2 ureteral stents in October , DVT/PE that presents with fever. Patient was admitted to this facility and discharged on 12/22, following sepsis with enterococcal bacteremia, LENIN and UTI treated with ampicillin and discharged home on Zyvox.  He is on anticoagulation with Eliquis.  He had a Mediport that was removed by Dr. Allen on 12/22 as a possible source of his sepsis.  He is followed by Dr. Mccormick with ID.  Since discharge he has had nausea, diarrhea, dry cough, malaise, and fever Tmax of 103 at home.  No obvious alleviating or exacerbating features to his symptoms.  He did  test positive corona virus 229E.        History of Present Illness    Review of Systems   Constitutional:  Positive for activity change, appetite change, chills, fatigue and fever.   HENT:  Negative for trouble swallowing.    Respiratory:  Negative for choking.    Cardiovascular:  Positive for leg swelling. Negative for chest pain.   Gastrointestinal:  Positive for diarrhea and nausea. Negative for abdominal distention, abdominal pain, blood in stool, constipation and vomiting.   Genitourinary:  Negative for difficulty urinating and dysuria.   Musculoskeletal:  Negative for back pain.   Skin:  Negative for pallor.   Neurological:  Negative for dizziness and light-headedness.   Hematological:  Does not bruise/bleed easily.   Psychiatric/Behavioral:  Negative for confusion.         Personal History     Past Medical History:   Diagnosis Date    Abdominal lymphadenopathy 05/29/2023    Acute pulmonary embolism, unspecified pulmonary embolism type, unspecified whether acute cor pulmonale present 05/28/2023    ADMITTED TO [facility], RIGHT    LENIN (acute kidney injury)     Anemia 05/29/2023    Colon polyps     FOLLOWED BY DR. SHANNA MOTTA    Constipation     Drug rash 06/19/2023    DVT (deep venous thrombosis)     LEFT LEG    Hepatic lesion 05/29/2023    History of shortness of breath     FOUND BLOOD CLOT ATTACHED TO LUNG    Left leg swelling     FROM BLOOD CLOT 5/2023    Left lower quadrant abdominal mass 05/28/2023    LEFT INGUINAL BX SHOWED METASTATIC MELANOMA    Melanoma     LEFT SIDE GROIN AREA, STAGE 4    Metastasis to brain 06/02/2023    Metastatic melanoma 05/31/2023    Moderate Malnutrition (HCC) 05/30/2023    Personal history of venous thrombosis and embolism 06/19/2023     Past Surgical History:   Procedure Laterality Date    COLONOSCOPY N/A 06/02/2023    40 MM TUBULOVILLOUS ADENOMA POLYP IN DISTAL RECTUM, RESCOPE IN 1 YR, DR. SHANNA MOTTA AT [facility]    COLONOSCOPY W/ POLYPECTOMY N/A 06/02/2023    40 MM POLYP IN DISTAL  RECTUM, RESECTION AND RETREIVAL COMPLETE, RESCOPE IN 1 YR, DR. SHANNA MOTTA AT [facility]    CYSTOSCOPY W/ URETERAL STENT PLACEMENT Bilateral 07/10/2023    Procedure: CYSTOSCOPY URETERAL CATHETER/STENT INSERTION;  Surgeon: Vic Montelongo Jr., MD;  Location: [facility];  Service: Urology;  Laterality: Bilateral;    CYSTOSCOPY W/ URETERAL STENT PLACEMENT N/A 10/19/2023    Procedure: CYSTOSCOPY WITH BILATERAL STENT EXCHANGE;  Surgeon: Vic Montelongo Jr., MD;  Location: [facility] OR;  Service: Urology;  Laterality: N/A;    PORTACATH PLACEMENT Right 06/02/2023    DONE AT [facility]    US GUIDED LYMPH NODE BIOPSY  05/30/2023    METASTATIC MELANOMA, DONE AT [facility]    VENOUS ACCESS DEVICE (PORT) INSERTION N/A 06/02/2023    Procedure: INFUSAPORT PLACEMENT;  Surgeon: Vicki Layne MD;  Location: [facility];  Service: General;  Laterality: N/A;    VENOUS ACCESS DEVICE (PORT) REMOVAL N/A 12/22/2023    Procedure: REMOVAL VENOUS ACCESS DEVICE;  Surgeon: Jose A Allen Jr., MD;  Location: [facility];  Service: General;  Laterality: N/A;     Family History   Problem Relation Age of Onset    Breast cancer Mother     Prostate cancer Father     Malig Hyperthermia Neg Hx      Social History     Tobacco Use    Smoking status: Never     Passive exposure: Never    Smokeless tobacco: Never   Vaping Use    Vaping Use: Unknown   Substance Use Topics    Alcohol use: Never    Drug use: Never     No current facility-administered medications on file prior to encounter.     Current Outpatient Medications on File Prior to Encounter   Medication Sig Dispense Refill    Braftovi 75 MG capsule Take 6 capsules by mouth Every Night.      dexAMETHasone (DECADRON) 0.5 MG tablet Take 2 tablets by mouth Daily With Breakfast.      dexAMETHasone (DECADRON) 0.5 MG tablet Take 1 tablet by mouth Every Night.      Eliquis 5 MG tablet tablet TAKE 1 TABLET BY MOUTH EVERY 12 (TWELVE) HOURS. INDICATIONS: DVT/PE (ACTIVE THROMBOSIS) (Patient taking  differently: Take 1 tablet by mouth Every 12 (Twelve) Hours. FOLLOW MD INSTRUCTIONS FOR STOPPING FOR SURGERY) 60 tablet 0    famotidine (PEPCID) 20 MG tablet Take 1 tablet by mouth 2 (Two) Times a Day Before Meals. 60 tablet 0    linezolid (Zyvox) 600 MG tablet Take 1 tablet by mouth 2 (Two) Times a Day for 7 days. 14 tablet 0    Mektovi 15 MG tablet Take 15 mg by mouth 2 (Two) Times a Day. TAKES 3 PILLS IN AM AND 3 PILLS IN PM      ondansetron (ZOFRAN) 8 MG tablet Take 1 tablet by mouth Every 8 (Eight) Hours As Needed for Nausea or Vomiting. Pt not taking      prochlorperazine (COMPAZINE) 10 MG tablet Take 1 tablet by mouth Every 6 (Six) Hours As Needed.      sodium bicarbonate 650 MG tablet Take 1 tablet by mouth 3 (Three) Times a Day. 90 tablet 0     No Known Allergies    Objective   Objective     Vital Signs  Temp:  [97.9 °F (36.6 °C)-101.1 °F (38.4 °C)] 99.5 °F (37.5 °C)  Heart Rate:  [] 83  Resp:  [18-20] 18  BP: ()/(58-87) 154/79  SpO2:  [91 %-100 %] 100 %  on   ;   Device (Oxygen Therapy): room air  Body mass index is 32.78 kg/m².    Physical Exam  Vitals and nursing note reviewed.   Constitutional:       General: He is not in acute distress.     Appearance: He is well-developed. He is ill-appearing, toxic-appearing and diaphoretic.      Comments: Ill-appearing fever 102.8   HENT:      Head: Normocephalic and atraumatic.   Eyes:      Conjunctiva/sclera: Conjunctivae normal.   Neck:      Trachea: No tracheal deviation.   Cardiovascular:      Rate and Rhythm: Normal rate and regular rhythm.   Pulmonary:      Effort: Pulmonary effort is normal. No respiratory distress.      Breath sounds: Normal breath sounds.   Abdominal:      General: Bowel sounds are normal. There is no distension.      Palpations: Abdomen is soft. There is no mass.      Tenderness: There is no abdominal tenderness. There is no guarding or rebound.   Musculoskeletal:         General: Normal range of motion.      Cervical back:  Normal range of motion.      Right lower leg: Edema present.      Left lower leg: Edema present.   Skin:     General: Skin is warm and dry.   Neurological:      General: No focal deficit present.      Mental Status: He is alert and oriented to person, place, and time. Mental status is at baseline.   Psychiatric:         Mood and Affect: Mood normal.         Behavior: Behavior normal.         Thought Content: Thought content normal.         Results Review:  I reviewed the patient's new clinical results.  I reviewed the patient's new imaging results and agree with the interpretation.  I reviewed the patient's other test results and agree with the interpretation  I personally viewed and interpreted the patient's EKG/Telemetry data  Discussed with ED provider.    Lab Results (last 24 hours)       Procedure Component Value Units Date/Time    CBC & Differential [[id number]]  (Abnormal) Collected: 12/25/23 2111    Specimen: Blood from Arm, Left Updated: 12/25/23 2126    Narrative:      The following orders were created for panel order CBC & Differential.  Procedure                               Abnormality         Status                     ---------                               -----------         ------                     CBC Auto Differential[[id number]]        Abnormal            Final result                 Please view results for these tests on the individual orders.    Comprehensive Metabolic Panel [[id number]]  (Abnormal) Collected: 12/25/23 2111    Specimen: Blood from Arm, Left Updated: 12/25/23 2146     Glucose 141 mg/dL      BUN 17 mg/dL      Creatinine 1.69 mg/dL      Sodium 130 mmol/L      Potassium 3.6 mmol/L      Chloride 97 mmol/L      CO2 19.0 mmol/L      Calcium 8.1 mg/dL      Total Protein 7.4 g/dL      Albumin 3.4 g/dL      ALT (SGPT) 15 U/L      AST (SGOT) 17 U/L      Alkaline Phosphatase 94 U/L      Total Bilirubin 0.3 mg/dL      Globulin 4.0 gm/dL      A/G Ratio 0.9 g/dL      BUN/Creatinine Ratio  10.1     Anion Gap 14.0 mmol/L      eGFR 45.6 mL/min/1.73     Narrative:      GFR Normal >60  Chronic Kidney Disease <60  Kidney Failure <15      Lipase [[id number]]  (Abnormal) Collected: 12/25/23 2111    Specimen: Blood from Arm, Left Updated: 12/25/23 2145     Lipase 66 U/L     Lactic Acid, Plasma [[id number]]  (Normal) Collected: 12/25/23 2111    Specimen: Blood from Arm, Left Updated: 12/25/23 2139     Lactate 1.5 mmol/L     CBC Auto Differential [[id number]]  (Abnormal) Collected: 12/25/23 2111    Specimen: Blood from Arm, Left Updated: 12/25/23 2126     WBC 13.56 10*3/mm3      RBC 3.21 10*6/mm3      Hemoglobin 8.6 g/dL      Hematocrit 26.8 %      MCV 83.5 fL      MCH 26.8 pg      MCHC 32.1 g/dL      RDW 14.9 %      RDW-SD 45.2 fl      MPV 9.0 fL      Platelets 395 10*3/mm3      Neutrophil % 76.5 %      Lymphocyte % 12.5 %      Monocyte % 8.8 %      Eosinophil % 1.1 %      Basophil % 0.3 %      Immature Grans % 0.8 %      Neutrophils, Absolute 10.36 10*3/mm3      Lymphocytes, Absolute 1.70 10*3/mm3      Monocytes, Absolute 1.20 10*3/mm3      Eosinophils, Absolute 0.15 10*3/mm3      Basophils, Absolute 0.04 10*3/mm3      Immature Grans, Absolute 0.11 10*3/mm3      nRBC 0.0 /100 WBC     Blood Culture - Blood, Arm, Left [[id number]] Collected: 12/25/23 2111    Specimen: Blood from Arm, Left Updated: 12/25/23 2118    Sedimentation Rate [[id number]]  (Abnormal) Collected: 12/25/23 2111    Specimen: Blood from Arm, Left Updated: 12/25/23 2225     Sed Rate 49 mm/hr     C-reactive Protein [[id number]]  (Abnormal) Collected: 12/25/23 2111    Specimen: Blood from Arm, Left Updated: 12/25/23 2241     C-Reactive Protein 19.63 mg/dL     Urinalysis With Microscopic If Indicated (No Culture) - Urine, Clean Catch [[id number]]  (Abnormal) Collected: 12/25/23 2116    Specimen: Urine, Clean Catch Updated: 12/25/23 2146     Color, UA Dark Yellow     Appearance, UA Cloudy     pH, UA 6.5     Specific Gravity, UA 1.017     Glucose, UA  Negative     Ketones, UA Negative     Bilirubin, UA Negative     Blood, UA Large (3+)     Protein,  mg/dL (2+)     Leuk Esterase, UA Trace     Nitrite, UA Negative     Urobilinogen, UA 1.0 E.U./dL    Urinalysis, Microscopic Only - Urine, Clean Catch [[id number]]  (Abnormal) Collected: 12/25/23 2116    Specimen: Urine, Clean Catch Updated: 12/25/23 2203     RBC, UA 21-50 /HPF      WBC, UA 3-5 /HPF      Bacteria, UA Trace /HPF      Squamous Epithelial Cells, UA 0-2 /HPF      Renal Epithelial Cells, UA 0-2 /HPF      Hyaline Casts, UA None Seen /LPF      Methodology Manual Light Microscopy    Blood Culture - Blood, Arm, Right [[id number]] Collected: 12/25/23 2210    Specimen: Blood from Arm, Right Updated: 12/25/23 2217    Respiratory Panel PCR w/COVID-19(SARS-CoV-2) KELLE/MAYDA/SULLY/PAD/COR/DOUG In-House, NP Swab in UTM/VTM, 2 HR TAT - Swab, Nasopharynx [[id number]]  (Abnormal) Collected: 12/25/23 2304    Specimen: Swab from Nasopharynx Updated: 12/26/23 0000     ADENOVIRUS, PCR Not Detected     Coronavirus 229E Detected     Coronavirus HKU1 Not Detected     Coronavirus NL63 Not Detected     Coronavirus OC43 Not Detected     COVID19 Not Detected     Human Metapneumovirus Not Detected     Human Rhinovirus/Enterovirus Not Detected     Influenza A PCR Not Detected     Influenza B PCR Not Detected     Parainfluenza Virus 1 Not Detected     Parainfluenza Virus 2 Not Detected     Parainfluenza Virus 3 Not Detected     Parainfluenza Virus 4 Not Detected     RSV, PCR Not Detected     Bordetella pertussis pcr Not Detected     Bordetella parapertussis PCR Not Detected     Chlamydophila pneumoniae PCR Not Detected     Mycoplasma pneumo by PCR Not Detected    Narrative:      In the setting of a positive respiratory panel with a viral infection PLUS a negative procalcitonin without other underlying concern for bacterial infection, consider observing off antibiotics or discontinuation of antibiotics and continue supportive care. If the  respiratory panel is positive for atypical bacterial infection (Bordetella pertussis, Chlamydophila pneumoniae, or Mycoplasma pneumoniae), consider antibiotic de-escalation to target atypical bacterial infection.    Basic Metabolic Panel [[id number]]  (Abnormal) Collected: 12/26/23 0541    Specimen: Blood Updated: 12/26/23 0647     Glucose 124 mg/dL      BUN 16 mg/dL      Creatinine 1.54 mg/dL      Sodium 133 mmol/L      Potassium 3.4 mmol/L      Chloride 101 mmol/L      CO2 20.1 mmol/L      Calcium 7.9 mg/dL      BUN/Creatinine Ratio 10.4     Anion Gap 11.9 mmol/L      eGFR 51.0 mL/min/1.73     Narrative:      GFR Normal >60  Chronic Kidney Disease <60  Kidney Failure <15      CBC Auto Differential [[id number]]  (Abnormal) Collected: 12/26/23 0541    Specimen: Blood Updated: 12/26/23 0632     WBC 10.91 10*3/mm3      RBC 2.94 10*6/mm3      Hemoglobin 7.9 g/dL      Hematocrit 24.7 %      MCV 84.0 fL      MCH 26.9 pg      MCHC 32.0 g/dL      RDW 14.9 %      RDW-SD 45.2 fl      MPV 9.4 fL      Platelets 340 10*3/mm3      Neutrophil % 77.6 %      Lymphocyte % 10.9 %      Monocyte % 8.9 %      Eosinophil % 1.9 %      Basophil % 0.2 %      Immature Grans % 0.5 %      Neutrophils, Absolute 8.47 10*3/mm3      Lymphocytes, Absolute 1.19 10*3/mm3      Monocytes, Absolute 0.97 10*3/mm3      Eosinophils, Absolute 0.21 10*3/mm3      Basophils, Absolute 0.02 10*3/mm3      Immature Grans, Absolute 0.05 10*3/mm3      nRBC 0.0 /100 WBC             Imaging Results (Last 24 Hours)       Procedure Component Value Units Date/Time    XR Chest 1 View [[id number]] Collected: 12/25/23 2308     Updated: 12/25/23 2312    Narrative:      SINGLE VIEW OF THE CHEST     HISTORY: Fever     COMPARISON: 2/19/2023     FINDINGS:  There is cardiomegaly. There is no vascular congestion. There is some  chronic scarring at the left lung base. No pneumothorax, pleural  effusion, or acute infiltrate is seen. Right internal jugular vein  Mediport has been  removed.       Impression:      No acute findings.     This report was finalized on 12/25/2023 11:09 PM by Dr. Harika Seo M.D on Workstation: [id number]               Results for orders placed during the hospital encounter of 12/19/23    Adult Transthoracic Echo Complete W/ Cont if Necessary Per Protocol    Interpretation Summary    Left ventricular systolic function is normal. Calculated left ventricular EF = 58.3%    Left ventricular diastolic function was normal.    Mild dilation of the aortic root is present. The aortic root measures 3.9 cm.    There is a trivial pericardial effusion.    No definite valvular vegetations appreciated on transthoracic echocardiogram.      SCANNED - TELEMETRY     Final Result      SCANNED - TELEMETRY     Final Result           Assessment/Plan     Active Hospital Problems    Diagnosis  POA    **Coronavirus infection [B34.2]  Yes    Acute renal insufficiency [N28.9]  Yes    Fever [R50.9]  Yes    Bacteremia due to Enterococcus [R78.81, B95.2]  Yes    Chronic anticoagulation [Z79.01]  Not Applicable    Viral sepsis [A41.89, B97.89]  Yes    Hydroureteronephrosis [N13.30]  Yes    Metastatic melanoma [C43.9]  Yes    Anemia [D64.9]  Yes      Resolved Hospital Problems   No resolved problems to display.       Mr. George is a 63 y.o. admitted coronavirus/viral sepsis in the setting of recent enterococcal bacteremia    Viral sepsis, Coronavirus 229E  Febrile on my exam, nurse notified to treat with antipyretics. WBC normal. Elevated CRP and sed rate. Supportive care for viral illness.  Distant nausea, add scopolamine patch.  No PNA or other acute process on chest Xray.  Blood cx pending.  Continue home Braftovi and Mektovi if okay with ID    History of sespis with enteroccocal bacteremia   Port removed 12/22.  Discharged on 12/22 on oral Zyvox 600 mg x 7 days.  ER initiated back on ampicillin IV.  ID consulted.    LENIN  Likely due to viral illness, dehydration. nephrology, NAK,   followed last admission.creatinine baseline closer to 0.8 compared to 2.0 on 12/19 during admission with creatinine of 1.39 at time of discharge.  On admission it is 1.69 trending down to 1.54 with IVF. Continue IVF and monitor. Na improving with fluids but if worsens will consult nephrology    Chronic obstructive uropathy/metastatic melanoma  Monitor for urinary retention, hydronephrosis.  Recent evaluation of stents demonstrated good position on imaging without need for stent exchange or manipulation per urology.  He is [facility] at [facility]   on  Braftovi and Mektovi     PE/DVT- continue on Eliquis  Chronic rmiyib-Zilwlz-ca with oncology.  No overt bleeding.      I discussed the patient's findings and my recommendations with patient, family, and nursing staff.    VTE Prophylaxis - Eliquis (home med).  Code Status - Full code.       TANYA Latif  [facility]  12/26/23  12:22 EST    Electronically signed by Price Banuelos MD at 12/26/23 1842          Emergency Department Notes        Delon Sarabia RN at 12/26/23 0200          Nursing report ED to floor  King George  63 y.o.  male    HPI (triage note):   Chief Complaint   Patient presents with    Dysuria    Fever       Admitting doctor:   Miguel Bennett DO    Admitting diagnosis:   The primary encounter diagnosis was Acute renal insufficiency. Diagnoses of Immunocompromised patient, Fever and chills, Coronavirus infection, and History of sepsis-Enterococcus were also pertinent to this visit.    Code status:   Current Code Status       Date Active Code Status Order ID Comments User Context       Prior            Allergies:   Patient has no known allergies.    Past Medical History:  Past Medical History:   Diagnosis Date    Abdominal lymphadenopathy 05/29/2023    Acute pulmonary embolism, unspecified pulmonary embolism type, unspecified whether acute cor pulmonale present 05/28/2023    ADMITTED TO [facility], RIGHT    LENIN (acute  kidney injury)     Anemia 05/29/2023    Colon polyps     FOLLOWED BY DR. SHANNA MOTTA    Constipation     Drug rash 06/19/2023    DVT (deep venous thrombosis)     LEFT LEG    Hepatic lesion 05/29/2023    History of shortness of breath     FOUND BLOOD CLOT ATTACHED TO LUNG    Left leg swelling     FROM BLOOD CLOT 5/2023    Left lower quadrant abdominal mass 05/28/2023    LEFT INGUINAL BX SHOWED METASTATIC MELANOMA    Melanoma     LEFT SIDE GROIN AREA, STAGE 4    Metastasis to brain 06/02/2023    Metastatic melanoma 05/31/2023    Moderate Malnutrition (HCC) 05/30/2023    Personal history of venous thrombosis and embolism 06/19/2023        Weight:       12/25/23 2053   Weight: 107 kg (235 lb)       Most recent vitals:   Vitals:    12/25/23 2207 12/25/23 2301 12/26/23 0001 12/26/23 0101   BP:  109/69 119/69 139/87   BP Location:       Patient Position:       Pulse: 88 72 71 74   Resp:       Temp:       TempSrc:       SpO2: 98% 98% 99% 100%   Weight:       Height:           Active LDAs/IV Access:   Lines, Drains & Airways       Active LDAs       Name Placement date Placement time Site Days    Peripheral IV 12/25/23 2210 Right Antecubital 12/25/23 2210  Antecubital  less than 1                    Labs (abnormal labs have a star):   Labs Reviewed   RESPIRATORY PANEL PCR W/ COVID-19 (SARS-COV-2), NP SWAB IN UTM/VTP, 2 HR TAT - Abnormal; Notable for the following components:       Result Value    Coronavirus 229E Detected (*)     All other components within normal limits    Narrative:     In the setting of a positive respiratory panel with a viral infection PLUS a negative procalcitonin without other underlying concern for bacterial infection, consider observing off antibiotics or discontinuation of antibiotics and continue supportive care. If the respiratory panel is positive for atypical bacterial infection (Bordetella pertussis, Chlamydophila pneumoniae, or Mycoplasma pneumoniae), consider antibiotic de-escalation to  target atypical bacterial infection.   COMPREHENSIVE METABOLIC PANEL - Abnormal; Notable for the following components:    Glucose 141 (*)     Creatinine 1.69 (*)     Sodium 130 (*)     Chloride 97 (*)     CO2 19.0 (*)     Calcium 8.1 (*)     Albumin 3.4 (*)     eGFR 45.6 (*)     All other components within normal limits    Narrative:     GFR Normal >60  Chronic Kidney Disease <60  Kidney Failure <15     LIPASE - Abnormal; Notable for the following components:    Lipase 66 (*)     All other components within normal limits   URINALYSIS W/ MICROSCOPIC IF INDICATED (NO CULTURE) - Abnormal; Notable for the following components:    Color, UA Dark Yellow (*)     Appearance, UA Cloudy (*)     Blood, UA Large (3+) (*)     Protein,  mg/dL (2+) (*)     Leuk Esterase, UA Trace (*)     All other components within normal limits   CBC WITH AUTO DIFFERENTIAL - Abnormal; Notable for the following components:    WBC 13.56 (*)     RBC 3.21 (*)     Hemoglobin 8.6 (*)     Hematocrit 26.8 (*)     Neutrophil % 76.5 (*)     Lymphocyte % 12.5 (*)     Immature Grans % 0.8 (*)     Neutrophils, Absolute 10.36 (*)     Monocytes, Absolute 1.20 (*)     Immature Grans, Absolute 0.11 (*)     All other components within normal limits   URINALYSIS, MICROSCOPIC ONLY - Abnormal; Notable for the following components:    RBC, UA 21-50 (*)     WBC, UA 3-5 (*)     Bacteria, UA Trace (*)     All other components within normal limits   SEDIMENTATION RATE - Abnormal; Notable for the following components:    Sed Rate 49 (*)     All other components within normal limits   C-REACTIVE PROTEIN - Abnormal; Notable for the following components:    C-Reactive Protein 19.63 (*)     All other components within normal limits   LACTIC ACID, PLASMA - Normal   BLOOD CULTURE   BLOOD CULTURE   RAINBOW DRAW    Narrative:     The following orders were created for panel order [facility] Draw.  Procedure                               Abnormality         Status                      ---------                               -----------         ------                     Green Top (Gel)[[id number]]                                  Final result               Lavender Top[[id number]]                                     Final result               Gold Top - SST[[id number]]                                   Final result               Cardoza Top[[id number]]                                         Final result               Light Blue Top[[id number]]                                   Final result                 Please view results for these tests on the individual orders.   CBC AND DIFFERENTIAL    Narrative:     The following orders were created for panel order CBC & Differential.  Procedure                               Abnormality         Status                     ---------                               -----------         ------                     CBC Auto Differential[[id number]]        Abnormal            Final result                 Please view results for these tests on the individual orders.   GREEN TOP   LAVENDER TOP   GOLD TOP - SST   GRAY TOP   LIGHT BLUE TOP       EKG:   No orders to display       Meds given in ED:   Medications   sodium chloride 0.9 % flush 10 mL (has no administration in time range)   ampicillin 2000 mg IVPB in 100 ml NS (VTB) (0 mg Intravenous Stopped 12/25/23 2341)   sodium chloride 0.9 % bolus 1,000 mL (0 mL Intravenous Stopped 12/26/23 0014)   acetaminophen (TYLENOL) tablet 1,000 mg (1,000 mg Oral Given 12/25/23 2303)   sodium chloride 0.9 % bolus 1,000 mL (1,000 mL Intravenous New Bag 12/26/23 0014)       Imaging results:  XR Chest 1 View    Result Date: 12/25/2023  No acute findings.  This report was finalized on 12/25/2023 11:09 PM by Dr. Harika Seo M.D on Workstation: [id number]       Ambulatory status:   - ad antonio    Social issues:   Social History     Socioeconomic History    Marital status: Unknown   Tobacco Use    Smoking status: Never     Passive  exposure: Never    Smokeless tobacco: Never   Vaping Use    Vaping Use: Unknown   Substance and Sexual Activity    Alcohol use: Never    Drug use: Never    Sexual activity: Defer          NIH Stroke Scale:         Delno Sarabia RN  12/26/23 02:00 EST       Electronically signed by Delon Sarabia RN at 12/26/23 0200       Rowdy Helms MD at 12/25/23 2353          MD ATTESTATION NOTE    The NOEMÍ and I have discussed this patient's history, physical exam, and treatment plan.  I have reviewed the documentation and personally had a face to face interaction with the patient. I affirm the documentation and agree with the treatment and plan.  The attached note describes my personal findings.      I provided a substantive portion of the care of the patient.  I personally performed the physical exam in its entirety, and below are my findings.        Brief HPI: This patient is a 63-year-old male currently being treated for stage IV melanoma presenting to the emergency room today due to fever/chills.  He was just recently admitted to the hospital for Enterococcus septicemia and is currently on oral Zyvox treatment.  Currently, he denies cough/congestion, abdominal pain, chest pain, or shortness of breath.      PHYSICAL EXAM  ED Triage Vitals   Temp Heart Rate Resp BP SpO2   12/25/23 2053 12/25/23 2053 12/25/23 2053 12/25/23 2056 12/25/23 2053   (!) 101.1 °F (38.4 °C) 108 20 99/58 91 %      Temp src Heart Rate Source Patient Position BP Location FiO2 (%)   12/25/23 2053 -- 12/25/23 2056 12/25/23 2056 --   Tympanic  Lying Left arm          GENERAL: Resting comfortably and in no acute distress, nontoxic in appearance  HENT: nares patent  EYES: no scleral icterus  CV: regular rhythm, normal rate, no M/R/G  RESPIRATORY: normal effort, lungs clear bilaterally  ABDOMEN: soft, nontender, no rebound or guarding  MUSCULOSKELETAL: no deformity, no edema  NEURO: alert, moves all extremities, follows commands  PSYCH:  calm,  cooperative  SKIN: warm, dry    Vital signs and nursing notes reviewed.        Plan: We will obtain labs, urinalysis, as well as a chest x-ray in the ED.  We will begin the patient on IV fluids as well as IV ampicillin for antibiotic coverage.  Ultimately, he will be admitted to the hospital for further management and treatment.       Rowdy Helms MD  12/25/23 4305      Electronically signed by Rowdy Helms MD at 12/25/23 2313       Anselmo Flores III, PA at 12/25/23 5937       Attestation signed by Rowdy Helms MD at 12/26/23 1917    MD ATTESTATION NOTE    The NOEMÍ and I have discussed this patient's history, physical exam, and treatment plan.  I have reviewed the documentation and personally had a face to face interaction with the patient. I affirm the documentation and agree with the treatment and plan.  The separate note describes my personal findings.    I agree with the PA's or NP's findings and plan.  I reviewed the PA's or NP's note.  Rowdy Helms MD                 EMERGENCY DEPARTMENT ENCOUNTER    Room Number:  N435/1  PCP: System, Provider Not In      HPI:  Chief Complaint: Fever, recent history of sepsis  A complete HPI/ROS/PMH/PSH/SH/FH are unobtainable due to: Nothing  Context: [place] is a very pleasant 63 y.o. male with a significant past medical history of stage IV melanoma, PE, UTI, and recent history of enterococcal septicemia who presents to the ED c/o patient states he recently spent 5 days in the hospital due to Enterococcus septicemia and acute renal injury.  Patient states while in the hospital he was treated with IV ampicillin and IV fluids and responded well.  He was discharged on the 22 December 2023 on oral Zyvox.  Over the past day his fever has returned.  He denies cough, URI symptoms, dysuria, abdominal pain, chest pain, palpitations associated with the symptoms.  Patient is anticoagulated with Eliquis.      Reviewing the patient's chart,  while in the hospital there was concern that his Mediport could be the source of sepsis and this was removed by Dr. Jose A Allen on 12/22/2023.  Patient was followed by infectious disease/Dr. Dunn.  He was also seen by urology while in the hospital as his metastatic disease had caused ureteral obstruction and ureteral stents had to be placed bilateral on 10/23/2023 by Dr. Montelongo.  Patient's metastatic melanoma is currently being followed by the [facility].          PAST MEDICAL HISTORY  Active Ambulatory Problems     Diagnosis Date Noted    History of pulmonary embolism 05/28/2023    Anemia 05/29/2023    Left groin mass 05/29/2023    Hepatic lesion 05/29/2023    Abdominal lymphadenopathy 05/29/2023    Moderate malnutrition 05/30/2023    Left lower quadrant abdominal mass 05/28/2023    Metastatic melanoma 05/31/2023    Rectal polyp 06/02/2023    Metastasis to brain 06/02/2023    LENIN (acute kidney injury) 06/19/2023    Drug rash 06/19/2023    Personal history of venous thrombosis and embolism 06/19/2023    Adverse effect of antineoplastic drug 06/20/2023    Encounter for management of implanted device 07/06/2023    Inguinal pain, left 07/07/2023    Hydroureteronephrosis 07/07/2023    Acute pain of left lower extremity 07/10/2023    Acute pain of lower extremity, left 07/12/2023    Sepsis 12/19/2023    UTI (urinary tract infection), bacterial 12/19/2023    Bacteremia due to Enterococcus 12/21/2023    Chronic anticoagulation 12/21/2023    Enterococcal septicemia 12/22/2023     Resolved Ambulatory Problems     Diagnosis Date Noted    Odynophagia 06/20/2023     Past Medical History:   Diagnosis Date    Acute pulmonary embolism, unspecified pulmonary embolism type, unspecified whether acute cor pulmonale present 05/28/2023    Colon polyps     Constipation     DVT (deep venous thrombosis)     History of shortness of breath     Left leg swelling     Melanoma          PAST SURGICAL  HISTORY  Past Surgical History:   Procedure Laterality Date    COLONOSCOPY N/A 06/02/2023    40 MM TUBULOVILLOUS ADENOMA POLYP IN DISTAL RECTUM, RESCOPE IN 1 YR, DR. SHANNA MOTTA AT [facility]    COLONOSCOPY W/ POLYPECTOMY N/A 06/02/2023    40 MM POLYP IN DISTAL RECTUM, RESECTION AND RETREIVAL COMPLETE, RESCOPE IN 1 YR, DR. SHANNA MOTTA AT [facility]    CYSTOSCOPY W/ URETERAL STENT PLACEMENT Bilateral 07/10/2023    Procedure: CYSTOSCOPY URETERAL CATHETER/STENT INSERTION;  Surgeon: Vic Montelongo Jr., MD;  Location: [facility];  Service: Urology;  Laterality: Bilateral;    CYSTOSCOPY W/ URETERAL STENT PLACEMENT N/A 10/19/2023    Procedure: CYSTOSCOPY WITH BILATERAL STENT EXCHANGE;  Surgeon: Vic Montelongo Jr., MD;  Location: [facility];  Service: Urology;  Laterality: N/A;    PORTACATH PLACEMENT Right 06/02/2023    DONE AT [facility]    US GUIDED LYMPH NODE BIOPSY  05/30/2023    METASTATIC MELANOMA, DONE AT [facility]    VENOUS ACCESS DEVICE (PORT) INSERTION N/A 06/02/2023    Procedure: INFUSAPORT PLACEMENT;  Surgeon: Vicki Layne MD;  Location: [facility];  Service: General;  Laterality: N/A;         FAMILY HISTORY  Family History   Problem Relation Age of Onset    Breast cancer Mother     Prostate cancer Father     Malig Hyperthermia Neg Hx          SOCIAL HISTORY  Social History     Socioeconomic History    Marital status: Unknown   Tobacco Use    Smoking status: Never     Passive exposure: Never    Smokeless tobacco: Never   Vaping Use    Vaping Use: Unknown   Substance and Sexual Activity    Alcohol use: Never    Drug use: Never    Sexual activity: Defer         ALLERGIES  Patient has no known allergies.        REVIEW OF SYSTEMS  Review of Systems     All systems reviewed and negative except for those discussed in HPI.       PHYSICAL EXAM  ED Triage Vitals   Temp Heart Rate Resp BP SpO2   12/25/23 2053 12/25/23 2053 12/25/23 2053 12/25/23 2056 12/25/23 2053   (!) 101.1 °F (38.4 °C) 108 20 99/58 91 %      Temp  src Heart Rate Source Patient Position BP Location FiO2 (%)   12/25/23 2053 -- 12/25/23 2056 12/25/23 2056 --   Tympanic  Lying Left arm        Physical Exam      GENERAL: Very pleasant, no acute distress  HENT: nares patent  EYES: no scleral icterus  CV: regular rhythm, normal rate, normal S1-S2  RESPIRATORY: normal effort, lungs CTA B  ABDOMEN: soft  MUSCULOSKELETAL: no deformity  NEURO: alert, moves all extremities, follows commands  PSYCH:  calm, cooperative  SKIN: warm, dry    Vital signs and nursing notes reviewed.          LAB RESULTS  Recent Results (from the past 24 hour(s))   Comprehensive Metabolic Panel    Collection Time: 12/25/23  9:11 PM    Specimen: Arm, Left; Blood   Result Value Ref Range    Glucose 141 (H) 65 - 99 mg/dL    BUN 17 8 - 23 mg/dL    Creatinine 1.69 (H) 0.76 - 1.27 mg/dL    Sodium 130 (L) 136 - 145 mmol/L    Potassium 3.6 3.5 - 5.2 mmol/L    Chloride 97 (L) 98 - 107 mmol/L    CO2 19.0 (L) 22.0 - 29.0 mmol/L    Calcium 8.1 (L) 8.6 - 10.5 mg/dL    Total Protein 7.4 6.0 - 8.5 g/dL    Albumin 3.4 (L) 3.5 - 5.2 g/dL    ALT (SGPT) 15 1 - 41 U/L    AST (SGOT) 17 1 - 40 U/L    Alkaline Phosphatase 94 39 - 117 U/L    Total Bilirubin 0.3 0.0 - 1.2 mg/dL    Globulin 4.0 gm/dL    A/G Ratio 0.9 g/dL    BUN/Creatinine Ratio 10.1 7.0 - 25.0    Anion Gap 14.0 5.0 - 15.0 mmol/L    eGFR 45.6 (L) >60.0 mL/min/1.73   Lipase    Collection Time: 12/25/23  9:11 PM    Specimen: Arm, Left; Blood   Result Value Ref Range    Lipase 66 (H) 13 - 60 U/L   Lactic Acid, Plasma    Collection Time: 12/25/23  9:11 PM    Specimen: Arm, Left; Blood   Result Value Ref Range    Lactate 1.5 0.5 - 2.0 mmol/L   Green Top (Gel)    Collection Time: 12/25/23  9:11 PM   Result Value Ref Range    Extra Tube Hold for add-ons.    Lavender Top    Collection Time: 12/25/23  9:11 PM   Result Value Ref Range    Extra Tube hold for add-on    Gold Top - SST    Collection Time: 12/25/23  9:11 PM   Result Value Ref Range    Extra Tube Hold  for add-ons.    Gray Top    Collection Time: 12/25/23  9:11 PM   Result Value Ref Range    Extra Tube Hold for add-ons.    Light Blue Top    Collection Time: 12/25/23  9:11 PM   Result Value Ref Range    Extra Tube Hold for add-ons.    CBC Auto Differential    Collection Time: 12/25/23  9:11 PM    Specimen: Arm, Left; Blood   Result Value Ref Range    WBC 13.56 (H) 3.40 - 10.80 10*3/mm3    RBC 3.21 (L) 4.14 - 5.80 10*6/mm3    Hemoglobin 8.6 (L) 13.0 - 17.7 g/dL    Hematocrit 26.8 (L) 37.5 - 51.0 %    MCV 83.5 79.0 - 97.0 fL    MCH 26.8 26.6 - 33.0 pg    MCHC 32.1 31.5 - 35.7 g/dL    RDW 14.9 12.3 - 15.4 %    RDW-SD 45.2 37.0 - 54.0 fl    MPV 9.0 6.0 - 12.0 fL    Platelets 395 140 - 450 10*3/mm3    Neutrophil % 76.5 (H) 42.7 - 76.0 %    Lymphocyte % 12.5 (L) 19.6 - 45.3 %    Monocyte % 8.8 5.0 - 12.0 %    Eosinophil % 1.1 0.3 - 6.2 %    Basophil % 0.3 0.0 - 1.5 %    Immature Grans % 0.8 (H) 0.0 - 0.5 %    Neutrophils, Absolute 10.36 (H) 1.70 - 7.00 10*3/mm3    Lymphocytes, Absolute 1.70 0.70 - 3.10 10*3/mm3    Monocytes, Absolute 1.20 (H) 0.10 - 0.90 10*3/mm3    Eosinophils, Absolute 0.15 0.00 - 0.40 10*3/mm3    Basophils, Absolute 0.04 0.00 - 0.20 10*3/mm3    Immature Grans, Absolute 0.11 (H) 0.00 - 0.05 10*3/mm3    nRBC 0.0 0.0 - 0.2 /100 WBC   Sedimentation Rate    Collection Time: 12/25/23  9:11 PM    Specimen: Arm, Left; Blood   Result Value Ref Range    Sed Rate 49 (H) 0 - 20 mm/hr   C-reactive Protein    Collection Time: 12/25/23  9:11 PM    Specimen: Arm, Left; Blood   Result Value Ref Range    C-Reactive Protein 19.63 (H) 0.00 - 0.50 mg/dL   Urinalysis With Microscopic If Indicated (No Culture) - Urine, Clean Catch    Collection Time: 12/25/23  9:16 PM    Specimen: Urine, Clean Catch   Result Value Ref Range    Color, UA Dark Yellow (A) Yellow, Straw    Appearance, UA Cloudy (A) Clear    pH, UA 6.5 5.0 - 8.0    Specific Gravity, UA 1.017 1.005 - 1.030    Glucose, UA Negative Negative    Ketones, UA Negative  Negative    Bilirubin, UA Negative Negative    Blood, UA Large (3+) (A) Negative    Protein,  mg/dL (2+) (A) Negative    Leuk Esterase, UA Trace (A) Negative    Nitrite, UA Negative Negative    Urobilinogen, UA 1.0 E.U./dL 0.2 - 1.0 E.U./dL   Urinalysis, Microscopic Only - Urine, Clean Catch    Collection Time: 12/25/23  9:16 PM    Specimen: Urine, Clean Catch   Result Value Ref Range    RBC, UA 21-50 (A) None Seen, 0-2 /HPF    WBC, UA 3-5 (A) None Seen, 0-2 /HPF    Bacteria, UA Trace (A) None Seen /HPF    Squamous Epithelial Cells, UA 0-2 None Seen, 0-2 /HPF    Renal Epithelial Cells, UA 0-2 0 - 2 /HPF    Hyaline Casts, UA None Seen None Seen /LPF    Methodology Manual Light Microscopy    Respiratory Panel PCR w/COVID-19(SARS-CoV-2) KELLE/MAYDA/SULLY/PAD/COR/DOUG In-House, NP Swab in UTM/VTM, 2 HR TAT - Swab, Nasopharynx    Collection Time: 12/25/23 11:04 PM    Specimen: Nasopharynx; Swab   Result Value Ref Range    ADENOVIRUS, PCR Not Detected Not Detected    Coronavirus 229E Detected (A) Not Detected    Coronavirus HKU1 Not Detected Not Detected    Coronavirus NL63 Not Detected Not Detected    Coronavirus OC43 Not Detected Not Detected    COVID19 Not Detected Not Detected - Ref. Range    Human Metapneumovirus Not Detected Not Detected    Human Rhinovirus/Enterovirus Not Detected Not Detected    Influenza A PCR Not Detected Not Detected    Influenza B PCR Not Detected Not Detected    Parainfluenza Virus 1 Not Detected Not Detected    Parainfluenza Virus 2 Not Detected Not Detected    Parainfluenza Virus 3 Not Detected Not Detected    Parainfluenza Virus 4 Not Detected Not Detected    RSV, PCR Not Detected Not Detected    Bordetella pertussis pcr Not Detected Not Detected    Bordetella parapertussis PCR Not Detected Not Detected    Chlamydophila pneumoniae PCR Not Detected Not Detected    Mycoplasma pneumo by PCR Not Detected Not Detected       Ordered the above labs and reviewed the results.        RADIOLOGY  XR  Chest 1 View    Result Date: 12/25/2023  SINGLE VIEW OF THE CHEST  HISTORY: Fever  COMPARISON: 2/19/2023  FINDINGS: There is cardiomegaly. There is no vascular congestion. There is some chronic scarring at the left lung base. No pneumothorax, pleural effusion, or acute infiltrate is seen. Right internal jugular vein Mediport has been removed.      No acute findings.  This report was finalized on 12/25/2023 11:09 PM by Dr. Harika Seo M.D on Workstation: [id number]       Ordered the above noted radiological studies. Reviewed by me in PACS.          PROCEDURES  Procedures          MEDICATIONS GIVEN IN ER  Medications   sodium chloride 0.9 % flush 10 mL (has no administration in time range)   ampicillin 2000 mg IVPB in 100 ml NS (VTB) (0 mg Intravenous Stopped 12/25/23 2341)   sodium chloride 0.9 % bolus 1,000 mL (0 mL Intravenous Stopped 12/26/23 0014)   acetaminophen (TYLENOL) tablet 1,000 mg (1,000 mg Oral Given 12/25/23 2303)   sodium chloride 0.9 % bolus 1,000 mL (0 mL Intravenous Stopped 12/26/23 0210)         MEDICAL DECISION MAKING, PROGRESS, and CONSULTS    Discussion below represents my analysis of pertinent findings related to patient's condition, differential diagnosis, treatment plan and final disposition.      Orders placed during this visit:  Orders Placed This Encounter   Procedures    Blood Culture - Blood,    Blood Culture - Blood,    Respiratory Panel PCR w/COVID-19(SARS-CoV-2) KELLE/MAYDA/SULLY/PAD/COR/DOUG In-House, NP Swab in UTM/VTM, 2 HR TAT - Swab, Nasopharynx    XR Chest 1 View    [place] Draw    Comprehensive Metabolic Panel    Lipase    Urinalysis With Microscopic If Indicated (No Culture) - Urine, Clean Catch    Lactic Acid, Plasma    CBC Auto Differential    Urinalysis, Microscopic Only - Urine, Clean Catch    Sedimentation Rate    C-reactive Protein    NPO Diet NPO Type: Strict NPO    Undress & Gown    LHA (on-call MD unless specified) Details    Insert Peripheral IV    Initiate  Observation Status    CBC & Differential    Green Top (Gel)    Lavender Top    Gold Top - SST    Gray Top    Light Blue Top         Additional sources:  - Discussed/obtained information from independent historians: None available  Additional information was obtained to confirm the patient's history.    - External (non-ED) record review:       Adult Transthoracic Echo Complete W/ Cont if Necessary Per Protocol     Interpretation Summary    Left ventricular systolic function is normal. Calculated left ventricular EF = 58.3%    Left ventricular diastolic function was normal.    Mild dilation of the aortic root is present. The aortic root measures 3.9 cm.    There is a trivial pericardial effusion.    No definite valvular vegetations appreciated on transthoracic echocardiogram.      - Chronic or social conditions impacting care: None        Differential diagnosis:        Independent interpretation of labs, radiology studies, and discussions with consultants:  ED Course as of 12/26/23 0244   Mon Dec 25, 2023   2242 Hemoglobin(!): 8.6  This is stable from the last 4 days and down 0.8 from 5 days ago. [RC]   2242 Sodium(!): 130 [RC]   2242 Creatinine(!): 1.69  This was 1.39 x 3 and 4 days ago [RC]   2242 Lactate: 1.5 [RC]   2242 Lipase(!): 66 [RC]   2242 Sed Rate(!): 49 [RC]   2243 Bacteria, UA(!): Trace [RC]   2243 WBC, UA(!): 3-5 [RC]   2243 RBC, UA(!): 21-50 [RC]      ED Course User Index  [RC] Anselmo Flores III, PA               DIAGNOSIS  Final diagnoses:   Acute renal insufficiency   Immunocompromised patient   Fever and chills   Coronavirus infection   History of sepsis-Enterococcus         DISPOSITION  Admission      Latest Documented Vital Signs:  As of 02:44 EST  BP- 121/78 HR- 62 Temp- (!) 101.1 °F (38.4 °C) (Tympanic) O2 sat- 100%      --    Please note that portions of this were completed with a voice recognition program.       Note Disclaimer: At [facility], we believe that sharing information  builds trust and better relationships. You are receiving this note because you are receiving care at [facility] or recently visited. It is possible you will see health information before a provider has talked with you about it. This kind of information can be easy to misunderstand. To help you fully understand what it means for your health, we urge you to discuss this note with your provider.         Anselmo Flores III, PA  12/26/23 0244      Electronically signed by Rowdy Helms MD at 12/26/23 1919       Oxygen Therapy (since admission)       Date/Time SpO2 Device (Oxygen Therapy) Flow (L/min) Oxygen Concentration (%) ETCO2 (mmHg)    12/27/23 1012 -- room air -- -- --    12/27/23 0724 97 room air -- -- --    12/27/23 0010 100 -- -- -- --    12/27/23 0004 -- room air -- -- --    12/26/23 2139 98 -- -- -- --    12/26/23 2044 -- room air -- -- --    12/26/23 1903 100 -- -- -- --    12/26/23 1326 97 -- -- -- --    12/26/23 1305 -- room air -- -- --    12/26/23 1127 -- room air -- -- --    12/26/23 0933 -- room air -- -- --    12/26/23 0811 100 room air -- -- --    12/26/23 0736 100 -- -- -- --    12/26/23 0239 100 room air -- -- --    12/26/23 0201 100 -- -- -- --    12/26/23 0101 100 -- -- -- --    12/26/23 0001 99 -- -- -- --    12/25/23 2301 98 -- -- -- --    12/25/23 2207 98 -- -- -- --    12/25/23 2053 91 -- -- -- --          Intake & Output (last 3 days)         12/24 0701 12/25 0700 12/25 0701 12/26 0700 12/26 0701 12/27 0700 12/27 0701  12/28 0700    P.O.   960     I.V. (mL/kg)   1200 (11.2)     IV Piggyback  2100 300     Total Intake(mL/kg)  2100 (19.6) 2460 (23)     Net  +2100 +2460             Urine Unmeasured Occurrence  1 x 7 x 0 x           Lines, Drains & Airways       Active LDAs       Name Placement date Placement time Site Days    Peripheral IV 12/25/23 2210 Right Antecubital 12/25/23  2210  Antecubital  1                  Medication Administration Report for [place]  "\"Min\" as of 12/27/23 1254     Legend:    Given Hold Not Given Due Canceled Entry Other Actions    Time Time (Time) Time Time-Action         Discontinued     Completed     Future     MAR Hold     Linked             Medications 12/25/23 12/26/23 12/27/23      acetaminophen (TYLENOL) tablet 650 mg  Dose: 650 mg  Freq: Every 4 Hours PRN Route: PO  PRN Reason: Mild Pain  Start: 12/26/23 0321   Admin Instructions:   If given for fever, use fever parameter: fever greater than 100.4 °F  Based on patient request - if ordered for moderate or severe pain, provider allows for administration of a medication prescribed for a lower pain scale.    Do not exceed 4 grams of acetaminophen in a 24 hr period. Max dose of 2gm for AST/ALT greater than 120 units/L.    If given for pain, use the following pain scale:   Mild Pain = Pain Score of 1-3, CPOT 1-2  Moderate Pain = Pain Score of 4-6, CPOT 3-4  Severe Pain = Pain Score of 7-10, CPOT 5-8     1248-Given     1913-Given     2317-Given        0556-Given [C]            Or  acetaminophen (TYLENOL) 160 MG/5ML oral solution 650 mg  Dose: 650 mg  Freq: Every 4 Hours PRN Route: PO  PRN Reason: Mild Pain  Start: 12/26/23 0321   Admin Instructions:   If given for fever, use fever parameter: fever greater than 100.4 °F  Based on patient request - if ordered for moderate or severe pain, provider allows for administration of a medication prescribed for a lower pain scale.    Do not exceed 4 grams of acetaminophen in a 24 hr period. Max dose of 2gm for AST/ALT greater than 120 units/L.    If given for pain, use the following pain scale:   Mild Pain = Pain Score of 1-3, CPOT 1-2  Moderate Pain = Pain Score of 4-6, CPOT 3-4  Severe Pain = Pain Score of 7-10, CPOT 5-8     1248-Not Given:  See Alt     1913-Not Given:  See Alt     2317-Not Given:  See Alt        0556-Not Given:  See Alt            Or  acetaminophen (TYLENOL) suppository 650 mg  Dose: 650 mg  Freq: Every 4 Hours PRN Route: RE  PRN " Reason: Mild Pain  Start: 12/26/23 0321   Admin Instructions:   If given for fever, use fever parameter: fever greater than 100.4 °F  Based on patient request - if ordered for moderate or severe pain, provider allows for administration of a medication prescribed for a lower pain scale.    Do not exceed 4 grams of acetaminophen in a 24 hr period. Max dose of 2gm for AST/ALT greater than 120 units/L.    If given for pain, use the following pain scale:   Mild Pain = Pain Score of 1-3, CPOT 1-2  Moderate Pain = Pain Score of 4-6, CPOT 3-4  Severe Pain = Pain Score of 7-10, CPOT 5-8     1248-Not Given:  See Alt     1913-Not Given:  See Alt     2317-Not Given:  See Alt        0556-Not Given:  See Alt             ampicillin 2000 mg IVPB in 100 ml NS (VTB)  Dose: 2 g  Freq: Every 4 Hours Route: IV  Indications of Use: BACTEREMIA  Start: 12/26/23 1130   End: 12/31/23 1159   Admin Instructions:   Activate vial before use.     (1200)-Not Given [C]     (1326)-Not Given [C]     1348-New Bag       1420-Stopped     1734-New Bag     2044-New Bag        0111-New Bag     0556-New Bag     1147-New Bag       1600     2000            apixaban (ELIQUIS) tablet 5 mg  Dose: 5 mg  Freq: Every 12 Hours Scheduled Route: PO  Indications of Use: DVT/PE (active thrombosis)  Start: 12/26/23 1300   Admin Instructions:   Tablet may be crushed and suspended in 60 mL of water or D5W and immediately delivered via NG tube.     1350-Given     2043-Given         1015-Given     2100            sennosides-docusate (PERICOLACE) 8.6-50 MG per tablet 2 tablet  Dose: 2 tablet  Freq: 2 Times Daily Route: PO  Start: 12/26/23 0900   Admin Instructions:   HOLD MEDICATION IF PATIENT HAS HAD BOWEL MOVEMENT. Start bowel management regimen if patient has not had a bowel movement after 12 hours.     (0935)-Not Given     (2045)-Not Given         (1015)-Not Given     2100           And  polyethylene glycol (MIRALAX) packet 17 g  Dose: 17 g  Freq: Daily PRN Route: PO  PRN  Reason: Constipation  PRN Comment: Use if senna-docusate is ineffective  Start: 12/26/23 0319   Admin Instructions:   Use if no bowel movement after 12 hours. Mix in 6-8 ounces of water.  Use 4-8 ounces of water, tea, or juice for each 17 gram dose.         And  bisacodyl (DULCOLAX) EC tablet 5 mg  Dose: 5 mg  Freq: Daily PRN Route: PO  PRN Reason: Constipation  PRN Comment: Use if polyethylene glycol is ineffective  Start: 12/26/23 0319   Admin Instructions:   Use if no bowel movement after 12 hours.  Swallow whole. Do not crush, split, or chew tablet.         And  bisacodyl (DULCOLAX) suppository 10 mg  Dose: 10 mg  Freq: Daily PRN Route: RE  PRN Reason: Constipation  PRN Comment: Use if bisacodyl oral is ineffective  Start: 12/26/23 0319   Admin Instructions:   Use if no bowel movement after 12 hours.  Hold for diarrhea          famotidine (PEPCID) tablet 20 mg  Dose: 20 mg  Freq: 2 Times Daily Before Meals Route: PO  Start: 12/26/23 1730     1735-Given          0556-Given     0730-Canceled Entry     1730           ipratropium-albuterol (DUO-NEB) nebulizer solution 3 mL  Dose: 3 mL  Freq: Every 4 Hours PRN Route: NEBULIZATION  PRN Reason: Shortness of Air  Start: 12/26/23 1145   Admin Instructions:   Include Respiratory Treatment Education          nitroglycerin (NITROSTAT) SL tablet 0.4 mg  Dose: 0.4 mg  Freq: Every 5 Minutes PRN Route: SL  PRN Reason: Chest Pain  PRN Comment: Only if SBP Greater Than 100  Start: 12/26/23 0320   Admin Instructions:   If Pain Unrelieved After 3 Doses Notify MD  May administer up to 3 doses per episode.          ondansetron (ZOFRAN) injection 4 mg  Dose: 4 mg  Freq: Every 6 Hours PRN Route: IV  PRN Reasons: Nausea,Vomiting  Start: 12/26/23 0321   Admin Instructions:   If BOTH ondansetron (ZOFRAN) and promethazine (PHENERGAN) are ordered use ondansetron first and THEN promethazine IF ondansetron is ineffective.     0929-Given              ondansetron (ZOFRAN) tablet 8 mg  Dose: 8  "mg  Freq: Every 8 Hours PRN Route: PO  PRN Reasons: Nausea,Vomiting  Start: 12/26/23 1219   Admin Instructions:   \"If multiple N/V medications ordered, use in the following order: Ondansetron, Prochlorperazine, Promethazine. Use PO unless patient refuses or patient unable to swallow.\"          prochlorperazine (COMPAZINE) tablet 10 mg  Dose: 10 mg  Freq: Every 6 Hours PRN Route: PO  PRN Reasons: Nausea,Vomiting  Start: 12/26/23 1219   Admin Instructions:   \"If multiple N/V medications ordered, use in the following order: Ondansetron, Prochlorperazine, Promethazine. Use PO unless patient refuses or patient unable to swallow.\"          scopolamine patch 1 mg/72 hr  Dose: 1 patch  Freq: Every 72 Hours Route: TD  Start: 12/26/23 1315   Admin Instructions:   Do not apply if patient older than 65 or has history of glaucoma.       1350-Medication Applied              sodium bicarbonate tablet 650 mg  Dose: 650 mg  Freq: 3 Times Daily Route: PO  Start: 12/26/23 1600     1614-Given     2043-Given         1015-Given     1600     2100           sodium chloride 0.9 % flush 10 mL  Dose: 10 mL  Freq: As Needed Route: IV  PRN Reason: Line Care  Start: 12/26/23 0319          sodium chloride 0.9 % flush 10 mL  Dose: 10 mL  Freq: Every 12 Hours Scheduled Route: IV  Start: 12/26/23 0900     0929-Given     2044-Given         1013-Given     2100            sodium chloride 0.9 % flush 10 mL  Dose: 10 mL  Freq: As Needed Route: IV  PRN Reason: Line Care  Start: 12/25/23 2055          sodium chloride 0.9 % infusion  Rate: 125 mL/hr Dose: 125 mL/hr  Freq: Continuous Route: IV  Start: 12/26/23 0415     0530-New Bag     1729-New Bag         0556-New Bag     1049 (Rate/Dose Change)            sodium chloride 0.9 % infusion 40 mL  Dose: 40 mL  Freq: As Needed Route: IV  PRN Reason: Line Care  Start: 12/26/23 0319   Admin Instructions:   Following administration of an IV intermittent medication, flush line with 40mL NS at 100mL/hr.       "   Completed Medications  Medications 12/25/23 12/26/23 12/27/23       acetaminophen (TYLENOL) tablet 1,000 mg  Dose: 1,000 mg  Freq: Once Route: PO  Start: 12/25/23 2255   End: 12/25/23 2303   Admin Instructions:   If given for fever, use fever parameter: fever greater than 100.4 °F  Based on patient request - if ordered for moderate or severe pain, provider allows for administration of a medication prescribed for a lower pain scale.    Do not exceed 4 grams of acetaminophen in a 24 hr period. Max dose of 2gm for AST/ALT greater than 120 units/L.    If given for pain, use the following pain scale:   Mild Pain = Pain Score of 1-3, CPOT 1-2  Moderate Pain = Pain Score of 4-6, CPOT 3-4  Severe Pain = Pain Score of 7-10, CPOT 5-8    2303-Given               ampicillin 2000 mg IVPB in 100 ml NS (VTB)  Dose: 2,000 mg  Freq: Once Route: IV  Indications of Use: BACTEREMIA  Start: 12/25/23 2240   End: 12/25/23 2341   Admin Instructions:   Activate vial before use.    2239-New Bag     2341-Stopped              sodium chloride 0.9 % bolus 1,000 mL  Dose: 1,000 mL  Freq: Once Route: IV  Start: 12/26/23 0017   End: 12/26/23 0210     0014-New Bag     0210-Stopped             sodium chloride 0.9 % bolus 1,000 mL  Dose: 1,000 mL  Freq: Once Route: IV  Start: 12/25/23 2240   End: 12/26/23 0014 2238-New Bag          0014-Stopped             Discontinued Medications  Medications 12/25/23 12/26/23 12/27/23       ampicillin-sulbactam (UNASYN) 1.5 g in sodium chloride 0.9 % 100 mL IVPB-VTB  Dose: 1.5 g  Freq: Every 6 Hours Route: IV  Indications of Use: BACTEREMIA  Start: 12/26/23 0415   End: 12/26/23 0811   Admin Instructions:   Activate vial before using.     0530-New Bag              ampicillin-sulbactam (UNASYN) 3 g in sodium chloride 0.9 % 100 mL IVPB-VTB  Dose: 3 g  Freq: Every 6 Hours Route: IV  Indications of Use: BACTEREMIA  Start: 12/26/23 1015   End: 12/26/23 1044   Admin Instructions:   Activate vial before using.      0926-New Bag     1030-Stopped             Binimetinib tablet 45 mg  Dose: 45 mg  Freq: 2 Times Daily Route: PO  Start: 12/26/23 1315   End: 12/26/23 1415   Order specific questions:   Drug Name: mektovi       (1614)-Not Given [C]              encorafenib (BRAFTOVI) capsule 75 mg  Dose: 75 mg  Freq: Nightly Route: PO  Start: 12/26/23 2100   End: 12/26/23 1415   Admin Instructions:   Group 1 (Yellow) Hazardous Drug Antineoplastic - See Handling Guide   Order specific questions:   Drug Name: braftovi            ipratropium-albuterol (DUO-NEB) nebulizer solution 3 mL  Dose: 3 mL  Freq: 4 Times Daily - RT Route: NEBULIZATION  Start: 12/26/23 0830   End: 12/26/23 1133   Admin Instructions:   Include Respiratory Treatment Education     0811-Given     1127-Given                        Blood Administration Record (From admission, onward)      None          Lab Results (all)       Procedure Component Value Units Date/Time    Basic Metabolic Panel [[id number]]  (Abnormal) Collected: 12/27/23 0919    Specimen: Blood Updated: 12/27/23 1024     Glucose 109 mg/dL      BUN 8 mg/dL      Creatinine 1.45 mg/dL      Sodium 132 mmol/L      Potassium 3.8 mmol/L      Chloride 101 mmol/L      CO2 19.0 mmol/L      Calcium 8.1 mg/dL      BUN/Creatinine Ratio 5.5     Anion Gap 12.0 mmol/L      eGFR 54.8 mL/min/1.73     Narrative:      GFR Normal >60  Chronic Kidney Disease <60  Kidney Failure <15      CBC & Differential [[id number]]  (Abnormal) Collected: 12/27/23 0500    Specimen: Blood Updated: 12/27/23 0512    Narrative:      The following orders were created for panel order CBC & Differential.  Procedure                               Abnormality         Status                     ---------                               -----------         ------                     CBC Auto Differential[[id number]]        Abnormal            Final result                 Please view results for these tests on the individual orders.    CBC Auto Differential  [[id number]]  (Abnormal) Collected: 12/27/23 0500    Specimen: Blood Updated: 12/27/23 0512     WBC 9.37 10*3/mm3      RBC 3.11 10*6/mm3      Hemoglobin 8.8 g/dL      Hematocrit 26.6 %      MCV 85.5 fL      MCH 28.3 pg      MCHC 33.1 g/dL      RDW 15.1 %      RDW-SD 47.2 fl      MPV 9.1 fL      Platelets 372 10*3/mm3      Neutrophil % 80.0 %      Lymphocyte % 10.7 %      Monocyte % 6.3 %      Eosinophil % 1.5 %      Basophil % 0.4 %      Immature Grans % 1.1 %      Neutrophils, Absolute 7.50 10*3/mm3      Lymphocytes, Absolute 1.00 10*3/mm3      Monocytes, Absolute 0.59 10*3/mm3      Eosinophils, Absolute 0.14 10*3/mm3      Basophils, Absolute 0.04 10*3/mm3      Immature Grans, Absolute 0.10 10*3/mm3      nRBC 0.0 /100 WBC     Blood Culture - Blood, Arm, Right [[id number]]  (Normal) Collected: 12/25/23 2210    Specimen: Blood from Arm, Right Updated: 12/26/23 2232     Blood Culture No growth at 24 hours    Narrative:      Less than seven (7) mL's of blood was collected.  Insufficient quantity may yield false negative results.    Blood Culture - Blood, Arm, Left [[id number]]  (Normal) Collected: 12/25/23 2111    Specimen: Blood from Arm, Left Updated: 12/26/23 2131     Blood Culture No growth at 24 hours    Ferritin [[id number]]  (Normal) Collected: 12/26/23 1900    Specimen: Blood Updated: 12/26/23 1953     Ferritin 264.00 ng/mL     Narrative:      Results may be falsely decreased if patient taking Biotin.      Vitamin B12 [[id number]]  (Normal) Collected: 12/26/23 1900    Specimen: Blood Updated: 12/26/23 1953     Vitamin B-12 573 pg/mL     Narrative:      Results may be falsely increased if patient taking Biotin.      Folate [[id number]]  (Normal) Collected: 12/26/23 1900    Specimen: Blood Updated: 12/26/23 1953     Folate 9.51 ng/mL     Narrative:      Results may be falsely increased if patient taking Biotin.      Iron Profile [[id number]]  (Abnormal) Collected: 12/26/23 1900    Specimen: Blood Updated: 12/26/23 1940      Iron 15 mcg/dL      Iron Saturation (TSAT) 6 %      Transferrin 167 mg/dL      TIBC 249 mcg/dL     Basic Metabolic Panel [[id number]]  (Abnormal) Collected: 12/26/23 0541    Specimen: Blood Updated: 12/26/23 0647     Glucose 124 mg/dL      BUN 16 mg/dL      Creatinine 1.54 mg/dL      Sodium 133 mmol/L      Potassium 3.4 mmol/L      Chloride 101 mmol/L      CO2 20.1 mmol/L      Calcium 7.9 mg/dL      BUN/Creatinine Ratio 10.4     Anion Gap 11.9 mmol/L      eGFR 51.0 mL/min/1.73     Narrative:      GFR Normal >60  Chronic Kidney Disease <60  Kidney Failure <15      CBC Auto Differential [[id number]]  (Abnormal) Collected: 12/26/23 0541    Specimen: Blood Updated: 12/26/23 0632     WBC 10.91 10*3/mm3      RBC 2.94 10*6/mm3      Hemoglobin 7.9 g/dL      Hematocrit 24.7 %      MCV 84.0 fL      MCH 26.9 pg      MCHC 32.0 g/dL      RDW 14.9 %      RDW-SD 45.2 fl      MPV 9.4 fL      Platelets 340 10*3/mm3      Neutrophil % 77.6 %      Lymphocyte % 10.9 %      Monocyte % 8.9 %      Eosinophil % 1.9 %      Basophil % 0.2 %      Immature Grans % 0.5 %      Neutrophils, Absolute 8.47 10*3/mm3      Lymphocytes, Absolute 1.19 10*3/mm3      Monocytes, Absolute 0.97 10*3/mm3      Eosinophils, Absolute 0.21 10*3/mm3      Basophils, Absolute 0.02 10*3/mm3      Immature Grans, Absolute 0.05 10*3/mm3      nRBC 0.0 /100 WBC     [place] Draw [[id number]] Collected: 12/25/23 2111    Specimen: Blood from Arm, Left Updated: 12/26/23 0116    Narrative:      The following orders were created for panel order [place] Draw.  Procedure                               Abnormality         Status                     ---------                               -----------         ------                     Green Top (Gel)[[id number]]                                  Final result               Lavender Top[[id number]]                                     Final result               Gold Top - SST[[id number]]                                   Final result                Cardoza Top[[id number]]                                         Final result               Light Blue Top[[id number]]                                   Final result                 Please view results for these tests on the individual orders.    Gray Top [[id number]] Collected: 12/25/23 2111    Specimen: Blood from Arm, Left Updated: 12/26/23 0116     Extra Tube Hold for add-ons.     Comment: Auto resulted.       Respiratory Panel PCR w/COVID-19(SARS-CoV-2) KELLE/MAYDA/SULLY/PAD/COR/DOUG In-House, NP Swab in UTM/VTM, 2 HR TAT - Swab, Nasopharynx [[id number]]  (Abnormal) Collected: 12/25/23 2304    Specimen: Swab from Nasopharynx Updated: 12/26/23 0000     ADENOVIRUS, PCR Not Detected     Coronavirus 229E Detected     Coronavirus HKU1 Not Detected     Coronavirus NL63 Not Detected     Coronavirus OC43 Not Detected     COVID19 Not Detected     Human Metapneumovirus Not Detected     Human Rhinovirus/Enterovirus Not Detected     Influenza A PCR Not Detected     Influenza B PCR Not Detected     Parainfluenza Virus 1 Not Detected     Parainfluenza Virus 2 Not Detected     Parainfluenza Virus 3 Not Detected     Parainfluenza Virus 4 Not Detected     RSV, PCR Not Detected     Bordetella pertussis pcr Not Detected     Bordetella parapertussis PCR Not Detected     Chlamydophila pneumoniae PCR Not Detected     Mycoplasma pneumo by PCR Not Detected    Narrative:      In the setting of a positive respiratory panel with a viral infection PLUS a negative procalcitonin without other underlying concern for bacterial infection, consider observing off antibiotics or discontinuation of antibiotics and continue supportive care. If the respiratory panel is positive for atypical bacterial infection (Bordetella pertussis, Chlamydophila pneumoniae, or Mycoplasma pneumoniae), consider antibiotic de-escalation to target atypical bacterial infection.    C-reactive Protein [[id number]]  (Abnormal) Collected: 12/25/23 2111    Specimen: Blood from Arm,  Left Updated: 12/25/23 2241     C-Reactive Protein 19.63 mg/dL     Sedimentation Rate [[id number]]  (Abnormal) Collected: 12/25/23 2111    Specimen: Blood from Arm, Left Updated: 12/25/23 2225     Sed Rate 49 mm/hr     Green Top (Gel) [[id number]] Collected: 12/25/23 2111    Specimen: Blood from Arm, Left Updated: 12/25/23 2215     Extra Tube Hold for add-ons.     Comment: Auto resulted.       Lavender Top [[id number]] Collected: 12/25/23 2111    Specimen: Blood from Arm, Left Updated: 12/25/23 2215     Extra Tube hold for add-on     Comment: Auto resulted       Gold Top - SST [[id number]] Collected: 12/25/23 2111    Specimen: Blood from Arm, Left Updated: 12/25/23 2215     Extra Tube Hold for add-ons.     Comment: Auto resulted.       Light Blue Top [[id number]] Collected: 12/25/23 2111    Specimen: Blood from Arm, Left Updated: 12/25/23 2215     Extra Tube Hold for add-ons.     Comment: Auto resulted       Urinalysis, Microscopic Only - Urine, Clean Catch [[id number]]  (Abnormal) Collected: 12/25/23 2116    Specimen: Urine, Clean Catch Updated: 12/25/23 2203     RBC, UA 21-50 /HPF      WBC, UA 3-5 /HPF      Bacteria, UA Trace /HPF      Squamous Epithelial Cells, UA 0-2 /HPF      Renal Epithelial Cells, UA 0-2 /HPF      Hyaline Casts, UA None Seen /LPF      Methodology Manual Light Microscopy    Comprehensive Metabolic Panel [[id number]]  (Abnormal) Collected: 12/25/23 2111    Specimen: Blood from Arm, Left Updated: 12/25/23 2146     Glucose 141 mg/dL      BUN 17 mg/dL      Creatinine 1.69 mg/dL      Sodium 130 mmol/L      Potassium 3.6 mmol/L      Chloride 97 mmol/L      CO2 19.0 mmol/L      Calcium 8.1 mg/dL      Total Protein 7.4 g/dL      Albumin 3.4 g/dL      ALT (SGPT) 15 U/L      AST (SGOT) 17 U/L      Alkaline Phosphatase 94 U/L      Total Bilirubin 0.3 mg/dL      Globulin 4.0 gm/dL      A/G Ratio 0.9 g/dL      BUN/Creatinine Ratio 10.1     Anion Gap 14.0 mmol/L      eGFR 45.6 mL/min/1.73     Narrative:      GFR  Normal >60  Chronic Kidney Disease <60  Kidney Failure <15      Urinalysis With Microscopic If Indicated (No Culture) - Urine, Clean Catch [[id number]]  (Abnormal) Collected: 12/25/23 2116    Specimen: Urine, Clean Catch Updated: 12/25/23 2146     Color, UA Dark Yellow     Appearance, UA Cloudy     pH, UA 6.5     Specific Gravity, UA 1.017     Glucose, UA Negative     Ketones, UA Negative     Bilirubin, UA Negative     Blood, UA Large (3+)     Protein,  mg/dL (2+)     Leuk Esterase, UA Trace     Nitrite, UA Negative     Urobilinogen, UA 1.0 E.U./dL    Lipase [[id number]]  (Abnormal) Collected: 12/25/23 2111    Specimen: Blood from Arm, Left Updated: 12/25/23 2145     Lipase 66 U/L     Lactic Acid, Plasma [[id number]]  (Normal) Collected: 12/25/23 2111    Specimen: Blood from Arm, Left Updated: 12/25/23 2139     Lactate 1.5 mmol/L     CBC & Differential [[id number]]  (Abnormal) Collected: 12/25/23 2111    Specimen: Blood from Arm, Left Updated: 12/25/23 2126    Narrative:      The following orders were created for panel order CBC & Differential.  Procedure                               Abnormality         Status                     ---------                               -----------         ------                     CBC Auto Differential[[id number]]        Abnormal            Final result                 Please view results for these tests on the individual orders.    CBC Auto Differential [[id number]]  (Abnormal) Collected: 12/25/23 2111    Specimen: Blood from Arm, Left Updated: 12/25/23 2126     WBC 13.56 10*3/mm3      RBC 3.21 10*6/mm3      Hemoglobin 8.6 g/dL      Hematocrit 26.8 %      MCV 83.5 fL      MCH 26.8 pg      MCHC 32.1 g/dL      RDW 14.9 %      RDW-SD 45.2 fl      MPV 9.0 fL      Platelets 395 10*3/mm3      Neutrophil % 76.5 %      Lymphocyte % 12.5 %      Monocyte % 8.8 %      Eosinophil % 1.1 %      Basophil % 0.3 %      Immature Grans % 0.8 %      Neutrophils, Absolute 10.36 10*3/mm3       Lymphocytes, Absolute 1.70 10*3/mm3      Monocytes, Absolute 1.20 10*3/mm3      Eosinophils, Absolute 0.15 10*3/mm3      Basophils, Absolute 0.04 10*3/mm3      Immature Grans, Absolute 0.11 10*3/mm3      nRBC 0.0 /100 WBC           Operative/Procedure Notes (all)    No notes of this type exist for this encounter.          Physician Progress Notes (all)        Prakash Mccormick MD at 12/27/23 1011          ID note for sepsis  Subjective: Tmax of 104 overnight.  Currently afebrile.  He says he is actually feeling quite a bit better today.  Physical Exam:   Vital Signs   Temp:  [98.6 °F (37 °C)-104 °F (40 °C)] 98.6 °F (37 °C)  Heart Rate:  [] 93  Resp:  [20] 20  BP: (122-140)/(69-75) 129/75    GENERAL: Awake and alert, sickly  HEENT: Oropharynx is clear. Hearing is grossly normal.   EYES: . No conjunctival injection. No lid lag.   LUNGS:normal respiratory effort.   SKIN: no cutaneous eruptions in exposed areas  PSYCHIATRIC: Appropriate mood, affect, insight, and judgment.     Results Review:  White count 9.37, hemoglobin 8.8  Creatinine 1.54  Microbiology:  COVID/influenza/RSV PCR negative  12/19 blood cultures Enterococcus faecalis  12/20 Ucx >100K enterococcus  12/21 BCx ngtd  12/22 port cx ngtd  12/25 bcx no growth to date  12/25 rpp: coronavirus 229e        A/p  Sepsis  Grupo  coronavirus 229e  Metastatic melanoma, [facility] at [facility]      Continue renally dosed ampicillin.  Follow-up blood cultures.  Renal ultrasound was negative for hydronephrosis or other abnormalities.  Continue supportive care for coronavirus infection.  Discussed with patient that this is not COVID but a different virus  Discussed with Dr. Banuelos regarding chemo.  Oncology has since evaluated and holding chemo.  Greatly appreciate their help.            Electronically signed by Prakash Mccormick MD at 12/27/23 1012          Consult Notes (all)        Amada South MD at 12/27/23 1026        Consult  Orders    1. Inpatient Nephrology Consult [[id number]] ordered by Price Banuelos MD at 23 0722                   [facility] Consult Note      Patient Name: King George  : 1960  MRN: [id number]  Primary Care Physician:  System, Provider Not In  Referring Physician: Miguel Bennett DO  Date of admission: 2023    Subjective     Reason for Consult:  LENIN     HPI:   King George is a 63 y.o. 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  The patient was discharged home on Zyvox.  His hospital course at that time was complicated by acute kidney injury with creatinine was up to 2 mg/dL from a normal baseline.  Etiology of his acute kidney injury was thought to be secondary to hypovolemia and patient was hydrated.  CT scan of the abdomen performed on 2020 showed no hydronephrosis.  Creatinine trended down to 1.4 with IV hydration.  -He was readmitted again on 2023 with fever and dysuria that was associated with nausea, diarrhea, dry cough and a fever 103 at home.  -His creatinine was 1.69 checked on 2023 improved to 1.45 today after IV hydration.  Patient tested positive for coronavirus 229e  -Currently is doing well overall.  He continues to be febrile.            review of Systems:   14 point review of systems is otherwise negative except for mentioned above on HPI    Personal History     Past Medical History:   Diagnosis Date    Abdominal lymphadenopathy 2023    Acute pulmonary embolism, unspecified pulmonary embolism type, unspecified whether acute cor pulmonale  present 05/28/2023    ADMITTED TO [facility], RIGHT    LENIN (acute kidney injury)     Anemia 05/29/2023    Colon polyps     FOLLOWED BY DR. SHANNA MOTTA    Constipation     Drug rash 06/19/2023    DVT (deep venous thrombosis)     LEFT LEG    Hepatic lesion 05/29/2023    History of shortness of breath     FOUND BLOOD CLOT ATTACHED TO LUNG    Left leg swelling     FROM BLOOD CLOT 5/2023    Left lower quadrant abdominal mass 05/28/2023    LEFT INGUINAL BX SHOWED METASTATIC MELANOMA    Melanoma     LEFT SIDE GROIN AREA, STAGE 4    Metastasis to brain 06/02/2023    Metastatic melanoma 05/31/2023    Moderate Malnutrition (HCC) 05/30/2023    Personal history of venous thrombosis and embolism 06/19/2023       Past Surgical History:   Procedure Laterality Date    COLONOSCOPY N/A 06/02/2023    40 MM TUBULOVILLOUS ADENOMA POLYP IN DISTAL RECTUM, RESCOPE IN 1 YR, DR. SHANNA MOTTA AT [facility]    COLONOSCOPY W/ POLYPECTOMY N/A 06/02/2023    40 MM POLYP IN DISTAL RECTUM, RESECTION AND RETREIVAL COMPLETE, RESCOPE IN 1 YR, DR. SHANNA MOTTA AT [facility]    CYSTOSCOPY W/ URETERAL STENT PLACEMENT Bilateral 07/10/2023    Procedure: CYSTOSCOPY URETERAL CATHETER/STENT INSERTION;  Surgeon: Vic Montelongo Jr., MD;  Location: [facility];  Service: Urology;  Laterality: Bilateral;    CYSTOSCOPY W/ URETERAL STENT PLACEMENT N/A 10/19/2023    Procedure: CYSTOSCOPY WITH BILATERAL STENT EXCHANGE;  Surgeon: Vic Montelongo Jr., MD;  Location: [facility] OR;  Service: Urology;  Laterality: N/A;    PORTACATH PLACEMENT Right 06/02/2023    DONE AT [facility]    US GUIDED LYMPH NODE BIOPSY  05/30/2023    METASTATIC MELANOMA, DONE AT [facility]    VENOUS ACCESS DEVICE (PORT) INSERTION N/A 06/02/2023    Procedure: INFUSAPORT PLACEMENT;  Surgeon: Vicki Layne MD;  Location: [facility] OR;  Service: General;  Laterality: N/A;    VENOUS ACCESS DEVICE (PORT) REMOVAL N/A 12/22/2023    Procedure: REMOVAL VENOUS ACCESS DEVICE;  Surgeon: Jose A Allen Jr., MD;  Location:  [facility] MAIN OR;  Service: General;  Laterality: N/A;       Family History: family history includes Breast cancer in his mother; Prostate cancer in his father.    Social History:  reports that he has never smoked. He has never been exposed to tobacco smoke. He has never used smokeless tobacco. He reports that he does not drink alcohol and does not use drugs.    Home Medications:  Prior to Admission medications    Medication Sig Start Date End Date Taking? Authorizing Provider   Braftovi 75 MG capsule Take 6 capsules by mouth Every Night. 10/2/23  Yes Andre Montoya MD   dexAMETHasone (DECADRON) 0.5 MG tablet Take 2 tablets by mouth Daily With Breakfast.   Yes Andre Montoya MD   dexAMETHasone (DECADRON) 0.5 MG tablet Take 1 tablet by mouth Every Night.   Yes Andre Montoya MD   Eliquis 5 MG tablet tablet TAKE 1 TABLET BY MOUTH EVERY 12 (TWELVE) HOURS. INDICATIONS: DVT/PE (ACTIVE THROMBOSIS)  Patient taking differently: Take 1 tablet by mouth Every 12 (Twelve) Hours. FOLLOW MD INSTRUCTIONS FOR STOPPING FOR SURGERY 8/10/23  Yes Delroy Loaiza MD   famotidine (PEPCID) 20 MG tablet Take 1 tablet by mouth 2 (Two) Times a Day Before Meals. 7/14/23  Yes Jayme Cason MD   linezolid (Zyvox) 600 MG tablet Take 1 tablet by mouth 2 (Two) Times a Day for 7 days. 12/22/23 12/29/23 Yes Vic Teague MD   Mektovi 15 MG tablet Take 15 mg by mouth 2 (Two) Times a Day. TAKES 3 PILLS IN AM AND 3 PILLS IN PM 10/2/23  Yes Andre Montoya MD   ondansetron (ZOFRAN) 8 MG tablet Take 1 tablet by mouth Every 8 (Eight) Hours As Needed for Nausea or Vomiting. Pt not taking   Yes Andre Montoya MD   prochlorperazine (COMPAZINE) 10 MG tablet Take 1 tablet by mouth Every 6 (Six) Hours As Needed. 9/21/23  Yes Andre Montoya MD   sodium bicarbonate 650 MG tablet Take 1 tablet by mouth 3 (Three) Times a Day. 12/22/23  Yes Vic Teague MD       Allergies:  No Known Allergies    Objective      Vitals:   Temp:  [98.6 °F (37 °C)-104 °F (40 °C)] 98.6 °F (37 °C)  Heart Rate:  [] 93  Resp:  [20] 20  BP: (122-140)/(69-75) 129/75    Intake/Output Summary (Last 24 hours) at 12/27/2023 1026  Last data filed at 12/27/2023 0615  Gross per 24 hour   Intake 2460 ml   Output --   Net 2460 ml       Physical Exam:   Constitutional: Awake, alert, no acute distress.  HEENT: Sclera anicteric, no conjunctival injection  Neck: Supple, no thyromegaly, no lymphadenopathy, trachea at midline, no JVD  Respiratory: Clear to auscultation bilaterally, nonlabored respiration  Cardiovascular: RRR, no murmurs, no rubs or gallops, no carotid bruit  Gastrointestinal: Positive bowel sounds, abdomen is soft, nontender and nondistended  : No palpable bladder  Musculoskeletal: Left lower extremity edema  Psychiatric: Appropriate affect, cooperative  Neurologic: Oriented x3, moving all extremities, normal speech and mental status  Skin: Warm and dry       Scheduled Meds:     ampicillin, 2 g, Intravenous, Q4H  apixaban, 5 mg, Oral, Q12H  famotidine, 20 mg, Oral, BID AC  Scopolamine, 1 patch, Transdermal, Q72H  senna-docusate sodium, 2 tablet, Oral, BID  sodium bicarbonate, 650 mg, Oral, TID  sodium chloride, 10 mL, Intravenous, Q12H      IV Meds:   sodium chloride, 100 mL/hr, Last Rate: 100 mL/hr (12/27/23 0556)        Results Reviewed:   I have personally reviewed the results from the time of this admission to 12/27/2023 10:26 EST     Lab Results   Component Value Date    GLUCOSE 109 (H) 12/27/2023    CALCIUM 8.1 (L) 12/27/2023     (L) 12/27/2023    K 3.8 12/27/2023    CO2 19.0 (L) 12/27/2023     12/27/2023    BUN 8 12/27/2023    CREATININE 1.45 (H) 12/27/2023    BCR 5.5 (L) 12/27/2023    ANIONGAP 12.0 12/27/2023      Lab Results   Component Value Date    MG 2.0 12/22/2023    PHOS 2.6 12/22/2023    ALBUMIN 3.4 (L) 12/25/2023     US Renal Bilateral    Result Date: 12/26/2023  US RENAL BILATERAL-  Clinical: Acute renal  insufficiency  FINDINGS: The right kidney is 12.9 cm in length and left kidney is 12.1 cm in length. Bilateral ureteral jets documented with limited imaging of the bladder. No intraluminal filling defect.  The right kidney is normal. The left kidney is normal. No calculus or obstructive uropathy.  CONCLUSION: Normal kidneys, bilateral ureteral jets documented.  This report was finalized on 12/26/2023 9:01 PM by Dr. Ar Cartwright M.D on Workstation: [id number]        Assessment / Plan     ASSESSMENT:  Acute kidney injury  secondary to prerenal/ATN due to sepsis, hypovolemia and UTI.  Creatinine trending down with IV hydration urinalysis with RBCs but that could be explained by ureteral stents.  CT scan abdomen pelvis performed on 12/18/2023 showed no hydronephrosis.  Renal ultrasound performed on 12/26/2023 with no hydro  Hypovolemic hyponatremia.  Normal anion gap metabolic acidosis secondary to acute kidney injury and IV normal saline  Iron deficiency anemia.  Will hold on IV iron replacement given possible sepsis  Severe sepsis.  On ampicillin followed by ID.  Patient had recent bacteremia on 12/19/2023 due to Enterococcus faecalis  History of metastatic melanoma follows at [facility] of chemotherapy now due to infection.  Coronavirus 229 ED followed by ID  History of bilateral hydronephrosis secondary to metastatic melanoma status post stent placement  Hematuria: Likely secondary to presence of ureteral stents.      PLAN:  Will continue IV hydration with normal saline at 125 cc an hour  Continue oral sodium bicarbonate  Renally dose antibiotic therapy  Labs in a.m.      Thank you for involving us in the care of King George.  Please feel free to call with any questions.    Amada South MD  12/27/23  10:26 [facility]    [facility]  [phone]    Parts of this note may be an electronic transcription/translation of spoken language to printed text using the Dragon dictation  system.      Electronically signed by Amada South MD at 12/27/23 1584       Kiera Alfonso MD at 12/26/23 8743        Consult Orders    1. Hematology & Oncology Inpatient Consult [[id number]] ordered by Price Banuelos MD at 12/26/23 1410                 Subjective     REASON FOR CONSULTATION:  Provide an opinion on any further workup or treatment on:    Metastatic melanoma                       REQUESTING PHYSICIAN: Miguel Bennett DO    HISTORY OF PRESENT ILLNESS:      King George is a 63 y.o. patient who was admitted on 12/25/2023.  He has metastatic melanoma and is currently followed at the [facility].  He was recently discharged on 12/22/2023 after being admitted for enterococcal septicemia.  He was discharged home on linezolid.  However, he developed fever or chills nausea and vomiting.  He presented to the ER and respiratory viral panel revealed coronavirus 229E.  He was subsequently admitted.    Patient was initially started on immunotherapy with Yervoy and Opdivo.  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  Immunotherapy was discontinued.  Treatment was switched to Braftovi and Mektovi.  Patient had subjective and objective evidence of response to treatment.     Past Medical History:   Diagnosis Date    Abdominal lymphadenopathy 05/29/2023    Acute pulmonary embolism, unspecified pulmonary embolism type, unspecified whether acute cor pulmonale present 05/28/2023    ADMITTED TO [facility], RIGHT    LENIN (acute kidney injury)     Anemia 05/29/2023    Colon polyps     FOLLOWED BY DR. SHANNA MOTTA    Constipation     Drug rash 06/19/2023    DVT (deep venous thrombosis)     LEFT LEG    Hepatic lesion 05/29/2023    History of shortness of breath     FOUND BLOOD CLOT ATTACHED TO LUNG    Left leg swelling     FROM BLOOD CLOT 5/2023    Left lower quadrant abdominal mass 05/28/2023    LEFT INGUINAL BX SHOWED METASTATIC MELANOMA    Melanoma     LEFT SIDE GROIN AREA,  STAGE 4    Metastasis to brain 06/02/2023    Metastatic melanoma 05/31/2023    Moderate Malnutrition (HCC) 05/30/2023    Personal history of venous thrombosis and embolism 06/19/2023       Past Surgical History:   Procedure Laterality Date    COLONOSCOPY N/A 06/02/2023    40 MM TUBULOVILLOUS ADENOMA POLYP IN DISTAL RECTUM, RESCOPE IN 1 YR, DR. SHANNA MOTTA AT [facility]    COLONOSCOPY W/ POLYPECTOMY N/A 06/02/2023    40 MM POLYP IN DISTAL RECTUM, RESECTION AND RETREIVAL COMPLETE, RESCOPE IN 1 YR, DR. SHANNA MOTTA AT [facility]    CYSTOSCOPY W/ URETERAL STENT PLACEMENT Bilateral 07/10/2023    Procedure: CYSTOSCOPY URETERAL CATHETER/STENT INSERTION;  Surgeon: Vic Montelongo Jr., MD;  Location: [facility];  Service: Urology;  Laterality: Bilateral;    CYSTOSCOPY W/ URETERAL STENT PLACEMENT N/A 10/19/2023    Procedure: CYSTOSCOPY WITH BILATERAL STENT EXCHANGE;  Surgeon: Vic Montleongo Jr., MD;  Location: [facility];  Service: Urology;  Laterality: N/A;    PORTACATH PLACEMENT Right 06/02/2023    DONE AT [facility]    US GUIDED LYMPH NODE BIOPSY  05/30/2023    METASTATIC MELANOMA, DONE AT [facility]    VENOUS ACCESS DEVICE (PORT) INSERTION N/A 06/02/2023    Procedure: INFUSAPORT PLACEMENT;  Surgeon: Vicki Layne MD;  Location: [facility];  Service: General;  Laterality: N/A;    VENOUS ACCESS DEVICE (PORT) REMOVAL N/A 12/22/2023    Procedure: REMOVAL VENOUS ACCESS DEVICE;  Surgeon: Jose A Allen Jr., MD;  Location: [facility];  Service: General;  Laterality: N/A;     SCHEDULED MEDS:  ampicillin, 2 g, Intravenous, Q4H  apixaban, 5 mg, Oral, Q12H  famotidine, 20 mg, Oral, BID AC  Scopolamine, 1 patch, Transdermal, Q72H  senna-docusate sodium, 2 tablet, Oral, BID  sodium bicarbonate, 650 mg, Oral, TID  sodium chloride, 10 mL, Intravenous, Q12H      INFUSIONS:  sodium chloride, 100 mL/hr, Last Rate: 100 mL/hr (12/26/23 0530)      ALLERGIES:  No Known Allergies     Social History     Socioeconomic History    Marital  status: Unknown   Tobacco Use    Smoking status: Never     Passive exposure: Never    Smokeless tobacco: Never   Vaping Use    Vaping Use: Unknown   Substance and Sexual Activity    Alcohol use: Never    Drug use: Never    Sexual activity: Defer     Family History   Problem Relation Age of Onset    Breast cancer Mother     Prostate cancer Father     Malig Hyperthermia Neg Hx       REVIEW OF SYSTEMS:   GENERAL: Fever and chills.   SKIN: Negative.  HEME/LYMPH: Enlarged left inguinal lymph node secondary to metastatic cancer, improved.  RESPIRATORY:  Negative.   CVS: Left lower extremity swelling secondary to DVT, improving.   GI: Nausea and vomiting.   :  Negative.   MUSCULOSKELETAL:  Negative.  NEUROLOGICAL:  Negative.    Objective   VITAL SIGNS:  Temp:  [97.9 °F (36.6 °C)-102.9 °F (39.4 °C)] 101.5 °F (38.6 °C)  Heart Rate:  [] 97  Resp:  [18-20] 20  BP: ()/(58-87) 140/70     Wt Readings from Last 3 Encounters:   12/26/23 107 kg (235 lb)   12/22/23 107 kg (235 lb 9.6 oz)   10/12/23 102 kg (223 lb 12.8 oz)       PHYSICAL EXAMINATION:   GENERAL:  The patient appears in fair general condition, not in acute distress.  SKIN: No ecchymosis.  Hyperpigmented skin lesions over the left inguinal area.  HEAD:  Normocephalic.  EYES:  No Jaundice. Pallor.   NECK:  Supple. No Masses.  LYMPHATICS: Multiple enlarged lymph nodes in the left inguinal area.  CHEST: Normal respiratory effort. Lungs clear to auscultation.   CARDIAC:  Normal S1 & S2. No murmur.   ABDOMEN:  Soft. No tenderness. No Hepatomegaly. No Splenomegaly. No masses.  EXTREMITIES: Left leg is larger than the right.  No calf tenderness.  NEUROLOGICAL:  No Focal neurological deficits.     RESULT REVIEW:   Results from last 7 days   Lab Units 12/26/23  0541 12/25/23  2111 12/22/23  0718 12/21/23  0536 12/20/23  0717   WBC 10*3/mm3 10.91* 13.56* 8.28 7.98 11.47*   NEUTROS ABS 10*3/mm3 8.47* 10.36* 6.35 6.02 9.86*   HEMOGLOBIN g/dL 7.9* 8.6* 8.7* 8.9* 9.4*    HEMATOCRIT % 24.7* 26.8* 26.9* 27.3* 27.9*   PLATELETS 10*3/mm3 340 395 198 170 154     Results from last 7 days   Lab Units 12/26/23  0541 12/25/23  2111 12/22/23  0718 12/21/23 2009 12/21/23  0536 12/20/23  0717   SODIUM mmol/L 133* 130* 135*  --  132* 132*   POTASSIUM mmol/L 3.4* 3.6 3.5   < > 3.6 3.8   CHLORIDE mmol/L 101 97* 104  --  105 105   CO2 mmol/L 20.1* 19.0* 18.3*  --  17.5* 14.7*   BUN mg/dL 16 17 17  --  29* 36*   CREATININE mg/dL 1.54* 1.69* 1.39*  --  1.39* 1.80*   CALCIUM mg/dL 7.9* 8.1* 8.5*  --  8.4* 8.3*   ALBUMIN g/dL  --  3.4* 3.1*  --  2.9*  --    BILIRUBIN mg/dL  --  0.3 0.3  --   --   --    ALK PHOS U/L  --  94 54  --   --   --    ALT (SGPT) U/L  --  15 17  --   --   --    AST (SGOT) U/L  --  17 25  --   --   --    MAGNESIUM mg/dL  --   --  2.0  --  2.3  --     < > = values in this interval not displayed.     Results from last 7 days   Lab Units 12/25/23 2111   CRP mg/dL 19.63*     Chest x-ray on 12/25/2023:  There is cardiomegaly. There is no vascular congestion. There is some  chronic scarring at the left lung base. No pneumothorax, pleural  effusion, or acute infiltrate is seen. Right internal jugular vein  Mediport has been removed.     IMPRESSION:  No acute findings.    Assessment & Plan   *Metastatic melanoma, BRAF mutated.  Patient currently follows with the [facility].  Patient presented with brain metastasis.  He was initially treated with Yervoy and Opdivo on 6/14/2023.  He developed generalized skin rash-Duarte-Adam syndrome.  He also had worsening renal function.  Yervoy was discontinued.  He received 1 dose of Opdivo on 7/6/2023.  Treatment was switched to Braftovi and Mektovi.  He had subjective and objective evidence of response.     *Incidental bilateral pulmonary embolism and left lower extremity deep vein thrombosis.  The left lower extremity DVT was due to compression by the bulky lymphadenopathy.  Patient is on Eliquis 5 mg twice daily.     *Coronavirus  229E infection.  Chest x-ray revealed no evidence of pneumonia.  There is chronic scarring at the left lung base.     *Anemia, multifactorial.  12/22/2023: Hemoglobin 8.7.  12/26/2023: Hemoglobin decreased to 7.9.  The anemia is attributed to infection and possible effects from his treatment.    PLAN:    1.  I recommended keeping Braftovi and Mektovi on hold for now.  I recommended tentatively starting back on 12/28/2023 to allow the immune system to fight infection.  2.  Obtain anemia workup.  3.  Continue Eliquis 5 mg twice daily.        Kiera Alfonso MD  12/26/23      Electronically signed by Kiera Alfonso MD at 12/27/23 8136       Prakash Mccormick MD at 12/26/23 1039        Consult Orders    1. Inpatient Infectious Diseases Consult [[id number]] ordered by Dena Tinoco APRN at 12/26/23 0321                 Referring Provider: Miguel Bennett DO      Subjective   History of present illness: This very nice gentleman with a history of metastatic melanoma followed at [facility] recently discharged on 12/22 with enterococcal septicemia.  Please see my notes from that admission for full details.    He did well initially at home and was taking oral linezolid but developed fever, chills and nausea and vomiting prompting readmission.  He denies any known sick contacts or respiratory symptoms.  He tested positive for coronavirus 229E    Physical Exam:   Vital Signs   Temp:  [97.9 °F (36.6 °C)-101.1 °F (38.4 °C)] 99.5 °F (37.5 °C)  Heart Rate:  [] 83  Resp:  [18-20] 18  BP: ()/(58-87) 154/79    GENERAL: Awake and alert, sickly  HEENT: Oropharynx is clear. Hearing is grossly normal.   EYES: . No conjunctival injection. No lid lag.   LUNGS:normal respiratory effort.   SKIN: no cutaneous eruptions in exposed areas  PSYCHIATRIC: Appropriate mood, affect, insight, and judgment.     Results Review:  Wbc 10.9, hgb 7.9, plt 340  Cr 1.54     CXR, independently interpreted: no acute  pna  Microbiology:  COVID/influenza/RSV PCR negative  12/19 blood cultures Enterococcus faecalis  12/20 Ucx >100K enterococcus  12/21 BCx ngtd  12/22 port cx ngtd  12/25 bcx pending  12/25 rpp: coronavirus 229e        A/p  Sepsis  Grupo  coronavirus 229e  Metastatic melanoma, follows at [facility]      Suspect majority of his illness related to virus.  Continue supportive care for non-COVID coronavirus infection  Only 3-5 white blood cells on urinalysis, but we will check a renal ultrasound to make sure there is no hydronephrosis given his stenting and acute kidney injury.  Was completing course of oral Linezolid for enterococcal septicemia.  Now back on IV antibiotics.  Discontinue Unasyn.  Start ampicillin.      Thank you for this consult.  We will continue to follow along and tailor antibiotics as the patient's clinical course evolves.              Electronically signed by Prakash Mccormick MD at 12/26/23 3473

## 2023-12-27 NOTE — PROGRESS NOTES
[facility]     LOS: 0 days     Subjective:    CC:Dysuria and Fever    DIET:  Diet Order   Procedures    Diet: Cardiac Diets; Healthy Heart (2-3 Na+); Texture: Regular Texture (IDDSI 7); Fluid Consistency: Thin (IDDSI 0)       Objective:    Vital Signs:  Temp:  [98.6 °F (37 °C)-104 °F (40 °C)] 99.7 °F (37.6 °C)  Heart Rate:  [] 84  Resp:  [20] 20  BP: (122-150)/(69-86) 150/86    SpO2:  [97 %-100 %] 100 %  on   ;   Device (Oxygen Therapy): room air  Body mass index is 32.78 kg/m².    Physical Exam  Constitutional:       Appearance: Normal appearance.   HENT:      Head: Normocephalic and atraumatic.   Cardiovascular:      Rate and Rhythm: Normal rate and regular rhythm.      Heart sounds: No murmur heard.     No friction rub.   Pulmonary:      Effort: Pulmonary effort is normal.      Breath sounds: Normal breath sounds.   Abdominal:      General: Bowel sounds are normal. There is no distension.      Palpations: Abdomen is soft.      Tenderness: There is no abdominal tenderness.   Musculoskeletal:      Right lower leg: Edema present.      Left lower leg: Edema present.   Skin:     General: Skin is warm and dry.   Neurological:      General: No focal deficit present.      Mental Status: He is alert.   Psychiatric:         Mood and Affect: Mood normal.         Behavior: Behavior normal.         Results Review:    Glucose   Date Value Ref Range Status   12/27/2023 109 (H) 65 - 99 mg/dL Final   12/26/2023 124 (H) 65 - 99 mg/dL Final   12/25/2023 141 (H) 65 - 99 mg/dL Final     Results from last 7 days   Lab Units 12/27/23  0500   WBC 10*3/mm3 9.37   HEMOGLOBIN g/dL 8.8*   HEMATOCRIT % 26.6*   PLATELETS 10*3/mm3 372     Results from last 7 days   Lab Units 12/27/23  0919 12/26/23  0541 12/25/23  2111   SODIUM mmol/L 132*   < > 130*   POTASSIUM mmol/L 3.8   < > 3.6   CHLORIDE mmol/L 101   < > 97*   CO2 mmol/L 19.0*   < > 19.0*   BUN mg/dL 8   < > 17   CREATININE mg/dL 1.45*   < > 1.69*    CALCIUM mg/dL 8.1*   < > 8.1*   BILIRUBIN mg/dL  --   --  0.3   ALK PHOS U/L  --   --  94   ALT (SGPT) U/L  --   --  15   AST (SGOT) U/L  --   --  17   GLUCOSE mg/dL 109*   < > 141*    < > = values in this interval not displayed.         Results from last 7 days   Lab Units 12/22/23  0718   MAGNESIUM mg/dL 2.0         Cultures:  Blood Culture   Date Value Ref Range Status   12/25/2023 No growth at 24 hours  Preliminary   12/25/2023 No growth at 24 hours  Preliminary   12/21/2023 No growth at 5 days  Final   12/21/2023 No growth at 5 days  Final       I have reviewed daily medications and changes in CPOE    Scheduled meds  ampicillin, 2 g, Intravenous, Q4H  apixaban, 5 mg, Oral, Q12H  famotidine, 20 mg, Oral, BID AC  Scopolamine, 1 patch, Transdermal, Q72H  senna-docusate sodium, 2 tablet, Oral, BID  sodium bicarbonate, 650 mg, Oral, TID  sodium chloride, 10 mL, Intravenous, Q12H        sodium chloride, 125 mL/hr, Last Rate: 125 mL/hr (12/27/23 1703)      PRN meds    acetaminophen **OR** acetaminophen **OR** acetaminophen    senna-docusate sodium **AND** polyethylene glycol **AND** bisacodyl **AND** bisacodyl    ipratropium-albuterol    nitroglycerin    ondansetron    ondansetron    prochlorperazine    sodium chloride    sodium chloride    sodium chloride        Coronavirus infection    Anemia    Metastatic melanoma    Hydroureteronephrosis    Viral sepsis    Bacteremia due to Enterococcus    Chronic anticoagulation    Acute renal insufficiency    Fever        Assessment/Plan:  63-year-old gentleman with corona virus sepsis with a recent enterococcal bacteremia    Viral sepsis secondary to coronavirus  -Supportive care  -IV fluid  -Still having fevers  -Hold on braftovi and Mektovi    Enterococcal bacteremia for which the patient remains on ampicillin per infectious disease recommendation  -Patient was admitted to our service for this and discharged on 1222 on Zyvox    Acute kidney injury  -Continue with IV  fluids  -Nephrology following    Chronic obstructive uropathy/metastatic melanoma    History of DVT PE-continue with Eliquis    Chronic anemia          Price Banuelos MD  12/27/23  18:31 EST

## 2023-12-27 NOTE — CONSULTS
[facility] Consult Note      Patient Name: King George  : 1960  MRN: [id number]  Primary Care Physician:  System, Provider Not In  Referring Physician: Miguel Bennett DO  Date of admission: 2023    Subjective     Reason for Consult:  LENIN     HPI:   King George is a 63 y.o. 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  The patient was discharged home on Zyvox.  His hospital course at that time was complicated by acute kidney injury with creatinine was up to 2 mg/dL from a normal baseline.  Etiology of his acute kidney injury was thought to be secondary to hypovolemia and patient was hydrated.  CT scan of the abdomen performed on 2020 showed no hydronephrosis.  Creatinine trended down to 1.4 with IV hydration.  -He was readmitted again on 2023 with fever and dysuria that was associated with nausea, diarrhea, dry cough and a fever 103 at home.  -His creatinine was 1.69 checked on 2023 improved to 1.45 today after IV hydration.  Patient tested positive for coronavirus 229e  -Currently is doing well overall.  He continues to be febrile.            review of Systems:   14 point review of systems is otherwise negative except for mentioned above on HPI    Personal History     Past Medical History:   Diagnosis Date    Abdominal lymphadenopathy 2023    Acute pulmonary embolism, unspecified pulmonary embolism type, unspecified whether acute cor pulmonale present 2023    ADMITTED TO [facility], RIGHT    LENIN (acute kidney injury)     Anemia 2023    Colon polyps     FOLLOWED  BY DR. SHANNA MOTTA    Constipation     Drug rash 06/19/2023    DVT (deep venous thrombosis)     LEFT LEG    Hepatic lesion 05/29/2023    History of shortness of breath     FOUND BLOOD CLOT ATTACHED TO LUNG    Left leg swelling     FROM BLOOD CLOT 5/2023    Left lower quadrant abdominal mass 05/28/2023    LEFT INGUINAL BX SHOWED METASTATIC MELANOMA    Melanoma     LEFT SIDE GROIN AREA, STAGE 4    Metastasis to brain 06/02/2023    Metastatic melanoma 05/31/2023    Moderate Malnutrition (HCC) 05/30/2023    Personal history of venous thrombosis and embolism 06/19/2023       Past Surgical History:   Procedure Laterality Date    COLONOSCOPY N/A 06/02/2023    40 MM TUBULOVILLOUS ADENOMA POLYP IN DISTAL RECTUM, RESCOPE IN 1 YR, DR. SHANNA MOTTA AT [facility]    COLONOSCOPY W/ POLYPECTOMY N/A 06/02/2023    40 MM POLYP IN DISTAL RECTUM, RESECTION AND RETREIVAL COMPLETE, RESCOPE IN 1 YR, DR. SHANNA MOTTA AT [facility]    CYSTOSCOPY W/ URETERAL STENT PLACEMENT Bilateral 07/10/2023    Procedure: CYSTOSCOPY URETERAL CATHETER/STENT INSERTION;  Surgeon: Vic Montelongo Jr., MD;  Location: [facility];  Service: Urology;  Laterality: Bilateral;    CYSTOSCOPY W/ URETERAL STENT PLACEMENT N/A 10/19/2023    Procedure: CYSTOSCOPY WITH BILATERAL STENT EXCHANGE;  Surgeon: Vic Montelongo Jr., MD;  Location: [facility];  Service: Urology;  Laterality: N/A;    PORTACATH PLACEMENT Right 06/02/2023    DONE AT [facility]    US GUIDED LYMPH NODE BIOPSY  05/30/2023    METASTATIC MELANOMA, DONE AT [facility]    VENOUS ACCESS DEVICE (PORT) INSERTION N/A 06/02/2023    Procedure: INFUSAPORT PLACEMENT;  Surgeon: Vicki Layne MD;  Location: [facility];  Service: General;  Laterality: N/A;    VENOUS ACCESS DEVICE (PORT) REMOVAL N/A 12/22/2023    Procedure: REMOVAL VENOUS ACCESS DEVICE;  Surgeon: Jose A Allen Jr., MD;  Location: [facility];  Service: General;  Laterality: N/A;       Family History: family history includes Breast cancer in his  mother; Prostate cancer in his father.    Social History:  reports that he has never smoked. He has never been exposed to tobacco smoke. He has never used smokeless tobacco. He reports that he does not drink alcohol and does not use drugs.    Home Medications:  Prior to Admission medications    Medication Sig Start Date End Date Taking? Authorizing Provider   Braftovi 75 MG capsule Take 6 capsules by mouth Every Night. 10/2/23  Yes Andre Montoya MD   dexAMETHasone (DECADRON) 0.5 MG tablet Take 2 tablets by mouth Daily With Breakfast.   Yes Andre Montoya MD   dexAMETHasone (DECADRON) 0.5 MG tablet Take 1 tablet by mouth Every Night.   Yes Andre Montoya MD   Eliquis 5 MG tablet tablet TAKE 1 TABLET BY MOUTH EVERY 12 (TWELVE) HOURS. INDICATIONS: DVT/PE (ACTIVE THROMBOSIS)  Patient taking differently: Take 1 tablet by mouth Every 12 (Twelve) Hours. FOLLOW MD INSTRUCTIONS FOR STOPPING FOR SURGERY 8/10/23  Yes Delroy Loaiza MD   famotidine (PEPCID) 20 MG tablet Take 1 tablet by mouth 2 (Two) Times a Day Before Meals. 7/14/23  Yes Jayme Cason MD   linezolid (Zyvox) 600 MG tablet Take 1 tablet by mouth 2 (Two) Times a Day for 7 days. 12/22/23 12/29/23 Yes Vic Teague MD   Mektovi 15 MG tablet Take 15 mg by mouth 2 (Two) Times a Day. TAKES 3 PILLS IN AM AND 3 PILLS IN PM 10/2/23  Yes Andre Montoya MD   ondansetron (ZOFRAN) 8 MG tablet Take 1 tablet by mouth Every 8 (Eight) Hours As Needed for Nausea or Vomiting. Pt not taking   Yes Andre Montoya MD   prochlorperazine (COMPAZINE) 10 MG tablet Take 1 tablet by mouth Every 6 (Six) Hours As Needed. 9/21/23  Yes Andre Montoya MD   sodium bicarbonate 650 MG tablet Take 1 tablet by mouth 3 (Three) Times a Day. 12/22/23  Yes Vic Teague MD       Allergies:  No Known Allergies    Objective     Vitals:   Temp:  [98.6 °F (37 °C)-104 °F (40 °C)] 98.6 °F (37 °C)  Heart Rate:  [] 93  Resp:  [20] 20  BP:  (122-140)/(69-75) 129/75    Intake/Output Summary (Last 24 hours) at 12/27/2023 1026  Last data filed at 12/27/2023 0615  Gross per 24 hour   Intake 2460 ml   Output --   Net 2460 ml       Physical Exam:   Constitutional: Awake, alert, no acute distress.  HEENT: Sclera anicteric, no conjunctival injection  Neck: Supple, no thyromegaly, no lymphadenopathy, trachea at midline, no JVD  Respiratory: Clear to auscultation bilaterally, nonlabored respiration  Cardiovascular: RRR, no murmurs, no rubs or gallops, no carotid bruit  Gastrointestinal: Positive bowel sounds, abdomen is soft, nontender and nondistended  : No palpable bladder  Musculoskeletal: Left lower extremity edema  Psychiatric: Appropriate affect, cooperative  Neurologic: Oriented x3, moving all extremities, normal speech and mental status  Skin: Warm and dry       Scheduled Meds:     ampicillin, 2 g, Intravenous, Q4H  apixaban, 5 mg, Oral, Q12H  famotidine, 20 mg, Oral, BID AC  Scopolamine, 1 patch, Transdermal, Q72H  senna-docusate sodium, 2 tablet, Oral, BID  sodium bicarbonate, 650 mg, Oral, TID  sodium chloride, 10 mL, Intravenous, Q12H      IV Meds:   sodium chloride, 100 mL/hr, Last Rate: 100 mL/hr (12/27/23 0556)        Results Reviewed:   I have personally reviewed the results from the time of this admission to 12/27/2023 10:26 EST     Lab Results   Component Value Date    GLUCOSE 109 (H) 12/27/2023    CALCIUM 8.1 (L) 12/27/2023     (L) 12/27/2023    K 3.8 12/27/2023    CO2 19.0 (L) 12/27/2023     12/27/2023    BUN 8 12/27/2023    CREATININE 1.45 (H) 12/27/2023    BCR 5.5 (L) 12/27/2023    ANIONGAP 12.0 12/27/2023      Lab Results   Component Value Date    MG 2.0 12/22/2023    PHOS 2.6 12/22/2023    ALBUMIN 3.4 (L) 12/25/2023     US Renal Bilateral    Result Date: 12/26/2023  US RENAL BILATERAL-  Clinical: Acute renal insufficiency  FINDINGS: The right kidney is 12.9 cm in length and left kidney is 12.1 cm in length. Bilateral  ureteral jets documented with limited imaging of the bladder. No intraluminal filling defect.  The right kidney is normal. The left kidney is normal. No calculus or obstructive uropathy.  CONCLUSION: Normal kidneys, bilateral ureteral jets documented.  This report was finalized on 12/26/2023 9:01 PM by Dr. Ar Cartwright M.D on Workstation: [id number]        Assessment / Plan     ASSESSMENT:  Acute kidney injury  secondary to prerenal/ATN due to sepsis, hypovolemia and UTI.  Creatinine trending down with IV hydration urinalysis with RBCs but that could be explained by ureteral stents.  CT scan abdomen pelvis performed on 12/18/2023 showed no hydronephrosis.  Renal ultrasound performed on 12/26/2023 with no hydro  Hypovolemic hyponatremia.  Normal anion gap metabolic acidosis secondary to acute kidney injury and IV normal saline  Iron deficiency anemia.  Will hold on IV iron replacement given possible sepsis  Severe sepsis.  On ampicillin followed by ID.  Patient had recent bacteremia on 12/19/2023 due to Enterococcus faecalis  History of metastatic melanoma follows at [facility] of chemotherapy now due to infection.  Coronavirus 229 ED followed by ID  History of bilateral hydronephrosis secondary to metastatic melanoma status post stent placement  Hematuria: Likely secondary to presence of ureteral stents.      PLAN:  Will continue IV hydration with normal saline at 125 cc an hour  Continue oral sodium bicarbonate  Renally dose antibiotic therapy  Labs in a.m.      Thank you for involving us in the care of King George.  Please feel free to call with any questions.    Amada South MD  12/27/23  10:26 [facility]    [facility]  [phone]    Parts of this note may be an electronic transcription/translation of spoken language to printed text using the Dragon dictation system.

## 2023-12-27 NOTE — PLAN OF CARE
Goal Outcome Evaluation:  Plan of Care Reviewed With: patient        Progress: improving  Outcome Evaluation: Patient has been alert and oriented. No complaints of pain. Up ad antonio. Tolerating IVF and IV abt well. No adverse reactions noted. Taking fluids well. Voiding well. Patient has been febrile this shift, but responded well with Tylenol. Vital signs stable. Call light in reach. Safety maintained.

## 2023-12-27 NOTE — PROGRESS NOTES
ID note for sepsis  Subjective: Tmax of 104 overnight.  Currently afebrile.  He says he is actually feeling quite a bit better today.  Physical Exam:   Vital Signs   Temp:  [98.6 °F (37 °C)-104 °F (40 °C)] 98.6 °F (37 °C)  Heart Rate:  [] 93  Resp:  [20] 20  BP: (122-140)/(69-75) 129/75    GENERAL: Awake and alert, sickly  HEENT: Oropharynx is clear. Hearing is grossly normal.   EYES: . No conjunctival injection. No lid lag.   LUNGS:normal respiratory effort.   SKIN: no cutaneous eruptions in exposed areas  PSYCHIATRIC: Appropriate mood, affect, insight, and judgment.     Results Review:  White count 9.37, hemoglobin 8.8  Creatinine 1.54  Microbiology:  COVID/influenza/RSV PCR negative  12/19 blood cultures Enterococcus faecalis  12/20 Ucx >100K enterococcus  12/21 BCx ngtd  12/22 port cx ngtd  12/25 bcx no growth to date  12/25 rpp: coronavirus 229e        A/p  Sepsis  Grupo  coronavirus 229e  Metastatic melanoma, follows at [facility]      Continue renally dosed ampicillin.  Follow-up blood cultures.  Renal ultrasound was negative for hydronephrosis or other abnormalities.  Continue supportive care for coronavirus infection.  Discussed with patient that this is not COVID but a different virus  Discussed with Dr. Banuelos regarding chemo.  Oncology has since evaluated and holding chemo.  Greatly appreciate their help.

## 2023-12-27 NOTE — PROGRESS NOTES
Subjective     CHIEF COMPLAINT:     Metastatic melanoma  Coronavirus infection    INTERVAL HISTORY:     Patient developed fever with temperature up to 104 this a.m. he started to feel better afterwards.    REVIEW OF SYSTEMS:  Pertinent ROS as in the interval history. Otherwise, negative.    SCHEDULED MEDS:  ampicillin, 2 g, Intravenous, Q4H  apixaban, 5 mg, Oral, Q12H  famotidine, 20 mg, Oral, BID AC  Scopolamine, 1 patch, Transdermal, Q72H  senna-docusate sodium, 2 tablet, Oral, BID  sodium bicarbonate, 650 mg, Oral, TID  sodium chloride, 10 mL, Intravenous, Q12H      INFUSIONS:  sodium chloride, 125 mL/hr, Last Rate: 100 mL/hr (12/27/23 0556)      Objective   VITAL SIGNS:  Temp:  [98.6 °F (37 °C)-104 °F (40 °C)] 98.6 °F (37 °C)  Heart Rate:  [] 93  Resp:  [20] 20  BP: (122-140)/(69-75) 129/75     PHYSICAL EXAMINATION:  GENERAL:  The patient appears in fair general condition, not in acute distress.  SKIN: Pigmented skin lesions over the left inguinal area.   EYES:  No Jaundice. No Pallor.   CHEST: Normal respiratory effort. Lungs clear to auscultation.   CARDIAC:  Normal S1 & S2. No murmur.   ABDOMEN:  Soft. No tenderness. No Hepatomegaly. No Splenomegaly.   EXTREMITIES: Left leg is larger than the right.    RESULT REVIEW:   Results from last 7 days   Lab Units 12/27/23  0500 12/26/23  0541 12/25/23 2111 12/22/23 0718 12/21/23  0536   WBC 10*3/mm3 9.37 10.91* 13.56* 8.28 7.98   NEUTROS ABS 10*3/mm3 7.50* 8.47* 10.36* 6.35 6.02   HEMOGLOBIN g/dL 8.8* 7.9* 8.6* 8.7* 8.9*   HEMATOCRIT % 26.6* 24.7* 26.8* 26.9* 27.3*   PLATELETS 10*3/mm3 372 340 395 198 170     Results from last 7 days   Lab Units 12/27/23  0919 12/26/23  0541 12/25/23 2111 12/22/23 0718 12/21/23 2009 12/21/23  0536   SODIUM mmol/L 132* 133* 130* 135*  --  132*   POTASSIUM mmol/L 3.8 3.4* 3.6 3.5 3.9 3.6   CHLORIDE mmol/L 101 101 97* 104  --  105   CO2 mmol/L 19.0* 20.1* 19.0* 18.3*  --  17.5*   BUN mg/dL 8 16 17 17  --  29*   CREATININE  mg/dL 1.45* 1.54* 1.69* 1.39*  --  1.39*   CALCIUM mg/dL 8.1* 7.9* 8.1* 8.5*  --  8.4*   ALBUMIN g/dL  --   --  3.4* 3.1*  --  2.9*   BILIRUBIN mg/dL  --   --  0.3 0.3  --   --    ALK PHOS U/L  --   --  94 54  --   --    ALT (SGPT) U/L  --   --  15 17  --   --    AST (SGOT) U/L  --   --  17 25  --   --    MAGNESIUM mg/dL  --   --   --  2.0  --  2.3         Lab 12/26/23  1900   IRON 15*   IRON SATURATION (TSAT) 6*   TIBC 249*   TRANSFERRIN 167*   FERRITIN 264.00   FOLATE 9.51   VITAMIN B 12 573        Assessment & Plan   *Metastatic melanoma, BRAF mutated.  Patient currently follows with the [facility].  Patient presented with brain metastasis.  He was initially treated with Yervoy and Opdivo on 6/14/2023.  He developed generalized skin rash-Duarte-Adam syndrome.  He also had worsening renal function.  Yervoy was discontinued.  He received 1 dose of Opdivo on 7/6/2023.  Treatment was switched to Braftovi and Mektovi.  He had subjective and objective evidence of response.     *Incidental bilateral pulmonary embolism and left lower extremity deep vein thrombosis.  The left lower extremity DVT was due to compression by the bulky lymphadenopathy.  Patient is on Eliquis 5 mg twice daily.     *Coronavirus 229E infection.  Chest x-ray revealed no evidence of pneumonia.  There is chronic scarring at the left lung base.     *Anemia, multifactorial.  12/22/2023: Hemoglobin 8.7.  12/26/2023: Hemoglobin decreased to 7.9.  The anemia is attributed to infection and possible effects from his treatment.  12/26/2023: Ferritin 264.  Transferrin saturation 6%.  Vitamin B12 573.  Folate 9.5.  12/27/2023: Hemoglobin 8.8.     PLAN:     1.  Keep Braftovi and Mektovi on hold for now.  2.  Continue Eliquis 5 mg twice daily.  3.  Consider starting oral iron when status improves.        Kiera Alfonso MD  12/27/23

## 2023-12-27 NOTE — PLAN OF CARE
Goal Outcome Evaluation:   Vital signs stable, still having intermittent fevers, receiving tylenol PRN.  Still on IV fluid, rate adjusted today.  Still on IV antibiotic.  Up independently.  Room air throughout shift. To be transferred to [facility].    Possible discharge tomorrow.

## 2023-12-28 ENCOUNTER — APPOINTMENT (OUTPATIENT)
Dept: GENERAL RADIOLOGY | Facility: HOSPITAL | Age: 63
DRG: 871 | End: 2023-12-28
Payer: COMMERCIAL

## 2023-12-28 LAB
ALBUMIN SERPL-MCNC: 2.9 G/DL (ref 3.5–5.2)
ANION GAP SERPL CALCULATED.3IONS-SCNC: 11.3 MMOL/L (ref 5–15)
BASOPHILS # BLD AUTO: 0.02 10*3/MM3 (ref 0–0.2)
BASOPHILS NFR BLD AUTO: 0.2 % (ref 0–1.5)
BUN SERPL-MCNC: 8 MG/DL (ref 8–23)
BUN/CREAT SERPL: 5.1 (ref 7–25)
CALCIUM SPEC-SCNC: 8.1 MG/DL (ref 8.6–10.5)
CHLORIDE SERPL-SCNC: 100 MMOL/L (ref 98–107)
CO2 SERPL-SCNC: 20.7 MMOL/L (ref 22–29)
CREAT SERPL-MCNC: 1.57 MG/DL (ref 0.76–1.27)
DEPRECATED RDW RBC AUTO: 45.4 FL (ref 37–54)
EGFRCR SERPLBLD CKD-EPI 2021: 49.8 ML/MIN/1.73
EOSINOPHIL # BLD AUTO: 0.2 10*3/MM3 (ref 0–0.4)
EOSINOPHIL NFR BLD AUTO: 2.3 % (ref 0.3–6.2)
ERYTHROCYTE [DISTWIDTH] IN BLOOD BY AUTOMATED COUNT: 14.8 % (ref 12.3–15.4)
GLUCOSE SERPL-MCNC: 110 MG/DL (ref 65–99)
HCT VFR BLD AUTO: 24.3 % (ref 37.5–51)
HGB BLD-MCNC: 8.3 G/DL (ref 13–17.7)
IMM GRANULOCYTES # BLD AUTO: 0.04 10*3/MM3 (ref 0–0.05)
IMM GRANULOCYTES NFR BLD AUTO: 0.5 % (ref 0–0.5)
LYMPHOCYTES # BLD AUTO: 0.83 10*3/MM3 (ref 0.7–3.1)
LYMPHOCYTES NFR BLD AUTO: 9.5 % (ref 19.6–45.3)
MCH RBC QN AUTO: 28.6 PG (ref 26.6–33)
MCHC RBC AUTO-ENTMCNC: 34.2 G/DL (ref 31.5–35.7)
MCV RBC AUTO: 83.8 FL (ref 79–97)
MONOCYTES # BLD AUTO: 0.55 10*3/MM3 (ref 0.1–0.9)
MONOCYTES NFR BLD AUTO: 6.3 % (ref 5–12)
NEUTROPHILS NFR BLD AUTO: 7.11 10*3/MM3 (ref 1.7–7)
NEUTROPHILS NFR BLD AUTO: 81.2 % (ref 42.7–76)
NRBC BLD AUTO-RTO: 0 /100 WBC (ref 0–0.2)
PHOSPHATE SERPL-MCNC: 2.6 MG/DL (ref 2.5–4.5)
PLATELET # BLD AUTO: 347 10*3/MM3 (ref 140–450)
PMV BLD AUTO: 9.1 FL (ref 6–12)
POTASSIUM SERPL-SCNC: 3.7 MMOL/L (ref 3.5–5.2)
PROCALCITONIN SERPL-MCNC: 0.68 NG/ML (ref 0–0.25)
RBC # BLD AUTO: 2.9 10*6/MM3 (ref 4.14–5.8)
SODIUM SERPL-SCNC: 132 MMOL/L (ref 136–145)
SODIUM UR-SCNC: 66 MMOL/L
WBC NRBC COR # BLD AUTO: 8.75 10*3/MM3 (ref 3.4–10.8)

## 2023-12-28 PROCEDURE — 25810000003 SODIUM CHLORIDE 0.9 % SOLUTION: Performed by: HOSPITALIST

## 2023-12-28 PROCEDURE — 99232 SBSQ HOSP IP/OBS MODERATE 35: CPT | Performed by: INTERNAL MEDICINE

## 2023-12-28 PROCEDURE — 71046 X-RAY EXAM CHEST 2 VIEWS: CPT

## 2023-12-28 PROCEDURE — 84300 ASSAY OF URINE SODIUM: CPT | Performed by: HOSPITALIST

## 2023-12-28 PROCEDURE — 25010000002 AMPICILLIN PER 500 MG: Performed by: INTERNAL MEDICINE

## 2023-12-28 PROCEDURE — 80069 RENAL FUNCTION PANEL: CPT | Performed by: HOSPITALIST

## 2023-12-28 PROCEDURE — 85025 COMPLETE CBC W/AUTO DIFF WBC: CPT | Performed by: HOSPITALIST

## 2023-12-28 PROCEDURE — 84145 PROCALCITONIN (PCT): CPT | Performed by: INTERNAL MEDICINE

## 2023-12-28 RX ORDER — FERROUS SULFATE 325(65) MG
325 TABLET ORAL DAILY
Status: DISCONTINUED | OUTPATIENT
Start: 2023-12-29 | End: 2023-12-29 | Stop reason: HOSPADM

## 2023-12-28 RX ORDER — FLUTICASONE PROPIONATE 50 MCG
2 SPRAY, SUSPENSION (ML) NASAL DAILY
Status: DISCONTINUED | OUTPATIENT
Start: 2023-12-28 | End: 2023-12-29 | Stop reason: HOSPADM

## 2023-12-28 RX ADMIN — SODIUM BICARBONATE 650 MG: 650 TABLET ORAL at 09:20

## 2023-12-28 RX ADMIN — FLUTICASONE PROPIONATE 2 SPRAY: 50 SPRAY, METERED NASAL at 18:17

## 2023-12-28 RX ADMIN — FAMOTIDINE 20 MG: 20 TABLET, FILM COATED ORAL at 17:14

## 2023-12-28 RX ADMIN — AMPICILLIN SODIUM 2 G: 2 INJECTION, POWDER, FOR SOLUTION INTRAMUSCULAR; INTRAVENOUS at 17:14

## 2023-12-28 RX ADMIN — Medication 10 ML: at 20:24

## 2023-12-28 RX ADMIN — APIXABAN 5 MG: 5 TABLET, FILM COATED ORAL at 08:29

## 2023-12-28 RX ADMIN — AMPICILLIN SODIUM 2 G: 2 INJECTION, POWDER, FOR SOLUTION INTRAMUSCULAR; INTRAVENOUS at 20:24

## 2023-12-28 RX ADMIN — FAMOTIDINE 20 MG: 20 TABLET, FILM COATED ORAL at 08:29

## 2023-12-28 RX ADMIN — SODIUM BICARBONATE 650 MG: 650 TABLET ORAL at 20:24

## 2023-12-28 RX ADMIN — AMPICILLIN SODIUM 2 G: 2 INJECTION, POWDER, FOR SOLUTION INTRAMUSCULAR; INTRAVENOUS at 00:37

## 2023-12-28 RX ADMIN — SODIUM CHLORIDE 125 ML/HR: 9 INJECTION, SOLUTION INTRAVENOUS at 16:14

## 2023-12-28 RX ADMIN — AMPICILLIN SODIUM 2 G: 2 INJECTION, POWDER, FOR SOLUTION INTRAMUSCULAR; INTRAVENOUS at 04:02

## 2023-12-28 RX ADMIN — AMPICILLIN SODIUM 2 G: 2 INJECTION, POWDER, FOR SOLUTION INTRAMUSCULAR; INTRAVENOUS at 08:29

## 2023-12-28 RX ADMIN — APIXABAN 5 MG: 5 TABLET, FILM COATED ORAL at 20:24

## 2023-12-28 RX ADMIN — AMPICILLIN SODIUM 2 G: 2 INJECTION, POWDER, FOR SOLUTION INTRAMUSCULAR; INTRAVENOUS at 13:38

## 2023-12-28 RX ADMIN — Medication 10 ML: at 09:21

## 2023-12-28 RX ADMIN — ACETAMINOPHEN 650 MG: 325 TABLET, FILM COATED ORAL at 00:37

## 2023-12-28 RX ADMIN — ACETAMINOPHEN 650 MG: 325 TABLET, FILM COATED ORAL at 08:29

## 2023-12-28 RX ADMIN — SODIUM BICARBONATE 650 MG: 650 TABLET ORAL at 17:14

## 2023-12-28 NOTE — PROGRESS NOTES
[facility] Progress Note      Patient Name: Knig George  : 1960  MRN: [id number]  Primary Care Physician:  System, Provider Not In  Date of admission: 2023    Subjective     Interval History:   The patient was seen and examined today for follow-up on LENIN   Patient febrile.  Denies any nausea or vomiting.  Appetite is marginal.  Denies any chest pain or shortness of breath  Review of Systems:   As noted above    Objective     Vitals:   Temp:  [97.9 °F (36.6 °C)-103.1 °F (39.5 °C)] 97.9 °F (36.6 °C)  Heart Rate:  [78-97] 96  Resp:  [18] 18  BP: ()/(55-78) 118/71    Intake/Output Summary (Last 24 hours) at 2023 1337  Last data filed at 2023 2237  Gross per 24 hour   Intake 240 ml   Output --   Net 240 ml       Physical Exam:    General Appearance: alert, oriented x 3, no acute distress   Skin: warm and dry  HEENT: oral mucosa normal, nonicteric sclera  Neck: supple, no JVD  Lungs: CTA  Heart: RRR, normal S1 and S2  Abdomen: soft, nontender, nondistended  : no palpable bladder  Extremities: no edema, cyanosis or clubbing.  Multiple purple nodular lesion around the left groin.  Neuro: normal speech and mental status     Scheduled Meds:     ampicillin, 2 g, Intravenous, Q4H  apixaban, 5 mg, Oral, Q12H  famotidine, 20 mg, Oral, BID AC  Scopolamine, 1 patch, Transdermal, Q72H  senna-docusate sodium, 2 tablet, Oral, BID  sodium bicarbonate, 650 mg, Oral, TID  sodium chloride, 10 mL, Intravenous, Q12H      IV Meds:   sodium chloride, 125 mL/hr, Last Rate: 125 mL/hr (23 1703)        Results Reviewed:   I have personally reviewed the results from the time of this admission to 2023 13:37 EST     Results from last 7 days   Lab Units 23  0617 23  0919 23  0541 23  2111 23  0718   SODIUM mmol/L 132* 132* 133* 130* 135*   POTASSIUM mmol/L 3.7 3.8 3.4* 3.6 3.5   CHLORIDE mmol/L 100 101 101 97* 104   CO2 mmol/L 20.7* 19.0*  20.1* 19.0* 18.3*   BUN mg/dL 8 8 16 17 17   CREATININE mg/dL 1.57* 1.45* 1.54* 1.69* 1.39*   CALCIUM mg/dL 8.1* 8.1* 7.9* 8.1* 8.5*   BILIRUBIN mg/dL  --   --   --  0.3 0.3   ALK PHOS U/L  --   --   --  94 54   ALT (SGPT) U/L  --   --   --  15 17   AST (SGOT) U/L  --   --   --  17 25   GLUCOSE mg/dL 110* 109* 124* 141* 96       Estimated Creatinine Clearance: 61.5 mL/min (A) (by C-G formula based on SCr of 1.57 mg/dL (H)).    Results from last 7 days   Lab Units 12/28/23  0617 12/22/23  0718   MAGNESIUM mg/dL  --  2.0   PHOSPHORUS mg/dL 2.6 2.6             Results from last 7 days   Lab Units 12/28/23  0617 12/27/23  0500 12/26/23  0541 12/25/23  2111 12/22/23  0718   WBC 10*3/mm3 8.75 9.37 10.91* 13.56* 8.28   HEMOGLOBIN g/dL 8.3* 8.8* 7.9* 8.6* 8.7*   PLATELETS 10*3/mm3 347 372 340 395 198             Assessment / Plan     ASSESSMENT:  Acute kidney injury  secondary to prerenal/ATN due to sepsis, hypovolemia and UTI.  Creatinine trending down with IV hydration urinalysis with RBCs but that could be explained by ureteral stents.  CT scan abdomen pelvis performed on 12/18/2023 showed no hydronephrosis.  Renal ultrasound performed on 12/26/2023 with no hydro  Hypovolemic hyponatremia.  Normal anion gap metabolic acidosis secondary to acute kidney injury and IV normal saline  Iron deficiency anemia.  Will hold on IV iron replacement given possible sepsis  Severe sepsis.  On ampicillin followed by ID.  Patient had recent bacteremia on 12/19/2023 due to Enterococcus faecalis  History of metastatic melanoma follows at [facility] of chemotherapy now due to infection.  Coronavirus 229 ED followed by ID  History of bilateral hydronephrosis secondary to metastatic melanoma status post stent placement  Hematuria: Likely secondary to presence of ureteral stents.        PLAN:  Creatinine relatively stable.  Will continue current IV hydration with normal saline  Labs in a.m.      Thank you for involving us in the care  of King George.  Please feel free to call with any questions.    Amada South MD  12/28/23  13:37 EST    [facility]  [phone]    Parts of this note may be an electronic transcription/translation of spoken language to printed text using the Dragon dictation system.

## 2023-12-28 NOTE — PROGRESS NOTES
Subjective     CHIEF COMPLAINT:     Metastatic melanoma  Coronavirus infection    INTERVAL HISTORY:     Patient had low-grade fever this morning.  Overall, he is feeling better.  No cough.    REVIEW OF SYSTEMS:  Pertinent ROS as in the interval history. Otherwise, negative.    SCHEDULED MEDS:  ampicillin, 2 g, Intravenous, Q4H  apixaban, 5 mg, Oral, Q12H  famotidine, 20 mg, Oral, BID AC  fluticasone, 2 spray, Each Nare, Daily  Scopolamine, 1 patch, Transdermal, Q72H  senna-docusate sodium, 2 tablet, Oral, BID  sodium bicarbonate, 650 mg, Oral, TID  sodium chloride, 10 mL, Intravenous, Q12H      INFUSIONS:  sodium chloride, 125 mL/hr, Last Rate: 125 mL/hr (12/28/23 1614)      Objective   VITAL SIGNS:  Temp:  [97.9 °F (36.6 °C)-100.8 °F (38.2 °C)] 98.4 °F (36.9 °C)  Heart Rate:  [78-96] 90  Resp:  [16-18] 16  BP: ()/(55-84) 118/84     PHYSICAL EXAMINATION:  GENERAL:  The patient appears in fair general condition, not in acute distress.  SKIN: Pigmented skin lesions over the left inguinal area.   EYES:  No Jaundice. Pallor.   CHEST: Normal respiratory effort.  Lungs clear bilaterally.  No added sounds.  CARDIAC:  Normal S1 & S2. No murmur.   ABDOMEN: Nondistended.    RESULT REVIEW:   Results from last 7 days   Lab Units 12/28/23  0617 12/27/23  0500 12/26/23  0541 12/25/23 2111 12/22/23  0718   WBC 10*3/mm3 8.75 9.37 10.91* 13.56* 8.28   NEUTROS ABS 10*3/mm3 7.11* 7.50* 8.47* 10.36* 6.35   HEMOGLOBIN g/dL 8.3* 8.8* 7.9* 8.6* 8.7*   HEMATOCRIT % 24.3* 26.6* 24.7* 26.8* 26.9*   PLATELETS 10*3/mm3 347 372 340 395 198     Results from last 7 days   Lab Units 12/28/23  0617 12/27/23  0919 12/26/23  0541 12/25/23 2111 12/22/23  0718   SODIUM mmol/L 132* 132* 133* 130* 135*   POTASSIUM mmol/L 3.7 3.8 3.4* 3.6 3.5   CHLORIDE mmol/L 100 101 101 97* 104   CO2 mmol/L 20.7* 19.0* 20.1* 19.0* 18.3*   BUN mg/dL 8 8 16 17 17   CREATININE mg/dL 1.57* 1.45* 1.54* 1.69* 1.39*   CALCIUM mg/dL 8.1* 8.1* 7.9* 8.1* 8.5*   ALBUMIN  g/dL 2.9*  --   --  3.4* 3.1*   BILIRUBIN mg/dL  --   --   --  0.3 0.3   ALK PHOS U/L  --   --   --  94 54   ALT (SGPT) U/L  --   --   --  15 17   AST (SGOT) U/L  --   --   --  17 25   MAGNESIUM mg/dL  --   --   --   --  2.0         Lab 12/26/23  1900   IRON 15*   IRON SATURATION (TSAT) 6*   TIBC 249*   TRANSFERRIN 167*   FERRITIN 264.00   FOLATE 9.51   VITAMIN B 12 573        Assessment & Plan   *Metastatic melanoma, BRAF mutated.  Patient currently follows with the [facility].  Patient presented with brain metastasis.  He was initially treated with Yervoy and Opdivo on 6/14/2023.  He developed generalized skin rash-Duarte-Adam syndrome.  He also had worsening renal function.  Yervoy was discontinued.  He received 1 dose of Opdivo on 7/6/2023.  Treatment was switched to Braftovi and Mektovi.  He had subjective and objective evidence of response.  Treatment has been on hold due to his lung infection.     *Incidental bilateral pulmonary embolism and left lower extremity deep vein thrombosis.  The left lower extremity DVT was due to compression by the bulky lymphadenopathy.  Patient is on Eliquis 5 mg twice daily.     *Coronavirus 229E infection.  Chest x-ray revealed no evidence of pneumonia.  There is chronic scarring at the left lung base.  Patient had fever on 12/27/2023 and earlier today.  The fever is improving.  Overall, he reports significant improvement in his symptoms.     *Anemia, multifactorial.  12/22/2023: Hemoglobin 8.7.  12/26/2023: Hemoglobin decreased to 7.9.  The anemia is attributed to infection and possible effects from his treatment.  12/26/2023: Ferritin 264.  Transferrin saturation 6%.  Vitamin B12 573.  Folate 9.5.  12/27/2023: Hemoglobin 8.8.  12/28/2023: Hemoglobin 8.3.  I explained to the patient that the anemia is attributed to infection as well as iron deficiency.  I recommended treatment with oral iron.     PLAN:     1.  I recommended starting back on Braftovi and Mektovi  tomorrow ,12/29/2023.  He has his home supply of the medicines.  2.  Continue Eliquis 5 mg twice daily.  3.  Start ferrous sulfate 325 mg p.o. daily starting tomorrow.        Kiera Alfonso MD  12/28/23

## 2023-12-28 NOTE — PROGRESS NOTES
ID note for sepsis  Subjective: Tmax of 103  On RA  Toelrating abx  Fevers responsive to APAP  No new sx apart from dry throat    Vital Signs   Temp:  [98.6 °F (37 °C)-103.1 °F (39.5 °C)] 99.1 °F (37.3 °C)  Heart Rate:  [] 94  Resp:  [18-20] 18  BP: (109-150)/(59-86) 139/78    GENERAL: Awake and alert, sickly  HEENT: Oropharynx is clear. Hearing is grossly normal.   EYES: . No conjunctival injection. No lid lag.   LUNGS:normal respiratory effort.   SKIN: no cutaneous eruptions in exposed areas  PSYCHIATRIC: Appropriate mood, affect, insight, and judgment.     Results Review:  White count 8.75  Creatinine 1.57  Microbiology:  COVID/influenza/RSV PCR negative  12/19 blood cultures Enterococcus faecalis  12/20 Ucx >100K enterococcus  12/21 BCx ngtd  12/22 port cx ngtd  12/25 bcx no growth to date  12/25 rpp: coronavirus 229e        A/p  Sepsis  Grupo  coronavirus 229e  Metastatic melanoma, follows at [facility]      Cont amp 2g iv q4  Still intermittently febrile but wbc now normal and clincally seems okay. We will check Pa and lateral and procal.   Repeat bcx if febrile over 101  Longer convalescence likely secondary to underlying immunosuppression from chemo, now on hold

## 2023-12-28 NOTE — PLAN OF CARE
Goal Outcome Evaluation:           Progress: improving  Outcome Evaluation: Pt transferred to [facility] at 0000. AO*4, VSS, Tmax 100.3, tylenol given x1. Tolerating IV abx. No c/o pain, n/v or shortness of breath. Tele discontinued. Up ad antonio, resting comfortably remainder of night. Pt wants to restart oral chemo in AM and has home supply med.

## 2023-12-28 NOTE — CASE MANAGEMENT/SOCIAL WORK
Continued Stay Note  [facility]     Patient Name: King George  MRN: [id number]  Today's Date: 12/28/2023    Admit Date: 12/25/2023    Plan: Home with family to transport   Discharge Plan       Row Name 12/28/23 1453       Plan    Plan Home with family to transport    Roadmap to Recovery Yes    Patient/Family in Agreement with Plan yes    Provided Post Acute Provider List? N/A    N/A Provider List Comment Patient denies need    Provided Post Acute Provider Quality & Resource List? N/A    N/A Quality & Resource List Comment Patient denies need    Plan Comments Spoke with patient at bedside. Introduced self. Patient plans to return home with family to transport.                   Discharge Codes    No documentation.                 Expected Discharge Date and Time       Expected Discharge Date Expected Discharge Time    Dec 28, 2023               Dang Quiñones RN

## 2023-12-28 NOTE — PLAN OF CARE
Pt had an uneventful day. Did spike a temp of 100.8 this morning, PRN tylenol given. Afebrile since. Waiting for pt to provide urine sample, per pt continued to forget. No acute signs of distress noted. X2 side rails up, bed in low locked position, call light within reach.    Problem: Adult Inpatient Plan of Care  Goal: Plan of Care Review  Outcome: Ongoing, Progressing     Problem: Adult Inpatient Plan of Care  Goal: Patient-Specific Goal (Individualized)  Outcome: Ongoing, Progressing     Problem: Adult Inpatient Plan of Care  Goal: Absence of Hospital-Acquired Illness or Injury  Outcome: Ongoing, Progressing  Intervention: Identify and Manage Fall Risk  Recent Flowsheet Documentation  Taken 12/28/2023 1800 by Cathy Kruger RN  Safety Promotion/Fall Prevention:   nonskid shoes/slippers when out of bed   safety round/check completed   clutter free environment maintained  Taken 12/28/2023 1400 by Cathy Kruger RN  Safety Promotion/Fall Prevention:   nonskid shoes/slippers when out of bed   safety round/check completed   clutter free environment maintained  Taken 12/28/2023 1200 by Cathy Kruger RN  Safety Promotion/Fall Prevention:   nonskid shoes/slippers when out of bed   safety round/check completed   clutter free environment maintained  Taken 12/28/2023 1000 by Cathy Kruger RN  Safety Promotion/Fall Prevention:   nonskid shoes/slippers when out of bed   safety round/check completed   clutter free environment maintained  Taken 12/28/2023 0829 by Cathy Kruger, RN  Safety Promotion/Fall Prevention:   nonskid shoes/slippers when out of bed   clutter free environment maintained   safety round/check completed   lighting adjusted  Taken 12/28/2023 0700 by Cathy Kruger, RN  Safety Promotion/Fall Prevention:   assistive device/personal items within reach   nonskid shoes/slippers when out of bed   safety round/check completed   clutter free environment maintained  Intervention: Prevent Skin  Injury  Recent Flowsheet Documentation  Taken 12/28/2023 1800 by Cathy Kruger RN  Body Position: position changed independently  Taken 12/28/2023 1400 by Cathy Kruger RN  Body Position: position changed independently  Taken 12/28/2023 1200 by Cathy Kruger RN  Body Position: position changed independently  Taken 12/28/2023 1000 by Cathy Kruger RN  Body Position: position changed independently  Taken 12/28/2023 0829 by Cathy Kruger RN  Body Position: position changed independently  Taken 12/28/2023 0700 by Cathy Kruger RN  Body Position:   supine   position changed independently  Intervention: Prevent and Manage VTE (Venous Thromboembolism) Risk  Recent Flowsheet Documentation  Taken 12/28/2023 1800 by Cathy Kruger RN  Activity Management: up ad antonio  Taken 12/28/2023 1400 by Cathy Kruger RN  Activity Management: up ad antonio  Taken 12/28/2023 1200 by Cathy Kruger RN  Activity Management: up ad antonio  Taken 12/28/2023 1000 by Cathy Kruger RN  Activity Management: up ad antonio  Taken 12/28/2023 0829 by aCthy Kruger RN  Activity Management:   back to bed   up ad antonio  VTE Prevention/Management: (eliquis)   sequential compression devices off   other (see comments)  Range of Motion:   active ROM (range of motion) encouraged   ROM (range of motion) performed  Taken 12/28/2023 0700 by Cathy Kruger RN  Activity Management: up ad antonio  VTE Prevention/Management: (eliquis)   sequential compression devices off   other (see comments)  Intervention: Prevent Infection  Recent Flowsheet Documentation  Taken 12/28/2023 1800 by Cathy Kruger RN  Infection Prevention:   environmental surveillance performed   hand hygiene promoted   single patient room provided  Taken 12/28/2023 1400 by Cathy Kruger RN  Infection Prevention:   environmental surveillance performed   hand hygiene promoted   single patient room provided  Taken 12/28/2023 1200 by Cathy Kruger RN  Infection Prevention:    environmental surveillance performed   hand hygiene promoted   single patient room provided  Taken 12/28/2023 1000 by Cathy Kruger RN  Infection Prevention:   environmental surveillance performed   hand hygiene promoted   single patient room provided  Taken 12/28/2023 0829 by Cathy Kruger RN  Infection Prevention:   environmental surveillance performed   hand hygiene promoted   single patient room provided  Taken 12/28/2023 0700 by Cathy Kruger RN  Infection Prevention:   environmental surveillance performed   hand hygiene promoted   rest/sleep promoted   single patient room provided     Problem: Adult Inpatient Plan of Care  Goal: Absence of Hospital-Acquired Illness or Injury  Intervention: Identify and Manage Fall Risk  Recent Flowsheet Documentation  Taken 12/28/2023 1800 by Cathy Kruger RN  Safety Promotion/Fall Prevention:   nonskid shoes/slippers when out of bed   safety round/check completed   clutter free environment maintained  Taken 12/28/2023 1400 by Cathy Kruger RN  Safety Promotion/Fall Prevention:   nonskid shoes/slippers when out of bed   safety round/check completed   clutter free environment maintained  Taken 12/28/2023 1200 by Cathy Kruger RN  Safety Promotion/Fall Prevention:   nonskid shoes/slippers when out of bed   safety round/check completed   clutter free environment maintained  Taken 12/28/2023 1000 by Cathy Kruger RN  Safety Promotion/Fall Prevention:   nonskid shoes/slippers when out of bed   safety round/check completed   clutter free environment maintained  Taken 12/28/2023 0829 by Cathy Kruger RN  Safety Promotion/Fall Prevention:   nonskid shoes/slippers when out of bed   clutter free environment maintained   safety round/check completed   lighting adjusted  Taken 12/28/2023 0700 by Cathy Kruger RN  Safety Promotion/Fall Prevention:   assistive device/personal items within reach   nonskid shoes/slippers when out of bed   safety round/check  completed   clutter free environment maintained     Problem: Adult Inpatient Plan of Care  Goal: Absence of Hospital-Acquired Illness or Injury  Intervention: Prevent and Manage VTE (Venous Thromboembolism) Risk  Recent Flowsheet Documentation  Taken 12/28/2023 1800 by Cathy Kruger RN  Activity Management: up ad antonio  Taken 12/28/2023 1400 by Cathy Kruger RN  Activity Management: up ad antonio  Taken 12/28/2023 1200 by Cathy Kruger RN  Activity Management: up ad antonio  Taken 12/28/2023 1000 by Cathy Kruger RN  Activity Management: up ad antonio  Taken 12/28/2023 0829 by Cathy Kruger RN  Activity Management:   back to bed   up ad antonio  VTE Prevention/Management: (eliquis)   sequential compression devices off   other (see comments)  Range of Motion:   active ROM (range of motion) encouraged   ROM (range of motion) performed  Taken 12/28/2023 0700 by Cathy Kruger RN  Activity Management: up ad antonio  VTE Prevention/Management: (eliquis)   sequential compression devices off   other (see comments)     Problem: Adult Inpatient Plan of Care  Goal: Absence of Hospital-Acquired Illness or Injury  Intervention: Prevent Infection  Recent Flowsheet Documentation  Taken 12/28/2023 1800 by Cathy Kruger RN  Infection Prevention:   environmental surveillance performed   hand hygiene promoted   single patient room provided  Taken 12/28/2023 1400 by Cathy Kruger RN  Infection Prevention:   environmental surveillance performed   hand hygiene promoted   single patient room provided  Taken 12/28/2023 1200 by Cathy Kruger RN  Infection Prevention:   environmental surveillance performed   hand hygiene promoted   single patient room provided  Taken 12/28/2023 1000 by Cathy Kruger RN  Infection Prevention:   environmental surveillance performed   hand hygiene promoted   single patient room provided  Taken 12/28/2023 0829 by Cathy Kruger RN  Infection Prevention:   environmental surveillance performed   hand  hygiene promoted   single patient room provided  Taken 12/28/2023 0700 by Cathy Kruger, RN  Infection Prevention:   environmental surveillance performed   hand hygiene promoted   rest/sleep promoted   single patient room provided     Problem: Adult Inpatient Plan of Care  Goal: Optimal Comfort and Wellbeing  Outcome: Ongoing, Progressing  Intervention: Provide Person-Centered Care  Recent Flowsheet Documentation  Taken 12/28/2023 0829 by Cathy Kruger, RN  Trust Relationship/Rapport:   care explained   choices provided   emotional support provided   empathic listening provided   questions answered   questions encouraged   reassurance provided   thoughts/feelings acknowledged   Goal Outcome Evaluation:

## 2023-12-28 NOTE — PROGRESS NOTES
Name: King George ADMIT: 2023   : 1960  PCP: System, Provider Not In    MRN: [id number] LOS: 1 days   AGE/SEX: 63 y.o. male  ROOM: [facility]     Subjective   Subjective   Currently feels fine. Some nasal drainage.     Objective   Objective   Vital Signs  Temp:  [97.9 °F (36.6 °C)-100.8 °F (38.2 °C)] 98.4 °F (36.9 °C)  Heart Rate:  [78-96] 90  Resp:  [16-18] 16  BP: ()/(55-84) 118/84  SpO2:  [96 %-100 %] 96 %  on   ;   Device (Oxygen Therapy): room air  Body mass index is 32.78 kg/m².    Physical Exam  Constitutional:       General: He is not in acute distress.     Appearance: Normal appearance. He is not toxic-appearing.   HENT:      Head: Normocephalic and atraumatic.   Cardiovascular:      Rate and Rhythm: Normal rate and regular rhythm.   Pulmonary:      Effort: Pulmonary effort is normal. No respiratory distress.      Breath sounds: Normal breath sounds. No wheezing or rhonchi.   Abdominal:      General: Bowel sounds are normal.      Palpations: Abdomen is soft.      Tenderness: There is no abdominal tenderness. There is no guarding or rebound.   Musculoskeletal:         General: No swelling.   Skin:     General: Skin is warm and dry.   Neurological:      General: No focal deficit present.      Mental Status: He is alert and oriented to person, place, and time.   Psychiatric:         Mood and Affect: Mood normal.         Behavior: Behavior normal.     Results Review  I reviewed the patient's new clinical results.  Results from last 7 days   Lab Units 23  0617 23  0500 23  0541 23  2111   WBC 10*3/mm3 8.75 9.37 10.91* 13.56*   HEMOGLOBIN g/dL 8.3* 8.8* 7.9* 8.6*   PLATELETS 10*3/mm3 347 372 340 395     Results from last 7 days   Lab Units 23  0617 23  0919 23  0541 23  2111   SODIUM mmol/L 132* 132* 133* 130*   POTASSIUM mmol/L 3.7 3.8 3.4* 3.6   CHLORIDE mmol/L 100 101 101 97*   CO2 mmol/L 20.7* 19.0* 20.1* 19.0*   BUN mg/dL 8 8 16 17  "  CREATININE mg/dL 1.57* 1.45* 1.54* 1.69*   GLUCOSE mg/dL 110* 109* 124* 141*     Lab Results   Component Value Date    ANIONGAP 11.3 12/28/2023     Estimated Creatinine Clearance: 61.5 mL/min (A) (by C-G formula based on SCr of 1.57 mg/dL (H)).   Lab Results   Component Value Date    EGFR 49.8 (L) 12/28/2023     Results from last 7 days   Lab Units 12/28/23 0617 12/25/23 2111 12/22/23  0718   ALBUMIN g/dL 2.9* 3.4* 3.1*   BILIRUBIN mg/dL  --  0.3 0.3   ALK PHOS U/L  --  94 54   AST (SGOT) U/L  --  17 25   ALT (SGPT) U/L  --  15 17     Results from last 7 days   Lab Units 12/28/23  0617 12/27/23  0919 12/26/23  0541 12/25/23 2111 12/22/23  0718   CALCIUM mg/dL 8.1* 8.1* 7.9* 8.1* 8.5*   ALBUMIN g/dL 2.9*  --   --  3.4* 3.1*   MAGNESIUM mg/dL  --   --   --   --  2.0   PHOSPHORUS mg/dL 2.6  --   --   --  2.6     Results from last 7 days   Lab Units 12/28/23 0617 12/25/23 2111   PROCALCITONIN ng/mL 0.68*  --    LACTATE mmol/L  --  1.5     No results found for: \"HGBA1C\", \"POCGLU\"    XR Chest PA & Lateral    Result Date: 12/28/2023  No focal pulmonary consolidation. Follow-up as clinical indications persist.  This report was finalized on 12/28/2023 2:51 PM by Dr. Alejandro Yu M.D on Workstation: [id number]       Scheduled Meds  ampicillin, 2 g, Intravenous, Q4H  apixaban, 5 mg, Oral, Q12H  famotidine, 20 mg, Oral, BID AC  fluticasone, 2 spray, Each Nare, Daily  Scopolamine, 1 patch, Transdermal, Q72H  senna-docusate sodium, 2 tablet, Oral, BID  sodium bicarbonate, 650 mg, Oral, TID  sodium chloride, 10 mL, Intravenous, Q12H    Continuous Infusions  sodium chloride, 125 mL/hr, Last Rate: 125 mL/hr (12/28/23 1614)    PRN Meds    acetaminophen **OR** acetaminophen **OR** acetaminophen    senna-docusate sodium **AND** polyethylene glycol **AND** bisacodyl **AND** bisacodyl    ipratropium-albuterol    ondansetron    ondansetron    prochlorperazine    sodium chloride    sodium chloride    sodium chloride    sodium " chloride, 125 mL/hr, Last Rate: 125 mL/hr (12/28/23 1614)    Diet  Diet: Regular/House Diet; Texture: Regular Texture (IDDSI 7); Fluid Consistency: Thin (IDDSI 0)    I have personally reviewed:  [x]  Medications  [x]  Laboratory   []  Microbiology   [x]  Radiology   []  EKG/Telemetry   []  Cardiology/Vascular   []  Pathology   [x]  Records      Assessment/Plan     Active Hospital Problems    Diagnosis  POA    **Coronavirus infection [B34.2]  Yes    Acute renal insufficiency [N28.9]  Yes    Fever [R50.9]  Yes    Bacteremia due to Enterococcus [R78.81, B95.2]  Yes    Chronic anticoagulation [Z79.01]  Not Applicable    Viral sepsis [A41.89, B97.89]  Yes    Hydroureteronephrosis [N13.30]  Yes    Metastatic melanoma [C43.9]  Yes    Anemia [D64.9]  Yes      Resolved Hospital Problems   No resolved problems to display.     63 y.o. male with a history of metastatic melanoma and chronic obstructive uropathy admitted with fever    Sepsis  Viral infection due to coronavirus 229E  Antibiotics per ID  Blood cultures 12/25/2023 no growth 2 days  Chest x-ray no pneumonia    Acute kidney injury  Hypovolemic hyponatremia  Metabolic acidosis  LENIN prerenal/ATN due to sepsis, hypovolemia  IVF per nephrology    Metastatic melanoma  Immunosuppression from chemotherapy  oncology following  Normally follows at [facility]    Bilateral ureteral stents placed in October (obstructive uropathy related to malignancy)  Recent admission for enterococcal bacteremia likely UTI     Bilateral PE  Left lower extremity DVT  Apixaban    DVT prophylaxis  Eliquis (home med)    Discharge    Expected Discharge Date: 12/30/2023; Expected Discharge Time:     Discussed with patient, family, and nursing staff    Ladarius Calvo MD  [facility]  12/28/23

## 2023-12-29 ENCOUNTER — READMISSION MANAGEMENT (OUTPATIENT)
Dept: CALL CENTER | Facility: HOSPITAL | Age: 63
End: 2023-12-29
Payer: COMMERCIAL

## 2023-12-29 VITALS
HEIGHT: 71 IN | RESPIRATION RATE: 16 BRPM | OXYGEN SATURATION: 99 % | HEART RATE: 77 BPM | BODY MASS INDEX: 32.9 KG/M2 | TEMPERATURE: 97.9 F | WEIGHT: 235 LBS | DIASTOLIC BLOOD PRESSURE: 73 MMHG | SYSTOLIC BLOOD PRESSURE: 127 MMHG

## 2023-12-29 LAB
ALBUMIN SERPL-MCNC: 2.8 G/DL (ref 3.5–5.2)
ANION GAP SERPL CALCULATED.3IONS-SCNC: 12 MMOL/L (ref 5–15)
BASOPHILS # BLD AUTO: 0.01 10*3/MM3 (ref 0–0.2)
BASOPHILS NFR BLD AUTO: 0.2 % (ref 0–1.5)
BUN SERPL-MCNC: 9 MG/DL (ref 8–23)
BUN/CREAT SERPL: 5.8 (ref 7–25)
CALCIUM SPEC-SCNC: 7.8 MG/DL (ref 8.6–10.5)
CHLORIDE SERPL-SCNC: 104 MMOL/L (ref 98–107)
CO2 SERPL-SCNC: 19 MMOL/L (ref 22–29)
CREAT SERPL-MCNC: 1.55 MG/DL (ref 0.76–1.27)
DEPRECATED RDW RBC AUTO: 44.9 FL (ref 37–54)
EGFRCR SERPLBLD CKD-EPI 2021: 50.6 ML/MIN/1.73
EOSINOPHIL # BLD AUTO: 0.23 10*3/MM3 (ref 0–0.4)
EOSINOPHIL NFR BLD AUTO: 3.5 % (ref 0.3–6.2)
ERYTHROCYTE [DISTWIDTH] IN BLOOD BY AUTOMATED COUNT: 14.9 % (ref 12.3–15.4)
GLUCOSE SERPL-MCNC: 120 MG/DL (ref 65–99)
HCT VFR BLD AUTO: 22.9 % (ref 37.5–51)
HGB BLD-MCNC: 7.3 G/DL (ref 13–17.7)
IMM GRANULOCYTES # BLD AUTO: 0.04 10*3/MM3 (ref 0–0.05)
IMM GRANULOCYTES NFR BLD AUTO: 0.6 % (ref 0–0.5)
LYMPHOCYTES # BLD AUTO: 0.82 10*3/MM3 (ref 0.7–3.1)
LYMPHOCYTES NFR BLD AUTO: 12.3 % (ref 19.6–45.3)
MCH RBC QN AUTO: 26.7 PG (ref 26.6–33)
MCHC RBC AUTO-ENTMCNC: 31.9 G/DL (ref 31.5–35.7)
MCV RBC AUTO: 83.9 FL (ref 79–97)
MONOCYTES # BLD AUTO: 0.72 10*3/MM3 (ref 0.1–0.9)
MONOCYTES NFR BLD AUTO: 10.8 % (ref 5–12)
NEUTROPHILS NFR BLD AUTO: 4.82 10*3/MM3 (ref 1.7–7)
NEUTROPHILS NFR BLD AUTO: 72.6 % (ref 42.7–76)
NRBC BLD AUTO-RTO: 0 /100 WBC (ref 0–0.2)
PHOSPHATE SERPL-MCNC: 3.4 MG/DL (ref 2.5–4.5)
PLATELET # BLD AUTO: 303 10*3/MM3 (ref 140–450)
PMV BLD AUTO: 9.1 FL (ref 6–12)
POTASSIUM SERPL-SCNC: 3.5 MMOL/L (ref 3.5–5.2)
RBC # BLD AUTO: 2.73 10*6/MM3 (ref 4.14–5.8)
SODIUM SERPL-SCNC: 135 MMOL/L (ref 136–145)
WBC NRBC COR # BLD AUTO: 6.64 10*3/MM3 (ref 3.4–10.8)

## 2023-12-29 PROCEDURE — 99233 SBSQ HOSP IP/OBS HIGH 50: CPT | Performed by: INTERNAL MEDICINE

## 2023-12-29 PROCEDURE — 80069 RENAL FUNCTION PANEL: CPT | Performed by: HOSPITALIST

## 2023-12-29 PROCEDURE — 25010000002 AMPICILLIN PER 500 MG: Performed by: INTERNAL MEDICINE

## 2023-12-29 PROCEDURE — 85025 COMPLETE CBC W/AUTO DIFF WBC: CPT | Performed by: HOSPITALIST

## 2023-12-29 RX ORDER — AMOXICILLIN 500 MG/1
1000 CAPSULE ORAL EVERY 8 HOURS SCHEDULED
Qty: 18 CAPSULE | Refills: 0 | Status: SHIPPED | OUTPATIENT
Start: 2023-12-29 | End: 2024-01-01

## 2023-12-29 RX ORDER — FERROUS SULFATE 325(65) MG
325 TABLET ORAL DAILY
Qty: 30 TABLET | Refills: 0 | Status: SHIPPED | OUTPATIENT
Start: 2023-12-29

## 2023-12-29 RX ORDER — AMOXICILLIN 250 MG/1
1000 CAPSULE ORAL EVERY 8 HOURS SCHEDULED
Status: DISCONTINUED | OUTPATIENT
Start: 2023-12-29 | End: 2023-12-29 | Stop reason: HOSPADM

## 2023-12-29 RX ORDER — FLUTICASONE PROPIONATE 50 MCG
2 SPRAY, SUSPENSION (ML) NASAL DAILY
Qty: 16 G | Refills: 0 | Status: SHIPPED | OUTPATIENT
Start: 2023-12-29

## 2023-12-29 RX ADMIN — AMPICILLIN SODIUM 2 G: 2 INJECTION, POWDER, FOR SOLUTION INTRAMUSCULAR; INTRAVENOUS at 00:08

## 2023-12-29 RX ADMIN — SODIUM BICARBONATE 650 MG: 650 TABLET ORAL at 09:05

## 2023-12-29 RX ADMIN — AMOXICILLIN 1000 MG: 250 CAPSULE ORAL at 09:05

## 2023-12-29 RX ADMIN — Medication 10 ML: at 09:06

## 2023-12-29 RX ADMIN — SENNOSIDES AND DOCUSATE SODIUM 2 TABLET: 50; 8.6 TABLET ORAL at 09:05

## 2023-12-29 RX ADMIN — FLUTICASONE PROPIONATE 2 SPRAY: 50 SPRAY, METERED NASAL at 09:09

## 2023-12-29 RX ADMIN — FAMOTIDINE 20 MG: 20 TABLET, FILM COATED ORAL at 09:05

## 2023-12-29 RX ADMIN — AMPICILLIN SODIUM 2 G: 2 INJECTION, POWDER, FOR SOLUTION INTRAMUSCULAR; INTRAVENOUS at 04:50

## 2023-12-29 RX ADMIN — FERROUS SULFATE TAB 325 MG (65 MG ELEMENTAL FE) 325 MG: 325 (65 FE) TAB at 09:05

## 2023-12-29 RX ADMIN — APIXABAN 5 MG: 5 TABLET, FILM COATED ORAL at 09:05

## 2023-12-29 NOTE — CASE MANAGEMENT/SOCIAL WORK
Continued Stay Note  [facility]     Patient Name: King George  MRN: [id number]  Today's Date: 12/29/2023    Admit Date: 12/25/2023    Plan: Home with family to transport   Discharge Plan       Row Name 12/29/23 0819       Plan    Plan Home with family to transport    Plan Comments Left message for Mireya/ [facility]  to notify of patient's discharge.                   Discharge Codes    No documentation.                 Expected Discharge Date and Time       Expected Discharge Date Expected Discharge Time    Dec 29, 2023               Dang Qiuñones RN

## 2023-12-29 NOTE — PLAN OF CARE
Pt being discharged home with self care. Discharge instructions and education given to patient. All questions and concerns answered and addressed. PIV discontinued without complication. Mother providing transportation home via private vehicle. No acute signs of distress noted. Per pt request, wait to leave until after he's done eating lunch. At that time I will place transport.     Problem: Adult Inpatient Plan of Care  Goal: Plan of Care Review  Outcome: Adequate for Care Transition  Goal: Patient-Specific Goal (Individualized)  Outcome: Adequate for Care Transition  Goal: Absence of Hospital-Acquired Illness or Injury  Outcome: Adequate for Care Transition  Intervention: Identify and Manage Fall Risk  Recent Flowsheet Documentation  Taken 12/29/2023 1200 by Cathy Kruger RN  Safety Promotion/Fall Prevention:   nonskid shoes/slippers when out of bed   safety round/check completed   clutter free environment maintained  Taken 12/29/2023 1000 by Cathy Kruger RN  Safety Promotion/Fall Prevention:   nonskid shoes/slippers when out of bed   safety round/check completed   clutter free environment maintained  Taken 12/29/2023 0905 by Cathy Kruger RN  Safety Promotion/Fall Prevention:   nonskid shoes/slippers when out of bed   safety round/check completed   clutter free environment maintained  Taken 12/29/2023 0800 by Cathy Kruger RN  Safety Promotion/Fall Prevention:   nonskid shoes/slippers when out of bed   safety round/check completed   clutter free environment maintained  Taken 12/29/2023 0700 by Cathy Kruger RN  Safety Promotion/Fall Prevention:   nonskid shoes/slippers when out of bed   safety round/check completed   clutter free environment maintained  Intervention: Prevent Skin Injury  Recent Flowsheet Documentation  Taken 12/29/2023 1200 by Cathy Kruger RN  Body Position: position changed independently  Taken 12/29/2023 1000 by Cathy Kruger RN  Body Position:   position changed  independently   supine  Taken 12/29/2023 0905 by Cathy Kruger RN  Body Position: position changed independently  Taken 12/29/2023 0800 by Cathy Kruger RN  Body Position: position changed independently  Taken 12/29/2023 0700 by Cathy Kruger RN  Body Position: position changed independently  Intervention: Prevent and Manage VTE (Venous Thromboembolism) Risk  Recent Flowsheet Documentation  Taken 12/29/2023 1200 by Cathy Kruger RN  Activity Management: up ad antonio  Taken 12/29/2023 1000 by Cathy Kruger RN  Activity Management: up ad antonio  Taken 12/29/2023 0905 by Cathy Kruger RN  Activity Management: up ad antonio  VTE Prevention/Management: (eliquis)   sequential compression devices off   other (see comments)  Range of Motion:   ROM (range of motion) performed   active ROM (range of motion) encouraged  Taken 12/29/2023 0800 by Cathy Kruger RN  Activity Management: up ad antonio  Taken 12/29/2023 0700 by Cathy Kruger RN  Activity Management:   up ad antonio   ambulated in room  Intervention: Prevent Infection  Recent Flowsheet Documentation  Taken 12/29/2023 1200 by Cathy Kruger RN  Infection Prevention:   environmental surveillance performed   hand hygiene promoted   single patient room provided  Taken 12/29/2023 1000 by Cathy Kruger RN  Infection Prevention:   environmental surveillance performed   hand hygiene promoted   single patient room provided  Taken 12/29/2023 0905 by Cathy Kruger RN  Infection Prevention:   environmental surveillance performed   hand hygiene promoted   single patient room provided  Taken 12/29/2023 0800 by Cathy Kruger RN  Infection Prevention:   environmental surveillance performed   hand hygiene promoted   single patient room provided  Taken 12/29/2023 0700 by Cathy Kruger RN  Infection Prevention:   environmental surveillance performed   hand hygiene promoted   single patient room provided  Goal: Optimal Comfort and Wellbeing  Outcome: Adequate for  Care Transition  Goal: Readiness for Transition of Care  Outcome: Adequate for Care Transition     Problem: Adjustment to Illness (Sepsis/Septic Shock)  Goal: Optimal Coping  Outcome: Adequate for Care Transition     Problem: Bleeding (Sepsis/Septic Shock)  Goal: Absence of Bleeding  Outcome: Adequate for Care Transition     Problem: Glycemic Control Impaired (Sepsis/Septic Shock)  Goal: Blood Glucose Level Within Desired Range  Outcome: Adequate for Care Transition     Problem: Infection Progression (Sepsis/Septic Shock)  Goal: Absence of Infection Signs and Symptoms  Outcome: Adequate for Care Transition  Intervention: Initiate Sepsis Management  Recent Flowsheet Documentation  Taken 12/29/2023 1200 by Cathy Kruger RN  Infection Prevention:   environmental surveillance performed   hand hygiene promoted   single patient room provided  Isolation Precautions: precautions maintained  Taken 12/29/2023 1000 by Cathy Kruger RN  Infection Prevention:   environmental surveillance performed   hand hygiene promoted   single patient room provided  Isolation Precautions: precautions maintained  Taken 12/29/2023 0905 by Cathy Kruger RN  Infection Prevention:   environmental surveillance performed   hand hygiene promoted   single patient room provided  Isolation Precautions: precautions maintained  Taken 12/29/2023 0800 by Cathy Kruger RN  Infection Prevention:   environmental surveillance performed   hand hygiene promoted   single patient room provided  Isolation Precautions: precautions maintained  Taken 12/29/2023 0700 by Cathy Kruger RN  Infection Prevention:   environmental surveillance performed   hand hygiene promoted   single patient room provided  Isolation Precautions: precautions maintained  Intervention: Promote Recovery  Recent Flowsheet Documentation  Taken 12/29/2023 1200 by Cathy Kruger RN  Activity Management: up ad antonio  Taken 12/29/2023 1000 by Cathy Kruger RN  Activity Management:  up ad antonio  Taken 12/29/2023 0905 by Cathy Kruger RN  Activity Management: up ad antonio  Taken 12/29/2023 0800 by Cathy Kruger RN  Activity Management: up ad antonio  Taken 12/29/2023 0700 by Cathy Kruger RN  Activity Management:   up ad antonio   ambulated in room     Problem: Nutrition Impaired (Sepsis/Septic Shock)  Goal: Optimal Nutrition Intake  Outcome: Adequate for Care Transition     Problem: Fall Injury Risk  Goal: Absence of Fall and Fall-Related Injury  Outcome: Adequate for Care Transition  Intervention: Promote Injury-Free Environment  Recent Flowsheet Documentation  Taken 12/29/2023 1200 by Cathy Kruger RN  Safety Promotion/Fall Prevention:   nonskid shoes/slippers when out of bed   safety round/check completed   clutter free environment maintained  Taken 12/29/2023 1000 by Cathy Kruger RN  Safety Promotion/Fall Prevention:   nonskid shoes/slippers when out of bed   safety round/check completed   clutter free environment maintained  Taken 12/29/2023 0905 by Cathy Kruger RN  Safety Promotion/Fall Prevention:   nonskid shoes/slippers when out of bed   safety round/check completed   clutter free environment maintained  Taken 12/29/2023 0800 by Cathy Kruger RN  Safety Promotion/Fall Prevention:   nonskid shoes/slippers when out of bed   safety round/check completed   clutter free environment maintained  Taken 12/29/2023 0700 by Cathy Kruger RN  Safety Promotion/Fall Prevention:   nonskid shoes/slippers when out of bed   safety round/check completed   clutter free environment maintained   Goal Outcome Evaluation:

## 2023-12-29 NOTE — DISCHARGE SUMMARY
[facility]    Date of Discharge:  12/29/2023    PCP: System, Provider Not In    Discharge Diagnosis:   Active Hospital Problems    Diagnosis  POA    **Coronavirus infection [B34.2]  Yes    Acute renal insufficiency [N28.9]  Yes    Fever [R50.9]  Yes    Bacteremia due to Enterococcus [R78.81, B95.2]  Yes    Chronic anticoagulation [Z79.01]  Not Applicable    Viral sepsis [A41.89, B97.89]  Yes    Hydroureteronephrosis [N13.30]  Yes    Metastatic melanoma [C43.9]  Yes    Anemia [D64.9]  Yes      Resolved Hospital Problems   No resolved problems to display.          Consults       Date and Time Order Name Status Description    12/27/2023  7:22 AM Inpatient Nephrology Consult Completed     12/26/2023  2:15 PM Hematology & Oncology Inpatient Consult Completed     12/26/2023  3:21 AM Inpatient Infectious Diseases Consult Completed     12/26/2023 12:37 AM [facility] (on-call MD unless specified) Details      12/21/2023 11:07 AM Inpatient General Surgery Consult Completed     12/19/2023  5:57 PM Inpatient Infectious Diseases Consult Completed           Hospital Course  63 y.o. male with a history of metastatic melanoma and chronic obstructive uropathy (related to malignancy, had bilateral ureteral stents placed in October, and recent enterococcal septicemia) admitted with fever and acute kidney injury.     Respiratory panel was positive for coronavirus 229E. She was seen by oncology and infectious diseases. He was treated with supportive care and antibiotics. He did take a few days but fever has finally resolved. ID felt he likely had longer convalescence secondary to underlying immunosuppression from chemotherapy. Chemotherapy was held in the hospital but is being restarted today. He had a elevated procalcitonin ID felt it was likely from renal dysfunction. There was no evidence of bacterial pneumonia on chest x-ray. Blood cultures no growth at 3 days this admission. Given  his long convalescence and recent enterococcal septicemia ID felt it was reasonable to discharge him on a few more days of amoxicillin.    Acute renal injury was felt by nephrology to be secondary to ATN, prerenal, hypovolemia. With IV hydration creatinine improved some and stabilized in the mid ones.    He is feeling much improved and would like to go home today. He will be discharged cleared by oncology and nephrology.    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 He has not been getting steroids this admission however oncology here recommended that he stay on his steroids and follow-up with his primary oncologist.    I discussed the patient's findings and my recommendations with patient and nursing staff and Dr. Alfonso and Dr. Spencer (both cleared for discharge). He is feeling improved today and very much would like to go home.    Temp:  [97.7 °F (36.5 °C)-98.9 °F (37.2 °C)] 97.9 °F (36.6 °C)  Heart Rate:  [77-96] 77  Resp:  [16-18] 16  BP: ()/(55-84) 127/73  Body mass index is 32.78 kg/m².    Physical Exam  Constitutional:       General: He is not in acute distress.     Appearance: Normal appearance. He is not toxic-appearing.   HENT:      Head: Normocephalic and atraumatic.   Cardiovascular:      Rate and Rhythm: Normal rate and regular rhythm.   Pulmonary:      Effort: Pulmonary effort is normal. No respiratory distress.      Breath sounds: Normal breath sounds. No wheezing or rhonchi.   Abdominal:      General: Bowel sounds are normal.      Palpations: Abdomen is soft.      Tenderness: There is no abdominal tenderness. There is no guarding or rebound.   Musculoskeletal:         General: No swelling.   Skin:     General: Skin is warm and dry.   Neurological:      General: No focal deficit present.      Mental Status: He is alert and oriented to person, place, and time.   Psychiatric:         Mood and Affect: Mood normal.         Behavior: Behavior normal.        Disposition: Home or Self Care       Discharge Medications        New Medications        Instructions Start Date   amoxicillin 500 MG capsule  Commonly known as: AMOXIL   1,000 mg, Oral, Every 8 Hours Scheduled      ferrous sulfate 325 (65 FE) MG tablet   325 mg, Oral, Daily      fluticasone 50 MCG/ACT nasal spray  Commonly known as: FLONASE   instill 2 sprays into each nostril Daily.             Changes to Medications        Instructions Start Date   Eliquis 5 MG tablet tablet  Generic drug: apixaban  What changed: See the new instructions.   TAKE 1 TABLET BY MOUTH EVERY 12 (TWELVE) HOURS. INDICATIONS: DVT/PE (ACTIVE THROMBOSIS)             Continue These Medications        Instructions Start Date   Braftovi 75 MG capsule  Generic drug: encorafenib   450 mg, Oral, Nightly      dexAMETHasone 0.5 MG tablet  Commonly known as: DECADRON   1 mg, Oral, Daily With Breakfast      dexAMETHasone 0.5 MG tablet  Commonly known as: DECADRON   0.5 mg, Oral, Nightly      famotidine 20 MG tablet  Commonly known as: PEPCID   20 mg, Oral, 2 Times Daily Before Meals      Mektovi 15 MG tablet  Generic drug: Binimetinib   15 mg, Oral, 2 Times Daily, TAKES 3 PILLS IN AM AND 3 PILLS IN PM      ondansetron 8 MG tablet  Commonly known as: ZOFRAN   8 mg, Oral, Every 8 Hours PRN, Pt not taking      prochlorperazine 10 MG tablet  Commonly known as: COMPAZINE   10 mg, Oral, Every 6 Hours PRN      sodium bicarbonate 650 MG tablet   650 mg, Oral, 3 Times Daily             Stop These Medications      linezolid 600 MG tablet  Commonly known as: Zyvox             Diet Instructions       Diet: Regular/House Diet      Discharge Diet: Regular/House Diet    Texture: Regular Texture (IDDSI 7)    Fluid Consistency: Thin (IDDSI 0)           Activity Instructions       Activity as Tolerated             Additional Instructions for the Follow-ups that You Need to Schedule       Call MD for problems / concerns.   As directed             Follow-up  Information       System, Provider Not In .    Contact information:  [facility] [id number]                          No future appointments.  Pending Labs       Order Current Status    Blood Culture - Blood, Arm, Left Preliminary result    Blood Culture - Blood, Arm, Right Preliminary result           Ladarius Calvo MD  [facility]  12/29/23    Discharge time spent greater than 30 minutes.

## 2023-12-29 NOTE — PROGRESS NOTES
ID note for sepsis  Subjective: Af since 8 am yesterday  On RA  Toelrating abx  No new sx    Vital Signs   Temp:  [97.7 °F (36.5 °C)-100.8 °F (38.2 °C)] 97.7 °F (36.5 °C)  Heart Rate:  [89-96] 93  Resp:  [16-18] 16  BP: ()/(55-84) 116/65    GENERAL: Awake and alert, sickly  HEENT: Oropharynx is clear. Hearing is grossly normal.   EYES: . No conjunctival injection. No lid lag.   LUNGS:normal respiratory effort.   SKIN: no cutaneous eruptions in exposed areas  PSYCHIATRIC: Appropriate mood, affect, insight, and judgment.     Results Review:  White count 6.64  Creatinine 1.55  Pct 0.68   CXR, independently interpreted: no acute pna  Microbiology:  COVID/influenza/RSV PCR negative  12/19 blood cultures Enterococcus faecalis  12/20 Ucx >100K enterococcus  12/21 BCx ngtd  12/22 port cx ngtd  12/25 bcx no growth to date  12/25 rpp: coronavirus 229e        A/p  Sepsis  Grupo  coronavirus 229e  Metastatic melanoma, follows at [facility]    He finally seems to be doing better.  He is now fever free for approximately 24 hours.  His procalcitonin was mildly elevated 0.68 but did not see a evidence of bacterial pneumonia on his x-ray.  He may be just that the procalcitonin is artificially elevated due to his renal dysfunction.  I think given his long convalescence and recent enterococcal septicemia be reasonable to discharge him on amoxicillin 1 g p.o. every 8 hours x 3 more days.  I will go ahead and discontinue the IV antibiotics.  No objection to discharge when okay with others.  I think he likely had a longer convalescence secondary to underlying immunosuppression from chemo which was held.  We will go ahead and sign off but remain available if infectious concerns evolve

## 2023-12-29 NOTE — PLAN OF CARE
Goal Outcome Evaluation:                   Patient alert and oriented x 4, vitals stable, room air, up with standby assist, no complaints this shift, safety precautions in use, plan of care ongoing.

## 2023-12-29 NOTE — OUTREACH NOTE
Prep Survey      Flowsheet Row Responses   [facility] facility patient discharged from? [place]   Is LACE score < 7 ? No   Eligibility Readm Mgmt   Discharge diagnosis Coronavirus infection (   Does the patient have one of the following disease processes/diagnoses(primary or secondary)? Other   Does the patient have Home health ordered? No   Is there a DME ordered? No   Prep survey completed? Yes            RAZIA CAGE - Registered Nurse

## 2023-12-29 NOTE — PAYOR COMM NOTE
"Marilee George W \"Min\" (63 y.o. Male)      PATIENT DISCHARGED 12/29/2023:  REF# [id number]     UR DEPT: [phone],  [phone]    [facility]: NPI [id number] TIN# [id number]    IRAJ VEGA RN,[facility]       Date of Birth   1960    Social Security Number       Address   [address] [address]    Home Phone   [phone]    MRN   [id number]       Buddhism   Unknown    Marital Status   Unknown                            Admission Date   12/25/23    Admission Type   Emergency    Admitting Provider   Price Banuelos MD    Attending Provider       Department, Room/Bed   [address], 76/1       Discharge Date   12/29/2023    Discharge Disposition   Home or Self Care    Discharge Destination                                 Attending Provider: (none)   Allergies: No Known Allergies    Isolation: None   Infection: None   Code Status: CPR    Ht: 180.3 cm (71\")   Wt: 107 kg (235 lb)    Admission Cmt: None   Principal Problem: Coronavirus infection [B34.2]                   Active Insurance as of 12/25/2023       Primary Coverage       Payor Plan Insurance Group Employer/Plan Group    [employer] [facility] [place]IX[place]       Payor Plan Address Payor Plan Phone Number Payor Plan Fax Number Effective Dates    PO    3/1/2021 - None Entered    [facility] [id number]         Subscriber Name Subscriber Birth Date Member ID       MARILEE GEORGE 1960 [id number]                     Emergency Contacts        (Rel.) Home Phone Work Phone Mobile Phone    daquan de leon (Friend) -- -- [phone]    Komal George (Mother) -- -- [phone]              [place]: NPI [id number]  Tax ID [id number]         Discharge Summary        Ladarius Calvo MD at 12/29/23 0753                              [facility]    Date of Discharge:  12/29/2023    PCP: System, Provider Not In    Discharge Diagnosis: "   Active Hospital Problems    Diagnosis  POA    **Coronavirus infection [B34.2]  Yes    Acute renal insufficiency [N28.9]  Yes    Fever [R50.9]  Yes    Bacteremia due to Enterococcus [R78.81, B95.2]  Yes    Chronic anticoagulation [Z79.01]  Not Applicable    Viral sepsis [A41.89, B97.89]  Yes    Hydroureteronephrosis [N13.30]  Yes    Metastatic melanoma [C43.9]  Yes    Anemia [D64.9]  Yes      Resolved Hospital Problems   No resolved problems to display.          Consults       Date and Time Order Name Status Description    12/27/2023  7:22 AM Inpatient Nephrology Consult Completed     12/26/2023  2:15 PM Hematology & Oncology Inpatient Consult Completed     12/26/2023  3:21 AM Inpatient Infectious Diseases Consult Completed     12/26/2023 12:37 AM [facility] (on-call MD unless specified) Details      12/21/2023 11:07 AM Inpatient General Surgery Consult Completed     12/19/2023  5:57 PM Inpatient Infectious Diseases Consult Completed           Hospital Course  63 y.o. male with a history of metastatic melanoma and chronic obstructive uropathy (related to malignancy, had bilateral ureteral stents placed in October, and recent enterococcal septicemia) admitted with fever and acute kidney injury.     Respiratory panel was positive for coronavirus 229E. She was seen by oncology and infectious diseases. He was treated with supportive care and antibiotics. He did take a few days but fever has finally resolved. ID felt he likely had longer convalescence secondary to underlying immunosuppression from chemotherapy. Chemotherapy was held in the hospital but is being restarted today. He had a elevated procalcitonin ID felt it was likely from renal dysfunction. There was no evidence of bacterial pneumonia on chest x-ray. Blood cultures no growth at 3 days this admission. Given his long convalescence and recent enterococcal septicemia ID felt it was reasonable to discharge him on a few more days of amoxicillin.    Acute renal injury  was felt by nephrology to be secondary to ATN, prerenal, hypovolemia. With IV hydration creatinine improved some and stabilized in the mid ones.    He is feeling much improved and would like to go home today. He will be discharged cleared by oncology and nephrology.    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 He has not been getting steroids this admission however oncology here recommended that he stay on his steroids and follow-up with his primary oncologist.    I discussed the patient's findings and my recommendations with patient and nursing staff and Dr. Alfonso and Dr. Spencer (both cleared for discharge). He is feeling improved today and very much would like to go home.    Temp:  [97.7 °F (36.5 °C)-98.9 °F (37.2 °C)] 97.9 °F (36.6 °C)  Heart Rate:  [77-96] 77  Resp:  [16-18] 16  BP: ()/(55-84) 127/73  Body mass index is 32.78 kg/m².    Physical Exam  Constitutional:       General: He is not in acute distress.     Appearance: Normal appearance. He is not toxic-appearing.   HENT:      Head: Normocephalic and atraumatic.   Cardiovascular:      Rate and Rhythm: Normal rate and regular rhythm.   Pulmonary:      Effort: Pulmonary effort is normal. No respiratory distress.      Breath sounds: Normal breath sounds. No wheezing or rhonchi.   Abdominal:      General: Bowel sounds are normal.      Palpations: Abdomen is soft.      Tenderness: There is no abdominal tenderness. There is no guarding or rebound.   Musculoskeletal:         General: No swelling.   Skin:     General: Skin is warm and dry.   Neurological:      General: No focal deficit present.      Mental Status: He is alert and oriented to person, place, and time.   Psychiatric:         Mood and Affect: Mood normal.         Behavior: Behavior normal.       Disposition: Home or Self Care       Discharge Medications        New Medications        Instructions Start Date   amoxicillin 500 MG capsule  Commonly  known as: AMOXIL   1,000 mg, Oral, Every 8 Hours Scheduled      ferrous sulfate 325 (65 FE) MG tablet   325 mg, Oral, Daily      fluticasone 50 MCG/ACT nasal spray  Commonly known as: FLONASE   instill 2 sprays into each nostril Daily.             Changes to Medications        Instructions Start Date   Eliquis 5 MG tablet tablet  Generic drug: apixaban  What changed: See the new instructions.   TAKE 1 TABLET BY MOUTH EVERY 12 (TWELVE) HOURS. INDICATIONS: DVT/PE (ACTIVE THROMBOSIS)             Continue These Medications        Instructions Start Date   Braftovi 75 MG capsule  Generic drug: encorafenib   450 mg, Oral, Nightly      dexAMETHasone 0.5 MG tablet  Commonly known as: DECADRON   1 mg, Oral, Daily With Breakfast      dexAMETHasone 0.5 MG tablet  Commonly known as: DECADRON   0.5 mg, Oral, Nightly      famotidine 20 MG tablet  Commonly known as: PEPCID   20 mg, Oral, 2 Times Daily Before Meals      Mektovi 15 MG tablet  Generic drug: Binimetinib   15 mg, Oral, 2 Times Daily, TAKES 3 PILLS IN AM AND 3 PILLS IN PM      ondansetron 8 MG tablet  Commonly known as: ZOFRAN   8 mg, Oral, Every 8 Hours PRN, Pt not taking      prochlorperazine 10 MG tablet  Commonly known as: COMPAZINE   10 mg, Oral, Every 6 Hours PRN      sodium bicarbonate 650 MG tablet   650 mg, Oral, 3 Times Daily             Stop These Medications      linezolid 600 MG tablet  Commonly known as: Zyvox             Diet Instructions       Diet: Regular/House Diet      Discharge Diet: Regular/House Diet    Texture: Regular Texture (IDDSI 7)    Fluid Consistency: Thin (IDDSI 0)           Activity Instructions       Activity as Tolerated             Additional Instructions for the Follow-ups that You Need to Schedule       Call MD for problems / concerns.   As directed             Follow-up Information       System, Provider Not In .    Contact information:  [facility] [address]                          No future  appointments.  Pending Labs       Order Current Status    Blood Culture - Blood, Arm, Left Preliminary result    Blood Culture - Blood, Arm, Right Preliminary result           Ladarius Calvo MD  [facility]  12/29/23    Discharge time spent greater than 30 minutes.      Electronically signed by Ladarius Calvo MD at 12/29/23 1121       Discharge Order (From admission, onward)       Start     Ordered    12/29/23 0652  Discharge patient  Once        Expected Discharge Date: 12/29/23   Discharge Disposition: Home or Self Care   Physician of Record for Attribution - Please select from Treatment Team: WILLI BALLESTEROS [[id number]]   Review needed by CMO to determine Physician of Record: No      Question Answer Comment   Physician of Record for Attribution - Please select from Treatment Team WILLI BALLESTEROS    Review needed by CMO to determine Physician of Record No        12/29/23 0652

## 2023-12-30 LAB
BACTERIA SPEC AEROBE CULT: NORMAL
BACTERIA SPEC AEROBE CULT: NORMAL

## 2024-01-01 NOTE — PROGRESS NOTES
Case Management Discharge Note      Final Note: Discharged to home. RENÉE Higgins RN, CCP.    Provided Post Acute Provider List?: N/A  N/A Provider List Comment: Patient denies need  Provided Post Acute Provider Quality & Resource List?: N/A  N/A Quality & Resource List Comment: Patient denies need    Selected Continued Care - Discharged on 12/29/2023 Admission date: 12/25/2023 - Discharge disposition: Home or Self Care      Destination    No services have been selected for the patient.                Durable Medical Equipment    No services have been selected for the patient.                Dialysis/Infusion    No services have been selected for the patient.                Home Medical Care    No services have been selected for the patient.                Therapy    No services have been selected for the patient.                Community Resources    No services have been selected for the patient.                Community & DME    No services have been selected for the patient.                    Transportation Services  Private: Car    Final Discharge Disposition Code: 01 - home or self-care

## 2024-01-02 ENCOUNTER — READMISSION MANAGEMENT (OUTPATIENT)
Dept: CALL CENTER | Facility: HOSPITAL | Age: 64
End: 2024-01-02
Payer: COMMERCIAL

## 2024-01-02 NOTE — OUTREACH NOTE
Medical Week 1 Survey      Flowsheet Row Responses   [facility] patient discharged from? [place]   Does the patient have one of the following disease processes/diagnoses(primary or secondary)? Other   Week 1 attempt successful? No   Unsuccessful attempts Attempt 1            Madeline PATTERSON - Licensed Nurse

## 2024-01-02 NOTE — PROGRESS NOTES
"Enter Query Response Below      Query Response: Hyponatremia- clinically insignificant              If applicable, please update the problem list.     Patient: King George W \"Min\"        : 1960  Account: [id number]           Admit Date: 2023        How to Respond to this query:       a. Click New Note     b. Answer query within the yellow box.                c. Update the Problem List, if applicable.      If you have any questions about this query contact me at: [phone]     Dr. Teague:    63-year-old male with history of urinary retention and obstructive uropathy with stents placed in October, presented with UTI, enterococcal bacteremia, and LENIN.  Progress notes include hyponatremia.  Labs include sodium 132, 135.  Patient was given IV fluids and labs were monitored.     Please clarify the following:    Hyponatremia- clinically significant  Hyponatremia- clinically insignificant  Other- specify______  Unable to determine      By submitting this query, we are merely seeking further clarification of documentation to accurately reflect all conditions that you are monitoring, evaluating, treating or that extend the hospitalization or utilize additional resources of care. Please utilize your independent clinical judgment when addressing the question(s) above.     This query and your response, once completed, will be entered into the legal medical record.    Sincerely,  Linda Field RN, MSN  Clinical Documentation Integrity Program   [email or web address]     "

## 2024-01-09 ENCOUNTER — READMISSION MANAGEMENT (OUTPATIENT)
Dept: CALL CENTER | Facility: HOSPITAL | Age: 64
End: 2024-01-09
Payer: COMMERCIAL

## 2024-01-09 NOTE — OUTREACH NOTE
"Medical Week 2 Survey      Flowsheet Row Responses   [facility] patient discharged from? [place]   Does the patient have one of the following disease processes/diagnoses(primary or secondary)? Other   Week 2 attempt successful? Yes   Call start time 0814   Discharge diagnosis Coronavirus infection (   Call end time 0817   Is the patient taking all medications as directed (includes completed medication regime)? Yes   Medication comments Completed antibiotics   Does the patient have a primary care provider?  Yes   Has the patient kept scheduled appointments due by today? Yes   Comments followed up with oncology   Psychosocial issues? No   Did the patient receive a copy of their discharge instructions? Yes   Nursing interventions Reviewed instructions with patient   What is the patient's perception of their health status since discharge? Improving   Is the patient/caregiver able to teach back signs and symptoms related to disease process for when to call PCP? Yes   Is the patient/caregiver able to teach back signs and symptoms related to disease process for when to call 911? Yes   Is the patient/caregiver able to teach back the hierarchy of who to call/visit for symptoms/problems? PCP, Specialist, Home health nurse, Urgent Care, ED, 911 Yes   If the patient is a current smoker, are they able to teach back resources for cessation? Not a smoker   Additional teach back comments States he is doing \"ok\".   Week 2 Call Completed? Yes   Graduated Yes   Graduated/Revoked comments No questions or needs at this time.   Call end time 0817            Madeline PATTERSON - Licensed Nurse  "

## 2024-05-22 ENCOUNTER — OFFICE VISIT (OUTPATIENT)
Dept: INTERNAL MEDICINE | Facility: CLINIC | Age: 64
End: 2024-05-22
Payer: COMMERCIAL

## 2024-05-22 ENCOUNTER — PATIENT ROUNDING (BHMG ONLY) (OUTPATIENT)
Dept: INTERNAL MEDICINE | Facility: CLINIC | Age: 64
End: 2024-05-22
Payer: COMMERCIAL

## 2024-05-22 VITALS — BODY MASS INDEX: 35 KG/M2 | HEIGHT: 71 IN | TEMPERATURE: 98.3 F | WEIGHT: 250 LBS

## 2024-05-22 DIAGNOSIS — N18.2 CHRONIC KIDNEY DISEASE, STAGE II (MILD): ICD-10-CM

## 2024-05-22 DIAGNOSIS — C43.9 METASTATIC MELANOMA: ICD-10-CM

## 2024-05-22 DIAGNOSIS — E44.0 MODERATE MALNUTRITION: ICD-10-CM

## 2024-05-22 DIAGNOSIS — Z00.00 ENCOUNTER FOR MEDICAL EXAMINATION TO ESTABLISH CARE: Primary | ICD-10-CM

## 2024-05-22 DIAGNOSIS — R10.32 INGUINAL PAIN, LEFT: ICD-10-CM

## 2024-05-22 PROBLEM — R41.82 ALTERED MENTAL STATUS: Status: RESOLVED | Noted: 2022-02-25 | Resolved: 2024-05-22

## 2024-05-22 PROBLEM — I25.728 ATHEROSCLEROSIS OF AUTOLOGOUS ARTERY CORONARY ARTERY BYPASS GRAFT(S) WITH OTHER FORMS OF ANGINA PECTORIS: Status: ACTIVE | Noted: 2022-10-17

## 2024-05-22 PROBLEM — D62 ACUTE POSTHEMORRHAGIC ANEMIA: Status: RESOLVED | Noted: 2021-12-11 | Resolved: 2024-05-22

## 2024-05-22 PROBLEM — G89.29 CHRONIC PAIN: Status: RESOLVED | Noted: 2021-09-26 | Resolved: 2024-05-22

## 2024-05-22 PROBLEM — B34.2 CORONAVIRUS INFECTION: Status: RESOLVED | Noted: 2023-12-26 | Resolved: 2024-05-22

## 2024-05-22 PROBLEM — R07.9 CHEST PAIN: Status: RESOLVED | Noted: 2023-06-15 | Resolved: 2024-05-22

## 2024-05-22 PROBLEM — I63.9 CVA (CEREBROVASCULAR ACCIDENT): Status: ACTIVE | Noted: 2022-01-13

## 2024-05-22 PROBLEM — R47.01 APHASIA: Status: RESOLVED | Noted: 2021-12-23 | Resolved: 2024-05-22

## 2024-05-22 PROBLEM — I70.1 ATHEROSCLEROSIS OF RENAL ARTERY: Chronic | Status: ACTIVE | Noted: 2022-02-25

## 2024-05-22 PROBLEM — I50.42 CHRONIC COMBINED SYSTOLIC AND DIASTOLIC CONGESTIVE HEART FAILURE: Status: ACTIVE | Noted: 2021-01-26

## 2024-05-22 PROBLEM — B34.9 ACUTE VIRAL DISEASE: Status: RESOLVED | Noted: 2021-09-26 | Resolved: 2024-05-22

## 2024-05-22 PROBLEM — F31.9 BIPOLAR DISORDER: Status: ACTIVE | Noted: 2022-02-25

## 2024-05-22 PROBLEM — I65.23 BILATERAL CAROTID ARTERY STENOSIS: Status: ACTIVE | Noted: 2022-03-02

## 2024-05-22 PROBLEM — I97.110 CARDIAC INSUFFICIENCY FOLLOWING CARDIAC SURGERY: Status: RESOLVED | Noted: 2021-12-11 | Resolved: 2024-05-22

## 2024-05-22 PROBLEM — F31.9 BIPOLAR DISORDER: Status: RESOLVED | Noted: 2022-02-25 | Resolved: 2024-05-22

## 2024-05-22 NOTE — LETTER
[facility]  [address]  [facility] [address]  [phone]        May 22, 2024      Patient: King George  YOB: 1960  Date of Visit: 5/22/2024      I am writing to provide clearance for Mr. King George to undergo hip replacement surgery. After thorough evaluation and consideration of their medical history, current condition, and pertinent diagnostic results, I am confident in recommending him for this procedure.  Based on the comprehensive assessment conducted, It is my medical opinion that Mr. King George is medically fit to undergo hip replacement surgery. We believe that the procedure will significantly improve their quality of life and alleviate the discomfort and limitations caused by their hip condition.    Should you require any further information or assistance, please do not hesitate to contact me at [phone].    Thank you for entrusting King's care to us. We look forward to collaborating with you to ensure a successful outcome for the upcoming surgery.    Sincerely        RENÉE Wright MD

## 2024-05-22 NOTE — PROGRESS NOTES
May 22, 2024    Hello, may I speak with King George?    My name is Margret       I am  with [facility]  [address]  [facility] [address].    Before we get started may I verify your date of birth? 1960    I am calling to officially welcome you to our practice and ask about your recent visit. Is this a good time to talk? yes    Tell me about your visit with us. What things went well?  visit  went  great  - will  be sending him  some patients        We're always looking for ways to make our patients' experiences even better. Do you have recommendations on ways we may improve?  no    Overall were you satisfied with your first visit to our practice? yes       I appreciate you taking the time to speak with me today. Is there anything else I can do for you? no      Thank you, and have a great day.

## 2024-05-22 NOTE — PROGRESS NOTES
Chief Complaint  Establish Care and Pre-op Exam (Right hip replacement// 5/31)    Subjective        King George presents to [facility]  History of Present Illness  #Establish Care  Pleasant 63 year old gentleman here to establish care and for pre-op evaluation prior to hip replacement surgery. Notably patient with medical chart that would indicate history of cardiovascular disease, however patient and patient's healthcare advocate report patient has same name, middle initial as another individual for whom he has previously experienced an identical issue. Reportedly patient's chart has been erroneously linked in some way, patient denies history of any cardiovascular disease or prior cardiovascular events preceding his pulmonary embolism. He notes he did not even have a primary care physician for quite some time prior to his abrupt introduction to the healthcare world on 5/28/2023. At that time he was admitted with a DVT, PE and subsequently found to have metastatic melanoma with mets to the liver, bone, brain and lungs, with a 2/2 rectal mass, along with what was later determined to be external invasion of the posterior wall of the bladder.. During this hospitalization he had a colonoscopy performed, a polyp was removed. He had a port placed on 6/1 of that year and was discharged to follow up with oncology and on eliquis for the management of his pe.     #hx of Stage 4 Metastatic Melanoma  Started on treatment with Ipilimumab/Opdivo on 6/, followed initially with Dr. Rocha at [facility], melanoma determined via biopsy to be BRAF V600e +. On 6/19/23 admitted to [facility] with severe drug rash, steffany. Again admitted on 7/7/23 with obstructive steffany in the setting of obstructive hydronephrosis 2/2 external ureteral compression that improved with bilateral ureteral stent placement. Since roughly 7/23, has been following with Dr. Daly at [facility].  "Admitted 12/19-12/22/23 due to Enterococcus septicemia/UTI and LENIN, with hypovolemia, hypotension, sepsis.   Admitted 12/25-12/29/23 with LENIN due to sepsis, hypovolemia, and UTI. Again treated with antibiotics. He was Covid positive as well. He reports a significant reduction in his overall tumour burden since this time, states that \"50% of my tumor is gone.\" He is very optimistic about his continued treatment and exceptionally thankful for all of the care he has received to this point.    #LENIN on CKD3a/b s/p b/l ureteral stents in 10/23  2/2 initially to extrinsic compressive effect on bilateral ureters from local tumor causing obstructive nephropathy, improved initially post bilateral ureteral stents, placed in October 2023. Initially told they would be temporary stents, however has jose been told they will be permanent. Kidney function varied over last year, with multiple akis, several in the setting of course of antibiotics or during hospitalization for sepsis (x2). Unclear where current baseline will be, however most recent sCr 1.78 on 5/16. Follows with Dr. Guillory at [facility] and Dr. Montelongo at [facility].     #Anticoag  History of PE, initial presenting complaint that prompted evaluation that discovered his malignancy. Patient initially treated with iv heparin was transitioned to eliquis, has been stable oneliquis for sometime. Some slight hematuria prior to the turn of the year, however that has since resolved. Planning to hold R Arthroplasty.    Objective   Vital Signs:  Temp 98.3 °F (36.8 °C)   Ht 180.3 cm (70.98\")   Wt 113 kg (250 lb)   BMI 34.88 kg/m²   Estimated body mass index is 34.88 kg/m² as calculated from the following:    Height as of this encounter: 180.3 cm (70.98\").    Weight as of this encounter: 113 kg (250 lb).             Physical Exam  Constitutional:       Appearance: Normal appearance.   HENT:      Head: Normocephalic and atraumatic.      Mouth/Throat:      " Mouth: Mucous membranes are moist.      Pharynx: Oropharynx is clear.   Eyes:      Extraocular Movements: Extraocular movements intact.      Conjunctiva/sclera: Conjunctivae normal.      Pupils: Pupils are equal, round, and reactive to light.   Cardiovascular:      Rate and Rhythm: Normal rate and regular rhythm.      Heart sounds: Normal heart sounds.   Pulmonary:      Effort: Pulmonary effort is normal.      Breath sounds: Normal breath sounds.   Abdominal:      General: Abdomen is flat.      Palpations: Abdomen is soft.   Musculoskeletal:      Cervical back: Normal range of motion.   Skin:     General: Skin is warm and dry.      Coloration: Skin is not jaundiced or pale.      Findings: No bruising, erythema or rash.   Neurological:      General: No focal deficit present.      Mental Status: He is alert and oriented to person, place, and time.   Psychiatric:         Mood and Affect: Mood normal.         Behavior: Behavior normal.         Thought Content: Thought content normal.         Judgment: Judgment normal.        Result Review :                     Assessment and Plan     Diagnoses and all orders for this visit:    1. Encounter for medical examination to establish care (Primary)    2. Moderate malnutrition    3. Metastatic melanoma    4. Chronic kidney disease, stage II (mild)    Very pleasant gentleman, here today to establish care and for pre-op evaluation prior to r TKA. Patient history complicated by Metastatic melanoma, pumonary embolism, CKD. Interestingly, Mr. George's chart has quite a few diagnoses that he states are erroneous and for which I can find no evidence either on physical exam or in the documented medical record. He denies any history of cardiovascular disease, states he has never had a cath or a cabg to his knowledge. He further denies any history of smoking or smoking related lung disease. He also denies a history of bipolar disorder. He was able to call a long time friend of his who is a  physician and serves as his health care advocate who can corroborate that she has known him and followed his health journey for greater than 20 years and to her knowledge he has never had a history of any of these conditions. Upon thorough chart review I could find no documentation of prior heart catheterization, psychiatric documentation of DSM Multi-Axial system, or pulmonary function testing or chest imaging that demonstrated COPD. Therefore at this time I'm inclined to believe that these records are erroneous. In light of this his calculated RCRI would be 0, indicating a relatively low (3.9) risk of MACE. In light of this, his significant pain burden and the limitations on his mobility and return to normalcy placed upon him by this condition, I think it is more than reasonable for him to proceed with tka. From a melanoma standpoint, following with Dr. Daly, improving well. Will need a follow up echo at some point in the next 3 months, as well as a ophthalmologic exam. These can be completed at his follow up appointment with me in September. Blood work reviewed from recent 5/16 labs, ckd appears relatively stable, egfr drift from 46 -> 43. Will continue to monitor           Follow Up     No follow-ups on file.  Patient was given instructions and counseling regarding his condition or for health maintenance advice. Please see specific information pulled into the AVS if appropriate.     Patient has been erroneously marked as diabetic. Based on the available clinical information, he does not have diabetes and should therefore be excluded from diabetic health maintenance and quality measures for the remainder of the reporting period.

## 2024-05-23 PROBLEM — R78.81 BACTEREMIA DUE TO ENTEROCOCCUS: Status: RESOLVED | Noted: 2023-12-21 | Resolved: 2024-05-23

## 2024-05-23 PROBLEM — Z86.711 HISTORY OF PULMONARY EMBOLISM: Status: RESOLVED | Noted: 2023-05-28 | Resolved: 2024-05-23

## 2024-05-23 PROBLEM — I50.42 CHRONIC COMBINED SYSTOLIC AND DIASTOLIC CONGESTIVE HEART FAILURE: Status: RESOLVED | Noted: 2021-01-26 | Resolved: 2024-05-23

## 2024-05-23 PROBLEM — Z86.718 PERSONAL HISTORY OF VENOUS THROMBOSIS AND EMBOLISM: Status: RESOLVED | Noted: 2023-06-19 | Resolved: 2024-05-23

## 2024-05-23 PROBLEM — L27.0 DRUG RASH: Status: RESOLVED | Noted: 2023-06-19 | Resolved: 2024-05-23

## 2024-05-23 PROBLEM — B95.2 BACTEREMIA DUE TO ENTEROCOCCUS: Status: RESOLVED | Noted: 2023-12-21 | Resolved: 2024-05-23

## 2024-05-23 PROBLEM — N28.9 ACUTE RENAL INSUFFICIENCY: Status: RESOLVED | Noted: 2023-12-26 | Resolved: 2024-05-23

## 2024-05-23 PROBLEM — R50.9 FEVER: Status: RESOLVED | Noted: 2023-12-26 | Resolved: 2024-05-23

## 2024-05-23 PROBLEM — N17.9 AKI (ACUTE KIDNEY INJURY): Status: RESOLVED | Noted: 2023-06-19 | Resolved: 2024-05-23

## 2024-05-23 PROBLEM — N13.30 HYDROURETERONEPHROSIS: Status: RESOLVED | Noted: 2023-07-07 | Resolved: 2024-05-23

## 2024-05-23 PROBLEM — A41.81 ENTEROCOCCAL SEPTICEMIA: Status: RESOLVED | Noted: 2023-12-22 | Resolved: 2024-05-23

## 2024-05-23 PROBLEM — B97.89 VIRAL SEPSIS: Status: RESOLVED | Noted: 2023-12-19 | Resolved: 2024-05-23

## 2024-05-23 PROBLEM — M79.605 ACUTE PAIN OF LOWER EXTREMITY, LEFT: Status: RESOLVED | Noted: 2023-07-12 | Resolved: 2024-05-23

## 2024-05-23 PROBLEM — A49.9 UTI (URINARY TRACT INFECTION), BACTERIAL: Status: RESOLVED | Noted: 2023-12-19 | Resolved: 2024-05-23

## 2024-05-23 PROBLEM — M79.605 ACUTE PAIN OF LEFT LOWER EXTREMITY: Status: RESOLVED | Noted: 2023-07-10 | Resolved: 2024-05-23

## 2024-05-23 PROBLEM — N39.0 UTI (URINARY TRACT INFECTION), BACTERIAL: Status: RESOLVED | Noted: 2023-12-19 | Resolved: 2024-05-23

## 2024-05-23 PROBLEM — A41.89 VIRAL SEPSIS: Status: RESOLVED | Noted: 2023-12-19 | Resolved: 2024-05-23

## 2024-09-09 ENCOUNTER — OFFICE VISIT (OUTPATIENT)
Dept: INTERNAL MEDICINE | Facility: CLINIC | Age: 64
End: 2024-09-09
Payer: COMMERCIAL

## 2024-09-09 VITALS
BODY MASS INDEX: 35.9 KG/M2 | TEMPERATURE: 98.7 F | SYSTOLIC BLOOD PRESSURE: 118 MMHG | HEIGHT: 71 IN | DIASTOLIC BLOOD PRESSURE: 78 MMHG | WEIGHT: 256.4 LBS

## 2024-09-09 DIAGNOSIS — C43.9 METASTATIC MELANOMA: Primary | ICD-10-CM

## 2024-09-09 DIAGNOSIS — Z79.01 CHRONIC ANTICOAGULATION: ICD-10-CM

## 2024-09-09 PROCEDURE — 99213 OFFICE O/P EST LOW 20 MIN: CPT | Performed by: STUDENT IN AN ORGANIZED HEALTH CARE EDUCATION/TRAINING PROGRAM

## 2024-09-09 NOTE — PROGRESS NOTES
"Chief Complaint  No chief complaint on file.    Subjective        King George presents to [facility]  History of Present Illness  #Metastatic Melanoma  Doing well overall.  Recent imaging from July demonstrated stable size of the metastatic lesions, primary lesion, overall doing well from that perspective.  Tolerating his medication without issue, remains on Braftovi, Mektovi doing well.    #Chronic anticoagulation  In the setting of his ongoing malignancy, on Eliquis 5 mg twice daily but currently with IVC filter in place, will get next week for IVC filter exchange, will restart Eliquis after this.  #Moderate malnutrition  Working on increasing dietary nutrition intake, gaining weight appropriately.  Objective   Vital Signs:  /78 (BP Location: Right arm, Patient Position: Sitting)   Temp 98.7 °F (37.1 °C)   Ht 180.3 cm (70.98\")   Wt 116 kg (256 lb 6.4 oz)   BMI 35.78 kg/m²   Estimated body mass index is 35.78 kg/m² as calculated from the following:    Height as of this encounter: 180.3 cm (70.98\").    Weight as of this encounter: 116 kg (256 lb 6.4 oz).          Physical Exam  Vitals reviewed.   Constitutional:       General: He is not in acute distress.     Appearance: Normal appearance.   HENT:      Head: Normocephalic and atraumatic.   Eyes:      General: No scleral icterus.     Extraocular Movements: Extraocular movements intact.      Pupils: Pupils are equal, round, and reactive to light.   Cardiovascular:      Rate and Rhythm: Normal rate and regular rhythm.      Heart sounds: No murmur heard.     No friction rub. No gallop.   Pulmonary:      Effort: Pulmonary effort is normal.      Breath sounds: Normal breath sounds. No wheezing, rhonchi or rales.   Abdominal:      General: Abdomen is flat. Bowel sounds are normal. There is no distension.      Palpations: Abdomen is soft.      Tenderness: There is no abdominal tenderness. There is no guarding.   Musculoskeletal:    "      General: Normal range of motion.      Right lower leg: No edema.      Left lower leg: No edema.   Skin:     General: Skin is warm and dry.      Capillary Refill: Capillary refill takes less than 2 seconds.      Coloration: Skin is not jaundiced.      Findings: No rash.   Neurological:      General: No focal deficit present.      Mental Status: He is alert and oriented to person, place, and time. Mental status is at baseline.   Psychiatric:         Mood and Affect: Mood normal.         Behavior: Behavior normal.        Result Review :                Assessment and Plan   Diagnoses and all orders for this visit:    1. Metastatic melanoma (Primary)    2. Chronic anticoagulation      Doing well overall, will restart Eliquis next week following his IVC filter Exchange.  Following oncology, nephrology.  Doing well from that  perspective.  No significant indication for medication change today.  Anemia appears relatively stable overall, will be obsessive follow-up blood work following filter exchange.Return to clinic in 3 months for routine follow-up.       Follow Up   No follow-ups on file.  Patient was given instructions and counseling regarding his condition or for health maintenance advice. Please see specific information pulled into the AVS if appropriate.

## 2025-01-09 ENCOUNTER — OFFICE VISIT (OUTPATIENT)
Dept: INTERNAL MEDICINE | Facility: CLINIC | Age: 65
End: 2025-01-09
Payer: COMMERCIAL

## 2025-01-09 VITALS
BODY MASS INDEX: 39.2 KG/M2 | DIASTOLIC BLOOD PRESSURE: 84 MMHG | WEIGHT: 280 LBS | HEIGHT: 71 IN | SYSTOLIC BLOOD PRESSURE: 130 MMHG

## 2025-01-09 DIAGNOSIS — D64.9 ANEMIA, UNSPECIFIED TYPE: ICD-10-CM

## 2025-01-09 DIAGNOSIS — N18.2 CHRONIC KIDNEY DISEASE, STAGE II (MILD): ICD-10-CM

## 2025-01-09 DIAGNOSIS — R17 SCLERAL ICTERUS: Primary | ICD-10-CM

## 2025-01-09 DIAGNOSIS — E44.0 MODERATE MALNUTRITION: ICD-10-CM

## 2025-01-09 PROCEDURE — 99214 OFFICE O/P EST MOD 30 MIN: CPT | Performed by: STUDENT IN AN ORGANIZED HEALTH CARE EDUCATION/TRAINING PROGRAM

## 2025-01-09 RX ORDER — TORSEMIDE 20 MG/1
1 TABLET ORAL DAILY
COMMUNITY
Start: 2024-11-01 | End: 2025-11-01

## 2025-01-09 NOTE — PROGRESS NOTES
"Chief Complaint  Anemia    Subjective        King George presents to [facility]  History of Present Illness  #anemia  Overall stable    #Lower extremity edema  Improving on torsemide    #MM w/ Brain Mets  Imaging reportedly stable, remains on mektovi, eliquis.    #Obesity  Gained almost 30lbs in the last year, believes its due to sedentary nature, diet  Objective   Vital Signs:  /84 (BP Location: Left arm, Patient Position: Sitting)   Ht 180.3 cm (70.98\")   Wt 127 kg (280 lb)   BMI 39.07 kg/m²   Estimated body mass index is 39.07 kg/m² as calculated from the following:    Height as of this encounter: 180.3 cm (70.98\").    Weight as of this encounter: 127 kg (280 lb).          Physical Exam  Constitutional:       Appearance: Normal appearance.   HENT:      Head: Normocephalic and atraumatic.   Eyes:      Extraocular Movements: Extraocular movements intact.   Pulmonary:      Effort: Pulmonary effort is normal.   Skin:     General: Skin is warm and dry.   Neurological:      General: No focal deficit present.      Mental Status: He is alert and oriented to person, place, and time. Mental status is at baseline.   Psychiatric:         Mood and Affect: Mood normal.         Behavior: Behavior normal.        Result Review :                Assessment and Plan   Diagnoses and all orders for this visit:    1. Scleral icterus (Primary)  -     Comprehensive metabolic panel    2. Anemia, unspecified type    3. Moderate malnutrition    4. Chronic kidney disease, stage II (mild)    Having some icterus, will obtain cmp to evaluate. Anemia reportedly stable and malnutrition concerns are greatly reduced. Will assess kidney function.         Follow Up   No follow-ups on file.  Patient was given instructions and counseling regarding his condition or for health maintenance advice. Please see specific information pulled into the AVS if appropriate.             "

## 2025-01-10 LAB
ALBUMIN SERPL-MCNC: 4.2 G/DL (ref 3.5–5.2)
ALBUMIN/GLOB SERPL: 1.2 G/DL
ALP SERPL-CCNC: 88 U/L (ref 39–117)
ALT SERPL-CCNC: 27 U/L (ref 1–41)
AST SERPL-CCNC: 24 U/L (ref 1–40)
BILIRUB SERPL-MCNC: 0.3 MG/DL (ref 0–1.2)
BUN SERPL-MCNC: 31 MG/DL (ref 8–23)
BUN/CREAT SERPL: 17.3 (ref 7–25)
CALCIUM SERPL-MCNC: 9.5 MG/DL (ref 8.6–10.5)
CHLORIDE SERPL-SCNC: 103 MMOL/L (ref 98–107)
CO2 SERPL-SCNC: 27.3 MMOL/L (ref 22–29)
CREAT SERPL-MCNC: 1.79 MG/DL (ref 0.76–1.27)
EGFRCR SERPLBLD CKD-EPI 2021: 42.3 ML/MIN/1.73
GLOBULIN SER CALC-MCNC: 3.5 GM/DL
GLUCOSE SERPL-MCNC: 119 MG/DL (ref 65–99)
POTASSIUM SERPL-SCNC: 5.1 MMOL/L (ref 3.5–5.2)
PROT SERPL-MCNC: 7.7 G/DL (ref 6–8.5)
SODIUM SERPL-SCNC: 139 MMOL/L (ref 136–145)

## 2025-03-12 ENCOUNTER — READMISSION MANAGEMENT (OUTPATIENT)
Dept: CALL CENTER | Facility: HOSPITAL | Age: 65
End: 2025-03-12
Payer: COMMERCIAL

## 2025-03-12 NOTE — OUTREACH NOTE
Prep Survey      Flowsheet Row Responses   [facility] facility patient discharged from? Non-[facility]   Is LACE score < 7 ? Non-BH Discharge   Eligibility [facility]   Date of Admission 03/07/25   Date of Discharge 03/12/25   Discharge Disposition Home or Self Care   Discharge diagnosis Closed fracture of proximal end of left humerus, initial encounter (Primary Dx),  Other closed displaced fracture of proximal end of left humerus, initial encounter,  Closed fracture of proximal end of left humerus, unspecified fracture morphology, initial encounter  [Total Shoulder Arthroplasty Reverse]   Does the patient have one of the following disease processes/diagnoses(primary or secondary)? Total Joint Replacement   Does the patient have Home health ordered? No   Is there a DME ordered? No   Comments regarding appointments OP PT--Kort   Prep survey completed? Yes            Tayler SAUL - Registered Nurse              Tayler SAUL - Registered Nurse

## 2025-03-13 ENCOUNTER — TRANSITIONAL CARE MANAGEMENT TELEPHONE ENCOUNTER (OUTPATIENT)
Dept: CALL CENTER | Facility: HOSPITAL | Age: 65
End: 2025-03-13
Payer: COMMERCIAL

## 2025-03-13 NOTE — OUTREACH NOTE
"Call Center TCM Note      Flowsheet Row Responses   [facility] patient discharged from? Non-   Does the patient have one of the following disease processes/diagnoses(primary or secondary)? Other   TCM attempt successful? Yes   Call start time 0927   Call end time 0930   Meds reviewed with patient/caregiver? Yes   Is the patient having any side effects they believe may be caused by any medication additions or changes? No   Does the patient have all medications ordered at discharge? Yes   Is the patient taking all medications as directed (includes completed medication regime)? Yes   Comments Ortho surgeon appt 03/20/25. Patient states has multiple upcoming appts, and desires to f/u with specialists only at this time.   Does the patient have an appointment with their PCP within 7-14 days of discharge? No   Nursing Interventions Patient desires to follow up with specialty only, Routed TCM call to PCP office   Has home health visited the patient within 72 hours of discharge? N/A   Psychosocial issues? No   Did the patient receive a copy of their discharge instructions? Yes   Nursing interventions Reviewed instructions with patient   What is the patient's perception of their health status since discharge? Returned to baseline/stable   Is the patient/caregiver able to teach back signs and symptoms related to disease process for when to call PCP? Yes   Is the patient/caregiver able to teach back signs and symptoms related to disease process for when to call 911? Yes   Is the patient/caregiver able to teach back the hierarchy of who to call/visit for symptoms/problems? PCP, Specialist, Home health nurse, Urgent Care, ED, 911 Yes   If the patient is a current smoker, are they able to teach back resources for cessation? Not a smoker   TCM call completed? Yes   Wrap up additional comments Brief call with patient. States is \"doing okay\" since discharged yesterday. States fracture was due to a fall. Reports dressing " dry/intact with no s/s of infection noted at this time. Denies any needs today. TCM complete.   Call end time 0930   Would this patient benefit from a Referral to [facility] Social Work? No   Is the patient interested in additional calls from an ambulatory ? No            Beverly Castorena RN    3/13/2025, 09:31 EDT

## (undated) DEVICE — Device

## (undated) DEVICE — SENSR O2 OXIMAX FNGR A/ 18IN NONSTR

## (undated) DEVICE — LOU MINOR PROCEDURE: Brand: MEDLINE INDUSTRIES, INC.

## (undated) DEVICE — NDL HYPO PRECISIONGLIDE REG 25G 1 1/2

## (undated) DEVICE — SYR LUERLOK 20CC BX/50

## (undated) DEVICE — CVR PROB 96IN LF STRL

## (undated) DEVICE — ANTIBACTERIAL UNDYED BRAIDED (POLYGLACTIN 910), SYNTHETIC ABSORBABLE SUTURE: Brand: COATED VICRYL

## (undated) DEVICE — PATIENT RETURN ELECTRODE, SINGLE-USE, CONTACT QUALITY MONITORING, ADULT, WITH 9FT CORD, FOR PATIENTS WEIGING OVER 33LBS. (15KG): Brand: MEGADYNE

## (undated) DEVICE — CATH URETRL FLXITP POLLACK STD 5F 70CM

## (undated) DEVICE — THE CARR-LOCKE INJECTION NEEDLE IS A SINGLE USE, DISPOSABLE, FLEXIBLE SHEATH INJECTION NEEDLE USED FOR THE INJECTION OF VARIOUS TYPES OF MEDIA THROUGH FLEXIBLE ENDOSCOPES.

## (undated) DEVICE — LN SMPL CO2 SHTRM SD STREAM W/M LUER

## (undated) DEVICE — KT ORCA ORCAPOD DISP STRL

## (undated) DEVICE — CANN O2 ETCO2 FITS ALL CONN CO2 SMPL A/ 7IN DISP LF

## (undated) DEVICE — GLV SURG SENSICARE PI MIC PF SZ6.5 LF STRL

## (undated) DEVICE — ADHS SKIN SURG TISS VISC PREMIERPRO EXOFIN HI/VISC FAST/DRY

## (undated) DEVICE — TUBING, SUCTION, 1/4" X 10', STRAIGHT: Brand: MEDLINE

## (undated) DEVICE — GLV SURG PREMIERPRO ORTHO LTX PF SZ7.5 BRN

## (undated) DEVICE — NITINOL WIRE WITH HYDROPHILIC TIP: Brand: SENSOR

## (undated) DEVICE — INTENDED FOR TISSUE SEPARATION, AND OTHER PROCEDURES THAT REQUIRE A SHARP SURGICAL BLADE TO PUNCTURE OR CUT.: Brand: BARD-PARKER ® CARBON RIB-BACK BLADES

## (undated) DEVICE — STRIP,CLOSURE,WOUND,MEDI-STRIP,1/2X4: Brand: MEDLINE

## (undated) DEVICE — SNAR POLYP SENSATION STDOVL 27 240 BX40

## (undated) DEVICE — LOU CYSTO: Brand: MEDLINE INDUSTRIES, INC.

## (undated) DEVICE — SUT MNCRYL PLS ANTIB UD 4/0 PS2 18IN

## (undated) DEVICE — ADAPT CLN BIOGUARD AIR/H2O DISP

## (undated) DEVICE — TRAP FLD MINIVAC MEGADYNE 100ML

## (undated) DEVICE — GLV SURG BIOGEL LTX PF 7

## (undated) DEVICE — 3M™ STERI-STRIP™ COMPOUND BENZOIN TINCTURE 40 BAGS/CARTON 4 CARTONS/CASE C1544: Brand: 3M™ STERI-STRIP™

## (undated) DEVICE — SUT PROLN 3/0 SH D/A 36IN 8522H

## (undated) DEVICE — TIDISHIELD UROLOGY DRAIN BAGS FROSTY VINYL STERILE FITS SIEMENS UROSKOP ACCESS 20 PER CASE: Brand: TIDISHIELD

## (undated) DEVICE — DRP C/ARM 41X74IN

## (undated) DEVICE — THE SINGLE USE ETRAP – POLYP TRAP IS USED FOR SUCTION RETRIEVAL OF ENDOSCOPICALLY REMOVED POLYPS.: Brand: ETRAP

## (undated) DEVICE — DECANTER BAG 9": Brand: MEDLINE INDUSTRIES, INC.

## (undated) DEVICE — TBG PENCL TELESCP MEGADYNE SMOKE EVAC 10FT

## (undated) DEVICE — ELECTRD BLD EZ CLN MOD XLNG 2.75IN